# Patient Record
Sex: MALE | Race: WHITE | Employment: OTHER | URBAN - NONMETROPOLITAN AREA
[De-identification: names, ages, dates, MRNs, and addresses within clinical notes are randomized per-mention and may not be internally consistent; named-entity substitution may affect disease eponyms.]

---

## 2017-10-11 DIAGNOSIS — M79.671 FOOT PAIN, BILATERAL: Primary | ICD-10-CM

## 2017-10-11 DIAGNOSIS — M79.672 FOOT PAIN, BILATERAL: Primary | ICD-10-CM

## 2017-10-11 PROCEDURE — L4210 ORTH DEV REPAIR/REPL MINOR P: HCPCS | Performed by: PODIATRIST

## 2022-12-07 ENCOUNTER — OFFICE VISIT (OUTPATIENT)
Dept: PODIATRY | Age: 67
End: 2022-12-07
Payer: MEDICARE

## 2022-12-07 VITALS
SYSTOLIC BLOOD PRESSURE: 130 MMHG | HEIGHT: 70 IN | HEART RATE: 88 BPM | DIASTOLIC BLOOD PRESSURE: 80 MMHG | BODY MASS INDEX: 36.79 KG/M2 | WEIGHT: 257 LBS

## 2022-12-07 DIAGNOSIS — B35.1 DERMATOPHYTOSIS OF NAIL: ICD-10-CM

## 2022-12-07 DIAGNOSIS — M20.42 HAMMER TOES OF BOTH FEET: ICD-10-CM

## 2022-12-07 DIAGNOSIS — E11.42 DM TYPE 2 WITH DIABETIC PERIPHERAL NEUROPATHY (HCC): Primary | ICD-10-CM

## 2022-12-07 DIAGNOSIS — M20.41 HAMMER TOES OF BOTH FEET: ICD-10-CM

## 2022-12-07 PROCEDURE — 1036F TOBACCO NON-USER: CPT | Performed by: PODIATRIST

## 2022-12-07 PROCEDURE — 3017F COLORECTAL CA SCREEN DOC REV: CPT | Performed by: PODIATRIST

## 2022-12-07 PROCEDURE — G8484 FLU IMMUNIZE NO ADMIN: HCPCS | Performed by: PODIATRIST

## 2022-12-07 PROCEDURE — 2022F DILAT RTA XM EVC RTNOPTHY: CPT | Performed by: PODIATRIST

## 2022-12-07 PROCEDURE — 1123F ACP DISCUSS/DSCN MKR DOCD: CPT | Performed by: PODIATRIST

## 2022-12-07 PROCEDURE — G8427 DOCREV CUR MEDS BY ELIG CLIN: HCPCS | Performed by: PODIATRIST

## 2022-12-07 PROCEDURE — G8417 CALC BMI ABV UP PARAM F/U: HCPCS | Performed by: PODIATRIST

## 2022-12-07 PROCEDURE — 11720 DEBRIDE NAIL 1-5: CPT | Performed by: PODIATRIST

## 2022-12-07 PROCEDURE — 3046F HEMOGLOBIN A1C LEVEL >9.0%: CPT | Performed by: PODIATRIST

## 2022-12-07 PROCEDURE — 99203 OFFICE O/P NEW LOW 30 MIN: CPT | Performed by: PODIATRIST

## 2022-12-07 RX ORDER — GLIPIZIDE 10 MG/1
10 TABLET ORAL
COMMUNITY
Start: 2022-06-28

## 2022-12-07 RX ORDER — SILDENAFIL 50 MG/1
25 TABLET, FILM COATED ORAL
COMMUNITY
Start: 2022-05-18

## 2022-12-07 RX ORDER — GABAPENTIN 300 MG/1
300 CAPSULE ORAL 2 TIMES DAILY
COMMUNITY

## 2022-12-07 RX ORDER — ATORVASTATIN CALCIUM 80 MG/1
40 TABLET, FILM COATED ORAL
COMMUNITY
Start: 2022-05-18

## 2022-12-07 NOTE — PROGRESS NOTES
Foot Care Worksheet  PCP: Bret Marion  Last visit: 11/14/2022    Nail description: Thick , Yellow , Crumbly , Marked limitation of ambulation     Pain resulting from thickened and dystrophy of nail plate No    Nails involved  Right   5  (T5-T9)  Left     1, 5  (TA-T4)    Q7 1 Class A Finding - Non traumatic amputation of foot No    Q8 2 Class B Findings - Absent DP pulse No, Absent PT pulse No, Advanced tropic changes (3 required) Yes    Decrease hair growth Yes, Nail changes/thickening Yes, Pigmented changes/discoloration Yes, Skin texture (thin, shiny) No, Skin color (rubor/redness) No    Q9 1 Class B and 2 Class C Findings  Claudication No, Temperature change Yes, Paresthesia No, Burning No, Edema Yes    Subjective:  Jeff Zuniga is a 69 y.o. male who presents to the office today complaining of thick deformed nails. Symptoms began  year(s) ago. Patient relates pain is Present. Pain is rated 1 out of 10 and is described as mild. Treatments prior to today's visit include: none recent. Currently denies F/C/N/V. Allergies   Allergen Reactions    Bee Venom     Naproxen     Lidocaine Rash     ? Past Medical History:   Diagnosis Date    Allergic rhinitis     Colon polyps     history of    Elevated WBC count     history of    Hyperlipidemia     Hypertension     Obesity     Type II or unspecified type diabetes mellitus without mention of complication, not stated as uncontrolled     Unspecified sleep apnea        Prior to Admission medications    Medication Sig Start Date End Date Taking?  Authorizing Provider   atorvastatin (LIPITOR) 80 MG tablet 40 mg 5/18/22  Yes Historical Provider, MD   glipiZIDE (GLUCOTROL) 10 MG tablet 10 mg 6/28/22  Yes Historical Provider, MD   Dulaglutide 0.75 MG/0.5ML SOPN INJECT 0.75MG (0.5ML) UNDER SKIN ONCE EVERY WEEK FOR TYPE 2 DIABETES MELLITUS **REPLACES [facility]** 11/17/22  Yes Historical Provider, MD   vitamin D (CHOLECALCIFEROL) 25 MCG (1000 UT) TABS tablet TAKE TWO TABLETS BY MOUTH EVERY DAY 14  Yes Historical Provider, MD   empagliflozin (JARDIANCE) 25 MG tablet 12.5 mg 22  Yes Historical Provider, MD   sildenafil (VIAGRA) 50 MG tablet 25 mg 22  Yes Historical Provider, MD   gabapentin (NEURONTIN) 300 MG capsule Take 300 mg by mouth 2 times daily. Yes Historical Provider, MD   lisinopril-hydrochlorothiazide (PRINZIDE;ZESTORETIC) 20-12.5 MG per tablet Take 1 tablet by mouth daily. 14  Yes VERNON Woodall   metFORMIN (GLUCOPHAGE) 1000 MG tablet Take 1,000 mg by mouth 2 times daily. Yes Historical Provider, MD   loratadine (CLARITIN) 10 MG tablet Take 10 mg by mouth daily. Yes Historical Provider, MD   aspirin 81 MG tablet Take 81 mg by mouth daily. Yes Historical Provider, MD   Multiple Vitamins-Minerals (MULTIVITAMIN PO) Take  by mouth daily. Yes Historical Provider, MD   Omega-3 Fatty Acids (FISH OIL) 1000 MG CAPS Take 1,000 mg by mouth 2 times daily. Yes Historical Provider, MD       Past Surgical History:   Procedure Laterality Date    CARPAL TUNNEL RELEASE Right 12/3/2010    COLONOSCOPY  2009    polyps    INGUINAL HERNIA REPAIR Right 1977    TOENAIL EXCISION Left 2003    removal nail plate 1st digit       Family History   Problem Relation Age of Onset    Heart Disease Mother     High Blood Pressure Mother     Diabetes Mother     Heart Disease Father     High Blood Pressure Other        Social History     Tobacco Use    Smoking status: Former     Types: Cigarettes     Quit date:      Years since quittin.9    Smokeless tobacco: Not on file   Substance Use Topics    Alcohol use: Not Currently       ROS: All 14 ROS systems reviewed and pertinent positives noted above, all others negative. Lower Extremity Physical Examination:     Vitals:   Vitals:    22 1043   BP: 130/80   Pulse: 88     General: AAO x 3 in NAD.      Vascular: DP and PT pulses palpable 2/4, bilateral.  CFT <3 seconds, bilateral.  Hair growth absent to the level of the digits, bilateral.  Edema present, bilateral.  Varicosities present, bilateral. Erythema absent, bilateral. Distal Rubor absent bilateral.  Temperature decreased bilateral. Hyperpigmentation present bilateral. Atrophic skin yes. Neurological: Sensation Impaired to light touch to level of digits, bilateral.  Protective sensation intact  10/10 sites via 5.07/10g [place] Monofilament, bilateral.  negative Tinel's, bilateral.  negative Valleix sign, bilateral.  Vibratory abnormal  bilateral.  Reflexes Decreased bilateral.  Paresthesias positive. Dysthesias negative. Sharp/dull abnormal  bilateral.   Musculoskeletal: Muscle strength 5/5, bilateral.  Pain absent upon palpation bilateral. Normal medial longitudinal arch, bilateral.  Ankle ROM decreased,bilateral.  1st MPJ ROM within normal limits, bilateral.  Dorsally contracted digits present. No other foot deformities. Integument:  Open lesion absent, bilateral.  Interdigital maceration absent to web spaces bilateral.   Nails left 1, 5 and right 5 thickened, dystrophic and crumbly, discolored with subungual debris. Fissures absent, bilateral. Hyperkeratotic tissue is absent. Previous nail procedures on multiple nails with only spicules remaining. Asessment: Patient is a 69 y.o. male with:    Diagnosis Orders   1. DM type 2 with diabetic peripheral neuropathy ([address]        2. Dermatophytosis of nail        3. Hammer toes of both feet            Plan: Patient examined and evaluated. Current condition and treatment options discussed in detail. Pt given tx options for anti fungal therapy. Pt educated on Rx po lamisil, Rx topical Penlac, OTC home remedies or other OTC topical meds. All nails as mentioned above debrided in length and thickness. Patient advised OTC methods for treatment of the mycotic nails. Patient will follow up in 10 weeks. DM foot ed and exam  Patient is low level medical decision making. Patient is stable chronic condition. No new testing or Rx. Low risk morbidity. Contact office with any questions/problems/concerns.

## 2023-02-15 ENCOUNTER — OFFICE VISIT (OUTPATIENT)
Dept: PODIATRY | Age: 68
End: 2023-02-15
Payer: OTHER GOVERNMENT

## 2023-02-15 VITALS
HEIGHT: 70 IN | BODY MASS INDEX: 36.51 KG/M2 | DIASTOLIC BLOOD PRESSURE: 84 MMHG | HEART RATE: 76 BPM | SYSTOLIC BLOOD PRESSURE: 138 MMHG | WEIGHT: 255 LBS

## 2023-02-15 DIAGNOSIS — E11.42 DM TYPE 2 WITH DIABETIC PERIPHERAL NEUROPATHY (HCC): Primary | ICD-10-CM

## 2023-02-15 DIAGNOSIS — B35.1 DERMATOPHYTOSIS OF NAIL: ICD-10-CM

## 2023-02-15 PROCEDURE — 11720 DEBRIDE NAIL 1-5: CPT | Performed by: PODIATRIST

## 2023-02-15 PROCEDURE — 99999 PR OFFICE/OUTPT VISIT,PROCEDURE ONLY: CPT | Performed by: PODIATRIST

## 2023-02-15 NOTE — PROGRESS NOTES
Foot Care Worksheet  PCP: Michelle Armstrong  Last visit: 11/14/2022    Nail description: Thick , Yellow , Crumbly , Marked limitation of ambulation     Pain resulting from thickened and dystrophy of nail plate No    Nails involved  Right   5  (T5-T9)  Left     1, 5  (TA-T4)    Q7 1 Class A Finding - Non traumatic amputation of foot No    Q8 2 Class B Findings - Absent DP pulse No, Absent PT pulse No, Advanced tropic changes (3 required) Yes    Decrease hair growth Yes, Nail changes/thickening Yes, Pigmented changes/discoloration Yes, Skin texture (thin, shiny) No, Skin color (rubor/redness) No    Q9 1 Class B and 2 Class C Findings  Claudication No, Temperature change Yes, Paresthesia No, Burning No, Edema Yes    Subjective:  Carla Woods is a 69 y.o. male who presents to the office today for DM foot care. Currently denies F/C/N/V. Allergies   Allergen Reactions    Bee Venom     Naproxen     Lidocaine Rash     ? Past Medical History:   Diagnosis Date    Allergic rhinitis     Colon polyps     history of    Elevated WBC count     history of    Hyperlipidemia     Hypertension     Obesity     Type II or unspecified type diabetes mellitus without mention of complication, not stated as uncontrolled     Unspecified sleep apnea        Prior to Admission medications    Medication Sig Start Date End Date Taking?  Authorizing Provider   atorvastatin (LIPITOR) 80 MG tablet 40 mg 5/18/22  Yes Historical Provider, MD   glipiZIDE (GLUCOTROL) 10 MG tablet 10 mg 6/28/22  Yes Historical Provider, MD   Dulaglutide 0.75 MG/0.5ML SOPN INJECT 0.75MG (0.5ML) UNDER SKIN ONCE EVERY WEEK FOR TYPE 2 DIABETES MELLITUS **REPLACES [facility]** 11/17/22  Yes Historical Provider, MD   vitamin D (CHOLECALCIFEROL) 25 MCG (1000 UT) TABS tablet TAKE TWO TABLETS BY MOUTH EVERY DAY 5/29/14  Yes Historical Provider, MD   empagliflozin (JARDIANCE) 25 MG tablet 12.5 mg 5/18/22  Yes Historical Provider, MD   sildenafil (VIAGRA) 50 MG tablet 25 mg 22  Yes Historical Provider, MD   gabapentin (NEURONTIN) 300 MG capsule Take 300 mg by mouth 2 times daily. Yes Historical Provider, MD   lisinopril-hydrochlorothiazide (PRINZIDE;ZESTORETIC) 20-12.5 MG per tablet Take 1 tablet by mouth daily. 14  Yes VERNON Joyner   metFORMIN (GLUCOPHAGE) 1000 MG tablet Take 1,000 mg by mouth 2 times daily. Yes Historical Provider, MD   loratadine (CLARITIN) 10 MG tablet Take 10 mg by mouth daily. Yes Historical Provider, MD   aspirin 81 MG tablet Take 81 mg by mouth daily. Yes Historical Provider, MD   Multiple Vitamins-Minerals (MULTIVITAMIN PO) Take  by mouth daily. Yes Historical Provider, MD   Omega-3 Fatty Acids (FISH OIL) 1000 MG CAPS Take 1,000 mg by mouth 2 times daily. Yes Historical Provider, MD       Past Surgical History:   Procedure Laterality Date    CARPAL TUNNEL RELEASE Right 12/3/2010    COLONOSCOPY  2009    polyps    INGUINAL HERNIA REPAIR Right 1977    TOENAIL EXCISION Left 2003    removal nail plate 1st digit       Family History   Problem Relation Age of Onset    Heart Disease Mother     High Blood Pressure Mother     Diabetes Mother     Heart Disease Father     High Blood Pressure Other        Social History     Tobacco Use    Smoking status: Former     Types: Cigarettes     Quit date:      Years since quittin.1    Smokeless tobacco: Not on file   Substance Use Topics    Alcohol use: Not Currently       ROS: All 14 ROS systems reviewed and pertinent positives noted above, all others negative. Lower Extremity Physical Examination:     Vitals: There were no vitals filed for this visit. General: AAO x 3 in NAD.      Vascular: DP and PT pulses palpable 2/4, bilateral.  CFT <3 seconds, bilateral.  Hair growth absent to the level of the digits, bilateral.  Edema present, bilateral.  Varicosities present, bilateral. Erythema absent, bilateral. Distal Rubor absent bilateral.  Temperature decreased bilateral. Hyperpigmentation present bilateral. Atrophic skin yes. Neurological: Sensation Impaired to light touch to level of digits, bilateral.  Protective sensation intact  10/10 sites via 5.07/10g [place]andon Monofilament, bilateral.  negative Tinel's, bilateral.  negative Valleix sign, bilateral.  Vibratory abnormal  bilateral.  Reflexes Decreased bilateral.  Paresthesias positive. Dysthesias negative. Sharp/dull abnormal  bilateral.   Musculoskeletal: Muscle strength 5/5, bilateral.  Pain absent upon palpation bilateral. Normal medial longitudinal arch, bilateral.  Ankle ROM decreased,bilateral.  1st MPJ ROM within normal limits, bilateral.  Dorsally contracted digits present. No other foot deformities. Integument:  Open lesion absent, bilateral.  Interdigital maceration absent to web spaces bilateral.   Nails left 1, 5 and right 5 thickened, dystrophic and crumbly, discolored with subungual debris. Fissures absent, bilateral. Hyperkeratotic tissue is absent. Previous nail procedures on multiple nails with only spicules remaining. Asessment: Patient is a 69 y.o. male with:    Diagnosis Orders   1. DM type 2 with diabetic peripheral neuropathy ([address]        2. Dermatophytosis of nail              Plan: Patient examined and evaluated. Current condition and treatment options discussed in detail. All nails as mentioned above debrided in length and thickness. Patient advised OTC methods for treatment of the mycotic nails. Patient will follow up in 4 months  Contact office with any questions/problems/concerns.

## 2023-03-05 ENCOUNTER — APPOINTMENT (OUTPATIENT)
Dept: GENERAL RADIOLOGY | Age: 68
End: 2023-03-05
Payer: MEDICARE

## 2023-03-05 ENCOUNTER — APPOINTMENT (OUTPATIENT)
Dept: CT IMAGING | Age: 68
End: 2023-03-05
Payer: MEDICARE

## 2023-03-05 ENCOUNTER — HOSPITAL ENCOUNTER (INPATIENT)
Age: 68
LOS: 6 days | Discharge: HOME | End: 2023-03-11
Attending: SPECIALIST | Admitting: FAMILY MEDICINE
Payer: MEDICARE

## 2023-03-05 ENCOUNTER — ANESTHESIA (OUTPATIENT)
Dept: OPERATING ROOM | Age: 68
End: 2023-03-05
Payer: MEDICARE

## 2023-03-05 ENCOUNTER — ANESTHESIA EVENT (OUTPATIENT)
Dept: OPERATING ROOM | Age: 68
End: 2023-03-05
Payer: MEDICARE

## 2023-03-05 DIAGNOSIS — K56.600 PARTIAL SMALL BOWEL OBSTRUCTION (HCC): Primary | ICD-10-CM

## 2023-03-05 DIAGNOSIS — K56.609 SMALL BOWEL OBSTRUCTION (HCC): ICD-10-CM

## 2023-03-05 PROBLEM — A41.9 SEPSIS WITH ACUTE RENAL FAILURE (HCC): Status: ACTIVE | Noted: 2023-03-05

## 2023-03-05 PROBLEM — N17.9 SEPSIS WITH ACUTE RENAL FAILURE (HCC): Status: ACTIVE | Noted: 2023-03-05

## 2023-03-05 PROBLEM — R65.20 SEPSIS WITH ACUTE RENAL FAILURE (HCC): Status: ACTIVE | Noted: 2023-03-05

## 2023-03-05 LAB
ABSOLUTE EOS #: 0.03 K/UL (ref 0–0.44)
ABSOLUTE EOS #: 0.26 K/UL (ref 0–0.44)
ABSOLUTE IMMATURE GRANULOCYTE: 0.04 K/UL (ref 0–0.3)
ABSOLUTE IMMATURE GRANULOCYTE: 0.11 K/UL (ref 0–0.3)
ABSOLUTE LYMPH #: 0.88 K/UL (ref 1.1–3.7)
ABSOLUTE LYMPH #: 2.19 K/UL (ref 1.1–3.7)
ABSOLUTE MONO #: 0.84 K/UL (ref 0.1–1.2)
ABSOLUTE MONO #: 1.3 K/UL (ref 0.1–1.2)
ALBUMIN SERPL-MCNC: 4.6 G/DL (ref 3.5–5.2)
ALBUMIN/GLOBULIN RATIO: 1.5 (ref 1–2.5)
ALP SERPL-CCNC: 103 U/L (ref 40–129)
ALT SERPL-CCNC: 25 U/L (ref 5–41)
ANION GAP SERPL CALCULATED.3IONS-SCNC: 16 MMOL/L (ref 9–17)
ANION GAP SERPL CALCULATED.3IONS-SCNC: 20 MMOL/L (ref 9–17)
AST SERPL-CCNC: 25 U/L
BASOPHILS # BLD: 0 % (ref 0–2)
BASOPHILS # BLD: 0 % (ref 0–2)
BASOPHILS ABSOLUTE: 0.06 K/UL (ref 0–0.2)
BASOPHILS ABSOLUTE: 0.09 K/UL (ref 0–0.2)
BILIRUB SERPL-MCNC: 0.6 MG/DL (ref 0.3–1.2)
BILIRUBIN URINE: NEGATIVE
BUN SERPL-MCNC: 24 MG/DL (ref 8–23)
BUN SERPL-MCNC: 26 MG/DL (ref 8–23)
BUN/CREAT BLD: 15 (ref 9–20)
BUN/CREAT BLD: 17 (ref 9–20)
CALCIUM SERPL-MCNC: 8.2 MG/DL (ref 8.6–10.4)
CALCIUM SERPL-MCNC: 9.6 MG/DL (ref 8.6–10.4)
CHLORIDE SERPL-SCNC: 102 MMOL/L (ref 98–107)
CHLORIDE SERPL-SCNC: 98 MMOL/L (ref 98–107)
CO2 SERPL-SCNC: 21 MMOL/L (ref 20–31)
CO2 SERPL-SCNC: 22 MMOL/L (ref 20–31)
COLOR: YELLOW
COMMENT UA: ABNORMAL
CREAT SERPL-MCNC: 1.39 MG/DL (ref 0.7–1.2)
CREAT SERPL-MCNC: 1.73 MG/DL (ref 0.7–1.2)
EOSINOPHILS RELATIVE PERCENT: 0 % (ref 1–4)
EOSINOPHILS RELATIVE PERCENT: 1 % (ref 1–4)
GFR SERPL CREATININE-BSD FRML MDRD: 42 ML/MIN/1.73M2
GFR SERPL CREATININE-BSD FRML MDRD: 55 ML/MIN/1.73M2
GLUCOSE BLD-MCNC: 169 MG/DL (ref 75–110)
GLUCOSE SERPL-MCNC: 145 MG/DL (ref 70–99)
GLUCOSE SERPL-MCNC: 243 MG/DL (ref 70–99)
GLUCOSE UR STRIP.AUTO-MCNC: ABNORMAL MG/DL
HCT VFR BLD AUTO: 42.2 % (ref 40.7–50.3)
HCT VFR BLD AUTO: 47.4 % (ref 40.7–50.3)
HGB BLD-MCNC: 13.9 G/DL (ref 13–17)
HGB BLD-MCNC: 15.8 G/DL (ref 13–17)
IMMATURE GRANULOCYTES: 0 %
IMMATURE GRANULOCYTES: 1 %
KETONES UR STRIP.AUTO-MCNC: NEGATIVE MG/DL
LACTATE PLASV-SCNC: 2.9 MMOL/L (ref 0.5–2.2)
LACTATE PLASV-SCNC: 3.8 MMOL/L (ref 0.5–2.2)
LACTATE PLASV-SCNC: 4.1 MMOL/L (ref 0.5–2.2)
LACTATE PLASV-SCNC: 4.3 MMOL/L (ref 0.5–2.2)
LACTIC ACID, SEPSIS: 5.1 MMOL/L (ref 0.5–1.9)
LEUKOCYTE ESTERASE UR QL STRIP.AUTO: NEGATIVE
LIPASE SERPL-CCNC: 41 U/L (ref 13–60)
LYMPHOCYTES # BLD: 6 % (ref 24–43)
LYMPHOCYTES # BLD: 9 % (ref 24–43)
MCH RBC QN AUTO: 27.9 PG (ref 25.2–33.5)
MCH RBC QN AUTO: 28.1 PG (ref 25.2–33.5)
MCHC RBC AUTO-ENTMCNC: 32.9 G/DL (ref 25.2–33.5)
MCHC RBC AUTO-ENTMCNC: 33.3 G/DL (ref 25.2–33.5)
MCV RBC AUTO: 83.6 FL (ref 82.6–102.9)
MCV RBC AUTO: 85.3 FL (ref 82.6–102.9)
MONOCYTES # BLD: 6 % (ref 3–12)
MONOCYTES # BLD: 6 % (ref 3–12)
NITRITE UR QL STRIP.AUTO: NEGATIVE
NRBC AUTOMATED: 0 PER 100 WBC
NRBC AUTOMATED: 0 PER 100 WBC
PDW BLD-RTO: 13.5 % (ref 11.8–14.4)
PDW BLD-RTO: 13.8 % (ref 11.8–14.4)
PLATELET # BLD AUTO: 347 K/UL (ref 138–453)
PLATELET # BLD AUTO: 448 K/UL (ref 138–453)
PMV BLD AUTO: 9.3 FL (ref 8.1–13.5)
PMV BLD AUTO: 9.8 FL (ref 8.1–13.5)
POTASSIUM SERPL-SCNC: 4.2 MMOL/L (ref 3.7–5.3)
POTASSIUM SERPL-SCNC: 4.6 MMOL/L (ref 3.7–5.3)
PROT SERPL-MCNC: 7.7 G/DL (ref 6.4–8.3)
PROT UR STRIP.AUTO-MCNC: 5.5 MG/DL (ref 5–6)
PROT UR STRIP.AUTO-MCNC: NEGATIVE MG/DL
RBC # BLD: 4.95 M/UL (ref 4.21–5.77)
RBC # BLD: 5.67 M/UL (ref 4.21–5.77)
SARS-COV-2 RDRP RESP QL NAA+PROBE: NOT DETECTED
SEG NEUTROPHILS: 83 % (ref 36–65)
SEG NEUTROPHILS: 88 % (ref 36–65)
SEGMENTED NEUTROPHILS ABSOLUTE COUNT: 13.41 K/UL (ref 1.5–8.1)
SEGMENTED NEUTROPHILS ABSOLUTE COUNT: 19.45 K/UL (ref 1.5–8.1)
SODIUM SERPL-SCNC: 139 MMOL/L (ref 135–144)
SODIUM SERPL-SCNC: 140 MMOL/L (ref 135–144)
SPECIFIC GRAVITY UA: 1.01 (ref 1.01–1.02)
SPECIMEN DESCRIPTION: NORMAL
TROPONIN I SERPL DL<=0.01 NG/ML-MCNC: 31 NG/L (ref 0–22)
TROPONIN I SERPL DL<=0.01 NG/ML-MCNC: 32 NG/L (ref 0–22)
TROPONIN I SERPL DL<=0.01 NG/ML-MCNC: 34 NG/L (ref 0–22)
TROPONIN I SERPL DL<=0.01 NG/ML-MCNC: 55 NG/L (ref 0–22)
TURBIDITY: CLEAR
URINE HGB: NEGATIVE
UROBILINOGEN, URINE: NORMAL
WBC # BLD AUTO: 15.3 K/UL (ref 3.5–11.3)
WBC # BLD AUTO: 23.4 K/UL (ref 3.5–11.3)

## 2023-03-05 PROCEDURE — 71045 X-RAY EXAM CHEST 1 VIEW: CPT

## 2023-03-05 PROCEDURE — 2580000003 HC RX 258: Performed by: SPECIALIST

## 2023-03-05 PROCEDURE — 93005 ELECTROCARDIOGRAM TRACING: CPT | Performed by: SPECIALIST

## 2023-03-05 PROCEDURE — 87075 CULTR BACTERIA EXCEPT BLOOD: CPT

## 2023-03-05 PROCEDURE — 81003 URINALYSIS AUTO W/O SCOPE: CPT

## 2023-03-05 PROCEDURE — 2580000003 HC RX 258

## 2023-03-05 PROCEDURE — 2580000003 HC RX 258: Performed by: NURSE ANESTHETIST, CERTIFIED REGISTERED

## 2023-03-05 PROCEDURE — 0DJW4ZZ INSPECTION OF PERITONEUM, PERCUTANEOUS ENDOSCOPIC APPROACH: ICD-10-PCS | Performed by: SURGERY

## 2023-03-05 PROCEDURE — 2500000003 HC RX 250 WO HCPCS: Performed by: NURSE ANESTHETIST, CERTIFIED REGISTERED

## 2023-03-05 PROCEDURE — 6360000002 HC RX W HCPCS

## 2023-03-05 PROCEDURE — 84484 ASSAY OF TROPONIN QUANT: CPT

## 2023-03-05 PROCEDURE — 6360000002 HC RX W HCPCS: Performed by: FAMILY MEDICINE

## 2023-03-05 PROCEDURE — 2580000003 HC RX 258: Performed by: FAMILY MEDICINE

## 2023-03-05 PROCEDURE — 83690 ASSAY OF LIPASE: CPT

## 2023-03-05 PROCEDURE — 87040 BLOOD CULTURE FOR BACTERIA: CPT

## 2023-03-05 PROCEDURE — 85025 COMPLETE CBC W/AUTO DIFF WBC: CPT

## 2023-03-05 PROCEDURE — 87070 CULTURE OTHR SPECIMN AEROBIC: CPT

## 2023-03-05 PROCEDURE — 2709999900 HC NON-CHARGEABLE SUPPLY: Performed by: SURGERY

## 2023-03-05 PROCEDURE — 3700000001 HC ADD 15 MINUTES (ANESTHESIA): Performed by: SURGERY

## 2023-03-05 PROCEDURE — 6360000004 HC RX CONTRAST MEDICATION: Performed by: SPECIALIST

## 2023-03-05 PROCEDURE — 2580000003 HC RX 258: Performed by: SURGERY

## 2023-03-05 PROCEDURE — 99285 EMERGENCY DEPT VISIT HI MDM: CPT

## 2023-03-05 PROCEDURE — 99222 1ST HOSP IP/OBS MODERATE 55: CPT | Performed by: FAMILY MEDICINE

## 2023-03-05 PROCEDURE — 87635 SARS-COV-2 COVID-19 AMP PRB: CPT

## 2023-03-05 PROCEDURE — 3600000004 HC SURGERY LEVEL 4 BASE: Performed by: SURGERY

## 2023-03-05 PROCEDURE — 87205 SMEAR GRAM STAIN: CPT

## 2023-03-05 PROCEDURE — 6360000002 HC RX W HCPCS: Performed by: NURSE ANESTHETIST, CERTIFIED REGISTERED

## 2023-03-05 PROCEDURE — 36415 COLL VENOUS BLD VENIPUNCTURE: CPT

## 2023-03-05 PROCEDURE — 0D9670Z DRAINAGE OF STOMACH WITH DRAINAGE DEVICE, VIA NATURAL OR ARTIFICIAL OPENING: ICD-10-PCS | Performed by: FAMILY MEDICINE

## 2023-03-05 PROCEDURE — 7100000001 HC PACU RECOVERY - ADDTL 15 MIN: Performed by: SURGERY

## 2023-03-05 PROCEDURE — 7100000000 HC PACU RECOVERY - FIRST 15 MIN: Performed by: SURGERY

## 2023-03-05 PROCEDURE — 2709999900 CTA ABDOMEN PELVIS W CONTRAST

## 2023-03-05 PROCEDURE — 80053 COMPREHEN METABOLIC PANEL: CPT

## 2023-03-05 PROCEDURE — 83605 ASSAY OF LACTIC ACID: CPT

## 2023-03-05 PROCEDURE — 83036 HEMOGLOBIN GLYCOSYLATED A1C: CPT

## 2023-03-05 PROCEDURE — 80048 BASIC METABOLIC PNL TOTAL CA: CPT

## 2023-03-05 PROCEDURE — 64488 TAP BLOCK BI INJECTION: CPT | Performed by: NURSE ANESTHETIST, CERTIFIED REGISTERED

## 2023-03-05 PROCEDURE — 82947 ASSAY GLUCOSE BLOOD QUANT: CPT

## 2023-03-05 PROCEDURE — 6360000002 HC RX W HCPCS: Performed by: SPECIALIST

## 2023-03-05 PROCEDURE — 96376 TX/PRO/DX INJ SAME DRUG ADON: CPT

## 2023-03-05 PROCEDURE — 2060000000 HC ICU INTERMEDIATE R&B

## 2023-03-05 PROCEDURE — 3600000014 HC SURGERY LEVEL 4 ADDTL 15MIN: Performed by: SURGERY

## 2023-03-05 PROCEDURE — C9113 INJ PANTOPRAZOLE SODIUM, VIA: HCPCS | Performed by: FAMILY MEDICINE

## 2023-03-05 PROCEDURE — 96374 THER/PROPH/DIAG INJ IV PUSH: CPT

## 2023-03-05 PROCEDURE — 3700000000 HC ANESTHESIA ATTENDED CARE: Performed by: SURGERY

## 2023-03-05 PROCEDURE — 96375 TX/PRO/DX INJ NEW DRUG ADDON: CPT

## 2023-03-05 RX ORDER — ONDANSETRON 2 MG/ML
4 INJECTION INTRAMUSCULAR; INTRAVENOUS ONCE
Status: COMPLETED | OUTPATIENT
Start: 2023-03-05 | End: 2023-03-05

## 2023-03-05 RX ORDER — 0.9 % SODIUM CHLORIDE 0.9 %
1000 INTRAVENOUS SOLUTION INTRAVENOUS ONCE
Status: COMPLETED | OUTPATIENT
Start: 2023-03-05 | End: 2023-03-05

## 2023-03-05 RX ORDER — MORPHINE SULFATE 2 MG/ML
2 INJECTION, SOLUTION INTRAMUSCULAR; INTRAVENOUS EVERY 5 MIN PRN
Status: DISCONTINUED | OUTPATIENT
Start: 2023-03-05 | End: 2023-03-05 | Stop reason: HOSPADM

## 2023-03-05 RX ORDER — FAMOTIDINE 20 MG/1
20 TABLET, FILM COATED ORAL 2 TIMES DAILY
Status: DISCONTINUED | OUTPATIENT
Start: 2023-03-05 | End: 2023-03-05

## 2023-03-05 RX ORDER — MORPHINE SULFATE 4 MG/ML
4 INJECTION, SOLUTION INTRAMUSCULAR; INTRAVENOUS ONCE
Status: COMPLETED | OUTPATIENT
Start: 2023-03-05 | End: 2023-03-05

## 2023-03-05 RX ORDER — SODIUM CHLORIDE, SODIUM LACTATE, POTASSIUM CHLORIDE, CALCIUM CHLORIDE 600; 310; 30; 20 MG/100ML; MG/100ML; MG/100ML; MG/100ML
INJECTION, SOLUTION INTRAVENOUS CONTINUOUS
Status: DISCONTINUED | OUTPATIENT
Start: 2023-03-05 | End: 2023-03-05

## 2023-03-05 RX ORDER — DEXTROSE MONOHYDRATE 100 MG/ML
INJECTION, SOLUTION INTRAVENOUS CONTINUOUS PRN
Status: DISCONTINUED | OUTPATIENT
Start: 2023-03-05 | End: 2023-03-05 | Stop reason: HOSPADM

## 2023-03-05 RX ORDER — POTASSIUM CHLORIDE 7.45 MG/ML
10 INJECTION INTRAVENOUS PRN
Status: DISCONTINUED | OUTPATIENT
Start: 2023-03-05 | End: 2023-03-11 | Stop reason: HOSPADM

## 2023-03-05 RX ORDER — MORPHINE SULFATE 2 MG/ML
2 INJECTION, SOLUTION INTRAMUSCULAR; INTRAVENOUS
Status: DISCONTINUED | OUTPATIENT
Start: 2023-03-05 | End: 2023-03-05

## 2023-03-05 RX ORDER — ONDANSETRON 2 MG/ML
4 INJECTION INTRAMUSCULAR; INTRAVENOUS EVERY 6 HOURS PRN
Status: DISCONTINUED | OUTPATIENT
Start: 2023-03-05 | End: 2023-03-05 | Stop reason: SDUPTHER

## 2023-03-05 RX ORDER — INSULIN LISPRO 100 [IU]/ML
0-4 INJECTION, SOLUTION INTRAVENOUS; SUBCUTANEOUS NIGHTLY
Status: DISCONTINUED | OUTPATIENT
Start: 2023-03-05 | End: 2023-03-05

## 2023-03-05 RX ORDER — 0.9 % SODIUM CHLORIDE 0.9 %
500 INTRAVENOUS SOLUTION INTRAVENOUS ONCE
Status: COMPLETED | OUTPATIENT
Start: 2023-03-05 | End: 2023-03-05

## 2023-03-05 RX ORDER — BUPIVACAINE HYDROCHLORIDE 5 MG/ML
INJECTION, SOLUTION EPIDURAL; INTRACAUDAL
Status: COMPLETED | OUTPATIENT
Start: 2023-03-05 | End: 2023-03-05

## 2023-03-05 RX ORDER — SODIUM CHLORIDE, SODIUM LACTATE, POTASSIUM CHLORIDE, AND CALCIUM CHLORIDE .6; .31; .03; .02 G/100ML; G/100ML; G/100ML; G/100ML
500 INJECTION, SOLUTION INTRAVENOUS ONCE
Status: COMPLETED | OUTPATIENT
Start: 2023-03-05 | End: 2023-03-05

## 2023-03-05 RX ORDER — MORPHINE SULFATE 2 MG/ML
1 INJECTION, SOLUTION INTRAMUSCULAR; INTRAVENOUS EVERY 5 MIN PRN
Status: DISCONTINUED | OUTPATIENT
Start: 2023-03-05 | End: 2023-03-05 | Stop reason: HOSPADM

## 2023-03-05 RX ORDER — SEMAGLUTIDE 1.34 MG/ML
0.5 INJECTION, SOLUTION SUBCUTANEOUS
Status: ON HOLD | COMMUNITY
End: 2023-03-11 | Stop reason: HOSPADM

## 2023-03-05 RX ORDER — MORPHINE SULFATE 2 MG/ML
2 INJECTION, SOLUTION INTRAMUSCULAR; INTRAVENOUS
Status: DISCONTINUED | OUTPATIENT
Start: 2023-03-05 | End: 2023-03-11 | Stop reason: HOSPADM

## 2023-03-05 RX ORDER — DIPHENHYDRAMINE HYDROCHLORIDE 50 MG/ML
INJECTION INTRAMUSCULAR; INTRAVENOUS PRN
Status: DISCONTINUED | OUTPATIENT
Start: 2023-03-05 | End: 2023-03-05 | Stop reason: SDUPTHER

## 2023-03-05 RX ORDER — SODIUM CHLORIDE, SODIUM LACTATE, POTASSIUM CHLORIDE, CALCIUM CHLORIDE 600; 310; 30; 20 MG/100ML; MG/100ML; MG/100ML; MG/100ML
INJECTION, SOLUTION INTRAVENOUS CONTINUOUS PRN
Status: DISCONTINUED | OUTPATIENT
Start: 2023-03-05 | End: 2023-03-05 | Stop reason: SDUPTHER

## 2023-03-05 RX ORDER — MORPHINE SULFATE 4 MG/ML
4 INJECTION, SOLUTION INTRAMUSCULAR; INTRAVENOUS
Status: DISCONTINUED | OUTPATIENT
Start: 2023-03-05 | End: 2023-03-11 | Stop reason: HOSPADM

## 2023-03-05 RX ORDER — DEXAMETHASONE SODIUM PHOSPHATE 4 MG/ML
INJECTION, SOLUTION INTRA-ARTICULAR; INTRALESIONAL; INTRAMUSCULAR; INTRAVENOUS; SOFT TISSUE PRN
Status: DISCONTINUED | OUTPATIENT
Start: 2023-03-05 | End: 2023-03-05 | Stop reason: SDUPTHER

## 2023-03-05 RX ORDER — ENOXAPARIN SODIUM 100 MG/ML
40 INJECTION SUBCUTANEOUS DAILY
Status: DISCONTINUED | OUTPATIENT
Start: 2023-03-05 | End: 2023-03-10

## 2023-03-05 RX ORDER — ONDANSETRON 2 MG/ML
4 INJECTION INTRAMUSCULAR; INTRAVENOUS EVERY 6 HOURS PRN
Status: DISCONTINUED | OUTPATIENT
Start: 2023-03-05 | End: 2023-03-11 | Stop reason: HOSPADM

## 2023-03-05 RX ORDER — PHENYLEPHRINE HYDROCHLORIDE 10 MG/ML
INJECTION INTRAVENOUS PRN
Status: DISCONTINUED | OUTPATIENT
Start: 2023-03-05 | End: 2023-03-05 | Stop reason: SDUPTHER

## 2023-03-05 RX ORDER — MIDAZOLAM HYDROCHLORIDE 1 MG/ML
INJECTION INTRAMUSCULAR; INTRAVENOUS PRN
Status: DISCONTINUED | OUTPATIENT
Start: 2023-03-05 | End: 2023-03-05 | Stop reason: SDUPTHER

## 2023-03-05 RX ORDER — SODIUM CHLORIDE 9 MG/ML
INJECTION, SOLUTION INTRAVENOUS CONTINUOUS
Status: DISCONTINUED | OUTPATIENT
Start: 2023-03-05 | End: 2023-03-05

## 2023-03-05 RX ORDER — METOPROLOL TARTRATE 5 MG/5ML
2.5 INJECTION INTRAVENOUS EVERY 6 HOURS PRN
Status: DISCONTINUED | OUTPATIENT
Start: 2023-03-05 | End: 2023-03-11 | Stop reason: HOSPADM

## 2023-03-05 RX ORDER — ENOXAPARIN SODIUM 100 MG/ML
30 INJECTION SUBCUTANEOUS 2 TIMES DAILY
Status: DISCONTINUED | OUTPATIENT
Start: 2023-03-05 | End: 2023-03-05

## 2023-03-05 RX ORDER — SODIUM CHLORIDE 0.9 % (FLUSH) 0.9 %
3 SYRINGE (ML) INJECTION EVERY 8 HOURS
Status: DISCONTINUED | OUTPATIENT
Start: 2023-03-05 | End: 2023-03-05

## 2023-03-05 RX ORDER — SODIUM CHLORIDE 9 MG/ML
INJECTION, SOLUTION INTRAVENOUS CONTINUOUS PRN
Status: DISCONTINUED | OUTPATIENT
Start: 2023-03-05 | End: 2023-03-05 | Stop reason: SDUPTHER

## 2023-03-05 RX ORDER — INSULIN LISPRO 100 [IU]/ML
0-8 INJECTION, SOLUTION INTRAVENOUS; SUBCUTANEOUS
Status: DISCONTINUED | OUTPATIENT
Start: 2023-03-05 | End: 2023-03-05

## 2023-03-05 RX ORDER — ONDANSETRON 2 MG/ML
INJECTION INTRAMUSCULAR; INTRAVENOUS PRN
Status: DISCONTINUED | OUTPATIENT
Start: 2023-03-05 | End: 2023-03-05 | Stop reason: SDUPTHER

## 2023-03-05 RX ORDER — ROPIVACAINE HYDROCHLORIDE 5 MG/ML
INJECTION, SOLUTION EPIDURAL; INFILTRATION; PERINEURAL
Status: DISCONTINUED | OUTPATIENT
Start: 2023-03-05 | End: 2023-03-05

## 2023-03-05 RX ORDER — ROCURONIUM BROMIDE 10 MG/ML
INJECTION, SOLUTION INTRAVENOUS PRN
Status: DISCONTINUED | OUTPATIENT
Start: 2023-03-05 | End: 2023-03-05 | Stop reason: SDUPTHER

## 2023-03-05 RX ORDER — 0.9 % SODIUM CHLORIDE 0.9 %
1000 INTRAVENOUS SOLUTION INTRAVENOUS
Status: COMPLETED | OUTPATIENT
Start: 2023-03-05 | End: 2023-03-05

## 2023-03-05 RX ORDER — DEXTROSE MONOHYDRATE 100 MG/ML
INJECTION, SOLUTION INTRAVENOUS CONTINUOUS PRN
Status: DISCONTINUED | OUTPATIENT
Start: 2023-03-05 | End: 2023-03-11 | Stop reason: HOSPADM

## 2023-03-05 RX ORDER — ONDANSETRON 4 MG/1
4 TABLET, ORALLY DISINTEGRATING ORAL EVERY 8 HOURS PRN
Status: DISCONTINUED | OUTPATIENT
Start: 2023-03-05 | End: 2023-03-05

## 2023-03-05 RX ORDER — SODIUM CHLORIDE 0.9 % (FLUSH) 0.9 %
5-40 SYRINGE (ML) INJECTION PRN
Status: DISCONTINUED | OUTPATIENT
Start: 2023-03-05 | End: 2023-03-11 | Stop reason: HOSPADM

## 2023-03-05 RX ORDER — INSULIN LISPRO 100 [IU]/ML
0-4 INJECTION, SOLUTION INTRAVENOUS; SUBCUTANEOUS EVERY 6 HOURS
Status: DISCONTINUED | OUTPATIENT
Start: 2023-03-06 | End: 2023-03-09

## 2023-03-05 RX ORDER — PROPOFOL 10 MG/ML
INJECTION, EMULSION INTRAVENOUS PRN
Status: DISCONTINUED | OUTPATIENT
Start: 2023-03-05 | End: 2023-03-05 | Stop reason: SDUPTHER

## 2023-03-05 RX ORDER — SODIUM CHLORIDE 0.9 % (FLUSH) 0.9 %
5-40 SYRINGE (ML) INJECTION EVERY 12 HOURS SCHEDULED
Status: DISCONTINUED | OUTPATIENT
Start: 2023-03-05 | End: 2023-03-11 | Stop reason: HOSPADM

## 2023-03-05 RX ORDER — METOPROLOL TARTRATE 5 MG/5ML
INJECTION INTRAVENOUS PRN
Status: DISCONTINUED | OUTPATIENT
Start: 2023-03-05 | End: 2023-03-05 | Stop reason: SDUPTHER

## 2023-03-05 RX ORDER — LIDOCAINE HYDROCHLORIDE 20 MG/ML
INJECTION, SOLUTION EPIDURAL; INFILTRATION; INTRACAUDAL; PERINEURAL PRN
Status: DISCONTINUED | OUTPATIENT
Start: 2023-03-05 | End: 2023-03-05 | Stop reason: SDUPTHER

## 2023-03-05 RX ORDER — KETOROLAC TROMETHAMINE 30 MG/ML
INJECTION, SOLUTION INTRAMUSCULAR; INTRAVENOUS PRN
Status: DISCONTINUED | OUTPATIENT
Start: 2023-03-05 | End: 2023-03-05 | Stop reason: SDUPTHER

## 2023-03-05 RX ORDER — 0.9 % SODIUM CHLORIDE 0.9 %
1000 INTRAVENOUS SOLUTION INTRAVENOUS
Status: COMPLETED | OUTPATIENT
Start: 2023-03-06 | End: 2023-03-06

## 2023-03-05 RX ORDER — ONDANSETRON 2 MG/ML
4 INJECTION INTRAMUSCULAR; INTRAVENOUS
Status: DISCONTINUED | OUTPATIENT
Start: 2023-03-05 | End: 2023-03-05 | Stop reason: HOSPADM

## 2023-03-05 RX ORDER — SODIUM CHLORIDE 9 MG/ML
INJECTION, SOLUTION INTRAVENOUS PRN
Status: DISCONTINUED | OUTPATIENT
Start: 2023-03-05 | End: 2023-03-11 | Stop reason: HOSPADM

## 2023-03-05 RX ADMIN — ENOXAPARIN SODIUM 30 MG: 100 INJECTION SUBCUTANEOUS at 10:03

## 2023-03-05 RX ADMIN — SODIUM CHLORIDE, POTASSIUM CHLORIDE, SODIUM LACTATE AND CALCIUM CHLORIDE: 600; 310; 30; 20 INJECTION, SOLUTION INTRAVENOUS at 17:05

## 2023-03-05 RX ADMIN — IOPAMIDOL 100 ML: 755 INJECTION, SOLUTION INTRAVENOUS at 05:30

## 2023-03-05 RX ADMIN — MORPHINE SULFATE 2 MG: 2 INJECTION, SOLUTION INTRAMUSCULAR; INTRAVENOUS at 10:03

## 2023-03-05 RX ADMIN — METOPROLOL TARTRATE 2 MG: 5 INJECTION, SOLUTION INTRAVENOUS at 16:47

## 2023-03-05 RX ADMIN — DEXAMETHASONE SODIUM PHOSPHATE 4 MG: 4 INJECTION, SOLUTION INTRAMUSCULAR; INTRAVENOUS at 16:38

## 2023-03-05 RX ADMIN — BUPIVACAINE HYDROCHLORIDE 30 ML: 5 INJECTION, SOLUTION EPIDURAL; INTRACAUDAL; PERINEURAL at 16:03

## 2023-03-05 RX ADMIN — SODIUM CHLORIDE 1000 ML: 9 INJECTION, SOLUTION INTRAVENOUS at 04:17

## 2023-03-05 RX ADMIN — PROPOFOL 180 MG: 10 INJECTION, EMULSION INTRAVENOUS at 15:57

## 2023-03-05 RX ADMIN — SODIUM CHLORIDE, POTASSIUM CHLORIDE, SODIUM LACTATE AND CALCIUM CHLORIDE: 600; 310; 30; 20 INJECTION, SOLUTION INTRAVENOUS at 16:13

## 2023-03-05 RX ADMIN — SODIUM CHLORIDE 1000 ML: 9 INJECTION, SOLUTION INTRAVENOUS at 21:28

## 2023-03-05 RX ADMIN — SODIUM CHLORIDE 1000 ML: 9 INJECTION, SOLUTION INTRAVENOUS at 23:54

## 2023-03-05 RX ADMIN — SODIUM CHLORIDE, POTASSIUM CHLORIDE, SODIUM LACTATE AND CALCIUM CHLORIDE 500 ML: 600; 310; 30; 20 INJECTION, SOLUTION INTRAVENOUS at 13:59

## 2023-03-05 RX ADMIN — SUGAMMADEX 100 MG: 100 INJECTION, SOLUTION INTRAVENOUS at 17:11

## 2023-03-05 RX ADMIN — ONDANSETRON 4 MG: 2 INJECTION INTRAMUSCULAR; INTRAVENOUS at 16:38

## 2023-03-05 RX ADMIN — MORPHINE SULFATE 4 MG: 4 INJECTION, SOLUTION INTRAMUSCULAR; INTRAVENOUS at 06:10

## 2023-03-05 RX ADMIN — MIDAZOLAM HYDROCHLORIDE 2 MG: 1 INJECTION, SOLUTION INTRAMUSCULAR; INTRAVENOUS at 15:47

## 2023-03-05 RX ADMIN — SODIUM CHLORIDE 500 ML: 9 INJECTION, SOLUTION INTRAVENOUS at 22:40

## 2023-03-05 RX ADMIN — METOPROLOL TARTRATE 3 MG: 5 INJECTION, SOLUTION INTRAVENOUS at 16:43

## 2023-03-05 RX ADMIN — SODIUM CHLORIDE: 9 INJECTION, SOLUTION INTRAVENOUS at 17:29

## 2023-03-05 RX ADMIN — PHENYLEPHRINE HYDROCHLORIDE 100 MCG: 10 INJECTION INTRAVENOUS at 16:07

## 2023-03-05 RX ADMIN — ENOXAPARIN SODIUM 40 MG: 100 INJECTION SUBCUTANEOUS at 21:27

## 2023-03-05 RX ADMIN — PHENYLEPHRINE HYDROCHLORIDE 100 MCG: 10 INJECTION INTRAVENOUS at 17:08

## 2023-03-05 RX ADMIN — SODIUM CHLORIDE, POTASSIUM CHLORIDE, SODIUM LACTATE AND CALCIUM CHLORIDE: 600; 310; 30; 20 INJECTION, SOLUTION INTRAVENOUS at 15:49

## 2023-03-05 RX ADMIN — SODIUM CHLORIDE: 9 INJECTION, SOLUTION INTRAVENOUS at 09:24

## 2023-03-05 RX ADMIN — ROCURONIUM BROMIDE 30 MG: 10 INJECTION INTRAVENOUS at 15:57

## 2023-03-05 RX ADMIN — SODIUM CHLORIDE, POTASSIUM CHLORIDE, SODIUM LACTATE AND CALCIUM CHLORIDE: 600; 310; 30; 20 INJECTION, SOLUTION INTRAVENOUS at 18:23

## 2023-03-05 RX ADMIN — SODIUM CHLORIDE, PRESERVATIVE FREE 40 MG: 5 INJECTION INTRAVENOUS at 13:01

## 2023-03-05 RX ADMIN — PHENYLEPHRINE HYDROCHLORIDE 100 MCG: 10 INJECTION INTRAVENOUS at 17:11

## 2023-03-05 RX ADMIN — DIPHENHYDRAMINE HYDROCHLORIDE 25 MG: 50 INJECTION, SOLUTION INTRAMUSCULAR; INTRAVENOUS at 16:38

## 2023-03-05 RX ADMIN — LIDOCAINE HYDROCHLORIDE 100 MG: 20 INJECTION, SOLUTION EPIDURAL; INFILTRATION; INTRACAUDAL; PERINEURAL at 15:57

## 2023-03-05 RX ADMIN — MORPHINE SULFATE 4 MG: 4 INJECTION, SOLUTION INTRAMUSCULAR; INTRAVENOUS at 04:16

## 2023-03-05 RX ADMIN — ONDANSETRON 4 MG: 2 INJECTION INTRAMUSCULAR; INTRAVENOUS at 04:16

## 2023-03-05 RX ADMIN — PHENYLEPHRINE HYDROCHLORIDE 100 MCG: 10 INJECTION INTRAVENOUS at 16:32

## 2023-03-05 RX ADMIN — PHENYLEPHRINE HYDROCHLORIDE 100 MCG: 10 INJECTION INTRAVENOUS at 16:33

## 2023-03-05 RX ADMIN — KETOROLAC TROMETHAMINE 30 MG: 30 INJECTION, SOLUTION INTRAMUSCULAR; INTRAVENOUS at 16:38

## 2023-03-05 RX ADMIN — PHENYLEPHRINE HYDROCHLORIDE 100 MCG: 10 INJECTION INTRAVENOUS at 16:05

## 2023-03-05 RX ADMIN — PIPERACILLIN AND TAZOBACTAM 3375 MG: 3; .375 INJECTION, POWDER, FOR SOLUTION INTRAVENOUS at 13:08

## 2023-03-05 RX ADMIN — ROCURONIUM BROMIDE 20 MG: 10 INJECTION INTRAVENOUS at 16:13

## 2023-03-05 RX ADMIN — SODIUM CHLORIDE 1000 ML: 9 INJECTION, SOLUTION INTRAVENOUS at 19:36

## 2023-03-05 RX ADMIN — PIPERACILLIN AND TAZOBACTAM 3375 MG: 3; .375 INJECTION, POWDER, FOR SOLUTION INTRAVENOUS at 22:59

## 2023-03-05 RX ADMIN — SODIUM CHLORIDE 1000 ML: 9 INJECTION, SOLUTION INTRAVENOUS at 20:13

## 2023-03-05 RX ADMIN — MIDAZOLAM HYDROCHLORIDE 100 MG: 1 INJECTION, SOLUTION INTRAMUSCULAR; INTRAVENOUS at 15:56

## 2023-03-05 RX ADMIN — SUGAMMADEX 100 MG: 100 INJECTION, SOLUTION INTRAVENOUS at 17:02

## 2023-03-05 ASSESSMENT — PAIN DESCRIPTION - FREQUENCY
FREQUENCY: CONTINUOUS
FREQUENCY: CONTINUOUS

## 2023-03-05 ASSESSMENT — PAIN DESCRIPTION - PAIN TYPE: TYPE: ACUTE PAIN

## 2023-03-05 ASSESSMENT — PAIN DESCRIPTION - LOCATION
LOCATION: ABDOMEN
LOCATION: ABDOMEN
LOCATION: ABDOMEN;BACK
LOCATION: ABDOMEN;BACK

## 2023-03-05 ASSESSMENT — ENCOUNTER SYMPTOMS
TROUBLE SWALLOWING: 0
WHEEZING: 0
DIARRHEA: 1
NAUSEA: 1
CHEST TIGHTNESS: 0
VOICE CHANGE: 0
SORE THROAT: 0
CHOKING: 0
ABDOMINAL DISTENTION: 1
VOMITING: 1
SHORTNESS OF BREATH: 0
HEMATOCHEZIA: 0
ABDOMINAL PAIN: 1
COUGH: 0
BACK PAIN: 1

## 2023-03-05 ASSESSMENT — CROHNS DISEASE ACTIVITY INDEX (CDAI): CDAI SCORE: 0

## 2023-03-05 ASSESSMENT — PAIN DESCRIPTION - DESCRIPTORS
DESCRIPTORS: SHARP
DESCRIPTORS: SHARP
DESCRIPTORS: DISCOMFORT

## 2023-03-05 ASSESSMENT — PAIN DESCRIPTION - ORIENTATION: ORIENTATION: MID

## 2023-03-05 ASSESSMENT — PAIN SCALES - GENERAL
PAINLEVEL_OUTOF10: 10
PAINLEVEL_OUTOF10: 8
PAINLEVEL_OUTOF10: 0
PAINLEVEL_OUTOF10: 6
PAINLEVEL_OUTOF10: 7
PAINLEVEL_OUTOF10: 10

## 2023-03-05 ASSESSMENT — PAIN DESCRIPTION - ONSET: ONSET: ON-GOING

## 2023-03-05 ASSESSMENT — PAIN - FUNCTIONAL ASSESSMENT
PAIN_FUNCTIONAL_ASSESSMENT: 0-10
PAIN_FUNCTIONAL_ASSESSMENT: PREVENTS OR INTERFERES WITH ALL ACTIVE AND SOME PASSIVE ACTIVITIES

## 2023-03-05 NOTE — ANESTHESIA PRE PROCEDURE
Department of Anesthesiology  Preprocedure Note       Name:  Mary Avila   Age:  69 y.o.  :  1955                                          MRN:  [id number]         Date:  3/5/2023      Surgeon: Gilles Beyer):  Hai Nino MD    Procedure: Procedure(s):  LAPAROSCOPY EXPLORATORY    Medications prior to admission:   Prior to Admission medications    Medication Sig Start Date End Date Taking? Authorizing Provider   Semaglutide,0.25 or 0.5MG/DOS, (OZEMPIC, 0.25 OR 0.5 MG/DOSE,) 2 MG/1.5ML SOPN Inject 0.5 mg into the skin   Yes Historical Provider, MD   atorvastatin (LIPITOR) 80 MG tablet 40 mg 22   Historical Provider, MD   glipiZIDE (GLUCOTROL) 10 MG tablet 10 mg 22   Historical Provider, MD   Dulaglutide 0.75 MG/0.5ML SOPN INJECT 0.75MG (0.5ML) UNDER SKIN ONCE EVERY WEEK FOR TYPE 2 DIABETES MELLITUS **[facility] 22   Historical Provider, MD   vitamin D (CHOLECALCIFEROL) 25 MCG (1000 UT) TABS tablet TAKE TWO TABLETS BY MOUTH EVERY DAY 14   Historical Provider, MD   empagliflozin (JARDIANCE) 25 MG tablet 12.5 mg 22   Historical Provider, MD   sildenafil (VIAGRA) 50 MG tablet 25 mg 22   Historical Provider, MD   gabapentin (NEURONTIN) 300 MG capsule Take 300 mg by mouth 2 times daily. Historical Provider, MD   lisinopril-hydrochlorothiazide (PRINZIDE;ZESTORETIC) 20-12.5 MG per tablet Take 1 tablet by mouth daily. 14   VERNON Alvarez   metFORMIN (GLUCOPHAGE) 1000 MG tablet Take 1,000 mg by mouth 2 times daily. Historical Provider, MD   loratadine (CLARITIN) 10 MG tablet Take 10 mg by mouth daily. Historical Provider, MD   aspirin 81 MG tablet Take 81 mg by mouth daily. Historical Provider, MD   Multiple Vitamins-Minerals (MULTIVITAMIN PO) Take  by mouth daily. Historical Provider, MD   Omega-3 Fatty Acids (FISH OIL) 1000 MG CAPS Take 1,000 mg by mouth 2 times daily.     Historical Provider, MD       Current medications:    Current Facility-Administered Medications   Medication Dose Route Frequency Provider Last Rate Last Admin    sodium chloride flush 0.9 % injection 3 mL  3 mL IntraVENous Q8H Keegan Fernandez MD        sodium chloride flush 0.9 % injection 5-40 mL  5-40 mL IntraVENous 2 times per day Rexene Bruch, APRN - CNP        sodium chloride flush 0.9 % injection 5-40 mL  5-40 mL IntraVENous PRN Rexene Bruch, APRN - CNP        0.9 % sodium chloride infusion   IntraVENous PRN Rexene Bruch, APRN - CNP        enoxaparin Sodium (LOVENOX) injection 30 mg  30 mg SubCUTAneous BID Rexene Bruch, APRN - CNP   30 mg at 03/05/23 1003    potassium chloride 10 mEq/100 mL IVPB (Peripheral Line)  10 mEq IntraVENous PRN Rexene Bruch, APRN - CNP        ondansetron [facility]) injection 4 mg  4 mg IntraVENous Q6H PRN Rexene Bruch, APRN - CNP        0.9 % sodium chloride infusion   IntraVENous Continuous Rexene Bruch, APRN -  mL/hr at 03/05/23 0924 New Bag at 03/05/23 0924    morphine (PF) injection 2 mg  2 mg IntraVENous Q1H PRN Rexene Bruch, APRN - CNP   2 mg at 03/05/23 1003    piperacillin-tazobactam (ZOSYN) 3,375 mg in sodium chloride 0.9 % 50 mL IVPB (mini-bag)  3,375 mg IntraVENous q8h Raul Alston MD 12.5 mL/hr at 03/05/23 1308 3,375 mg at 03/05/23 1308    metoprolol (LOPRESSOR) injection 2.5 mg  2.5 mg IntraVENous Q6H PRN Raul Alston MD        glucose chewable tablet 16 g  4 tablet Oral PRN Raul Alston MD        dextrose bolus 10% 125 mL  125 mL IntraVENous PRN Raul Alston MD        Or    dextrose bolus 10% 250 mL  250 mL IntraVENous PRN Raul Alston MD        glucagon (rDNA) injection 1 mg  1 mg SubCUTAneous PRN Raul Alston MD        dextrose 10 % infusion   IntraVENous Continuous PRN Yomaira Green MD        insulin lispro (HUMALOG) injection vial 0-8 Units  0-8 Units SubCUTAneous TID  Yomaira Green MD        insulin lispro (HUMALOG) injection vial 0-4 Units  0-4 Units SubCUTAneous Nightly Tiffany Smart MD        pantoprazole (PROTONIX) 40 mg in sodium chloride (PF) 0.9 % 10 mL injection  40 mg IntraVENous Daily Tiffany Smart MD   40 mg at 23 1301       Allergies:     Allergies   Allergen Reactions    Bee Venom     Naproxen        Problem List:    Patient Active Problem List   Diagnosis Code    Partial small bowel obstruction (HCC) K56.600       Past Medical History:        Diagnosis Date    Allergic rhinitis     Colon polyps     history of    Elevated WBC count     history of    Hyperlipidemia     Hypertension     Obesity     Type II or unspecified type diabetes mellitus without mention of complication, not stated as uncontrolled     Unspecified sleep apnea        Past Surgical History:        Procedure Laterality Date    CARPAL TUNNEL RELEASE Right 12/3/2010    COLONOSCOPY  2009    polyps    INGUINAL HERNIA REPAIR Right 1977    TOENAIL EXCISION Left 2003    removal nail plate 1st digit       Social History:    Social History     Tobacco Use    Smoking status: Former     Types: Cigarettes     Quit date:      Years since quittin.1    Smokeless tobacco: Not on file   Substance Use Topics    Alcohol use: Not Currently                                Counseling given: Not Answered      Vital Signs (Current):   Vitals:    23 0700 23 0745 23 0813 23 1245   BP:  (!) 145/66 127/60 122/62   Pulse: 88 94 93 92   Resp: 16 18 20 16   Temp:   37.1 °C (98.7 °F) 36.9 °C (98.5 °F)   TempSrc:   Tympanic Tympanic   SpO2: 96% 98% 97% 95%   Weight:       Height:                                                  BP Readings from Last 3 Encounters:   23 122/62   02/15/23 138/84   22 130/80       NPO Status: Time of last liquid consumption: 023 (to take meds)                        Time of last solid consumption:                         Date of last liquid consumption: 03/05/23                        Date of last solid food consumption: 03/04/23    BMI:   Wt Readings from Last 3 Encounters:   03/05/23 255 lb (115.7 kg)   02/15/23 255 lb (115.7 kg)   12/07/22 257 lb (116.6 kg)     Body mass index is 36.59 kg/m².     CBC:   Lab Results   Component Value Date/Time    WBC 23.4 03/05/2023 04:07 AM    RBC 5.67 03/05/2023 04:07 AM    HGB 15.8 03/05/2023 04:07 AM    HCT 47.4 03/05/2023 04:07 AM    MCV 83.6 03/05/2023 04:07 AM    RDW 13.5 03/05/2023 04:07 AM     03/05/2023 04:07 AM       CMP:   Lab Results   Component Value Date/Time     03/05/2023 04:07 AM    K 4.2 03/05/2023 04:07 AM    CL 98 03/05/2023 04:07 AM    CO2 21 03/05/2023 04:07 AM    BUN 24 03/05/2023 04:07 AM    CREATININE 1.39 03/05/2023 04:07 AM    LABGLOM 55 03/05/2023 04:07 AM    GLUCOSE 243 03/05/2023 04:07 AM    PROT 7.7 03/05/2023 04:07 AM    CALCIUM 9.6 03/05/2023 04:07 AM    BILITOT 0.6 03/05/2023 04:07 AM    ALKPHOS 103 03/05/2023 04:07 AM    AST 25 03/05/2023 04:07 AM    ALT 25 03/05/2023 04:07 AM       POC Tests:   Recent Labs     03/05/23  1235   POCGLU 169*       Coags: No results found for: PROTIME, INR, APTT    HCG (If Applicable): No results found for: PREGTESTUR, PREGSERUM, HCG, HCGQUANT     ABGs: No results found for: PHART, PO2ART, FQA2VJO, PHM4HSH, BEART, I4VDSCZF     Type & Screen (If Applicable):  No results found for: LABABO, LABRH    Drug/Infectious Status (If Applicable):  No results found for: HIV, HEPCAB    COVID-19 Screening (If Applicable):   Lab Results   Component Value Date/Time    COVID19 Not Detected 03/05/2023 06:31 AM           Anesthesia Evaluation  Patient summary reviewed and Nursing notes reviewed  Airway: Mallampati: II  TM distance: >3 FB   Neck ROM: full  Mouth opening: > = 3 FB   Dental:          Pulmonary:normal exam    (+) sleep apnea:                             Cardiovascular:    (+) hypertension:,       ECG reviewed                        Neuro/Psych: GI/Hepatic/Renal:             Endo/Other:    (+) Diabetes, . Abdominal:             Vascular: Other Findings:           Anesthesia Plan      general     ASA 3 - emergent       Induction: intravenous. MIPS: Postoperative opioids intended and Prophylactic antiemetics administered. Anesthetic plan and risks discussed with patient.       Plan discussed with surgical team.                    SAV Fairbanks - ALYSSA   3/5/2023

## 2023-03-05 NOTE — ED PROVIDER NOTES
[address]      Pt Name: Heather Waters  MRN: [id number]  [place] 1955  Date of evaluation: 3/5/2023      CHIEF COMPLAINT       Chief Complaint   Patient presents with    Emesis    Nausea         HISTORY OF PRESENT ILLNESS    Heather Waters is a 69 y.o. male who presents to the emergency department accompanied by his daughter for evaluation of generalized abdominal pain mostly in the periumbilical and epigastric region since 10 PM last night. Patient grades pain as sharp stabbing in nature 8 out of 10 in intensity associate with nausea and vomiting. He initially started having back pain at about 3 PM yesterday. Prior to that, he has had diarrhea about 4 episodes 2 days ago relieved with 3 tablets of Imodium. Patient states he has external hemorrhoids and has noticed blood upon wiping after the bowel movement. He denies any fever or chills. Daughter has noticed some abdominal distention. Patient took extra strength Tylenol as well as Tylenol with codeine without much relief. His previous abdominal surgery includes right inguinal hernia repair. He denies any chest pain, shortness of breath, lightheadedness, dizziness, palpitations or diaphoresis and denies any urinary frequency, urgency, dysuria or hematuria. No history of kidney stones in the past.      REVIEW OF SYSTEMS       Review of Systems   Constitutional:  Negative for chills, diaphoresis and fever. Respiratory:  Negative for cough and shortness of breath. Gastrointestinal:  Positive for abdominal distention, abdominal pain, diarrhea, nausea and vomiting. Genitourinary:  Negative for dysuria and hematuria. Musculoskeletal:  Positive for back pain. Neurological:  Negative for dizziness, light-headedness and headaches. All other systems reviewed and are negative.      PAST MEDICAL HISTORY    has a past medical history of Allergic rhinitis, Colon polyps, Elevated WBC count, Hyperlipidemia, Hypertension, Obesity, Type II or unspecified type diabetes mellitus without mention of complication, not stated as uncontrolled, and Unspecified sleep apnea. SURGICAL HISTORY      has a past surgical history that includes Carpal tunnel release (Right, 12/3/2010); Colonoscopy (1/20/2009); toenail excision (Left, 1/30/2003); and Inguinal hernia repair (Right, 8/19/1977). CURRENT MEDICATIONS       Previous Medications    ASPIRIN 81 MG TABLET    Take 81 mg by mouth daily. ATORVASTATIN (LIPITOR) 80 MG TABLET    40 mg    DULAGLUTIDE 0.75 MG/0.5ML SOPN    INJECT 0.75MG (0.5ML) UNDER SKIN ONCE EVERY WEEK FOR TYPE 2 DIABETES MELLITUS **REPLACES SEMAGLUTIDE**    EMPAGLIFLOZIN (JARDIANCE) 25 MG TABLET    12.5 mg    GABAPENTIN (NEURONTIN) 300 MG CAPSULE    Take 300 mg by mouth 2 times daily. GLIPIZIDE (GLUCOTROL) 10 MG TABLET    10 mg    LISINOPRIL-HYDROCHLOROTHIAZIDE (PRINZIDE;ZESTORETIC) 20-12.5 MG PER TABLET    Take 1 tablet by mouth daily. LORATADINE (CLARITIN) 10 MG TABLET    Take 10 mg by mouth daily. METFORMIN (GLUCOPHAGE) 1000 MG TABLET    Take 1,000 mg by mouth 2 times daily. MULTIPLE VITAMINS-MINERALS (MULTIVITAMIN PO)    Take  by mouth daily. OMEGA-3 FATTY ACIDS (FISH OIL) 1000 MG CAPS    Take 1,000 mg by mouth 2 times daily. SEMAGLUTIDE,0.25 OR 0.5MG/DOS, (OZEMPIC, 0.25 OR 0.5 MG/DOSE,) 2 MG/1.5ML SOPN    Inject 0.5 mg into the skin    SILDENAFIL (VIAGRA) 50 MG TABLET    25 mg    VITAMIN D (CHOLECALCIFEROL) 25 MCG (1000 UT) TABS TABLET    TAKE TWO TABLETS BY MOUTH EVERY DAY       ALLERGIES     is allergic to bee venom, naproxen, and lidocaine. FAMILY HISTORY     has no family status information on file. family history includes Diabetes in his mother; Heart Disease in his father and mother; High Blood Pressure in his mother and another family member. SOCIAL HISTORY      reports that he quit smoking about 31 years ago. His smoking use included cigarettes.  He does not have any smokeless tobacco history on file. He reports that he does not currently use alcohol. He reports that he does not use drugs. SCREENINGS    [place] Coma Scale  Eye Opening: Spontaneous  Best Verbal Response: Oriented  Best Motor Response: Obeys commands  Keith Coma Scale Score: 15      PHYSICAL EXAM     INITIAL VITALS:  height is 5' 10\" (1.778 m) and weight is 255 lb (115.7 kg). His tympanic temperature is 97.4 °F (36.3 °C). His blood pressure is 138/62 and his pulse is 85. His respiration is 16 and oxygen saturation is 94%. Physical Exam  Vitals and nursing note reviewed. Constitutional:       General: He is not in acute distress. Appearance: He is well-developed. HENT:      Head: Normocephalic and atraumatic. Nose: Nose normal.   Eyes:      Extraocular Movements: Extraocular movements intact. Pupils: Pupils are equal, round, and reactive to light. Cardiovascular:      Rate and Rhythm: Normal rate and regular rhythm. Heart sounds: Normal heart sounds. No murmur heard. Pulmonary:      Effort: Pulmonary effort is normal. No respiratory distress. Breath sounds: Normal breath sounds. Abdominal:      General: Bowel sounds are normal. There is distension. There is no abdominal bruit. Palpations: Abdomen is soft. There is no pulsatile mass. Tenderness: There is abdominal tenderness in the epigastric area and periumbilical area. There is guarding. There is no right CVA tenderness, left CVA tenderness or rebound. Negative signs include Aponte's sign and McBurney's sign. Hernia: No hernia is present. Musculoskeletal:      Cervical back: Normal range of motion and neck supple. Skin:     General: Skin is warm and dry. Neurological:      General: No focal deficit present. Mental Status: He is alert and oriented to person, place, and time. Psychiatric:         Behavior: Behavior normal.         Thought Content:  Thought content normal. DIFFERENTIAL DIAGNOSIS/ MDM:     Differential diagnosis considered: Cholecystitis, appendicitis, pancreatitis,  urinary tract infection, renal stone, small bowel obstruction,diverticulitis, gastritis, enteritis, colitis. Chronic Conditions affecting care (DM,HTN,CA, etc): Hypertension, diabetes mellitus, hyperlipidemia, sleep apnea, obesity, colonic polyps, elevated white blood cell count    Social Determinants of Health affecting care (unable to care for self, lives alone, unemployed, homeless,etc): Patient lives at home and is able to take care of himself. History source(s) (patient,spouse,parent,family,friend,EMS,etc): Patient and the daughter    Review of external sources (ECF,Hospital records,EMS report, radiology reports, etc): Hospital records    Tests considered but not ordered: See below    Independent interpretation of tests (eg.  X-ray, CAT scan, Doppler studies, EKG): See below    Discussion of x-ray results with radiology: See below    Consults: See below    Consideration for admission/observation (even if discharged): Considered admission, final decision will be based on test results and patient's status. Prescription considerations: See below    Sepsis considered: No evidence of bacteremia or sepsis at this time. Critical Care note written: Not applicable    DIAGNOSTIC RESULTS     EKG: All EKG's are interpreted by the Emergency Department Physician who either signs or Co-signs this chart in the absence of a cardiologist.    EKG Interpretation    Interpreted by emergency department physician    Rhythm: normal sinus   Rate: normal  Axis: normal  Conduction: RBBB  ST Segments: no acute change  T Waves: no acute change  Q Waves: no acute change    Clinical Impression: no acute change, but this is a nonspecific EKG.         RADIOLOGY:   Interpretation per the Radiologist below, if available at the time of this note:    CTA ABDOMEN PELVIS W CONTRAST    Result Date: 3/5/2023  EXAMINATION: CTA OF THE ABDOMEN AND PELVIS WITH CONTRAST 3/5/2023 5:27 am: TECHNIQUE: CTA of the abdomen and pelvis was performed with the administration of intravenous contrast. Multiplanar reformatted images are provided for review. MIP images are provided for review. Automated exposure control, iterative reconstruction, and/or weight based adjustment of the mA/kV was utilized to reduce the radiation dose to as low as reasonably achievable. COMPARISON: None. HISTORY: ORDERING SYSTEM PROVIDED HISTORY: Diffuse abdominal pain TECHNOLOGIST PROVIDED HISTORY: Diffuse abdominal pain Reason for Exam: Diffuse abdominal pain, r/o mesentaric ischemia CTA ABDOMEN: The abdominal aorta is of normal size. There is no evidence of abdominal aortic aneurysm or dissection. The celiac, superior mesenteric, bilateral renal arteries are within normal limits. 2 right and 2 left renal arteries, normal variants. Lower chest: Clear lung bases. There is no pleural effusions. The heart is of normal size. No pericardial effusion. Organs: The liver, gallbladder, pancreas, spleen and the bilateral adrenal glands are otherwise within normal limits. The bilateral kidneys are within normal limits, without hydronephrosis or obstructive uropathy. GI/Bowel: Multiple dilated small bowel loops containing air-fluid levels throughout the abdomen, suspected transition point in right mid abdomen with sudden transition to decompressed bowel and upstream small bowel fecalization (4:245). There is no pneumoperitoneum. There is no acute appendicitis. Fluid within the esophagus may be due to gastroesophageal reflux. Stomach is distended with fluid, air. .  Ingested radiopaque bodies located within small bowel, pill shaped. Peritoneum/Retroperitoneum: There is no ascites or pneumoperitoneum. There is no evidence of mesenteric or retroperitoneal lymphadenopathy. Bones/Soft Tissues: There is no acute osseous abnormality. There is no acute soft tissue abnormality.  CTA PELVIS: The bilateral iliac and common femoral are within normal limits. Pelvis: The urinary bladder is within normal limits. The pelvic structures are grossly within normal limits. There is no free pelvic fluid. Bones/Soft Tissues: There is no acute osseous abnormality. There is no acute soft tissue abnormality. No evidence of mesenteric ischemia. Low-grade small-bowel obstruction, suspected transition point in the right mid abdomen. Ingested radiopaque bodies located within small bowel, pill shaped. Fluid within the esophagus may be due to gastroesophageal reflux. No evidence of aortic aneurysm, significant arterial stenosis, or traumatic vascular injury. ED BEDSIDE ULTRASOUND:       LABS:  Labs Reviewed   CBC WITH AUTO DIFFERENTIAL - Abnormal; Notable for the following components:       Result Value    WBC 23.4 (*)     Seg Neutrophils 83 (*)     Lymphocytes 9 (*)     Immature Granulocytes 1 (*)     Segs Absolute 19.45 (*)     Absolute Mono # 1.30 (*)     All other components within normal limits   COMPREHENSIVE METABOLIC PANEL - Abnormal; Notable for the following components:    Glucose 243 (*)     BUN 24 (*)     Creatinine 1.39 (*)     Est, Glom Filt Rate 55 (*)     Anion Gap 20 (*)     All other components within normal limits   LACTIC ACID - Abnormal; Notable for the following components:    Lactic Acid 4.1 (*)     All other components within normal limits   TROPONIN - Abnormal; Notable for the following components:    Troponin, High Sensitivity 31 (*)     All other components within normal limits   URINALYSIS WITH REFLEX TO CULTURE - Abnormal; Notable for the following components:    Glucose, Ur 3+ (*)     All other components within normal limits   COVID-19, RAPID   LIPASE   TROPONIN       Patient's past medical history includes elevated white blood cell count. I do not have any previous lab results to compare.   We will attempt to get his baseline lab results from [facility] in [facility]. EMERGENCY DEPARTMENT COURSE:   Vitals:    Vitals:    03/05/23 0400 03/05/23 0427 03/05/23 0455 03/05/23 0548   BP: 117/65   138/62   Pulse: 85 85 90 85   Resp: 16 16 16 16   Temp:       TempSrc:       SpO2: 99% 98% 95% 94%   Weight:       Height:         -------------------------  BP: 138/62, Temp: 97.4 °F (36.3 °C), Heart Rate: 85, Resp: 16    Orders Placed This Encounter   Medications    sodium chloride flush 0.9 % injection 3 mL    0.9 % sodium chloride bolus    morphine injection 4 mg    ondansetron (ZOFRAN) injection 4 mg    iopamidol (ISOVUE-370) 76 % injection 100 mL    morphine injection 4 mg       During the emergency department course, patient was given normal saline 250 mL/h, morphine 4 mg IV push twice and Zofran 4 mg IV push once. His abdominal pain is improved and nausea and vomiting are resolved. Patient will benefit from hospitalization due to early small bowel obstruction. He also has elevated lactic acid level. There is no evidence of mesenteric ischemia. I discussed the case with Dr. Kenny Fermin as well as Pete Lanza CNP covering for hospitalist group and plan is to admit the patient on the medical floor for further evaluation and management. I have reviewed the disposition diagnosis with the patient and or their family/guardian. I have answered their questions and given discharge instructions. They voiced understanding of these instructions and did not have any further questions or complaints. Re-evaluation Notes    Patient is resting comfortably and does not appear to be in any pain or distress at this time. CRITICAL CARE:   None        CONSULTS:      PROCEDURES:  None    FINAL IMPRESSION      1. Partial small bowel obstruction (HCC)          DISPOSITION/PLAN   DISPOSITION Decision To Admit    Condition on Disposition    Stable    PATIENT REFERRED TO:  No follow-up provider specified.     DISCHARGE MEDICATIONS:  New Prescriptions    No medications on file       (Please note that portions of this note were completed with a voice recognition program.  Efforts were made to edit the dictations but occasionally words are mis-transcribed.)    Tracie Wolff MD,, MD, F.A.C.E.P.   Attending Emergency Physician                          Tracie Wolff MD  03/05/23 5946 [Negative] : Heme/Lymph

## 2023-03-05 NOTE — H&P
Hospital Medicine  History and Physical    Patient:  Daisy Kim  MRN: [id number]    CHIEF COMPLAINT:  Abdominal pain with nausea and vomiting    History Obtained From:  Patient and his daughter  PCP: Kemal Menendez    HISTORY OF PRESENT ILLNESS:   The patient is a 69 y.o. male who presents with generalized abdominal pain with bloating since last night  has had nausea and emesis. CT abdomen in ER with low garde SBO TRANSITION POINT IN MID ABDOMEN has h/o chronic back pain [facility] with no acute findings and was given tylenol #3  for pain control has had some loose stools few days ago and then he took imodium and had had no bowel movement since Friday . No prior h/o bowel obstruction has had a right inguinal hernia repair in past no abdominal surgeries. Denies chest pain or SOB. H/o HTN,DMtype 2 and hyperlipidemia    Past Medical History:        Diagnosis Date    Allergic rhinitis     Colon polyps     history of    Elevated WBC count     history of    Hyperlipidemia     Hypertension     Obesity     Type II or unspecified type diabetes mellitus without mention of complication, not stated as uncontrolled     Unspecified sleep apnea        Past Surgical History:        Procedure Laterality Date    CARPAL TUNNEL RELEASE Right 12/3/2010    COLONOSCOPY  1/20/2009    polyps    INGUINAL HERNIA REPAIR Right 8/19/1977    TOENAIL EXCISION Left 1/30/2003    removal nail plate 1st digit       Medications Prior to Admission:  I obtained, documented, reviewed, and updated the patient's current medications. Prior to Admission medications    Medication Sig Start Date End Date Taking?  Authorizing Provider   Semaglutide,0.25 or 0.5MG/DOS, (OZEMPIC, 0.25 OR 0.5 MG/DOSE,) 2 MG/1.5ML SOPN Inject 0.5 mg into the skin   Yes Historical Provider, MD   atorvastatin (LIPITOR) 80 MG tablet 40 mg 5/18/22   Historical Provider, MD   glipiZIDE (GLUCOTROL) 10 MG tablet 10 mg 6/28/22   Historical Provider, MD   Dulaglutide 0.75 MG/0.5ML SOPN INJECT 0.75MG (0.5ML) UNDER SKIN ONCE EVERY WEEK FOR TYPE 2 DIABETES MELLITUS **REPLACES SEMAGLUTIDE** 11/17/22   Historical Provider, MD   vitamin D (CHOLECALCIFEROL) 25 MCG (1000 UT) TABS tablet TAKE TWO TABLETS BY MOUTH EVERY DAY 5/29/14   Historical Provider, MD   empagliflozin (JARDIANCE) 25 MG tablet 12.5 mg 5/18/22   Historical Provider, MD   sildenafil (VIAGRA) 50 MG tablet 25 mg 5/18/22   Historical Provider, MD   gabapentin (NEURONTIN) 300 MG capsule Take 300 mg by mouth 2 times daily. Historical Provider, MD   lisinopril-hydrochlorothiazide (PRINZIDE;ZESTORETIC) 20-12.5 MG per tablet Take 1 tablet by mouth daily. 6/24/14   VERNON Dupont   metFORMIN (GLUCOPHAGE) 1000 MG tablet Take 1,000 mg by mouth 2 times daily. Historical Provider, MD   loratadine (CLARITIN) 10 MG tablet Take 10 mg by mouth daily. Historical Provider, MD   aspirin 81 MG tablet Take 81 mg by mouth daily. Historical Provider, MD   Multiple Vitamins-Minerals (MULTIVITAMIN PO) Take  by mouth daily. Historical Provider, MD   Omega-3 Fatty Acids (FISH OIL) 1000 MG CAPS Take 1,000 mg by mouth 2 times daily. Historical Provider, MD       Allergies:  Bee venom and Naproxen    Social History:   TOBACCO:   reports that he quit smoking about 31 years ago. His smoking use included cigarettes. He does not have any smokeless tobacco history on file. ETOH:   reports that he does not currently use alcohol.   OCCUPATION:      Family History:       Problem Relation Age of Onset    Heart Disease Mother     High Blood Pressure Mother     Diabetes Mother     Heart Disease Father     High Blood Pressure Other        REVIEW OF SYSTEMS:  Constitutional:  negative  HEENT:  negative  Neck:  No lumps or masses, No swollen glands, No recent swelling in thyroid area, and No significant pain in neck  Cardiac:  negative for  chest pain, dyspnea, palpitations  Respiratory:  negative for  dry cough, cough with sputum, dyspnea, and wheezing  Gastrointestinal:  positive for nausea, vomiting, change in bowel habits, abdominal pain, and abdominal distention  :  negative for frequency, dysuria, urinary incontinence, and hematuria  Musculoskeletal:  positive for  chronic back pain  Neuro:  negative for headaches, dizziness, seizures, tremor, and weakness  Psychiatry:No depression or anxiety  Rest of ROS Negative    Physical Exam:    Vitals: /60   Pulse 93   Temp 98.7 °F (37.1 °C) (Tympanic)   Resp 20   Ht 5' 10\" (1.778 m)   Wt 255 lb (115.7 kg)   SpO2 97%   BMI 36.59 kg/m²   General appearance: alert, appears stated age and cooperative  Skin: Skin color, texture, turgor normal. No rashes or lesions  HEENT: Head: Normocephalic, no lesions, without obvious abnormality. Neck: no adenopathy, no carotid bruit, no JVD, supple, symmetrical, trachea midline, and thyroid not enlarged, symmetric, no tenderness/mass/nodules  Lungs: clear to auscultation bilaterally  Heart: regular rate and rhythm, S1, S2 normal, no murmur, click, rub or gallop  Abdomen:  softly distended,bowel sounds absent,has some generalized tenderness,no guarding or rigidity. Extremities: extremities normal, atraumatic, no cyanosis or edema  Neurologic: Mental status: Alert, oriented, thought content appropriate    CBC:   Recent Labs     03/05/23  0407   WBC 23.4*   HGB 15.8        BMP:    Recent Labs     03/05/23  0407      K 4.2   CL 98   CO2 21   BUN 24*   CREATININE 1.39*   GLUCOSE 243*     Hepatic:   Recent Labs     03/05/23  0407   AST 25   ALT 25   BILITOT 0.6   ALKPHOS 103     Troponin: No results for input(s): TROPONINI in the last 72 hours. BNP: No results for input(s): BNP in the last 72 hours. Lipids: No results for input(s): CHOL, HDL in the last 72 hours.     Invalid input(s): LDLCALCU  ABGs: No results found for: PHART, PO2ART, LPL6YRU  INR: No results for input(s): INR in the last 72 hours.  -----------------------------------------------------------------      Assessment and Plan   Small bowel obstruction. DMtype 2 . HTN  Chronic back pain. SARA  Lactic acidosis  Leucocytosis  Elevated troponin    Plan:NPO,IV fluids. NG placement ,surgery consulted. IV Protonix,Insulin coverage for diabetes. IV lopressor for BP control. DVT prevention. serial lactic acid and troponin. IV zosyn since WBC  count is up. Echocardiogram in am.      Estimated length of stay 2-3 days.           Patient Active Problem List   Diagnosis Code    SBO (small bowel obstruction) ([facility] K56.609       DVT prophylaxis:   [] Lovenox   [] SCDs   [] SQ Heparin   [] Encourage ambulation, low risk for DVT, no chemical or mechanical    prophylaxis necessary      [] Already on Anticoagulation        Rosemarie Dubin, MD, MD

## 2023-03-05 NOTE — PROGRESS NOTES
was visiting a relative in the hospital and saw that Mr. Medellin was a patient.  The  has known him for 30 years and just dropped in to see if he could do anything for the patient.  presented Mr. Medellin a 's Card and American Flag for his service in the [facility]       03/05/23 1348   Encounter Summary   Encounter Overview/Reason  Volunteer Encounter   Service Provided For: Patient   Support System Children   Last Encounter  03/05/23   Complexity of Encounter Moderate   Begin Time 1340   End Time  1345   Total Time Calculated 5 min

## 2023-03-05 NOTE — ANESTHESIA POSTPROCEDURE EVALUATION
Department of Anesthesiology  Postprocedure Note    Patient: Rose Mendoza  MRN: [id number]  YOB: 1955  Date of evaluation: 3/5/2023      Procedure Summary     Date: 03/05/23 Room / Location: [address] [place] [address]    Anesthesia Start: 2796 Anesthesia Stop: 1735    Procedure: DIAGNOSTIC LAPAROSCOPY Diagnosis: Small bowel obstruction ([facility] [address]    Surgeons: Marisa Alston MD Responsible Provider: SAV Beasley CRNA    Anesthesia Type: general ASA Status: 3 - Emergent          Anesthesia Type: No value filed.     Yelena Phase I: Yelena Score: 8    Yelena Phase II:        Anesthesia Post Evaluation    Patient location during evaluation: PACU  Patient participation: complete - patient participated  Level of consciousness: awake and alert  Pain score: 0  Airway patency: patent  Nausea & Vomiting: no nausea and no vomiting  Complications: no  Cardiovascular status: blood pressure returned to baseline and hemodynamically stable  Respiratory status: acceptable, spontaneous ventilation, room air and face mask  Hydration status: euvolemic  Multimodal analgesia pain management approach

## 2023-03-05 NOTE — OP NOTE
Operative Note      Patient: Iggy Haywood  YOB: 1955  MRN: [id number]    Date of Procedure: 3/5/2023    Pre-Op Diagnosis: Small Bowel Obstruction, Unknown etiology. Sepsis    Post-Op Diagnosis: Same and (appears to be an ileus rather than sbo)       Procedure(s):  DIAGNOSTIC LAPAROSCOPY    Surgeon(s):  Lavon Elias MD    Assistant:   * No surgical staff found *    Anesthesia: General    Estimated Blood Loss (mL): Minimal    Complications: None    Specimens:   ID Type Source Tests Collected by Time Destination   1 : PERITONEAL FLUID Body Fluid Abdomen CULTURE, BODY FLUID Lavon Elias MD 3/5/2023 1645        Implants:  * No implants in log *      Drains:   NG/OG/NJ/NE Tube Nasogastric Right nostril (Active)   Surrounding Skin Clean, dry & intact 03/05/23 0915   Securement device Adhesive based barton 03/05/23 1002   Status Suction-low intermittent 03/05/23 1002   Placement Verified Gastric Contents; Other (comment); External Catheter Length 03/05/23 1002   NG/OG/NJ/NE External Measurement (cm) 60 cm 03/05/23 1002   Drainage Appearance Tan;Mucous shreds 03/05/23 1002   Output (mL) 1150 ml 03/05/23 1002       Urinary Catheter 03/05/23 Sarabia (Active)       Patient was brought to the operating room and general anesthesia was induced. Tap block was placed by the CRNA for postoperative analgesia. Sarabia catheter placed. His abdomen was then carefully prepped and draped. Peritoneal access was gained with a supraumbilical incision using using an open or balloon port technique. Once this was into the peritoneum the abdomen slight with carbon dioxide. Under direct vision 5 mm ports were placed in the suprapubic position and subxiphoid positions. There was a modest amount of serous fluid in the pelvis. This was aspirated for culture. Really was not enough to get a cell count or anything of that nature. I switched 5 mm scope to the subxiphoid position. Identified the cecum and the appendix.   Both of these appeared normal.  Starting at the terminal ileum I ran the small bowel backwards. While sure the terminal ileum was relatively decompressed compared to the more proximal bowel certainly was not flat and had fluid and air within it. Not an abrupt change but somewhere in the jejunum could deftly start seeing more fluid and air contained within the small bowel small bowel was more distended. This appears to be more consistent with an ileus pattern and a bowel obstruction. Certainly possible he could have something intraluminal but in the absence of a perforation or sign and necrotic bowel I did not think there was any reason to open up at this point. Big part of the reason I did the operation as he seemed to be septic and at this point there is no sign of anything that would cause intra-abdominal sepsis. He had a lot of intraperitoneal fat but I did not see that the omentum was adherent anything. Other than the cecum I really could not see the colon very well because the amount of intra-abdominal fat he had. However again I would expect that the CT scan would have shown evidence of colitis or diverticulitis had that been the case. The gallbladder was fairly fatty but had no adhesions to it no evidence of any acute inflammation. Anterior gastric wall looks fairly normal 2. The small bowel was freely mobile there did not appear to be any areas where was adherent. Process of exploring the small bowel I did see a small area looks like maybe got a small burn when I made the supraumbilical incision with the cautery. I do not think there is any significant intra-abdominal pathology causing his sepsis at this point. I did go ahead and grasped this area where I thought there might be a little burn with laparoscopic instrument.   I desufflated the abdomen and I extended the umbilical incision a little bit and brought that loop of small bowel up and put a couple of 3-0 Vicryl serosal sutures in that area where there might be a small burn. This was not a full-thickness injury on her prior event okay without the sutures. Pneumoperitoneum was released prior to doing this. Once the valve was repaired and all the ports were removed we proceeded with closure. The fascia of the midline incision was closed with a couple of figure-of-eight 0 Vicryl sutures. The skin was closed with subcuticular 4-0 Vicryl sutures. Patient was awakened and transferred recovery room in stable condition. Case classification is clean or 1 assuming that the fluid from the abdomen does not grow any organisms. Sponge needle instrument counts correct at the end of the case.     Electronically signed by Iván Tapia MD on 3/5/2023 at 5:16 PM

## 2023-03-05 NOTE — ED NOTES
Dr. Brendan Patino requesting information from [facility]. [email or web address] due to pt having a hx. of high WBC. Writer called [employer] [email or web address].  Authorization to Calvin Kenny 1937 sheet faxed per request.      Pam Webb RN  03/05/23 9907

## 2023-03-05 NOTE — CONSULTS
Abdominal Pain  This is a new problem. The current episode started yesterday. The onset quality is gradual. The problem occurs constantly. The problem has been rapidly worsening. The pain is located in the epigastric region, RUQ and LUQ. The pain is moderate. The quality of the pain is sharp. The abdominal pain does not radiate. Associated symptoms include anorexia, diarrhea, dysuria, nausea and vomiting. Pertinent negatives include no fever, hematochezia, hematuria or myalgias. Nothing aggravates the pain. The pain is relieved by Nothing. He has tried nothing for the symptoms. Prior diagnostic workup includes CT scan. There is no history of abdominal surgery, colon cancer, Crohn's disease, gallstones, GERD, irritable bowel syndrome, pancreatitis, PUD or ulcerative colitis. Tylenol #3 did not help. Took Imodium Friday for diarrhea to go to work. Yesterday took Dulcolax as thought he might be constipated. Only took 2 or 3 tablets Tylenol #3 for back pain.     Past Medical History:   Diagnosis Date    Allergic rhinitis     Colon polyps     history of    Elevated WBC count     history of    Hyperlipidemia     Hypertension     Obesity     Type II or unspecified type diabetes mellitus without mention of complication, not stated as uncontrolled     Unspecified sleep apnea      Past Surgical History:   Procedure Laterality Date    CARPAL TUNNEL RELEASE Right 12/3/2010    COLONOSCOPY  1/20/2009    polyps    INGUINAL HERNIA REPAIR Right 8/19/1977    TOENAIL EXCISION Left 1/30/2003    removal nail plate 1st digit     Current Facility-Administered Medications   Medication Dose Route Frequency Provider Last Rate Last Admin    sodium chloride flush 0.9 % injection 3 mL  3 mL IntraVENous Q8H Zia Richardson MD        sodium chloride flush 0.9 % injection 5-40 mL  5-40 mL IntraVENous 2 times per day SAV Lyon - CNP        sodium chloride flush 0.9 % injection 5-40 mL  5-40 mL IntraVENous PRN Talisha Crenshaw APRN - CNP        0.9 % sodium chloride infusion   IntraVENous PRN Jesus Alberto Butcher, APRN - CNP        enoxaparin Sodium (LOVENOX) injection 30 mg  30 mg SubCUTAneous BID Jesus Alberto Butcher APRN - CNP   30 mg at 03/05/23 1003    potassium chloride 10 mEq/100 mL IVPB (Peripheral Line)  10 mEq IntraVENous PRN Jesus Alberto Butcher APRN - CNP        ondansetron [facility]) injection 4 mg  4 mg IntraVENous Q6H PRN Jesus Alberto Butcher, APRN - CNP        0.9 % sodium chloride infusion   IntraVENous Continuous Jesus Alberto Butcher APRN -  mL/hr at 03/05/23 0924 New Bag at 03/05/23 0924    morphine (PF) injection 2 mg  2 mg IntraVENous Q1H PRN Jesus Alberto Butcher, APRN - CNP   2 mg at 03/05/23 1003    piperacillin-tazobactam (ZOSYN) 3,375 mg in sodium chloride 0.9 % 50 mL IVPB (mini-bag)  3,375 mg IntraVENous q8h Danielle Dixon MD 12.5 mL/hr at 03/05/23 1308 3,375 mg at 03/05/23 1308    metoprolol (LOPRESSOR) injection 2.5 mg  2.5 mg IntraVENous Q6H PRN Danielle Dixon MD        glucose chewable tablet 16 g  4 tablet Oral PRN Danielle Dixon MD        dextrose bolus 10% 125 mL  125 mL IntraVENous PRN Danielle Dixon MD        Or    dextrose bolus 10% 250 mL  250 mL IntraVENous PRN Danielle Dixon MD        glucagon (rDNA) injection 1 mg  1 mg SubCUTAneous PRN Salmavamary Alonzo MD        dextrose 10 % infusion   IntraVENous Continuous PRN Salmavapralisha Alonzo MD        insulin lispro (HUMALOG) injection vial 0-8 Units  0-8 Units SubCUTAneous TID WC Shivaprasad Morena Alonzo MD        insulin lispro (HUMALOG) injection vial 0-4 Units  0-4 Units SubCUTAneous Nightly Shivaprasad Morena Alonzo MD        pantoprazole (PROTONIX) 40 mg in sodium chloride (PF) 0.9 % 10 mL injection  40 mg IntraVENous Daily Danielle Dixon MD   40 mg at 03/05/23 1301     Allergies   Allergen Reactions    Bee Venom     Naproxen      Social History     Tobacco Use    Smoking status: Former     Types: Cigarettes     Quit date: 1992     Years since quittin.1   Vaping Use    Vaping Use: Never used   Substance Use Topics    Alcohol use: Not Currently    Drug use: Never     His wife   6 months ago. Family History   Problem Relation Age of Onset    Heart Disease Mother     High Blood Pressure Mother     Diabetes Mother     Heart Disease Father     High Blood Pressure Other      Review of Systems   Constitutional:  Negative for fever. HENT:  Negative for dental problem, ear pain, sore throat, trouble swallowing and voice change. Respiratory:  Negative for choking, chest tightness, shortness of breath and wheezing. Gastrointestinal:  Positive for abdominal pain, anorexia, diarrhea, nausea and vomiting. Negative for hematochezia. Genitourinary:  Positive for dysuria. Negative for hematuria. Musculoskeletal:  Negative for myalgias. /62   Pulse 92   Temp 98.5 °F (36.9 °C) (Tympanic)   Resp 16   Ht 5' 10\" (1.778 m)   Wt 255 lb (115.7 kg)   SpO2 95%   BMI 36.59 kg/m²     Physical Exam  Constitutional:       General: He is in acute distress. Appearance: He is obese. He is ill-appearing. He is not diaphoretic. Comments: NG in place   HENT:      Head: Normocephalic and atraumatic. No right periorbital erythema or left periorbital erythema. Nose:      Comments: NG in place     Mouth/Throat:      Lips: Pink. No lesions. Mouth: Mucous membranes are dry. Dentition: Abnormal dentition (edentulous). Does not have dentures. Tongue: No lesions. Tongue does not deviate from midline. Eyes:      General: No scleral icterus. Extraocular Movements: Extraocular movements intact. Conjunctiva/sclera:      Right eye: Right conjunctiva is injected. Left eye: Left conjunctiva is injected. Pupils: Pupils are equal, round, and reactive to light. Cardiovascular:      Rate and Rhythm: Normal rate and regular rhythm.    Pulmonary:      Effort: Pulmonary effort is normal.      Breath sounds: Normal breath sounds and air entry. Abdominal:      General: Abdomen is protuberant. Bowel sounds are decreased. There is distension. There is no abdominal bruit. Palpations: Abdomen is rigid. There is no hepatomegaly, splenomegaly, mass or pulsatile mass. Tenderness: There is generalized abdominal tenderness. There is guarding and rebound. Hernia: No hernia is present. There is no hernia in the umbilical area, ventral area, right femoral area, left femoral area or right inguinal area. Comments: NG drain is brown and cloudy   Skin:     General: Skin is warm and dry. Coloration: Skin is not jaundiced. Neurological:      General: No focal deficit present. Mental Status: He is oriented to person, place, and time and easily aroused. He is lethargic. Psychiatric:         Attention and Perception: He is inattentive. Speech: Speech is delayed. Behavior: Behavior is cooperative. WBC 23.4  Lactic Acid after some hydration is 4.3  Cr, 1,39 BUN 23    CT  images and report reviewed. No evidence of mesenteric ischemia. Low-grade small-bowel obstruction, suspected transition point in the right   mid abdomen. Ingested radiopaque bodies located within small bowel, pill shaped. Fluid within the esophagus may be due to gastroesophageal reflux. No evidence of aortic aneurysm, significant arterial stenosis, or traumatic   vascular injury. IMP/PLAN  1) SBO without obvious source. No hernia. No previous abdominal surgery. - Evidence of sepsis - marked elevated WBC, elevated lactate. - Peritoneal signs on physical exam.      I am recommend proceeding to exploration. This is worrisome for compromised bowel. Source of problems is not clear.     _____    We do not have access to his [place] records. He reports having had a colonoscopy in the past year.

## 2023-03-05 NOTE — ANESTHESIA PROCEDURE NOTES
Peripheral Block    Patient location during procedure: OR  Reason for block: post-op pain management and at surgeon's request  Start time: 3/5/2023 3:56 PM  End time: 3/5/2023 4:04 PM  Staffing  Performed: anesthesiologist   Anesthesiologist: SAV Rosa CRNA  Preanesthetic Checklist  Completed: patient identified, IV checked, site marked, risks and benefits discussed, surgical/procedural consents, equipment checked, pre-op evaluation, timeout performed, anesthesia consent given, oxygen available, monitors applied/VS acknowledged, fire risk safety assessment completed and verbalized and blood product R/B/A discussed and consented  Peripheral Block   Patient position: supine  Prep: ChloraPrep  Provider prep: mask and sterile gloves  Patient monitoring: cardiac monitor, continuous pulse ox, frequent blood pressure checks and IV access  Block type: TAP  Laterality: bilateral  Injection technique: single-shot  Guidance: ultrasound guided  Infiltration strength: 1 %  Dose: 3 mL    Needle   Needle gauge: 21 G  Needle localization: ultrasound guidance  Needle length: 10 cm  Assessment   Injection assessment: negative aspiration for heme, no paresthesia on injection and local visualized surrounding nerve on ultrasound  Paresthesia pain: none  Slow fractionated injection: yes  Hemodynamics: stable  Real-time US image taken/store: yes  Outcomes: uncomplicated and patient tolerated procedure well    Additional Notes  U/S 91372. (1) Under ultrasound guidance, Bilat Tap Blocks placed as noted above. Total of FOUR blocks performed with each containing 15ml of 0.25% Marcaine plain ( 0.5% dilute with equal parts 0.9NS).  Bilat Subcostal and Bilat Mid Axillary for Total of FOUR blocks with each containing 15 ml for Total 60 ml. US images stored, Pt tolerates without incident            Medications Administered  bupivacaine (PF) 0.5 % - Perineural   30 mL - 3/5/2023 4:03:00 PM

## 2023-03-05 NOTE — PROGRESS NOTES
Writer contacted OR staff for procedure today. Writer also contacted pt dtrs Alexandru Kelly to inform them plan of care and upcoming procedure.

## 2023-03-06 ENCOUNTER — APPOINTMENT (OUTPATIENT)
Dept: GENERAL RADIOLOGY | Age: 68
End: 2023-03-06
Payer: MEDICARE

## 2023-03-06 LAB
ABSOLUTE EOS #: <0.03 K/UL (ref 0–0.44)
ABSOLUTE IMMATURE GRANULOCYTE: <0.03 K/UL (ref 0–0.3)
ABSOLUTE LYMPH #: 0.69 K/UL (ref 1.1–3.7)
ABSOLUTE MONO #: 0.69 K/UL (ref 0.1–1.2)
ALBUMIN SERPL-MCNC: 3.1 G/DL (ref 3.5–5.2)
ALBUMIN/GLOBULIN RATIO: 1.3 (ref 1–2.5)
ALP SERPL-CCNC: 66 U/L (ref 40–129)
ALT SERPL-CCNC: 24 U/L (ref 5–41)
ANION GAP SERPL CALCULATED.3IONS-SCNC: 12 MMOL/L (ref 9–17)
AST SERPL-CCNC: 20 U/L
BASOPHILS # BLD: 0 % (ref 0–2)
BASOPHILS ABSOLUTE: <0.03 K/UL (ref 0–0.2)
BILIRUB SERPL-MCNC: 0.6 MG/DL (ref 0.3–1.2)
BUN SERPL-MCNC: 27 MG/DL (ref 8–23)
BUN/CREAT BLD: 20 (ref 9–20)
CALCIUM SERPL-MCNC: 7.2 MG/DL (ref 8.6–10.4)
CHLORIDE SERPL-SCNC: 110 MMOL/L (ref 98–107)
CO2 SERPL-SCNC: 21 MMOL/L (ref 20–31)
CREAT SERPL-MCNC: 1.38 MG/DL (ref 0.7–1.2)
EKG ATRIAL RATE: 86 BPM
EKG P AXIS: 61 DEGREES
EKG P-R INTERVAL: 168 MS
EKG Q-T INTERVAL: 414 MS
EKG QRS DURATION: 152 MS
EKG QTC CALCULATION (BAZETT): 495 MS
EKG R AXIS: 81 DEGREES
EKG T AXIS: 27 DEGREES
EKG VENTRICULAR RATE: 86 BPM
EOSINOPHILS RELATIVE PERCENT: 0 % (ref 1–4)
EST. AVERAGE GLUCOSE BLD GHB EST-MCNC: 160 MG/DL
EST. AVERAGE GLUCOSE BLD GHB EST-MCNC: 163 MG/DL
GFR SERPL CREATININE-BSD FRML MDRD: 55 ML/MIN/1.73M2
GLUCOSE BLD-MCNC: 109 MG/DL (ref 75–110)
GLUCOSE BLD-MCNC: 110 MG/DL (ref 75–110)
GLUCOSE BLD-MCNC: 143 MG/DL (ref 75–110)
GLUCOSE BLD-MCNC: 89 MG/DL (ref 75–110)
GLUCOSE BLD-MCNC: 91 MG/DL (ref 75–110)
GLUCOSE SERPL-MCNC: 148 MG/DL (ref 70–99)
HBA1C MFR BLD: 7.2 % (ref 4–6)
HBA1C MFR BLD: 7.3 % (ref 4–6)
HCT VFR BLD AUTO: 36.5 % (ref 40.7–50.3)
HGB BLD-MCNC: 12.1 G/DL (ref 13–17)
IMMATURE GRANULOCYTES: 0 %
LACTATE PLASV-SCNC: 2 MMOL/L (ref 0.5–2.2)
LYMPHOCYTES # BLD: 6 % (ref 24–43)
MCH RBC QN AUTO: 28.2 PG (ref 25.2–33.5)
MCHC RBC AUTO-ENTMCNC: 33.2 G/DL (ref 25.2–33.5)
MCV RBC AUTO: 85.1 FL (ref 82.6–102.9)
MONOCYTES # BLD: 6 % (ref 3–12)
NRBC AUTOMATED: 0 PER 100 WBC
PDW BLD-RTO: 13.9 % (ref 11.8–14.4)
PLATELET # BLD AUTO: 311 K/UL (ref 138–453)
PMV BLD AUTO: 9.5 FL (ref 8.1–13.5)
POTASSIUM SERPL-SCNC: 4.3 MMOL/L (ref 3.7–5.3)
PROT SERPL-MCNC: 5.5 G/DL (ref 6.4–8.3)
RBC # BLD: 4.29 M/UL (ref 4.21–5.77)
SEG NEUTROPHILS: 88 % (ref 36–65)
SEGMENTED NEUTROPHILS ABSOLUTE COUNT: 9.4 K/UL (ref 1.5–8.1)
SODIUM SERPL-SCNC: 143 MMOL/L (ref 135–144)
TROPONIN I SERPL DL<=0.01 NG/ML-MCNC: 42 NG/L (ref 0–22)
WBC # BLD AUTO: 10.8 K/UL (ref 3.5–11.3)

## 2023-03-06 PROCEDURE — 36415 COLL VENOUS BLD VENIPUNCTURE: CPT

## 2023-03-06 PROCEDURE — 2060000000 HC ICU INTERMEDIATE R&B

## 2023-03-06 PROCEDURE — 6370000000 HC RX 637 (ALT 250 FOR IP): Performed by: SURGERY

## 2023-03-06 PROCEDURE — 6360000002 HC RX W HCPCS

## 2023-03-06 PROCEDURE — 82947 ASSAY GLUCOSE BLOOD QUANT: CPT

## 2023-03-06 PROCEDURE — 80053 COMPREHEN METABOLIC PANEL: CPT

## 2023-03-06 PROCEDURE — 74022 RADEX COMPL AQT ABD SERIES: CPT

## 2023-03-06 PROCEDURE — 85025 COMPLETE CBC W/AUTO DIFF WBC: CPT

## 2023-03-06 PROCEDURE — 83605 ASSAY OF LACTIC ACID: CPT

## 2023-03-06 PROCEDURE — 6360000002 HC RX W HCPCS: Performed by: INTERNAL MEDICINE

## 2023-03-06 PROCEDURE — 2580000003 HC RX 258

## 2023-03-06 PROCEDURE — C9113 INJ PANTOPRAZOLE SODIUM, VIA: HCPCS

## 2023-03-06 PROCEDURE — 99231 SBSQ HOSP IP/OBS SF/LOW 25: CPT | Performed by: INTERNAL MEDICINE

## 2023-03-06 PROCEDURE — 97165 OT EVAL LOW COMPLEX 30 MIN: CPT | Performed by: OCCUPATIONAL THERAPIST

## 2023-03-06 PROCEDURE — 97161 PT EVAL LOW COMPLEX 20 MIN: CPT

## 2023-03-06 PROCEDURE — 84484 ASSAY OF TROPONIN QUANT: CPT

## 2023-03-06 RX ORDER — LIDOCAINE HYDROCHLORIDE 20 MG/ML
15 SOLUTION OROPHARYNGEAL ONCE
Status: COMPLETED | OUTPATIENT
Start: 2023-03-06 | End: 2023-03-06

## 2023-03-06 RX ORDER — SODIUM CHLORIDE 9 MG/ML
INJECTION, SOLUTION INTRAVENOUS CONTINUOUS
Status: DISCONTINUED | OUTPATIENT
Start: 2023-03-06 | End: 2023-03-10

## 2023-03-06 RX ORDER — LIDOCAINE HYDROCHLORIDE 20 MG/ML
15 SOLUTION OROPHARYNGEAL
Status: DISCONTINUED | OUTPATIENT
Start: 2023-03-06 | End: 2023-03-11 | Stop reason: HOSPADM

## 2023-03-06 RX ORDER — LORAZEPAM 2 MG/ML
1 INJECTION INTRAMUSCULAR EVERY 6 HOURS PRN
Status: DISCONTINUED | OUTPATIENT
Start: 2023-03-06 | End: 2023-03-11 | Stop reason: HOSPADM

## 2023-03-06 RX ADMIN — PIPERACILLIN AND TAZOBACTAM 3375 MG: 3; .375 INJECTION, POWDER, FOR SOLUTION INTRAVENOUS at 20:45

## 2023-03-06 RX ADMIN — SODIUM CHLORIDE: 9 INJECTION, SOLUTION INTRAVENOUS at 02:04

## 2023-03-06 RX ADMIN — PIPERACILLIN AND TAZOBACTAM 3375 MG: 3; .375 INJECTION, POWDER, FOR SOLUTION INTRAVENOUS at 13:04

## 2023-03-06 RX ADMIN — SODIUM CHLORIDE, PRESERVATIVE FREE 10 ML: 5 INJECTION INTRAVENOUS at 08:39

## 2023-03-06 RX ADMIN — ENOXAPARIN SODIUM 40 MG: 100 INJECTION SUBCUTANEOUS at 08:39

## 2023-03-06 RX ADMIN — LIDOCAINE HYDROCHLORIDE 15 ML: 20 SOLUTION ORAL at 17:51

## 2023-03-06 RX ADMIN — SODIUM CHLORIDE: 9 INJECTION, SOLUTION INTRAVENOUS at 10:52

## 2023-03-06 RX ADMIN — SODIUM CHLORIDE: 9 INJECTION, SOLUTION INTRAVENOUS at 17:59

## 2023-03-06 RX ADMIN — SODIUM CHLORIDE, PRESERVATIVE FREE 10 ML: 5 INJECTION INTRAVENOUS at 20:47

## 2023-03-06 RX ADMIN — SODIUM CHLORIDE, PRESERVATIVE FREE 40 MG: 5 INJECTION INTRAVENOUS at 08:39

## 2023-03-06 RX ADMIN — LORAZEPAM 1 MG: 2 INJECTION INTRAMUSCULAR; INTRAVENOUS at 17:51

## 2023-03-06 RX ADMIN — PIPERACILLIN AND TAZOBACTAM 3375 MG: 3; .375 INJECTION, POWDER, FOR SOLUTION INTRAVENOUS at 04:55

## 2023-03-06 RX ADMIN — SODIUM CHLORIDE 1000 ML: 9 INJECTION, SOLUTION INTRAVENOUS at 00:57

## 2023-03-06 ASSESSMENT — PAIN - FUNCTIONAL ASSESSMENT: PAIN_FUNCTIONAL_ASSESSMENT: PREVENTS OR INTERFERES SOME ACTIVE ACTIVITIES AND ADLS

## 2023-03-06 ASSESSMENT — PAIN DESCRIPTION - ORIENTATION: ORIENTATION: MID

## 2023-03-06 ASSESSMENT — PAIN SCALES - GENERAL
PAINLEVEL_OUTOF10: 0
PAINLEVEL_OUTOF10: 0
PAINLEVEL_OUTOF10: 1

## 2023-03-06 ASSESSMENT — PAIN DESCRIPTION - ONSET: ONSET: ON-GOING

## 2023-03-06 ASSESSMENT — PAIN DESCRIPTION - DESCRIPTORS: DESCRIPTORS: SORE;DISCOMFORT

## 2023-03-06 ASSESSMENT — PAIN DESCRIPTION - FREQUENCY: FREQUENCY: CONTINUOUS

## 2023-03-06 ASSESSMENT — PAIN DESCRIPTION - LOCATION: LOCATION: ABDOMEN

## 2023-03-06 ASSESSMENT — PAIN DESCRIPTION - PAIN TYPE: TYPE: ACUTE PAIN;SURGICAL PAIN

## 2023-03-06 NOTE — CONSULTS
[facility]  CONSULT NOTE                  Date:   3/6/2023  Patient name: Madalyn Jack  Date of admission:  3/5/2023  3:41 AM  MRN:   [id number]  YOB: 1955    Reason for Admission: Small bowel obstruction    CHIEF COMPLAINT:   Abdominal pain and bloating    History Obtained From:  Patient and chart review     HISTORY OF PRESENT ILLNESS:    61-year-old male admitted with abdominal pain, bloating and emesis. Found to have partial small bowel obstruction. He underwent diagnostic laparoscopy. He currently has NG tube in place. He denies any chest pain, angina or dyspnea. Cardiology consulted for mildly elevated troponins. He also had SARA on admission with creatinine of 1.7 which is improving with IV hydration. He denies any previous history of CAD or CHF. Past Medical History:   has a past medical history of Allergic rhinitis, Colon polyps, Elevated WBC count, Hyperlipidemia, Hypertension, Obesity, Type II or unspecified type diabetes mellitus without mention of complication, not stated as uncontrolled, and Unspecified sleep apnea. Past Surgical History:   has a past surgical history that includes Carpal tunnel release (Right, 12/3/2010); Colonoscopy (1/20/2009); toenail excision (Left, 1/30/2003); Inguinal hernia repair (Right, 8/19/1977); and laparoscopy (N/A, 3/5/2023). Home Medications:    Prior to Admission medications    Medication Sig Start Date End Date Taking?  Authorizing Provider   Semaglutide,0.25 or 0.5MG/DOS, (OZEMPIC, 0.25 OR 0.5 MG/DOSE,) 2 MG/1.5ML SOPN Inject 0.5 mg into the skin   Yes Historical Provider, MD   atorvastatin (LIPITOR) 80 MG tablet 40 mg 5/18/22   Historical Provider, MD   glipiZIDE (GLUCOTROL) 10 MG tablet 10 mg 6/28/22   Historical Provider, MD   Dulaglutide 0.75 MG/0.5ML SOPN INJECT 0.75MG (0.5ML) UNDER SKIN ONCE EVERY WEEK FOR TYPE 2 DIABETES MELLITUS **REPLACES [place]** 11/17/22   Historical Provider, MD   vitamin D (CHOLECALCIFEROL) 25 MCG (1000 UT) TABS tablet TAKE TWO TABLETS BY MOUTH EVERY DAY 5/29/14   Historical Provider, MD   empagliflozin (JARDIANCE) 25 MG tablet 12.5 mg 5/18/22   Historical Provider, MD   sildenafil (VIAGRA) 50 MG tablet 25 mg 5/18/22   Historical Provider, MD   gabapentin (NEURONTIN) 300 MG capsule Take 300 mg by mouth 2 times daily. Historical Provider, MD   lisinopril-hydrochlorothiazide (PRINZIDE;ZESTORETIC) 20-12.5 MG per tablet Take 1 tablet by mouth daily. 6/24/14   VERNON Ho   metFORMIN (GLUCOPHAGE) 1000 MG tablet Take 1,000 mg by mouth 2 times daily. Historical Provider, MD   loratadine (CLARITIN) 10 MG tablet Take 10 mg by mouth daily. Historical Provider, MD   aspirin 81 MG tablet Take 81 mg by mouth daily. Historical Provider, MD   Multiple Vitamins-Minerals (MULTIVITAMIN PO) Take  by mouth daily. Historical Provider, MD   Omega-3 Fatty Acids (FISH OIL) 1000 MG CAPS Take 1,000 mg by mouth 2 times daily. Historical Provider, MD       Allergies:  Bee venom and Naproxen    Social History:   reports that he quit smoking about 31 years ago. His smoking use included cigarettes. He does not have any smokeless tobacco history on file. He reports that he does not currently use alcohol. He reports that he does not use drugs. Family History:    for early CAD    REVIEW OF SYSTEMS:    Constitutional: there has been no unanticipated weight loss. No change in functional capacity. Eyes: No visual changes or diplopia. ENT: No Headaches, hearing loss or vertigo. No mouth sores or sore throat. Cardiovascular: As in HPI  Respiratory: No hx of productive cough, pleuritic chest pain   Gastrointestinal: Positive for abdominal pain, bloating, emesis as described above  Genitourinary: No dysuria, trouble voiding, or hematuria. Musculoskeletal:  No gait disturbance, No weakness or joint complaints. Integumentary: No rash or pruritis.   Neurological: No headache, weakness, numbness or tingling. No change in gait, balance, coordination. Psychiatric: No anxiety, or depression. Endocrine: No temperature intolerance. No excessive thirst, fluid intake, or urination. No tremor. Hematologic/Lymphatic: No abnormal bruising or bleeding, blood clots or swollen lymph nodes. Allergic/Immunologic: No nasal congestion or hives. PHYSICAL EXAM:    Physical Examination:    BP (!) 119/49   Pulse 96   Temp 99 °F (37.2 °C) (Tympanic)   Resp 16   Ht 5' 10\" (1.778 m)   Wt 255 lb (115.7 kg)   SpO2 96%   BMI 36.59 kg/m²    Constitutional and General Appearance: alert, cooperative, in no distress   HEENT: PERRL, no cervical lymphadenopathy. Normal oral mucosa. NG tube in place  Respiratory:  Normal excursion and expansion without use of accessory muscles  Resp Auscultation: Good respiratory effort. No for increased work of breathing. On auscultation: clear to auscultation bilaterally, no wheezing, no rales. Cardiovascular: The apical impulse is not displaced  Heart tones are crisp and normal. regular S1 and S2. Murmurs: None   Jugular venous pulsation Normal  Thre is no carotid bruit   Peripheral pulses are symmetrical and full   Abdomen:  No masses or tenderness  Bowel sounds present  Extremities:   No Cyanosis or Clubbing   Lower extremity edema: No edema    Skin: Warm and dry  Neurological:  Not done     DATA:    Diagnostics:      EKG:   SR, right bundle branch block. Previous cardiac testing:     None    Labs:     CBC:   Recent Labs     03/05/23  1803 03/06/23  0313   WBC 15.3* 10.8   HGB 13.9 12.1*   HCT 42.2 36.5*    311     BMP:   Recent Labs     03/05/23  1803 03/06/23  0313    143   K 4.6 4.3   CO2 22 21   BUN 26* 27*   CREATININE 1.73* 1.38*   LABGLOM 42* 55*   GLUCOSE 145* 148*     BNP: No results for input(s): BNP in the last 72 hours. PT/INR: No results for input(s): PROTIME, INR in the last 72 hours. APTT:No results for input(s):  APTT in the last 72 hours.  CARDIAC ENZYMES:No results for input(s): CKTOTAL, CKMB, CKMBINDEX, TROPONINI in the last 72 hours.   FASTING LIPID PANEL:No results found for: HDL, LDLDIRECT, LDLCALC, TRIG  LIVER PROFILE:  Recent Labs     03/05/23  0407 03/06/23  0313   AST 25 20   ALT 25 24   LABALBU 4.6 3.1*         IMPRESSION:    Small bowel obstruction  Status post diagnostic laparoscopy  Acute kidney injury  Mildly elevated troponins, secondary to SARA, no evidence of ACS  Right bundle branch block  Hypertension  Hyperlipidemia  Diabetes mellitus      RECOMMENDATIONS:  Continue IV hydration  Continue management per general surgery  Echocardiogram to assess LVEF  Resume home dose aspirin, Prinzide on discharge  No further cardiac testing if echocardiogram low risk  Outpatient follow-up in 4-6 weeks     Discussed with patient      Robe Wen MD, [facility]

## 2023-03-06 NOTE — PROGRESS NOTES
Occupational Therapy  Facility/Department: [address] [place] [address]  Occupational Therapy Initial Assessment    Name: Caleb White  : 1955  MRN: [id number]  Date of Service: 3/6/2023    Discharge Recommendations:  Home with assist PRN          Patient Diagnosis(es): The primary encounter diagnosis was Partial small bowel obstruction ([address] A diagnosis of Small bowel obstruction (HCC) was also pertinent to this visit. Past Medical History:  has a past medical history of Allergic rhinitis, Colon polyps, Elevated WBC count, Hyperlipidemia, Hypertension, Obesity, Type II or unspecified type diabetes mellitus without mention of complication, not stated as uncontrolled, and Unspecified sleep apnea. Past Surgical History:  has a past surgical history that includes Carpal tunnel release (Right, 12/3/2010); Colonoscopy (2009); toenail excision (Left, 2003); Inguinal hernia repair (Right, 1977); and laparoscopy (N/A, 3/5/2023).     Treatment Diagnosis: general weakness      Assessment   Performance deficits / Impairments: Decreased endurance;Decreased high-level IADLs  Treatment Diagnosis: general weakness  Prognosis: Good  Decision Making: Low Complexity           Plan   Occupational Therapy Plan  Times Per Week: 1x  Current Treatment Recommendations: Self-Care / ADL, Equipment evaluation, education, & procurement, Patient/Caregiver education & training     Subjective   General  Chart Reviewed: Yes  Patient assessed for rehabilitation services?: Yes  Family / Caregiver Present: No  Referring Practitioner: IGOR Conde  Diagnosis: partial SBO     Social/Functional History  Social/Functional History  Lives With: Son  Type of Home: House  Home Layout: One level  Home Access: Stairs to enter with rails  Entrance Stairs - Number of Steps: 1  Bathroom Shower/Tub: Walk-in shower  Bathroom Toilet: Handicap height  Bathroom Equipment: Shower chair, Grab bars in shower, Grab bars around toilet  Home Equipment: [employer] Help From: Family  ADL Assistance: Independent  Homemaking Assistance: Independent  Homemaking Responsibilities: Yes  Meal Prep Responsibility: Primary  Laundry Responsibility: Primary  Cleaning Responsibility: Primary  Shopping Responsibility: Primary  Ambulation Assistance: Independent  Transfer Assistance: Independent  Active : Yes  Mode of Transportation: SUV  Occupation: Retired       Objective   Heart Rate: 96  Heart Rate Source: Monitor  BP: (!) 117/49  BP Location: Left upper arm  BP Method: Automatic  Patient Position: Sitting  MAP (Calculated): 72  Resp: 18  SpO2: 97 %  O2 Device: Nasal cannula             Safety Devices  Type of Devices: Left in chair;Nurse notified;Gait belt;Call light within reach           ADL  UE Bathing:  Moderate assistance  LE Bathing: Modified independent      Activity Tolerance  Activity Tolerance: Patient tolerated evaluation without incident     Transfers  Sit to stand: Stand by assistance  Stand to sit: Stand by assistance  Vision  Vision: Within Functional Limits  Hearing  Hearing: Within functional limits  Cognition  Overall Cognitive Status: WNL  Orientation  Overall Orientation Status: Within Normal Limits      Goals  Short Term Goals  Time Frame for Short Term Goals: duration of hospital stay  Short Term Goal 1: Patient education adaptive equipment LB dressing for increased ease ADLs       Therapy Time   Individual Concurrent Group Co-treatment   Time In 1305         Time Out 1316         Minutes 11         Timed Code Treatment Minutes: 0 Minutes       ALLA ALTAMIRANO OTR, OT

## 2023-03-06 NOTE — PLAN OF CARE
Problem: Pain  Goal: Verbalizes/displays adequate comfort level or baseline comfort level  3/6/2023 0007 by Anoop Neil RN  Outcome: Progressing  3/5/2023 1159 by Cristian Lord RN  Outcome: Progressing     Problem: Chronic Conditions and Co-morbidities  Goal: Patient's chronic conditions and co-morbidity symptoms are monitored and maintained or improved  Outcome: Progressing  Flowsheets (Taken 3/5/2023 2008)  Care Plan - Patient's Chronic Conditions and Co-Morbidity Symptoms are Monitored and Maintained or Improved:   Monitor and assess patient's chronic conditions and comorbid symptoms for stability, deterioration, or improvement   Update acute care plan with appropriate goals if chronic or comorbid symptoms are exacerbated and prevent overall improvement and discharge     Problem: Neurosensory - Adult  Goal: Achieves maximal functionality and self care  Outcome: Progressing     Problem: Discharge Planning  Goal: Discharge to home or other facility with appropriate resources  3/6/2023 0007 by Anoop Neil RN  Outcome: Progressing  [address] (Taken 3/5/2023 2008)  Discharge to home or other facility with appropriate resources:   Identify barriers to discharge with patient and caregiver   Identify discharge learning needs (meds, wound care, etc)   Refer to discharge planning if patient needs post-hospital services based on physician order or complex needs related to functional status, cognitive ability or social support system  3/5/2023 1159 by Cristian Lord RN  Outcome: Progressing

## 2023-03-06 NOTE — PROGRESS NOTES
Patient had a diagnostic laparoscopy yesterday for questionable small bowel obstruction. It appeared to be an ileus rather than a small bowel obstruction. Patient was left with an NG tube in and conservative therapy today. Patient states he feels better today he has no pain and no nausea. He is complaining significantly of the NG tube causing all kinds of problems with his nose pain in the back of the throat tearing of his eye and he wants to have it out now he said. He has not had a bowel movement. He did pass just a little bit of gas while I was there tonight. PHYSICAL EXAM:    Blood pressure (!) 125/57, pulse 94, temperature 99.6 °F (37.6 °C), temperature source Tympanic, resp. rate 20, height 5' 10\" (1.778 m), weight 255 lb (115.7 kg), SpO2 98 %. Gen:  A and O x 3, NAD, well nourished  Eyes:  Sclera non icterus, PERRL  Head:  Normocephalic, non-tender  Abd: soft , + bs , slightly distended,     Wbc=10.8    Plan: At this point he of the NG tube out. I had a long discussion with him and his daughters. One of the daughters is a nurse. They persuaded him to try and leave it in tonight. We will use some lidocaine viscous for the back of the throat and some Ativan for nerves. We will see if he can keep it in through the night.   We will do a small bowel follow-through through the NG tube tomorrow to make sure he is open and then proceed with may be removing it tomorrow

## 2023-03-06 NOTE — PROGRESS NOTES
Physical Therapy  Facility/Department: [facility]  Physical Therapy Initial Assessment    Name: Kayla Jimenez  : 1955  MRN: [id number]  Date of Service: 3/6/2023    Discharge Recommendations:  Home with assist PRN, Home with Home health PT, Continue to assess pending progress          Patient Diagnosis(es): The primary encounter diagnosis was Partial small bowel obstruction ([address] A diagnosis of Small bowel obstruction (HCC) was also pertinent to this visit. Past Medical History:  has a past medical history of Allergic rhinitis, Colon polyps, Elevated WBC count, Hyperlipidemia, Hypertension, Obesity, Type II or unspecified type diabetes mellitus without mention of complication, not stated as uncontrolled, and Unspecified sleep apnea. Past Surgical History:  has a past surgical history that includes Carpal tunnel release (Right, 12/3/2010); Colonoscopy (2009); toenail excision (Left, 2003); Inguinal hernia repair (Right, 1977); and laparoscopy (N/A, 3/5/2023). Assessment   Body Structures, Functions, Activity Limitations Requiring Skilled Therapeutic Intervention: Decreased functional mobility ; Decreased ADL status; Decreased strength;Decreased endurance;Decreased balance  Therapy Prognosis: Good  Decision Making: Low Complexity  Activity Tolerance  Activity Tolerance: Patient tolerated evaluation without incident     Plan   Physcial Therapy Plan  General Plan: 5-7 times per week  Current Treatment Recommendations: Strengthening, Balance training, Functional mobility training, Transfer training, ADL/Self-care training, Endurance training, Gait training, Neuromuscular re-education, Home exercise program, Safety education & training, Patient/Caregiver education & training, Equipment evaluation, education, & procurement, Therapeutic activities  Safety Devices  Type of Devices: Left in chair, Nurse notified, Gait belt, Call light within reach     Restrictions        Subjective General  Chart Reviewed: Yes  Patient assessed for rehabilitation services?: Yes  Referring Practitioner: Zaina Tyler MD  Diagnosis: Generalized weakness  Follows Commands: Within Functional Limits  Subjective  Subjective: Nursing and patient agreeable to PT eval. Patient in the chair upon arrival.         Social/Functional History  Social/Functional History  Lives With: Son  Type of Home: House  Home Layout: One level  Home Access: Stairs to enter with rails  Entrance Stairs - Number of Steps: 1  Bathroom Shower/Tub: Walk-in shower  Bathroom Toilet: Handicap height  Bathroom Equipment: Shower chair, Grab bars in shower, Grab bars around toilet  Home Equipment: [employer] Help From: Family  ADL Assistance: Independent  Homemaking Assistance: Independent  Homemaking Responsibilities: Yes  Ambulation Assistance: Independent  Transfer Assistance: Independent  Active : Yes  Mode of Transportation: SUV  Vision/Hearing  Vision  Vision: Within Functional Limits  Hearing  Hearing: Within functional limits    Cognition   Orientation  Overall Orientation Status: Within Functional Limits     Objective   Heart Rate: 96  Heart Rate Source: Monitor  BP: (!) 117/49  BP Location: Left upper arm  BP Method: Automatic  Patient Position: Sitting  MAP (Calculated): 72  Resp: 18  SpO2: 97 %  O2 Device: Nasal cannula              AROM RLE (degrees)  RLE AROM: WFL  AROM LLE (degrees)  LLE AROM : WFL  Strength RLE  Comment: Grossly 4 to 4+/5  Strength LLE  Comment: Grossly 4 to 4+/5              Transfers  Sit to Stand: Contact guard assistance  Stand to Sit: Contact guard assistance  Ambulation  Surface: Level tile  Device: Single point cane  Assistance: Contact guard assistance  Gait Deviations: Slow Addis;Decreased step length  Distance: 5'     Balance  Sitting - Static: Good  Sitting - Dynamic: Good  Standing - Static: Fair;+  Standing - Dynamic: Fair;+          Goals  Short Term Goals  Time Frame for Short Term Goals: Length of stay  Short Term Goal 1: Patient will complete bed mobility with ANGEL assistance  Short Term Goal 2: Patient will ambulate 50' with SPC and ANGEL assistance  Short Term Goal 3: Patient will complete transfers with ANGEL assistance         Therapy Time   Individual Concurrent Group Co-treatment   Time In 0105         Time Out 0116         Minutes 11                 Nicole Hazel, PT

## 2023-03-06 NOTE — PROGRESS NOTES
Hospitalist Progress Note    Patient:  Jevon Albright     YOB: 1955    MRN: [id number]   Admit date: 3/5/2023     Acct: [de-identified]     PCP: Vin Purdy    CC--Interval History:     POD 1 diagnostic laparoscopy---3.5.2023--Zavala---SBO, but likely ileus by reports---discussed with General Surgery--SBFT--3.7.2023    Lactic acid --> 2.0--sepsis ruled out     HTN---119/45    RBBB--chronic    See note below     All other ROS negative except noted in HPI    Diet:  Diet NPO Exceptions are: Ice Chips    Medications:  Scheduled Meds:   sodium chloride flush  5-40 mL IntraVENous 2 times per day    piperacillin-tazobactam  3,375 mg IntraVENous q8h    pantoprazole (PROTONIX) 40 mg injection  40 mg IntraVENous Daily    enoxaparin  40 mg SubCUTAneous Daily    insulin lispro  0-4 Units SubCUTAneous Q6H     Continuous Infusions:   sodium chloride 125 mL/hr at 03/06/23 0204    sodium chloride      dextrose       PRN Meds:sodium chloride flush, sodium chloride, potassium chloride, ondansetron, metoprolol, glucose, dextrose bolus **OR** dextrose bolus, glucagon (rDNA), dextrose, morphine **OR** morphine    Objective:  Labs:  CBC with Differential:    Lab Results   Component Value Date/Time    WBC 10.8 03/06/2023 03:13 AM    RBC 4.29 03/06/2023 03:13 AM    HGB 12.1 03/06/2023 03:13 AM    HCT 36.5 03/06/2023 03:13 AM     03/06/2023 03:13 AM    MCV 85.1 03/06/2023 03:13 AM    MCH 28.2 03/06/2023 03:13 AM    MCHC 33.2 03/06/2023 03:13 AM    RDW 13.9 03/06/2023 03:13 AM    LYMPHOPCT 6 03/06/2023 03:13 AM    MONOPCT 6 03/06/2023 03:13 AM    BASOPCT 0 03/06/2023 03:13 AM    MONOSABS 0.69 03/06/2023 03:13 AM    LYMPHSABS 0.69 03/06/2023 03:13 AM    EOSABS <0.03 03/06/2023 03:13 AM    BASOSABS <0.03 03/06/2023 03:13 AM     BMP:    Lab Results   Component Value Date/Time     03/06/2023 03:13 AM    K 4.3 03/06/2023 03:13 AM     03/06/2023 03:13 AM    CO2 21 03/06/2023 03:13 AM    BUN 27 03/06/2023 03:13 AM LABALBU 3.1 03/06/2023 03:13 AM    CREATININE 1.38 03/06/2023 03:13 AM    CALCIUM 7.2 03/06/2023 03:13 AM    LABGLOM 55 03/06/2023 03:13 AM    GLUCOSE 148 03/06/2023 03:13 AM           Physical Exam:  Vitals: BP (!) 119/49   Pulse (!) 103   Temp 99 °F (37.2 °C) (Tympanic)   Resp 16   Ht 5' 10\" (1.778 m)   Wt 255 lb (115.7 kg)   SpO2 96%   BMI 36.59 kg/m²   24 hour intake/output:  Intake/Output Summary (Last 24 hours) at 3/6/2023 0757  Last data filed at 3/6/2023 0503  Gross per 24 hour   Intake 2500 ml   Output 2775 ml   Net -275 ml     Last 3 weights: Wt Readings from Last 3 Encounters:   03/05/23 255 lb (115.7 kg)   02/15/23 255 lb (115.7 kg)   12/07/22 257 lb (116.6 kg)     HEENT:  NGT--clearing, Normocephalic, and Atraumatic  Neck: Supple, No Masses, Tenderness, Nodularity, and No Lymphadenopathy  Chest/Lungs: Clear to Auscultation without Rales, Rhonchi, or Wheezes  Cardiac: Regular Rate and Rhythm  GI/Abdomen: Bowel Sounds absent-- without Guarding or Rebound Tenderness  : Not examined  EXT/Skin:  Edema---mild BLE, No Cyanosis, and No Clubbing  Neuro:  alert--laconic answers to questions and No Localizing Signs/Symptoms      Assessment:    Principal Problem:    Partial small bowel obstruction (HCC)  Active Problems:    Sepsis with acute renal failure (HCC)  Resolved Problems:    * No resolved hospital problems.  *    SHARON WASHINGTON  69  WM  [Ramo Mcgee;  josé antonio Zavala, GS;  DC Cardiology---TCC]  FULL CODE    LOVENOX 30 bid  COVID-19--NEGATIVE,   INFLUENZA--NEGATIVE    NGT    CHI    Anti-infectives:   Zosyn IV    POD _____  diagnostic laparoscopy---3.5.2023---SBO--more likely ileus         SBFT---3.7.2023---pending   SBO        AAS---3.6.2023---persistent mild-low-grade SBO---bibasilar atelectasis--vs--infiltrates        CT abdomen-pelvis---3.5.2023---no mesenteric ischemia--low grade SBO--suspected                               transition point in the right mid-abdomen--fluid in esophagus--GERD EKG---3.5.2023---NSR--86--RBBB  Dehydration--3.5.2023  Elevated lactic acid responsive to fluids---3.5.2023----sepsis ruled out    RBBB  Hypertension          2D ECHO--3.6.2023--pending   Hyperlipidemia  Diabetes Mellitus Type 2---postoperative goal 140-180  CKD---Stage 3b at admission à Stage 3a  ANGELA   Obesity  Hypoalbuminemia   Tobacco abuse---quit--1992   PMH:  allergic rhinitis, colon polyps  PSH:   right CTS---2010, colonoscopy--2009---polyps, RIH--1977, left great toe nail plate [id number]--[id number]    Allergies:  bee venom, naproxen      Plan:    Cont'd IVF    PSBO--SBO--ileus--seen by General Surgery---SBFT---3.7.2023    NGT per GS    2D ECHO---3.7.2023 per Cardiology    See orders     Electronically signed by Elana Dickens MD on 3/6/2023 at 7:57 AM    Hospitalist

## 2023-03-06 NOTE — CARE COORDINATION
DISCHARGE BARRIERS       Reason for Referral:  SW completed a Psychosocial Assessment for evaluation of patient's mental health, social status, and functional capacity within the community. Kayla Jimenez is a 69 y.o. male admitted due to Partial small bowel obstruction ([facility] [address] [facility] SW provided supportive listening while patient discussed past medical history and events leading up to hospitalization. Mental Status:  Alert, oriented, and engaging during assessment. Decision Making:  Makes own decisions. Family/Social/Home Environment: Lives in [place] [id number] and states his handicapped son lives with him. Support: Discussed a good social support network     [facility]    Current DMEs: Justin    PCP: Yung Hunt and ever no issues affording medication.  status:  [place]     ADLs and means of transportation: Independent in ADLs prior to hospitalization and able to transport self. Food insecurity or needed financial assistance: Denies any food insecurity or financial concerns at this time. ACP and Code Status:  Kayla Jimenez is a Full Code status and does not have an Advance Directive. SW discussed an Advance Directive which included the patient's choices for care and treatment in the case of a health event that adversely affects decision-making abilities. SW provided education and resources. Kayla Jimenez has no questions at this time and has agreed to keep me up-to-date should anything change. Collaborative List of SNF/ECF/HH were provided: offered, declined No discharge order at this time. Anticipated Needs/Discharge Plan:  Spoke with patient/family/representative about discharge plan. Patient/Family/Representative verbalizes understanding of the plan of care and denies discharge needs or further services at this time. SW provided business card. SW will continue to monitor needs and assist as appropriate.            Electronically signed by Karen Church Archana Vivar on 3/6/2023 at 12:17 PM

## 2023-03-06 NOTE — CARE COORDINATION
Case Management Assessment  Initial Evaluation    Date/Time of Evaluation: 3/6/2023 11:19 AM  Assessment Completed by: Katelin Pineda RN    If patient is discharged prior to next notation, then this note serves as note for discharge by case management. Patient Name: Carla Woods                   YOB: 1955  Diagnosis: SBO (small bowel obstruction) ([facility] [address] [S70.679]  Partial small bowel obstruction ([facility] [address] [E97.685]                   Date / Time: 3/5/2023  3:41 AM    Patient Admission Status: Inpatient   Readmission Risk (Low < 19, Mod (19-27), High > 27): Readmission Risk Score: 13.7    Current PCP: Michelle Armstrong  PCP verified by CM? Yes    Chart Reviewed: Yes      History Provided by: Patient  Patient Orientation: Alert and Oriented    Patient Cognition: Alert    Hospitalization in the last 30 days (Readmission):  No    If yes, Readmission Assessment in CM Navigator will be completed. Advance Directives:      Code Status: Full Code   Patient's Primary Decision Maker is:        Discharge Planning:    Patient lives with: Children Type of Home: House  Primary Care Giver: Self  Patient Support Systems include: Children   Current Financial resources: Medicare  Current community resources:    Current services prior to admission: Home Bipap            Current DME:              Type of Home Care services:  None    ADLS  Prior functional level: Independent in ADLs/IADLs  Current functional level: Independent in ADLs/IADLs    PT AM-PAC:   /24  OT AM-PAC:   /24    Family can provide assistance at DC: Yes  Would you like Case Management to discuss the discharge plan with any other family members/significant others, and if so, who? No  Plans to Return to Present Housing: Yes  Other Identified Issues/Barriers to RETURNING to current housing: yes  Potential Assistance needed at discharge:  Outpatient PT/OT, Home Care            Potential DME:    Patient expects to discharge to: House  Plan for transportation at discharge:      Financial    Payor: MEDICARE / Plan: MEDICARE PART A AND B / Product Type: *No Product type* /     Does insurance require precert for SNF: No    Potential assistance Purchasing Medications:    Meds-to-Beds request: Not Assessed (no prescription coverage found)      Teresa Ferraro [phone] - [place], [address] [facility] Danyel Almaraz [address]  [facility] [address]  Phone: [phone] Fax: [phone]      Notes:    Factors facilitating achievement of predicted outcomes: Family support, Motivated, and Good insight into deficits    Barriers to discharge: Limited insight into deficits and Decreased endurance    Additional Case Management Notes: Patient lives at home with his son and is independent. Pt denies any discharge needs at present time. The Plan for Transition of Care is related to the following treatment goals of SBO (small bowel obstruction) ([facility] [address] [K56.609]  Partial small bowel obstruction ([address] [N75.728]    IF APPLICABLE: The Patient and/or patient representative Alina Sow and his family were provided with a choice of provider and agrees with the discharge plan. Freedom of choice list with basic dialogue that supports the patient's individualized plan of care/goals and shares the quality data associated with the providers was provided to:     Patient Representative Name:       The Patient and/or Patient Representative Agree with the Discharge Plan?       Rose Allen RN  Case Management Department

## 2023-03-06 NOTE — PLAN OF CARE
Problem: Discharge Planning  Goal: Discharge to home or other facility with appropriate resources  3/6/2023 1134 by Raoul RN  Outcome: Progressing  Flowsheets (Taken 3/6/2023 5519)  Discharge to home or other facility with appropriate resources: Identify barriers to discharge with patient and caregiver  3/6/2023 0007 by Dianna Andrade RN  Outcome: Bib Quiroz (Taken 3/5/2023 2008)  Discharge to home or other facility with appropriate resources:   Identify barriers to discharge with patient and caregiver   Identify discharge learning needs (meds, wound care, etc)   Refer to discharge planning if patient needs post-hospital services based on physician order or complex needs related to functional status, cognitive ability or social support system     Problem: Pain  Goal: Verbalizes/displays adequate comfort level or baseline comfort level  3/6/2023 1134 by Raoul RN  Outcome: Progressing  3/6/2023 0007 by Dianna Andrade RN  Outcome: Progressing     Problem: Chronic Conditions and Co-morbidities  Goal: Patient's chronic conditions and co-morbidity symptoms are monitored and maintained or improved  3/6/2023 1134 by Raoul RN  Outcome: Progressing  Flowsheets (Taken 3/6/2023 0844)  Care Plan - Patient's Chronic Conditions and Co-Morbidity Symptoms are Monitored and Maintained or Improved: Monitor and assess patient's chronic conditions and comorbid symptoms for stability, deterioration, or improvement  3/6/2023 0007 by Dianna Andrade RN  Outcome: Progressing  [address] (Taken 3/5/2023 2008)  Care Plan - Patient's Chronic Conditions and Co-Morbidity Symptoms are Monitored and Maintained or Improved:   Monitor and assess patient's chronic conditions and comorbid symptoms for stability, deterioration, or improvement   Update acute care plan with appropriate goals if chronic or comorbid symptoms are exacerbated and prevent overall improvement and discharge     Problem: Neurosensory - Adult  Goal: Achieves maximal functionality and self care  3/6/2023 1134 by Justice Avila RN  Outcome: Progressing  3/6/2023 0007 by Sole Garcia RN  Outcome: Progressing     Problem: Skin/Tissue Integrity  Goal: Absence of new skin breakdown  Description: 1. Monitor for areas of redness and/or skin breakdown  2. Assess vascular access sites hourly  3. Every 4-6 hours minimum:  Change oxygen saturation probe site  4. Every 4-6 hours:  If on nasal continuous positive airway pressure, respiratory therapy assess nares and determine need for appliance change or resting period.   Outcome: Progressing     Problem: Safety - Adult  Goal: Free from fall injury  Outcome: Progressing

## 2023-03-07 ENCOUNTER — APPOINTMENT (OUTPATIENT)
Dept: GENERAL RADIOLOGY | Age: 68
End: 2023-03-07
Payer: MEDICARE

## 2023-03-07 ENCOUNTER — APPOINTMENT (OUTPATIENT)
Dept: NON INVASIVE DIAGNOSTICS | Age: 68
End: 2023-03-07
Payer: MEDICARE

## 2023-03-07 LAB
ABSOLUTE EOS #: 0.23 K/UL (ref 0–0.44)
ABSOLUTE IMMATURE GRANULOCYTE: 0.03 K/UL (ref 0–0.3)
ABSOLUTE LYMPH #: 1.45 K/UL (ref 1.1–3.7)
ABSOLUTE MONO #: 0.68 K/UL (ref 0.1–1.2)
ANION GAP SERPL CALCULATED.3IONS-SCNC: 12 MMOL/L (ref 9–17)
BASOPHILS # BLD: 1 % (ref 0–2)
BASOPHILS ABSOLUTE: 0.06 K/UL (ref 0–0.2)
BUN SERPL-MCNC: 21 MG/DL (ref 8–23)
BUN/CREAT BLD: 21 (ref 9–20)
CALCIUM SERPL-MCNC: 7.6 MG/DL (ref 8.6–10.4)
CHLORIDE SERPL-SCNC: 108 MMOL/L (ref 98–107)
CO2 SERPL-SCNC: 20 MMOL/L (ref 20–31)
CREAT SERPL-MCNC: 1 MG/DL (ref 0.7–1.2)
EOSINOPHILS RELATIVE PERCENT: 3 % (ref 1–4)
GFR SERPL CREATININE-BSD FRML MDRD: >60 ML/MIN/1.73M2
GLUCOSE BLD-MCNC: 84 MG/DL (ref 75–110)
GLUCOSE BLD-MCNC: 94 MG/DL (ref 75–110)
GLUCOSE BLD-MCNC: 99 MG/DL (ref 75–110)
GLUCOSE SERPL-MCNC: 91 MG/DL (ref 70–99)
HCT VFR BLD AUTO: 37.2 % (ref 40.7–50.3)
HGB BLD-MCNC: 11.8 G/DL (ref 13–17)
IMMATURE GRANULOCYTES: 0 %
LV EF: 60 %
LVEF MODALITY: NORMAL
LYMPHOCYTES # BLD: 17 % (ref 24–43)
MCH RBC QN AUTO: 27.5 PG (ref 25.2–33.5)
MCHC RBC AUTO-ENTMCNC: 31.7 G/DL (ref 25.2–33.5)
MCV RBC AUTO: 86.7 FL (ref 82.6–102.9)
MONOCYTES # BLD: 8 % (ref 3–12)
NRBC AUTOMATED: 0 PER 100 WBC
PDW BLD-RTO: 13.9 % (ref 11.8–14.4)
PLATELET # BLD AUTO: 308 K/UL (ref 138–453)
PMV BLD AUTO: 9.5 FL (ref 8.1–13.5)
POTASSIUM SERPL-SCNC: 3.7 MMOL/L (ref 3.7–5.3)
RBC # BLD: 4.29 M/UL (ref 4.21–5.77)
SEG NEUTROPHILS: 71 % (ref 36–65)
SEGMENTED NEUTROPHILS ABSOLUTE COUNT: 6.15 K/UL (ref 1.5–8.1)
SODIUM SERPL-SCNC: 140 MMOL/L (ref 135–144)
WBC # BLD AUTO: 8.6 K/UL (ref 3.5–11.3)

## 2023-03-07 PROCEDURE — 82947 ASSAY GLUCOSE BLOOD QUANT: CPT

## 2023-03-07 PROCEDURE — C9113 INJ PANTOPRAZOLE SODIUM, VIA: HCPCS

## 2023-03-07 PROCEDURE — 6360000004 HC RX CONTRAST MEDICATION: Performed by: SURGERY

## 2023-03-07 PROCEDURE — 2580000003 HC RX 258

## 2023-03-07 PROCEDURE — 2060000000 HC ICU INTERMEDIATE R&B

## 2023-03-07 PROCEDURE — 80048 BASIC METABOLIC PNL TOTAL CA: CPT

## 2023-03-07 PROCEDURE — 74250 X-RAY XM SM INT 1CNTRST STD: CPT

## 2023-03-07 PROCEDURE — 85025 COMPLETE CBC W/AUTO DIFF WBC: CPT

## 2023-03-07 PROCEDURE — 6360000002 HC RX W HCPCS

## 2023-03-07 PROCEDURE — 36415 COLL VENOUS BLD VENIPUNCTURE: CPT

## 2023-03-07 PROCEDURE — 99231 SBSQ HOSP IP/OBS SF/LOW 25: CPT | Performed by: INTERNAL MEDICINE

## 2023-03-07 PROCEDURE — 99024 POSTOP FOLLOW-UP VISIT: CPT | Performed by: SURGERY

## 2023-03-07 PROCEDURE — 97530 THERAPEUTIC ACTIVITIES: CPT

## 2023-03-07 PROCEDURE — 6370000000 HC RX 637 (ALT 250 FOR IP): Performed by: INTERNAL MEDICINE

## 2023-03-07 PROCEDURE — 93306 TTE W/DOPPLER COMPLETE: CPT

## 2023-03-07 RX ORDER — CALCIUM GLUCONATE 10 MG/ML
1000 INJECTION, SOLUTION INTRAVENOUS ONCE
Status: COMPLETED | OUTPATIENT
Start: 2023-03-07 | End: 2023-03-07

## 2023-03-07 RX ADMIN — PIPERACILLIN AND TAZOBACTAM 3375 MG: 3; .375 INJECTION, POWDER, FOR SOLUTION INTRAVENOUS at 21:28

## 2023-03-07 RX ADMIN — ONDANSETRON 4 MG: 2 INJECTION INTRAMUSCULAR; INTRAVENOUS at 21:16

## 2023-03-07 RX ADMIN — CALCIUM GLUCONATE 1000 MG: 10 INJECTION, SOLUTION INTRAVENOUS at 06:26

## 2023-03-07 RX ADMIN — ENOXAPARIN SODIUM 40 MG: 100 INJECTION SUBCUTANEOUS at 08:44

## 2023-03-07 RX ADMIN — SODIUM CHLORIDE: 9 INJECTION, SOLUTION INTRAVENOUS at 19:41

## 2023-03-07 RX ADMIN — SODIUM CHLORIDE, PRESERVATIVE FREE 40 MG: 5 INJECTION INTRAVENOUS at 08:44

## 2023-03-07 RX ADMIN — SODIUM CHLORIDE, PRESERVATIVE FREE 10 ML: 5 INJECTION INTRAVENOUS at 08:45

## 2023-03-07 RX ADMIN — PIPERACILLIN AND TAZOBACTAM 3375 MG: 3; .375 INJECTION, POWDER, FOR SOLUTION INTRAVENOUS at 05:19

## 2023-03-07 RX ADMIN — MORPHINE SULFATE 4 MG: 4 INJECTION, SOLUTION INTRAMUSCULAR; INTRAVENOUS at 12:18

## 2023-03-07 RX ADMIN — DIATRIZOATE MEGLUMINE AND DIATRIZOATE SODIUM 240 ML: 660; 100 LIQUID ORAL; RECTAL at 14:49

## 2023-03-07 RX ADMIN — LIDOCAINE HYDROCHLORIDE 15 ML: 20 SOLUTION ORAL at 01:42

## 2023-03-07 RX ADMIN — SODIUM CHLORIDE: 9 INJECTION, SOLUTION INTRAVENOUS at 01:48

## 2023-03-07 RX ADMIN — SODIUM CHLORIDE: 9 INJECTION, SOLUTION INTRAVENOUS at 11:20

## 2023-03-07 RX ADMIN — MORPHINE SULFATE 2 MG: 2 INJECTION, SOLUTION INTRAMUSCULAR; INTRAVENOUS at 21:17

## 2023-03-07 RX ADMIN — PIPERACILLIN AND TAZOBACTAM 3375 MG: 3; .375 INJECTION, POWDER, FOR SOLUTION INTRAVENOUS at 12:26

## 2023-03-07 ASSESSMENT — PAIN SCALES - GENERAL
PAINLEVEL_OUTOF10: 7
PAINLEVEL_OUTOF10: 0
PAINLEVEL_OUTOF10: 8
PAINLEVEL_OUTOF10: 4
PAINLEVEL_OUTOF10: 8
PAINLEVEL_OUTOF10: 5

## 2023-03-07 ASSESSMENT — PAIN DESCRIPTION - ORIENTATION: ORIENTATION: LOWER

## 2023-03-07 ASSESSMENT — PAIN DESCRIPTION - LOCATION
LOCATION: ABDOMEN
LOCATION: BACK

## 2023-03-07 ASSESSMENT — PAIN - FUNCTIONAL ASSESSMENT
PAIN_FUNCTIONAL_ASSESSMENT: ACTIVITIES ARE NOT PREVENTED

## 2023-03-07 ASSESSMENT — PAIN DESCRIPTION - PAIN TYPE
TYPE: CHRONIC PAIN
TYPE: CHRONIC PAIN

## 2023-03-07 ASSESSMENT — PAIN DESCRIPTION - DESCRIPTORS
DESCRIPTORS: ACHING;PRESSURE
DESCRIPTORS: ACHING;DISCOMFORT
DESCRIPTORS: ACHING;DISCOMFORT

## 2023-03-07 ASSESSMENT — PAIN DESCRIPTION - FREQUENCY: FREQUENCY: CONTINUOUS

## 2023-03-07 NOTE — PROGRESS NOTES
Physical Therapy    Re-visited patient room in afternoon with patient requesting to hold off on therapy for today. Discussed benefits of skilled session with understanding noted. Will revisit patient room 3/8/23. Charla Jo PTA. Previously Declined (within the last year)

## 2023-03-07 NOTE — PROGRESS NOTES
Occupational Therapy  Facility/Department: [facility]  PROGRESSIVE CARE  Daily Treatment Note  NAME: Kem Laguna  : 1955  MRN: [id number]    Date of Service: 3/7/2023    Discharge Recommendations:  Home with assist PRN  OT Equipment Recommendations  Equipment Needed: Yes  Mobility Devices: ADL Assistive Devices      Patient Diagnosis(es): The primary encounter diagnosis was Partial small bowel obstruction ([address] A diagnosis of Small bowel obstruction (HCC) was also pertinent to this visit. Assessment    Activity Tolerance: Patient tolerated treatment well  Discharge Recommendations: Home with assist PRN  Equipment Needed: Yes  Mobility Devices: ADL Assistive Devices      Plan   Occupational Therapy Plan  Times Per Week: 1x  Current Treatment Recommendations: Self-Care / ADL;Equipment evaluation, education, & procurement;Patient/Caregiver education & training     Restrictions       Subjective   Subjective  Subjective: Patient rec'd seated in recliner upon arrival, agreeable for OT.   Pain: \"Not a whole lot\"  Orientation  Overall Orientation Status: Within Normal Limits  Cognition  Overall Cognitive Status: WNL        Objective    Vitals  Vitals  O2 Device: Nasal cannula  Bed Mobility Training  Bed Mobility Training: No  Transfer Training  Transfer Training: No     ADL  Equipment Provided: Reacher;Sock aid;Long-handled shoe horn;Long-handled sponge;Dressing stick        Safety Devices  Type of Devices: Left in chair;Nurse notified;Call light within reach     Patient Education  Education Given To: Patient  Education Provided: Equipment  Education Provided Comments: Educated patient regarding use of AE for ease in LB dressing tasks; verbal/visual demo of various pieces of AE provided  Education Method: Demonstration;Verbal;Printed Information/Hand-outs  Barriers to Learning: None  Education Outcome: Verbalized understanding    Goals  Short Term Goals  Time Frame for Short Term Goals: duration of hospital stay  Short Term Goal 1: Patient education adaptive equipment LB dressing for increased ease ADLs - MET       Therapy Time   Individual Concurrent Group Co-treatment   Time In 0827         Time Out 4621         Minutes 8         Timed Code Treatment Minutes: [address] Noah Valdes, MARTHA

## 2023-03-07 NOTE — PROGRESS NOTES
POD #2    Felt very nauseated earlier and distended while undergoing SBFT. SBFT does not show dye in colon yet. NG back to suction. BP (!) 150/80   Pulse 81   Temp 97.3 °F (36.3 °C) (Oral)   Resp 18   Ht 5' 10\" (1.778 m)   Wt 255 lb (115.7 kg)   SpO2 96%   BMI 36.59 kg/m²     WBC is down  Creatinine is back to normal    ABd - still distended. Diminished bowel sounds. IMP/PLAN  1) Ileus vs obstruction - he could have an intraluminal obstruction which I may not have picked up with the laparoscopy. If it a bezoar, the gastrografin may help. - Will recheck plain xray tomorrow.

## 2023-03-07 NOTE — PROGRESS NOTES
Hospitalist Progress Note    Patient:  Immanuel Benitez     YOB: 1955    MRN: [id number]   Admit date: 3/5/2023     Acct: [de-identified]     PCP: Rosita Zarco    CC--Interval History:    SBO---SBFT---3.7.2023----consistent SBO---cont'd NGT    [place] out    See 2D ECHO below---preserved EF    See note below    All other ROS negative except noted in HPI    Diet:  Diet NPO Exceptions are: Ice Chips    Medications:  Scheduled Meds:   sodium chloride flush  5-40 mL IntraVENous 2 times per day    piperacillin-tazobactam  3,375 mg IntraVENous q8h    pantoprazole (PROTONIX) 40 mg injection  40 mg IntraVENous Daily    enoxaparin  40 mg SubCUTAneous Daily    insulin lispro  0-4 Units SubCUTAneous Q6H     Continuous Infusions:   sodium chloride 125 mL/hr at 03/07/23 1120    sodium chloride      dextrose       PRN Meds:phenol, lidocaine viscous hcl, LORazepam, sodium chloride flush, sodium chloride, potassium chloride, ondansetron, metoprolol, glucose, dextrose bolus **OR** dextrose bolus, glucagon (rDNA), dextrose, morphine **OR** morphine    Objective:  Labs:  CBC with Differential:    Lab Results   Component Value Date/Time    WBC 8.6 03/07/2023 04:43 AM    RBC 4.29 03/07/2023 04:43 AM    HGB 11.8 03/07/2023 04:43 AM    HCT 37.2 03/07/2023 04:43 AM     03/07/2023 04:43 AM    MCV 86.7 03/07/2023 04:43 AM    MCH 27.5 03/07/2023 04:43 AM    MCHC 31.7 03/07/2023 04:43 AM    RDW 13.9 03/07/2023 04:43 AM    LYMPHOPCT 17 03/07/2023 04:43 AM    MONOPCT 8 03/07/2023 04:43 AM    BASOPCT 1 03/07/2023 04:43 AM    MONOSABS 0.68 03/07/2023 04:43 AM    LYMPHSABS 1.45 03/07/2023 04:43 AM    EOSABS 0.23 03/07/2023 04:43 AM    BASOSABS 0.06 03/07/2023 04:43 AM     BMP:    Lab Results   Component Value Date/Time     03/07/2023 04:43 AM    K 3.7 03/07/2023 04:43 AM     03/07/2023 04:43 AM    CO2 20 03/07/2023 04:43 AM    BUN 21 03/07/2023 04:43 AM    LABALBU 3.1 03/06/2023 03:13 AM    CREATININE 1.00 03/07/2023 04:43 AM CALCIUM 7.6 03/07/2023 04:43 AM    LABGLOM >60 03/07/2023 04:43 AM    GLUCOSE 91 03/07/2023 04:43 AM           Physical Exam:  Vitals: BP (!) 150/80   Pulse 81   Temp 97.3 °F (36.3 °C) (Oral)   Resp 18   Ht 5' 10\" (1.778 m)   Wt 255 lb (115.7 kg)   SpO2 96%   BMI 36.59 kg/m²   24 hour intake/output:  Intake/Output Summary (Last 24 hours) at 3/7/2023 1237  Last data filed at 3/7/2023 1110  Gross per 24 hour   Intake 9629.89 ml   Output 2250 ml   Net 7379.89 ml     Last 3 weights: Wt Readings from Last 3 Encounters:   03/05/23 255 lb (115.7 kg)   02/15/23 255 lb (115.7 kg)   12/07/22 257 lb (116.6 kg)     HEENT:  NGT---, Normocephalic, and Atraumatic  Neck: Supple, No Masses, Tenderness, Nodularity, and No Lymphadenopathy  Chest/Lungs: Clear to Auscultation without Rales, Rhonchi, or Wheezes and Distant Breath Sounds  Cardiac: Regular Rate and Rhythm  GI/Abdomen: Bowel Sounds minimal----mild tenderness without Guarding or Rebound Tenderness  :  clear yellow---chi  EXT/Skin: No Cyanosis, No Clubbing, and Edema Present--minimal  Neuro:  alert---generalized weakness      Assessment:    Principal Problem:    Partial small bowel obstruction (HCC)  Active Problems:    Sepsis with acute renal failure (HCC)  Resolved Problems:    * No resolved hospital problems.  *    SHARON WASHINGTON  67  WM  [Ramo Mcgee;  josé antonio Zavala, GS;  DC Cardiology---[facility]]  FULL CODE    LOVENOX 30 bid  COVID-19--NEGATIVE,   INFLUENZA--NEGATIVE    NGT    [facility]--dc'd---3.7.2023    Anti-infectives:   Zosyn IV    POD _____  diagnostic laparoscopy---3.5.2023---SBO--more likely ileus         SBFT---3.7.2023--dilated loops bowel up to 4.3 cm---at 5 hours contrast mid-small                       bowel---consistent with SBO  SBO        AAS---3.6.2023---persistent mild-low-grade SBO---bibasilar atelectasis--vs--infiltrates        CT abdomen-pelvis---3.5.2023---no mesenteric ischemia--low grade SBO--suspected                               transition point in the right mid-abdomen--fluid in esophagus--GERD         EKG---3.5.2023---NSR--86--RBBB  Dehydration--3.5.2023  Elevated lactic acid responsive to fluids---3.5.2023----sepsis ruled out    RBBB  Hypertension          2D ECHO--3.6.2023--LA mildly dilated--mild LVH--NLVSF--RA dilatation--NRVSF--                               MAC--AV calcification with adequate opening---RVSP ~ 34 mm Hg---                              AR mildly dilated 3.8 cm---IVC not visualized--Grade II moderate DD---                              LVEF ~ 60%  Hyperlipidemia  BLE--edema  Diabetes Mellitus Type 2---postoperative goal 140-180  CKD---Stage 3b at admission à Stage 3a  ANGELA   Obesity  Tobacco abuse---quit--1992   PMH:  allergic rhinitis, colon polyps  PSH:   right CTS---2010, colonoscopy--2009---polyps, RIH--1977, left great toe nail plate [id number]--[id number]    Allergies:  bee venom, naproxen      Plan:     SBO----cont'd NGT     DAVID matute AAS--3.8.2023     Seen by General Surgery    Electronically signed by Ravinder Lucio MD on 3/7/2023 at 12:37 PM    Hospitalist

## 2023-03-07 NOTE — PROGRESS NOTES
Comprehensive Nutrition Assessment    Type and Reason for Visit:  Initial    Nutrition Recommendations/Plan:   Initiate and advance diet as GI feasible. Monitor NPO duration. Malnutrition Assessment:  Malnutrition Status: At risk for malnutrition (Comment) (03/07/23 1327)    Context:  Acute Illness     Findings of the 6 clinical characteristics of malnutrition:  Energy Intake:  Mild decrease in energy intake (Comment) (at least last 2 days)  Weight Loss:  Unable to assess (only stated weights on chart)     Body Fat Loss:  Unable to assess     Muscle Mass Loss:  Unable to assess    Fluid Accumulation:  Mild Extremities   Strength:  Not Performed    Nutrition Assessment:    Inadequate nutrient intakes r/t altered GI status, AEB SBO/ileus and NPO status >2 days. History of reasonably controlled diabetes with A1C 7.2 with glucose  mg/dl in last 24 hours whilst NPO and 0.9% sodium chloride iv hydration. Only 75 ml NGT out noted through last evening with NGT clamped per last flowsheet note. Not avaiable to speak with upon call to room (receiving therapy). Is s/p diagnostic lap with pendoing SBFT. Will monitor NPO duration. Nutrition Related Findings:    + b/s, ngt clamped. + flatus per surgeon note yesterday. Wound Type: None       Current Nutrition Intake & Therapies:    Average Meal Intake: NPO  Average Supplements Intake: NPO  Diet NPO Exceptions are: Ice Chips    Anthropometric Measures:  Height: 5' 10\" (177.8 cm)  Ideal Body Weight (IBW): 166 lbs (75 kg)    Admission Body Weight: 255 lb (115.7 kg)  Current Body Weight: 255 lb (115.7 kg), 153.6 % IBW.  Weight Source: Stated  Current BMI (kg/m2): 36.6  Usual Body Weight: 255 lb (115.7 kg)  % Weight Change (Calculated): 0  Weight Adjustment For: No Adjustment                 BMI Categories: Obese Class 2 (BMI 35.0 -39.9)    Estimated Daily Nutrient Needs:  Energy Requirements Based On: Kcal/kg  Weight Used for Energy Requirements: Current  Energy (kcal/day): 7259-2584 (15-18)  Weight Used for Protein Requirements: Ideal  Protein (g/day): 83-98 (1.1-1.3)  Method Used for Fluid Requirements: 1 ml/kcal  Fluid (ml/day): 2100    Nutrition Diagnosis:   Inadequate protein-energy intake related to altered GI function as evidenced by NPO or clear liquid status due to medical condition    Lab Results   Component Value Date     03/07/2023    K 3.7 03/07/2023     (H) 03/07/2023    CO2 20 03/07/2023    BUN 21 03/07/2023    CREATININE 1.00 03/07/2023    GLUCOSE 91 03/07/2023    CALCIUM 7.6 (L) 03/07/2023    PROT 5.5 (L) 03/06/2023    LABALBU 3.1 (L) 03/06/2023    BILITOT 0.6 03/06/2023    ALKPHOS 66 03/06/2023    AST 20 03/06/2023    ALT 24 03/06/2023    LABGLOM >60 03/07/2023     Hemoglobin A1C   Date Value Ref Range Status   03/05/2023 7.2 (H) 4.0 - 6.0 % Final     No results found for: VITD25    Nutrition Interventions:   Food and/or Nutrient Delivery: Continue NPO, Start Oral Diet  Nutrition Education/Counseling: No recommendation at this time  Coordination of Nutrition Care: Continue to monitor while inpatient  Plan of Care discussed with: no one    Goals:     Goals: Meet at least 75% of estimated needs       Nutrition Monitoring and Evaluation:   Behavioral-Environmental Outcomes: None Identified  Food/Nutrient Intake Outcomes: Food and Nutrient Intake, Diet Advancement/Tolerance  Physical Signs/Symptoms Outcomes: Biochemical Data    Discharge Planning:     Too soon to determine     Summer Solorios, [address], [place]  Contact: [phone]

## 2023-03-08 ENCOUNTER — APPOINTMENT (OUTPATIENT)
Dept: GENERAL RADIOLOGY | Age: 68
End: 2023-03-08
Payer: MEDICARE

## 2023-03-08 LAB
ABSOLUTE EOS #: 0.27 K/UL (ref 0–0.44)
ABSOLUTE IMMATURE GRANULOCYTE: 0.04 K/UL (ref 0–0.3)
ABSOLUTE LYMPH #: 1.4 K/UL (ref 1.1–3.7)
ABSOLUTE MONO #: 0.66 K/UL (ref 0.1–1.2)
ANION GAP SERPL CALCULATED.3IONS-SCNC: 17 MMOL/L (ref 9–17)
BASOPHILS # BLD: 1 % (ref 0–2)
BASOPHILS ABSOLUTE: 0.05 K/UL (ref 0–0.2)
BUN SERPL-MCNC: 23 MG/DL (ref 8–23)
BUN/CREAT BLD: 23 (ref 9–20)
CALCIUM SERPL-MCNC: 8.8 MG/DL (ref 8.6–10.4)
CHLORIDE SERPL-SCNC: 111 MMOL/L (ref 98–107)
CO2 SERPL-SCNC: 18 MMOL/L (ref 20–31)
CREAT SERPL-MCNC: 1.02 MG/DL (ref 0.7–1.2)
EOSINOPHILS RELATIVE PERCENT: 3 % (ref 1–4)
GFR SERPL CREATININE-BSD FRML MDRD: >60 ML/MIN/1.73M2
GLUCOSE BLD-MCNC: 100 MG/DL (ref 75–110)
GLUCOSE BLD-MCNC: 83 MG/DL (ref 75–110)
GLUCOSE BLD-MCNC: 92 MG/DL (ref 75–110)
GLUCOSE SERPL-MCNC: 116 MG/DL (ref 70–99)
HCT VFR BLD AUTO: 38.4 % (ref 40.7–50.3)
HGB BLD-MCNC: 12.5 G/DL (ref 13–17)
IMMATURE GRANULOCYTES: 0 %
LYMPHOCYTES # BLD: 16 % (ref 24–43)
MCH RBC QN AUTO: 28.2 PG (ref 25.2–33.5)
MCHC RBC AUTO-ENTMCNC: 32.6 G/DL (ref 25.2–33.5)
MCV RBC AUTO: 86.7 FL (ref 82.6–102.9)
MONOCYTES # BLD: 7 % (ref 3–12)
NRBC AUTOMATED: 0 PER 100 WBC
PDW BLD-RTO: 13.5 % (ref 11.8–14.4)
PLATELET # BLD AUTO: 352 K/UL (ref 138–453)
PMV BLD AUTO: 9.5 FL (ref 8.1–13.5)
POTASSIUM SERPL-SCNC: 3.9 MMOL/L (ref 3.7–5.3)
RBC # BLD: 4.43 M/UL (ref 4.21–5.77)
SEG NEUTROPHILS: 73 % (ref 36–65)
SEGMENTED NEUTROPHILS ABSOLUTE COUNT: 6.59 K/UL (ref 1.5–8.1)
SODIUM SERPL-SCNC: 146 MMOL/L (ref 135–144)
WBC # BLD AUTO: 9 K/UL (ref 3.5–11.3)

## 2023-03-08 PROCEDURE — 74022 RADEX COMPL AQT ABD SERIES: CPT

## 2023-03-08 PROCEDURE — 6360000002 HC RX W HCPCS

## 2023-03-08 PROCEDURE — 85025 COMPLETE CBC W/AUTO DIFF WBC: CPT

## 2023-03-08 PROCEDURE — 2580000003 HC RX 258

## 2023-03-08 PROCEDURE — 2060000000 HC ICU INTERMEDIATE R&B

## 2023-03-08 PROCEDURE — 99024 POSTOP FOLLOW-UP VISIT: CPT | Performed by: SURGERY

## 2023-03-08 PROCEDURE — 99231 SBSQ HOSP IP/OBS SF/LOW 25: CPT | Performed by: INTERNAL MEDICINE

## 2023-03-08 PROCEDURE — 36415 COLL VENOUS BLD VENIPUNCTURE: CPT

## 2023-03-08 PROCEDURE — 82947 ASSAY GLUCOSE BLOOD QUANT: CPT

## 2023-03-08 PROCEDURE — 80048 BASIC METABOLIC PNL TOTAL CA: CPT

## 2023-03-08 PROCEDURE — C9113 INJ PANTOPRAZOLE SODIUM, VIA: HCPCS

## 2023-03-08 PROCEDURE — 97110 THERAPEUTIC EXERCISES: CPT

## 2023-03-08 RX ADMIN — SODIUM CHLORIDE: 9 INJECTION, SOLUTION INTRAVENOUS at 19:31

## 2023-03-08 RX ADMIN — PIPERACILLIN AND TAZOBACTAM 3375 MG: 3; .375 INJECTION, POWDER, FOR SOLUTION INTRAVENOUS at 13:24

## 2023-03-08 RX ADMIN — SODIUM CHLORIDE: 9 INJECTION, SOLUTION INTRAVENOUS at 03:31

## 2023-03-08 RX ADMIN — PIPERACILLIN AND TAZOBACTAM 3375 MG: 3; .375 INJECTION, POWDER, FOR SOLUTION INTRAVENOUS at 20:45

## 2023-03-08 RX ADMIN — SODIUM CHLORIDE: 9 INJECTION, SOLUTION INTRAVENOUS at 11:56

## 2023-03-08 RX ADMIN — SODIUM CHLORIDE, PRESERVATIVE FREE 10 ML: 5 INJECTION INTRAVENOUS at 09:40

## 2023-03-08 RX ADMIN — ENOXAPARIN SODIUM 40 MG: 100 INJECTION SUBCUTANEOUS at 09:39

## 2023-03-08 RX ADMIN — SODIUM CHLORIDE, PRESERVATIVE FREE 40 MG: 5 INJECTION INTRAVENOUS at 09:39

## 2023-03-08 RX ADMIN — PIPERACILLIN AND TAZOBACTAM 3375 MG: 3; .375 INJECTION, POWDER, FOR SOLUTION INTRAVENOUS at 04:59

## 2023-03-08 ASSESSMENT — PAIN SCALES - GENERAL: PAINLEVEL_OUTOF10: 0

## 2023-03-08 NOTE — PROGRESS NOTES
Hospitalist Progress Note    Patient:  Helen Quiroz     YOB: 1955    MRN: [id number]   Admit date: 3/5/2023     Acct: [de-identified]     PCP: Angélica Brewer    CC--Interval History:    SBO---still with high output NGT----Abdominal X-ray--3.8.2023---progression of enteric contrast in the colon---overall pattern ileus or PSBO---General Surgery to see--may require surgery    Dehydration resolved    HTN--/75    See note below     All other ROS negative except noted in HPI    Diet:  Diet NPO Exceptions are: Ice Chips    Medications:  Scheduled Meds:   sodium chloride flush  5-40 mL IntraVENous 2 times per day    piperacillin-tazobactam  3,375 mg IntraVENous q8h    pantoprazole (PROTONIX) 40 mg injection  40 mg IntraVENous Daily    enoxaparin  40 mg SubCUTAneous Daily    insulin lispro  0-4 Units SubCUTAneous Q6H     Continuous Infusions:   sodium chloride 125 mL/hr at 03/08/23 0510    sodium chloride      dextrose       PRN Meds:phenol, diatrizoate meglumine-sodium, lidocaine viscous hcl, LORazepam, sodium chloride flush, sodium chloride, potassium chloride, ondansetron, metoprolol, glucose, dextrose bolus **OR** dextrose bolus, glucagon (rDNA), dextrose, morphine **OR** morphine    Objective:  Labs:  CBC with Differential:    Lab Results   Component Value Date/Time    WBC 9.0 03/08/2023 05:31 AM    RBC 4.43 03/08/2023 05:31 AM    HGB 12.5 03/08/2023 05:31 AM    HCT 38.4 03/08/2023 05:31 AM     03/08/2023 05:31 AM    MCV 86.7 03/08/2023 05:31 AM    MCH 28.2 03/08/2023 05:31 AM    MCHC 32.6 03/08/2023 05:31 AM    RDW 13.5 03/08/2023 05:31 AM    LYMPHOPCT 16 03/08/2023 05:31 AM    MONOPCT 7 03/08/2023 05:31 AM    BASOPCT 1 03/08/2023 05:31 AM    MONOSABS 0.66 03/08/2023 05:31 AM    LYMPHSABS 1.40 03/08/2023 05:31 AM    EOSABS 0.27 03/08/2023 05:31 AM    BASOSABS 0.05 03/08/2023 05:31 AM     BMP:    Lab Results   Component Value Date/Time     03/08/2023 05:31 AM    K 3.9 03/08/2023 05:31 AM    CL 111 03/08/2023 05:31 AM    CO2 18 03/08/2023 05:31 AM    BUN 23 03/08/2023 05:31 AM    LABALBU 3.1 03/06/2023 03:13 AM    CREATININE 1.02 03/08/2023 05:31 AM    CALCIUM 8.8 03/08/2023 05:31 AM    LABGLOM >60 03/08/2023 05:31 AM    GLUCOSE 116 03/08/2023 05:31 AM           Physical Exam:  Vitals: BP (!) 161/75   Pulse 81   Temp 97.3 °F (36.3 °C) (Tympanic)   Resp 18   Ht 5' 10\" (1.778 m)   Wt 255 lb (115.7 kg)   SpO2 98%   BMI 36.59 kg/m²   24 hour intake/output:  Intake/Output Summary (Last 24 hours) at 3/8/2023 1124  Last data filed at 3/8/2023 1017  Gross per 24 hour   Intake 3151.34 ml   Output 6000 ml   Net -2848.66 ml     Last 3 weights: Wt Readings from Last 3 Encounters:   03/05/23 255 lb (115.7 kg)   02/15/23 255 lb (115.7 kg)   12/07/22 257 lb (116.6 kg)     HEENT:  NGT----pale green semiclear drainage, Normocephalic, and Atraumatic  Neck: Supple, No Masses, Tenderness, Nodularity, and No Lymphadenopathy  Chest/Lungs: Distant Breath Sounds  Cardiac: Regular Rate and Rhythm  GI/Abdomen:  without Guarding or Rebound Tenderness and Bowel Sounds Absent  : Not examined  EXT/Skin: No Cyanosis, No Clubbing, and Edema Present---trivial  Neuro:  alert---generalized weakness      Assessment:    Principal Problem:    Partial small bowel obstruction (HCC)  Active Problems:    Sepsis with acute renal failure (HCC)  Resolved Problems:    * No resolved hospital problems.  *    SHARON WASHINGTON  67  WM  [Ramo Mcgee;  RODOLFO Crawford;  DA[facility]iology---[facility]]  FULL CODE    LOVENOX 30 bid  COVID-19--NEGATIVE,   INFLUENZA--NEGATIVE    NGT    [facility]--dc'd---3.7.2023    Anti-infectives:   Zosyn IV    POD _____  diagnostic laparoscopy---3.5.2023---SBO         SBFT---3.7.2023--dilated loops bowel up to 4.3 cm---at 5 hours contrast mid-small                       bowel---consistent with SBO  SBO        AAS---3.6.2023---persistent mild-low-grade SBO---bibasilar atelectasis--vs--infiltrates        CT abdomen-pelvis---3.5.2023---no mesenteric ischemia--low grade SBO--suspected                               transition point in the right mid-abdomen--fluid in esophagus--GERD         EKG---3.5.2023---NSR--86--RBBB  Dehydration--3.5.2023  Elevated lactic acid responsive to fluids---3.5.2023----sepsis ruled out    RBBB  Hypertension          2D ECHO--3.6.2023--LA mildly dilated--mild LVH--NLVSF--RA dilatation--NRVSF--                               MAC--AV calcification with adequate opening---RVSP ~ 34 mm Hg---                              AR mildly dilated 3.8 cm---IVC not visualized--Grade II moderate DD---                              LVEF ~ 60%  Hyperlipidemia  BLE--edema  Diabetes Mellitus Type 2---postoperative goal 140-180  CKD---Stage 3b at admission à Stage 3a  ANGELA   Obesity  Tobacco abuse---quit--1992   PMH:  allergic rhinitis, colon polyps  PSH:   right CTS---2010, colonoscopy--2009---polyps, RIH--1977, left great toe nail plate [id number]--[id number]    Allergies:  bee venom, naproxen      Plan:      PSBO--cont'd NGT--may require surgery      See orders     Electronically signed by Ghislaine Willis MD on 3/8/2023 at 11:24 AM    Hospitalist

## 2023-03-08 NOTE — PROGRESS NOTES
NG tube clamped this AM per Dr. Amos Hunter order. Pt tolerated ice chips throughout afternoon with no nausea, vomiting, or abd distention. Checked residual at 1800 with no return. Pt continues to deny nausea, increased bloating, or abd pain. Writer spoke with Dr. Bryan Tran at 1800 and updated him on current condition. Dr Bryan Tran orders NG tube be removed at this time. Pt tolerated tube removal.  Tube intact. Bowel sounds remain hypoactive but more audible than previous assessment. Pt continues with ice chips.

## 2023-03-08 NOTE — PROGRESS NOTES
Physical Therapy  Facility/Department: [facility]  PROGRESSIVE CARE  Daily Treatment Note  NAME: Daisy Kim  : 1955  MRN: [id number]    Date of Service: 3/8/2023    Discharge Recommendations:  Home with assist PRN, Home with Home health PT, Continue to assess pending progress        Patient Diagnosis(es): The primary encounter diagnosis was Partial small bowel obstruction ([address] A diagnosis of Small bowel obstruction (HCC) was also pertinent to this visit. Assessment   Activity Tolerance: Patient tolerated treatment well     Plan    Physcial Therapy Plan  General Plan: 5-7 times per week  Current Treatment Recommendations: Strengthening;Balance training;Functional mobility training;Transfer training;ADL/Self-care training; Endurance training;Gait training;Neuromuscular re-education;Home exercise program;Safety education & training;Patient/Caregiver education & training;Equipment evaluation, education, & procurement; Therapeutic activities     Restrictions        Subjective    Subjective  Subjective: Pt in chair upon arrival. pt reports just got back from walking with nursing. Pt agreeable to session.   Pain: not rated  Orientation  Overall Orientation Status: Within Normal Limits     Objective   Vitals  O2 Device: None (Room air)  Bed Mobility Training  Bed Mobility Training: No  Transfer Training  Transfer Training: Yes  Overall Level of Assistance: Supervision  Sit to Stand: Supervision  Stand to Sit: Supervision  Gait Training  Gait Training: Yes  Gait  Overall Level of Assistance: Stand-by assistance  Distance (ft): 5 Feet (limited from lines)  Assistive Device: Cane, straight     PT Exercises  A/AROM Exercises: seated marches x10, hip add x10, LAQ, glute sets x10, seated HR/TR x10  Resistive Exercises: HS curls x10     Safety Devices  Type of Devices: Left in chair;Call light within reach       Goals  Short Term Goals  Time Frame for Short Term Goals: Length of stay  Short Term Goal 1: Patient will complete bed mobility with ANGEL assistance  Short Term Goal 2: Patient will ambulate 48' with SPC and ANGEL assistance  Short Term Goal 3: Patient will complete transfers with ANGEL assistance    Education  Patient Education  Education Given To: Patient    Therapy Time   Individual Concurrent Group Co-treatment   Time In 1037         Time Out 1046         Minutes 9                 Lorin Elder, JESSICA

## 2023-03-08 NOTE — PROGRESS NOTES
Patient states he is not having pain, and had some flatus earlier today. BP (!) 151/73   Pulse 74   Temp 97.6 °F (36.4 °C) (Tympanic)   Resp 20   Ht 5' 10\" (1.778 m)   Wt 255 lb (115.7 kg)   SpO2 99%   BMI 36.59 kg/m²     Xray show gastrografin in colon, but he still has come distended bowel loops. Abd - NG output remains brisk, but his is drinking some water. It appears a little bilious. Soft, with minimal tenderness. Has some active bowel sounds today.     IMP/PLAN  1) Resolving partial SBO vs ileus  - Try clamping NG No

## 2023-03-08 NOTE — FLOWSHEET NOTE
rounding in PCU. Assessment: Patient is a , father, grandfather and soon to be great grandfather. Patient is retired and works part time. Patient is still waiting to find out what is causing his health issues. Patient belongs to the [facility]. Intervention: Engaged in conversation and active listening. Prayed with Patient. Outcome: Patient expressed appreciation for visit and offer of continued prayer. Plan: Chaplains are available on site or on call 24/7 for spiritual and emotional support.     Electronically signed by Clyde Crockett on 3/8/2023 at 2:55 PM

## 2023-03-09 LAB
ABSOLUTE EOS #: 0.46 K/UL (ref 0–0.44)
ABSOLUTE IMMATURE GRANULOCYTE: 0.03 K/UL (ref 0–0.3)
ABSOLUTE LYMPH #: 1.43 K/UL (ref 1.1–3.7)
ABSOLUTE MONO #: 0.53 K/UL (ref 0.1–1.2)
ANION GAP SERPL CALCULATED.3IONS-SCNC: 13 MMOL/L (ref 9–17)
BASOPHILS # BLD: 1 % (ref 0–2)
BASOPHILS ABSOLUTE: 0.06 K/UL (ref 0–0.2)
BUN SERPL-MCNC: 22 MG/DL (ref 8–23)
BUN/CREAT BLD: 26 (ref 9–20)
CALCIUM SERPL-MCNC: 8.4 MG/DL (ref 8.6–10.4)
CHLORIDE SERPL-SCNC: 113 MMOL/L (ref 98–107)
CO2 SERPL-SCNC: 18 MMOL/L (ref 20–31)
CREAT SERPL-MCNC: 0.85 MG/DL (ref 0.7–1.2)
EOSINOPHILS RELATIVE PERCENT: 6 % (ref 1–4)
GFR SERPL CREATININE-BSD FRML MDRD: >60 ML/MIN/1.73M2
GLUCOSE BLD-MCNC: 108 MG/DL (ref 75–110)
GLUCOSE BLD-MCNC: 110 MG/DL (ref 75–110)
GLUCOSE BLD-MCNC: 72 MG/DL (ref 75–110)
GLUCOSE BLD-MCNC: 81 MG/DL (ref 75–110)
GLUCOSE SERPL-MCNC: 81 MG/DL (ref 70–99)
HCT VFR BLD AUTO: 35 % (ref 40.7–50.3)
HGB BLD-MCNC: 11.4 G/DL (ref 13–17)
IMMATURE GRANULOCYTES: 0 %
LYMPHOCYTES # BLD: 19 % (ref 24–43)
MCH RBC QN AUTO: 28 PG (ref 25.2–33.5)
MCHC RBC AUTO-ENTMCNC: 32.6 G/DL (ref 25.2–33.5)
MCV RBC AUTO: 86 FL (ref 82.6–102.9)
MONOCYTES # BLD: 7 % (ref 3–12)
NRBC AUTOMATED: 0 PER 100 WBC
PDW BLD-RTO: 13.2 % (ref 11.8–14.4)
PLATELET # BLD AUTO: 342 K/UL (ref 138–453)
PMV BLD AUTO: 9.4 FL (ref 8.1–13.5)
POTASSIUM SERPL-SCNC: 3.6 MMOL/L (ref 3.7–5.3)
POTASSIUM SERPL-SCNC: 4.1 MMOL/L (ref 3.7–5.3)
RBC # BLD: 4.07 M/UL (ref 4.21–5.77)
SEG NEUTROPHILS: 67 % (ref 36–65)
SEGMENTED NEUTROPHILS ABSOLUTE COUNT: 5.18 K/UL (ref 1.5–8.1)
SODIUM SERPL-SCNC: 144 MMOL/L (ref 135–144)
WBC # BLD AUTO: 7.7 K/UL (ref 3.5–11.3)

## 2023-03-09 PROCEDURE — 36415 COLL VENOUS BLD VENIPUNCTURE: CPT

## 2023-03-09 PROCEDURE — 2580000003 HC RX 258

## 2023-03-09 PROCEDURE — 82947 ASSAY GLUCOSE BLOOD QUANT: CPT

## 2023-03-09 PROCEDURE — APPNB15 APP NON BILLABLE TIME 0-15 MINS

## 2023-03-09 PROCEDURE — 80048 BASIC METABOLIC PNL TOTAL CA: CPT

## 2023-03-09 PROCEDURE — 99231 SBSQ HOSP IP/OBS SF/LOW 25: CPT | Performed by: INTERNAL MEDICINE

## 2023-03-09 PROCEDURE — C9113 INJ PANTOPRAZOLE SODIUM, VIA: HCPCS

## 2023-03-09 PROCEDURE — 2060000000 HC ICU INTERMEDIATE R&B

## 2023-03-09 PROCEDURE — 94760 N-INVAS EAR/PLS OXIMETRY 1: CPT

## 2023-03-09 PROCEDURE — 97116 GAIT TRAINING THERAPY: CPT | Performed by: PHYSICAL THERAPY ASSISTANT

## 2023-03-09 PROCEDURE — 6370000000 HC RX 637 (ALT 250 FOR IP)

## 2023-03-09 PROCEDURE — 6360000002 HC RX W HCPCS

## 2023-03-09 PROCEDURE — 84132 ASSAY OF SERUM POTASSIUM: CPT

## 2023-03-09 PROCEDURE — 85025 COMPLETE CBC W/AUTO DIFF WBC: CPT

## 2023-03-09 RX ORDER — INSULIN LISPRO 100 [IU]/ML
0-4 INJECTION, SOLUTION INTRAVENOUS; SUBCUTANEOUS NIGHTLY
Status: DISCONTINUED | OUTPATIENT
Start: 2023-03-09 | End: 2023-03-11 | Stop reason: HOSPADM

## 2023-03-09 RX ORDER — INSULIN LISPRO 100 [IU]/ML
0-4 INJECTION, SOLUTION INTRAVENOUS; SUBCUTANEOUS
Status: DISCONTINUED | OUTPATIENT
Start: 2023-03-09 | End: 2023-03-11 | Stop reason: HOSPADM

## 2023-03-09 RX ORDER — POTASSIUM CHLORIDE 20 MEQ/1
40 TABLET, EXTENDED RELEASE ORAL ONCE
Status: COMPLETED | OUTPATIENT
Start: 2023-03-09 | End: 2023-03-09

## 2023-03-09 RX ADMIN — PIPERACILLIN AND TAZOBACTAM 3375 MG: 3; .375 INJECTION, POWDER, FOR SOLUTION INTRAVENOUS at 04:58

## 2023-03-09 RX ADMIN — MORPHINE SULFATE 4 MG: 4 INJECTION, SOLUTION INTRAMUSCULAR; INTRAVENOUS at 20:15

## 2023-03-09 RX ADMIN — PIPERACILLIN AND TAZOBACTAM 3375 MG: 3; .375 INJECTION, POWDER, FOR SOLUTION INTRAVENOUS at 14:23

## 2023-03-09 RX ADMIN — SODIUM CHLORIDE: 9 INJECTION, SOLUTION INTRAVENOUS at 19:48

## 2023-03-09 RX ADMIN — POTASSIUM CHLORIDE 40 MEQ: 1500 TABLET, EXTENDED RELEASE ORAL at 06:54

## 2023-03-09 RX ADMIN — SODIUM CHLORIDE, PRESERVATIVE FREE 40 MG: 5 INJECTION INTRAVENOUS at 08:25

## 2023-03-09 RX ADMIN — SODIUM CHLORIDE, PRESERVATIVE FREE 10 ML: 5 INJECTION INTRAVENOUS at 08:29

## 2023-03-09 RX ADMIN — SODIUM CHLORIDE: 9 INJECTION, SOLUTION INTRAVENOUS at 11:49

## 2023-03-09 RX ADMIN — PIPERACILLIN AND TAZOBACTAM 3375 MG: 3; .375 INJECTION, POWDER, FOR SOLUTION INTRAVENOUS at 21:06

## 2023-03-09 RX ADMIN — ENOXAPARIN SODIUM 40 MG: 100 INJECTION SUBCUTANEOUS at 08:24

## 2023-03-09 ASSESSMENT — PAIN DESCRIPTION - ORIENTATION
ORIENTATION: LOWER
ORIENTATION: LOWER

## 2023-03-09 ASSESSMENT — PAIN SCALES - GENERAL
PAINLEVEL_OUTOF10: 7
PAINLEVEL_OUTOF10: 0
PAINLEVEL_OUTOF10: 6

## 2023-03-09 ASSESSMENT — PAIN DESCRIPTION - FREQUENCY: FREQUENCY: CONTINUOUS

## 2023-03-09 ASSESSMENT — PAIN DESCRIPTION - DESCRIPTORS
DESCRIPTORS: SPASM;ACHING
DESCRIPTORS: ACHING

## 2023-03-09 ASSESSMENT — PAIN DESCRIPTION - LOCATION
LOCATION: BACK
LOCATION: BACK

## 2023-03-09 ASSESSMENT — PAIN - FUNCTIONAL ASSESSMENT
PAIN_FUNCTIONAL_ASSESSMENT: PREVENTS OR INTERFERES WITH MANY ACTIVE NOT PASSIVE ACTIVITIES
PAIN_FUNCTIONAL_ASSESSMENT: ACTIVITIES ARE NOT PREVENTED

## 2023-03-09 ASSESSMENT — PAIN DESCRIPTION - PAIN TYPE: TYPE: CHRONIC PAIN

## 2023-03-09 NOTE — PROGRESS NOTES
Physical Therapy  Facility/Department: [facility]  PROGRESSIVE CARE  Daily Treatment Note  NAME: Ayanna Cosme  : 1955  MRN: [id number]    Date of Service: 3/9/2023    Discharge Recommendations:  Home with assist PRN, Home with Home health PT, Continue to assess pending progress        Patient Diagnosis(es): The primary encounter diagnosis was Partial small bowel obstruction ([address] A diagnosis of Small bowel obstruction (HCC) was also pertinent to this visit. Assessment   Activity Tolerance: Patient tolerated treatment well     Plan    Physcial Therapy Plan  General Plan: 5-7 times per week  Current Treatment Recommendations: Strengthening;Balance training;Functional mobility training;Transfer training;ADL/Self-care training; Endurance training;Gait training;Neuromuscular re-education;Home exercise program;Safety education & training;Patient/Caregiver education & training;Equipment evaluation, education, & procurement; Therapeutic activities  PT Plan of Care: Daily     Restrictions        Subjective    Subjective  Subjective: Pt. in chair at initiation of session. Notes feeling ok, notes being cold this morning. Pain: No pain noted at initiation. Orientation  Overall Orientation Status: Within Normal Limits  Cognition  Overall Cognitive Status: WNL     Objective   Vitals     Bed Mobility Training  Bed Mobility Training: No  Transfer Training  Transfer Training: Yes  Overall Level of Assistance: Supervision  Sit to Stand: Supervision  Stand to Sit: Supervision  Gait Training  Gait Training: Yes  Gait  Overall Level of Assistance: Stand-by assistance  Distance (ft): 300 Feet  Assistive Device: Cane, straight  Neuromuscular Education  NDT Treatment: Gait   PT Exercises  Exercise Treatment: Seated HR,TR, Marching  Resistive Exercises: Seated HS curls, LAQ hip abd, add: x10 ea with manual resistance.      Safety Devices  Type of Devices: Left in chair;Call light within reach;Gait belt       Goals  Short Term Goals  Time Frame for Short Term Goals: Length of stay  Short Term Goal 1: Patient will complete bed mobility with ANGEL assistance  Short Term Goal 2: Patient will ambulate 48' with SPC and ANGEL assistance  Short Term Goal 3: Patient will complete transfers with ANGEL assistance    Education  Patient Education  Education Given To: Patient  Barriers to Learning: None  Education Outcome: Verbalized understanding    Therapy Time   Individual Concurrent Group Co-treatment   Time In 0905         Time Out 0922         Minutes [place], [place]

## 2023-03-09 NOTE — PLAN OF CARE
Problem: Discharge Planning  Goal: Discharge to home or other facility with appropriate resources  Outcome: Progressing  Flowsheets (Taken 3/8/2023 2046 by Arcenio Broderick RN)  Discharge to home or other facility with appropriate resources: Identify barriers to discharge with patient and caregiver     Problem: Pain  Goal: Verbalizes/displays adequate comfort level or baseline comfort level  Outcome: Progressing     Problem: Chronic Conditions and Co-morbidities  Goal: Patient's chronic conditions and co-morbidity symptoms are monitored and maintained or improved  Outcome: Progressing  Flowsheets (Taken 3/8/2023 2046 by Arcenio Broderick RN)  Care Plan - Patient's Chronic Conditions and Co-Morbidity Symptoms are Monitored and Maintained or Improved: Monitor and assess patient's chronic conditions and comorbid symptoms for stability, deterioration, or improvement     Problem: Neurosensory - Adult  Goal: Achieves maximal functionality and self care  Outcome: Progressing     Problem: Skin/Tissue Integrity  Goal: Absence of new skin breakdown  Description: 1. Monitor for areas of redness and/or skin breakdown  2. Assess vascular access sites hourly  3. Every 4-6 hours minimum:  Change oxygen saturation probe site  4. Every 4-6 hours:  If on nasal continuous positive airway pressure, respiratory therapy assess nares and determine need for appliance change or resting period.   Outcome: Progressing     Problem: Safety - Adult  Goal: Free from fall injury  Outcome: Progressing     Problem: Nutrition Deficit:  Goal: Optimize nutritional status  Outcome: Progressing     Problem: ABCDS Injury Assessment  Goal: Absence of physical injury  Outcome: Progressing     Problem: Skin/Tissue Integrity - Adult  Goal: Skin integrity remains intact  Outcome: Progressing  Flowsheets (Taken 3/8/2023 2046 by Arcenio Broderick RN)  Skin Integrity Remains Intact: Monitor for areas of redness and/or skin breakdown     Problem: Gastrointestinal - Adult  Goal: Minimal or absence of nausea and vomiting  Outcome: Progressing     Problem: Metabolic/Fluid and Electrolytes - Adult  Goal: Electrolytes maintained within normal limits  Outcome: Progressing  Goal: Hemodynamic stability and optimal renal function maintained  Outcome: Progressing  Goal: Glucose maintained within prescribed range  Outcome: Progressing
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
## 2023-03-09 NOTE — PROGRESS NOTES
Hospitalist Progress Note    Patient:  Lenny Blanco     YOB: 1955    MRN: [id number]   Admit date: 3/5/2023     Acct: [de-identified]     PCP: Hezzie Mortimer    CC--Interval History: POD 4--diagnostic laparoscopy---3.5.2023    PSBO-SBO--ileus?--NGT out--3.8.2023---has tolerated ice chips --> CLD---BS-[+]--gas--[+]--no BM    K = 3.6 ---> potassium replacement    Has been walking in hallway    All other ROS negative except noted in HPI    Diet:  Diet NPO Exceptions are: Ice Chips    Medications:  Scheduled Meds:   sodium chloride flush  5-40 mL IntraVENous 2 times per day    piperacillin-tazobactam  3,375 mg IntraVENous q8h    pantoprazole (PROTONIX) 40 mg injection  40 mg IntraVENous Daily    enoxaparin  40 mg SubCUTAneous Daily    insulin lispro  0-4 Units SubCUTAneous Q6H     Continuous Infusions:   sodium chloride 125 mL/hr at 03/09/23 0659    sodium chloride      dextrose       PRN Meds:phenol, diatrizoate meglumine-sodium, lidocaine viscous hcl, LORazepam, sodium chloride flush, sodium chloride, potassium chloride, ondansetron, metoprolol, glucose, dextrose bolus **OR** dextrose bolus, glucagon (rDNA), dextrose, morphine **OR** morphine    Objective:  Labs:  CBC with Differential:    Lab Results   Component Value Date/Time    WBC 7.7 03/09/2023 05:30 AM    RBC 4.07 03/09/2023 05:30 AM    HGB 11.4 03/09/2023 05:30 AM    HCT 35.0 03/09/2023 05:30 AM     03/09/2023 05:30 AM    MCV 86.0 03/09/2023 05:30 AM    MCH 28.0 03/09/2023 05:30 AM    MCHC 32.6 03/09/2023 05:30 AM    RDW 13.2 03/09/2023 05:30 AM    LYMPHOPCT 19 03/09/2023 05:30 AM    MONOPCT 7 03/09/2023 05:30 AM    BASOPCT 1 03/09/2023 05:30 AM    MONOSABS 0.53 03/09/2023 05:30 AM    LYMPHSABS 1.43 03/09/2023 05:30 AM    EOSABS 0.46 03/09/2023 05:30 AM    BASOSABS 0.06 03/09/2023 05:30 AM     BMP:    Lab Results   Component Value Date/Time     03/09/2023 05:30 AM    K 3.6 03/09/2023 05:30 AM     03/09/2023 05:30 AM    CO2 18 03/09/2023 05:30 AM    BUN 22 03/09/2023 05:30 AM    LABALBU 3.1 03/06/2023 03:13 AM    CREATININE 0.85 03/09/2023 05:30 AM    CALCIUM 8.4 03/09/2023 05:30 AM    LABGLOM >60 03/09/2023 05:30 AM    GLUCOSE 81 03/09/2023 05:30 AM           Physical Exam:  Vitals: /67   Pulse 76   Temp 97.6 °F (36.4 °C) (Tympanic)   Resp 18   Ht 5' 10\" (1.778 m)   Wt 255 lb (115.7 kg)   SpO2 98%   BMI 36.59 kg/m²   24 hour intake/output:  Intake/Output Summary (Last 24 hours) at 3/9/2023 0823  Last data filed at 3/9/2023 0747  Gross per 24 hour   Intake 5599.49 ml   Output 1725 ml   Net 3874.49 ml     Last 3 weights: Wt Readings from Last 3 Encounters:   03/05/23 255 lb (115.7 kg)   02/15/23 255 lb (115.7 kg)   12/07/22 257 lb (116.6 kg)     HEENT: Normocephalic and Atraumatic  Neck: Supple, No Masses, Tenderness, Nodularity, and No Lymphadenopathy  Chest/Lungs: Clear to Auscultation without Rales, Rhonchi, or Wheezes  Cardiac: Regular Rate and Rhythm  GI/Abdomen: Bowel Sounds Present  without Guarding or Rebound Tenderness  : Not examined  EXT/Skin: No Cyanosis, No Clubbing, and Edema Present---minimal  Neuro:  alert--generalieed weakness      Assessment:    Principal Problem:    Partial small bowel obstruction (HCC)  Active Problems:    Sepsis with acute renal failure (HCC)  Resolved Problems:    * No resolved hospital problems.  *    SHARON WASHINGTON  67  WM  [Ramo Mcgee;  RODOLFO Crawford;  [facility]]  FULL CODE    LOVENOX 30 bid  COVID-19--NEGATIVE,   INFLUENZA--NEGATIVE    NGT---out--3.8.2023    [facility]--dc'd---3.7.2023    Anti-infectives:   Zosyn IV    POD _____  diagnostic laparoscopy---3.5.2023---SBO         X-ray--abdomen---3.9.2023---progression of enteric contrast in the colon--ileus--vs--PSBO         SBFT---3.7.2023--dilated loops bowel up to 4.3 cm---at 5 hours contrast mid-small                       bowel---consistent with SBO  SBO        AAS---3.6.2023---persistent mild-low-grade SBO---bibasilar atelectasis--vs--infiltrates        CT abdomen-pelvis---3.5.2023---no mesenteric ischemia--low grade SBO--suspected                               transition point in the right mid-abdomen--fluid in esophagus--GERD         EKG---3.5.2023---NSR--86--RBBB  Dehydration--3.5.2023  Elevated lactic acid responsive to fluids---3.5.2023----sepsis ruled out    RBBB  Hypertension          2D ECHO--3.6.2023--LA mildly dilated--mild LVH--NLVSF--RA dilatation--NRVSF--                               MAC--AV calcification with adequate opening---RVSP ~ 34 mm Hg---                              AR mildly dilated 3.8 cm---IVC not visualized--Grade II moderate DD---                              LVEF ~ 60%  Hyperlipidemia  BLE--edema  Diabetes Mellitus Type 2---postoperative goal 140-180  CKD---Stage 3b at admission à Stage 3a  ANGELA   Obesity  Tobacco abuse---quit--1992   PMH:  allergic rhinitis, colon polyps  PSH:   right CTS---2010, colonoscopy--2009---polyps, RIH--1977, left great toe nail plate [id number]--[id number]    Allergies:  bee venom, naproxen      Plan:     Diet --> CLD     Consider dc Zosyn     See orders    Electronically signed by Jon Cooper MD on 3/9/2023 at 8:23 AM    Hospitalist

## 2023-03-10 LAB
ABSOLUTE EOS #: 0.41 K/UL (ref 0–0.44)
ABSOLUTE IMMATURE GRANULOCYTE: 0.08 K/UL (ref 0–0.3)
ABSOLUTE LYMPH #: 1.56 K/UL (ref 1.1–3.7)
ABSOLUTE MONO #: 0.63 K/UL (ref 0.1–1.2)
ANION GAP SERPL CALCULATED.3IONS-SCNC: 11 MMOL/L (ref 9–17)
BASOPHILS # BLD: 1 % (ref 0–2)
BASOPHILS ABSOLUTE: 0.07 K/UL (ref 0–0.2)
BUN SERPL-MCNC: 15 MG/DL (ref 8–23)
BUN/CREAT BLD: 16 (ref 9–20)
CALCIUM SERPL-MCNC: 8.6 MG/DL (ref 8.6–10.4)
CHLORIDE SERPL-SCNC: 109 MMOL/L (ref 98–107)
CO2 SERPL-SCNC: 19 MMOL/L (ref 20–31)
CREAT SERPL-MCNC: 0.93 MG/DL (ref 0.7–1.2)
EOSINOPHILS RELATIVE PERCENT: 5 % (ref 1–4)
GFR SERPL CREATININE-BSD FRML MDRD: >60 ML/MIN/1.73M2
GLUCOSE BLD-MCNC: 102 MG/DL (ref 75–110)
GLUCOSE BLD-MCNC: 121 MG/DL (ref 75–110)
GLUCOSE BLD-MCNC: 89 MG/DL (ref 75–110)
GLUCOSE SERPL-MCNC: 113 MG/DL (ref 70–99)
HCT VFR BLD AUTO: 37.2 % (ref 40.7–50.3)
HGB BLD-MCNC: 12.5 G/DL (ref 13–17)
IMMATURE GRANULOCYTES: 1 %
LYMPHOCYTES # BLD: 19 % (ref 24–43)
MCH RBC QN AUTO: 28.1 PG (ref 25.2–33.5)
MCHC RBC AUTO-ENTMCNC: 33.6 G/DL (ref 25.2–33.5)
MCV RBC AUTO: 83.6 FL (ref 82.6–102.9)
MONOCYTES # BLD: 8 % (ref 3–12)
NRBC AUTOMATED: 0 PER 100 WBC
PDW BLD-RTO: 13.2 % (ref 11.8–14.4)
PLATELET # BLD AUTO: 369 K/UL (ref 138–453)
PMV BLD AUTO: 8.8 FL (ref 8.1–13.5)
POTASSIUM SERPL-SCNC: 3.8 MMOL/L (ref 3.7–5.3)
RBC # BLD: 4.45 M/UL (ref 4.21–5.77)
SEG NEUTROPHILS: 66 % (ref 36–65)
SEGMENTED NEUTROPHILS ABSOLUTE COUNT: 5.32 K/UL (ref 1.5–8.1)
SODIUM SERPL-SCNC: 139 MMOL/L (ref 135–144)
WBC # BLD AUTO: 8.1 K/UL (ref 3.5–11.3)

## 2023-03-10 PROCEDURE — 36415 COLL VENOUS BLD VENIPUNCTURE: CPT

## 2023-03-10 PROCEDURE — 82947 ASSAY GLUCOSE BLOOD QUANT: CPT

## 2023-03-10 PROCEDURE — 80048 BASIC METABOLIC PNL TOTAL CA: CPT

## 2023-03-10 PROCEDURE — 97116 GAIT TRAINING THERAPY: CPT

## 2023-03-10 PROCEDURE — C9113 INJ PANTOPRAZOLE SODIUM, VIA: HCPCS

## 2023-03-10 PROCEDURE — 2580000003 HC RX 258

## 2023-03-10 PROCEDURE — 85025 COMPLETE CBC W/AUTO DIFF WBC: CPT

## 2023-03-10 PROCEDURE — 99232 SBSQ HOSP IP/OBS MODERATE 35: CPT | Performed by: INTERNAL MEDICINE

## 2023-03-10 PROCEDURE — 2060000000 HC ICU INTERMEDIATE R&B

## 2023-03-10 PROCEDURE — 6360000002 HC RX W HCPCS

## 2023-03-10 PROCEDURE — 6360000002 HC RX W HCPCS: Performed by: INTERNAL MEDICINE

## 2023-03-10 RX ORDER — ENOXAPARIN SODIUM 100 MG/ML
30 INJECTION SUBCUTANEOUS 2 TIMES DAILY
Status: DISCONTINUED | OUTPATIENT
Start: 2023-03-10 | End: 2023-03-11 | Stop reason: HOSPADM

## 2023-03-10 RX ADMIN — ENOXAPARIN SODIUM 30 MG: 100 INJECTION SUBCUTANEOUS at 21:18

## 2023-03-10 RX ADMIN — SODIUM CHLORIDE: 9 INJECTION, SOLUTION INTRAVENOUS at 18:54

## 2023-03-10 RX ADMIN — SODIUM CHLORIDE: 9 INJECTION, SOLUTION INTRAVENOUS at 10:46

## 2023-03-10 RX ADMIN — SODIUM CHLORIDE, PRESERVATIVE FREE 40 MG: 5 INJECTION INTRAVENOUS at 08:34

## 2023-03-10 RX ADMIN — SODIUM CHLORIDE: 9 INJECTION, SOLUTION INTRAVENOUS at 02:59

## 2023-03-10 RX ADMIN — PIPERACILLIN AND TAZOBACTAM 3375 MG: 3; .375 INJECTION, POWDER, FOR SOLUTION INTRAVENOUS at 05:29

## 2023-03-10 RX ADMIN — ENOXAPARIN SODIUM 40 MG: 100 INJECTION SUBCUTANEOUS at 08:34

## 2023-03-10 ASSESSMENT — PAIN SCALES - GENERAL
PAINLEVEL_OUTOF10: 0

## 2023-03-10 NOTE — PROGRESS NOTES
Physical Therapy  Facility/Department: [facility]  PROGRESSIVE CARE  Daily Treatment Note  NAME: Taryn Villa  : 1955  MRN: [id number]    Date of Service: 3/10/2023    Discharge Recommendations:  Home with assist PRN, Home with Home health PT, Continue to assess pending progress        Patient Diagnosis(es): The primary encounter diagnosis was Partial small bowel obstruction ([address] A diagnosis of Small bowel obstruction (HCC) was also pertinent to this visit. Assessment   Activity Tolerance: Patient tolerated treatment well     Plan    Physcial Therapy Plan  Current Treatment Recommendations: Strengthening;Balance training;Functional mobility training;Transfer training;ADL/Self-care training; Endurance training;Gait training;Neuromuscular re-education;Home exercise program;Safety education & training;Patient/Caregiver education & training;Equipment evaluation, education, & procurement; Therapeutic activities     Restrictions        Subjective    Subjective  Subjective: Patient in bedside chair upon arrival. Agreeable to session.   Pain: none  Orientation  Overall Orientation Status: Within Normal Limits     Objective   Vitals  O2 Device: None (Room air)  Bed Mobility Training  Bed Mobility Training: No (In bedside chair upon arrival, returned to bed side chair.)  Transfer Training  Transfer Training: Yes  Overall Level of Assistance: Modified independent  Sit to Stand: Modified independent  Stand to Sit: Modified independent  Gait Training  Gait Training: Yes  Gait  Overall Level of Assistance: Modified independent  Distance (ft): 300 Feet  Assistive Device: Cane, straight           Safety Devices  Type of Devices: Left in chair;Call light within reach       Goals  Short Term Goals  Time Frame for Short Term Goals: Length of stay  Short Term Goal 1: Patient will complete bed mobility with ANGEL assistance  Short Term Goal 2: Patient will ambulate 48' with [facility] and ANGEL assistance  Short Term Goal 3: Patient will complete transfers with ANGEL assistance    Education  Patient Education  Education Given To: Patient  Education Provided: Home Exercise Program;Role of Therapy  Education Provided Comments: Educated patient on things to do at home. Encouraged continuation of ambulating at home.   Barriers to Learning: None  Education Outcome: Verbalized understanding    Therapy Time   Individual Concurrent Group Co-treatment   Time In 0106         Time Out 0117         Min[address] , [place]

## 2023-03-10 NOTE — PROGRESS NOTES
POD #5    Not much pain. + BS. Tolerating food. BP (!) 143/77   Pulse 69   Temp 97.6 °F (36.4 °C) (Tympanic)   Resp 16   Ht 5' 10\" (1.778 m)   Wt 271 lb 11.2 oz (123.2 kg)   SpO2 95%   BMI 38.98 kg/m²     No distress. IMP/PLAN  1) Appears to be recovering nicely  - Follow late next week in office with nurse to remove staples. - Follow up with me in 2 - 3 weeks if desired.

## 2023-03-10 NOTE — PLAN OF CARE
Problem: Discharge Planning  Goal: Discharge to home or other facility with appropriate resources  3/10/2023 1016 by Alvarado Le RN  Outcome: Progressing  3/9/2023 2108 by Ramses Carver RN  Outcome: Progressing     Problem: Pain  Goal: Verbalizes/displays adequate comfort level or baseline comfort level  3/10/2023 1016 by Alvarado Le RN  Outcome: Progressing  3/9/2023 2108 by Ramses Carver RN  Outcome: Progressing     Problem: Chronic Conditions and Co-morbidities  Goal: Patient's chronic conditions and co-morbidity symptoms are monitored and maintained or improved  3/10/2023 1016 by Alvarado Le RN  Outcome: Progressing  3/9/2023 2108 by Ramses Carver RN  Outcome: Progressing     Problem: Neurosensory - Adult  Goal: Achieves maximal functionality and self care  3/10/2023 1016 by Alvarado Le RN  Outcome: Progressing  3/9/2023 2108 by Ramses Carver RN  Outcome: Progressing     Problem: Skin/Tissue Integrity  Goal: Absence of new skin breakdown  Description: 1. Monitor for areas of redness and/or skin breakdown  2. Assess vascular access sites hourly  3. Every 4-6 hours minimum:  Change oxygen saturation probe site  4. Every 4-6 hours:  If on nasal continuous positive airway pressure, respiratory therapy assess nares and determine need for appliance change or resting period.   3/10/2023 1016 by Alvarado Le RN  Outcome: Progressing  3/9/2023 2108 by Ramses Carver RN  Outcome: Progressing     Problem: Safety - Adult  Goal: Free from fall injury  3/10/2023 1016 by Alvarado Le RN  Outcome: Progressing  3/9/2023 2108 by Ramses Carver RN  Outcome: Progressing     Problem: Nutrition Deficit:  Goal: Optimize nutritional status  3/10/2023 1016 by Alvarado Le RN  Outcome: Progressing  3/9/2023 2108 by Ramses Carver RN  Outcome: Progressing     Problem: ABCDS Injury Assessment  Goal: Absence of physical injury  3/10/2023 1016 by Alvarado Le RN  Outcome: Progressing  3/9/2023 2108 by Luis F Pearson Josue Benavides RN  Outcome: Progressing     Problem: Skin/Tissue Integrity - Adult  Goal: Skin integrity remains intact  3/10/2023 1016 by Maxine Shields RN  Outcome: Progressing  3/9/2023 2108 by Iliana Rosales RN  Outcome: Progressing  Flowsheets (Taken 3/9/2023 2006 by Santosh Sparks RN)  Skin Integrity Remains Intact: Monitor for areas of redness and/or skin breakdown     Problem: Gastrointestinal - Adult  Goal: Minimal or absence of nausea and vomiting  3/10/2023 1016 by Maxine Shields RN  Outcome: Progressing  3/9/2023 2108 by Iliana Rosales RN  Outcome: Progressing     Problem: Metabolic/Fluid and Electrolytes - Adult  Goal: Electrolytes maintained within normal limits  3/10/2023 1016 by Maxine Shields RN  Outcome: Progressing  3/9/2023 2108 by Iliana Rosales RN  Outcome: Progressing  Goal: Hemodynamic stability and optimal renal function maintained  3/10/2023 1016 by Maxine Shields RN  Outcome: Progressing  3/9/2023 2108 by Iliana Rosales RN  Outcome: Progressing  Goal: Glucose maintained within prescribed range  3/10/2023 1016 by Maxine Shields RN  Outcome: Progressing  3/9/2023 2108 by Iliana Rosales RN  Outcome: Progressing

## 2023-03-10 NOTE — PROGRESS NOTES
Physical Therapy    Visited patient room 3/10/23 in a.m. with patient requesting to not perform any therapy until after his breakfast was complete. Time allotted for morning sessions is limited, discussed with patient returning as therapists are able for trial of therapy. Patient agreed.      Díaz Cons, PTA

## 2023-03-10 NOTE — PLAN OF CARE
Problem: Discharge Planning  Goal: Discharge to home or other facility with appropriate resources  3/9/2023 2004 by Amilcar Cruz RN  Outcome: Progressing  3/9/2023 0947 by Petrona Jacobo RN  Outcome: Progressing  Flowsheets (Taken 3/8/2023 2046 by Patricio Joseph RN)  Discharge to home or other facility with appropriate resources: Identify barriers to discharge with patient and caregiver     Problem: Pain  Goal: Verbalizes/displays adequate comfort level or baseline comfort level  3/9/2023 2004 by Amilcar Cruz RN  Outcome: Progressing  3/9/2023 0947 by Petrona Jacobo RN  Outcome: Progressing     Problem: Chronic Conditions and Co-morbidities  Goal: Patient's chronic conditions and co-morbidity symptoms are monitored and maintained or improved  3/9/2023 2004 by Amilcar Cruz RN  Outcome: Progressing  3/9/2023 0947 by Petrona Jacobo RN  Outcome: Progressing  Flowsheets (Taken 3/8/2023 2046 by Patricio Joseph RN)  Care Plan - Patient's Chronic Conditions and Co-Morbidity Symptoms are Monitored and Maintained or Improved: Monitor and assess patient's chronic conditions and comorbid symptoms for stability, deterioration, or improvement     Problem: Neurosensory - Adult  Goal: Achieves maximal functionality and self care  3/9/2023 2004 by Amilcar Cruz RN  Outcome: Progressing  3/9/2023 0947 by Petrona Jacobo RN  Outcome: Progressing     Problem: Skin/Tissue Integrity  Goal: Absence of new skin breakdown  Description: 1. Monitor for areas of redness and/or skin breakdown  2. Assess vascular access sites hourly  3. Every 4-6 hours minimum:  Change oxygen saturation probe site  4. Every 4-6 hours:  If on nasal continuous positive airway pressure, respiratory therapy assess nares and determine need for appliance change or resting period.   3/9/2023 2004 by Amilcar Cruz RN  Outcome: Progressing  3/9/2023 0947 by Petrona Jacobo RN  Outcome: Progressing     Problem: Safety - Adult  Goal: Free from fall injury  3/9/2023 2004 by Shadia Arizmendi RN  Outcome: Progressing  3/9/2023 0947 by Ginger Murry RN  Outcome: Progressing     Problem: Nutrition Deficit:  Goal: Optimize nutritional status  3/9/2023 2004 by Shadia Arizmendi RN  Outcome: Progressing  3/9/2023 0947 by Ginger Murry RN  Outcome: Progressing     Problem: ABCDS Injury Assessment  Goal: Absence of physical injury  3/9/2023 2004 by Shadia Arizmendi RN  Outcome: Progressing  3/9/2023 0947 by Ginger Murry RN  Outcome: Progressing     Problem: Skin/Tissue Integrity - Adult  Goal: Skin integrity remains intact  3/9/2023 2004 by Shadia Arizmendi RN  Outcome: Progressing  3/9/2023 0947 by Ginger Murry RN  Outcome: Progressing  Flowsheets (Taken 3/8/2023 2046 by Juana Burgos RN)  Skin Integrity Remains Intact: Monitor for areas of redness and/or skin breakdown     Problem: Gastrointestinal - Adult  Goal: Minimal or absence of nausea and vomiting  3/9/2023 2004 by Shadia Arizmendi RN  Outcome: Progressing  3/9/2023 0947 by Ginger Murry RN  Outcome: Progressing     Problem: Metabolic/Fluid and Electrolytes - Adult  Goal: Electrolytes maintained within normal limits  3/9/2023 2004 by Shadia Arizmendi RN  Outcome: Progressing  3/9/2023 0947 by Ginger Murry RN  Outcome: Progressing  Goal: Hemodynamic stability and optimal renal function maintained  3/9/2023 2004 by Shadia Arizmendi RN  Outcome: Progressing  3/9/2023 0947 by Ginger Murry RN  Outcome: Progressing  Goal: Glucose maintained within prescribed range  3/9/2023 2004 by Shadia Arizmendi RN  Outcome: Progressing  3/9/2023 0947 by Ginger Murry RN  Outcome: Progressing

## 2023-03-10 NOTE — PLAN OF CARE
Problem: Discharge Planning  Goal: Discharge to home or other facility with appropriate resources  3/9/2023 2108 by Ramses Carver RN  Outcome: Progressing  3/9/2023 2004 by Boogie Castro RN  Outcome: Progressing  3/9/2023 0947 by Alvarado Le RN  Outcome: Progressing  Flowsheets (Taken 3/8/2023 2046 by Vane Corbin, RN)  Discharge to home or other facility with appropriate resources: Identify barriers to discharge with patient and caregiver     Problem: Pain  Goal: Verbalizes/displays adequate comfort level or baseline comfort level  3/9/2023 2108 by Ramses Carver RN  Outcome: Progressing  3/9/2023 2004 by Boogie Castro RN  Outcome: Progressing  3/9/2023 0947 by Alvarado Le RN  Outcome: Progressing     Problem: Chronic Conditions and Co-morbidities  Goal: Patient's chronic conditions and co-morbidity symptoms are monitored and maintained or improved  3/9/2023 2108 by Ramses Carver RN  Outcome: Progressing  3/9/2023 2004 by Boogie Castro RN  Outcome: Progressing  3/9/2023 0947 by Alvarado Le RN  Outcome: Progressing  Flowsheets (Taken 3/8/2023 2046 by Vane Corbin, RN)  Care Plan - Patient's Chronic Conditions and Co-Morbidity Symptoms are Monitored and Maintained or Improved: Monitor and assess patient's chronic conditions and comorbid symptoms for stability, deterioration, or improvement     Problem: Neurosensory - Adult  Goal: Achieves maximal functionality and self care  3/9/2023 2108 by Ramses Carver RN  Outcome: Progressing  3/9/2023 2004 by Boogie Castro RN  Outcome: Progressing  3/9/2023 0947 by Alvarado Le RN  Outcome: Progressing     Problem: Skin/Tissue Integrity  Goal: Absence of new skin breakdown  Description: 1. Monitor for areas of redness and/or skin breakdown  2. Assess vascular access sites hourly  3. Every 4-6 hours minimum:  Change oxygen saturation probe site  4.   Every 4-6 hours:  If on nasal continuous positive airway pressure, respiratory therapy assess nares and determine need for appliance change or resting period.   3/9/2023 2108 by Johnna Cruz RN  Outcome: Progressing  3/9/2023 2004 by Jannet Hartman RN  Outcome: Progressing  3/9/2023 0947 by Chad Foy RN  Outcome: Progressing     Problem: Safety - Adult  Goal: Free from fall injury  3/9/2023 2108 by Johnna Cruz RN  Outcome: Progressing  3/9/2023 2004 by Jannet Hartman RN  Outcome: Progressing  3/9/2023 0947 by Chad Foy RN  Outcome: Progressing     Problem: Nutrition Deficit:  Goal: Optimize nutritional status  3/9/2023 2108 by Johnna Cruz RN  Outcome: Progressing  3/9/2023 2004 by Jannet Hartman RN  Outcome: Progressing  3/9/2023 0947 by Chad Foy RN  Outcome: Progressing     Problem: ABCDS Injury Assessment  Goal: Absence of physical injury  3/9/2023 2108 by Johnna Cruz RN  Outcome: Progressing  3/9/2023 2004 by Jannet Hartman RN  Outcome: Progressing  3/9/2023 0947 by Chad Foy RN  Outcome: Progressing     Problem: Skin/Tissue Integrity - Adult  Goal: Skin integrity remains intact  3/9/2023 2108 by Johnna Cruz RN  Outcome: Progressing  Flowsheets (Taken 3/9/2023 2006 by Jannet Hartman RN)  Skin Integrity Remains Intact: Monitor for areas of redness and/or skin breakdown  3/9/2023 2004 by Jannet Hartman RN  Outcome: Progressing  3/9/2023 0947 by Chad Foy RN  Outcome: Progressing  Flowsheets (Taken 3/8/2023 2046 by Oumar Mensah RN)  Skin Integrity Remains Intact: Monitor for areas of redness and/or skin breakdown     Problem: Gastrointestinal - Adult  Goal: Minimal or absence of nausea and vomiting  3/9/2023 2108 by Johnna Cruz RN  Outcome: Progressing  3/9/2023 2004 by Jannet Hartman RN  Outcome: Progressing  3/9/2023 0947 by Chad Foy RN  Outcome: Progressing     Problem: Metabolic/Fluid and Electrolytes - Adult  Goal: Electrolytes maintained within normal limits  3/9/2023 2108 by Johnna Cruz RN  Outcome: Progressing  3/9/2023 2004 by Jannet Nares, RN  Outcome: Progressing  3/9/2023 0947 by Pardeep BORREGO Rachell Bautista RN  Outcome: Progressing  Goal: Hemodynamic stability and optimal renal function maintained  3/9/2023 2108 by Melita Calzada RN  Outcome: Progressing  3/9/2023 2004 by Jorgito Miller RN  Outcome: Progressing  3/9/2023 0947 by Sid Child RN  Outcome: Progressing  Goal: Glucose maintained within prescribed range  3/9/2023 2108 by Melita Calzada RN  Outcome: Progressing  3/9/2023 2004 by Jorgito Miller RN  Outcome: Progressing  3/9/2023 0947 by Sid Child RN  Outcome: Progressing

## 2023-03-11 VITALS
WEIGHT: 269.4 LBS | TEMPERATURE: 98 F | DIASTOLIC BLOOD PRESSURE: 70 MMHG | BODY MASS INDEX: 38.57 KG/M2 | HEIGHT: 70 IN | RESPIRATION RATE: 19 BRPM | OXYGEN SATURATION: 95 % | HEART RATE: 77 BPM | SYSTOLIC BLOOD PRESSURE: 150 MMHG

## 2023-03-11 LAB
ABSOLUTE EOS #: 0.34 K/UL (ref 0–0.44)
ABSOLUTE IMMATURE GRANULOCYTE: 0.15 K/UL (ref 0–0.3)
ABSOLUTE LYMPH #: 1.58 K/UL (ref 1.1–3.7)
ABSOLUTE MONO #: 0.59 K/UL (ref 0.1–1.2)
ANION GAP SERPL CALCULATED.3IONS-SCNC: 12 MMOL/L (ref 9–17)
BASOPHILS # BLD: 1 % (ref 0–2)
BASOPHILS ABSOLUTE: 0.06 K/UL (ref 0–0.2)
BUN SERPL-MCNC: 9 MG/DL (ref 8–23)
BUN/CREAT BLD: 10 (ref 9–20)
CALCIUM SERPL-MCNC: 8.2 MG/DL (ref 8.6–10.4)
CHLORIDE SERPL-SCNC: 111 MMOL/L (ref 98–107)
CO2 SERPL-SCNC: 19 MMOL/L (ref 20–31)
CREAT SERPL-MCNC: 0.88 MG/DL (ref 0.7–1.2)
EOSINOPHILS RELATIVE PERCENT: 4 % (ref 1–4)
GFR SERPL CREATININE-BSD FRML MDRD: >60 ML/MIN/1.73M2
GLUCOSE BLD-MCNC: 102 MG/DL (ref 75–110)
GLUCOSE BLD-MCNC: 116 MG/DL (ref 75–110)
GLUCOSE SERPL-MCNC: 107 MG/DL (ref 70–99)
HCT VFR BLD AUTO: 32.2 % (ref 40.7–50.3)
HGB BLD-MCNC: 10.9 G/DL (ref 13–17)
IMMATURE GRANULOCYTES: 2 %
LYMPHOCYTES # BLD: 20 % (ref 24–43)
MCH RBC QN AUTO: 28.2 PG (ref 25.2–33.5)
MCHC RBC AUTO-ENTMCNC: 33.9 G/DL (ref 25.2–33.5)
MCV RBC AUTO: 83.4 FL (ref 82.6–102.9)
MICROORGANISM SPEC CULT: NORMAL
MICROORGANISM SPEC CULT: NORMAL
MONOCYTES # BLD: 8 % (ref 3–12)
NRBC AUTOMATED: 0 PER 100 WBC
PDW BLD-RTO: 13 % (ref 11.8–14.4)
PLATELET # BLD AUTO: 345 K/UL (ref 138–453)
PMV BLD AUTO: 9.5 FL (ref 8.1–13.5)
POTASSIUM SERPL-SCNC: 3.5 MMOL/L (ref 3.7–5.3)
RBC # BLD: 3.86 M/UL (ref 4.21–5.77)
SEG NEUTROPHILS: 65 % (ref 36–65)
SEGMENTED NEUTROPHILS ABSOLUTE COUNT: 5.13 K/UL (ref 1.5–8.1)
SODIUM SERPL-SCNC: 142 MMOL/L (ref 135–144)
SPECIMEN DESCRIPTION: NORMAL
SPECIMEN DESCRIPTION: NORMAL
WBC # BLD AUTO: 7.9 K/UL (ref 3.5–11.3)

## 2023-03-11 PROCEDURE — C9113 INJ PANTOPRAZOLE SODIUM, VIA: HCPCS

## 2023-03-11 PROCEDURE — 6360000002 HC RX W HCPCS: Performed by: INTERNAL MEDICINE

## 2023-03-11 PROCEDURE — 80048 BASIC METABOLIC PNL TOTAL CA: CPT

## 2023-03-11 PROCEDURE — 82947 ASSAY GLUCOSE BLOOD QUANT: CPT

## 2023-03-11 PROCEDURE — 6370000000 HC RX 637 (ALT 250 FOR IP)

## 2023-03-11 PROCEDURE — 2580000003 HC RX 258

## 2023-03-11 PROCEDURE — 99239 HOSP IP/OBS DSCHRG MGMT >30: CPT | Performed by: FAMILY MEDICINE

## 2023-03-11 PROCEDURE — 36415 COLL VENOUS BLD VENIPUNCTURE: CPT

## 2023-03-11 PROCEDURE — 85025 COMPLETE CBC W/AUTO DIFF WBC: CPT

## 2023-03-11 PROCEDURE — 83036 HEMOGLOBIN GLYCOSYLATED A1C: CPT

## 2023-03-11 PROCEDURE — 99024 POSTOP FOLLOW-UP VISIT: CPT | Performed by: SURGERY

## 2023-03-11 PROCEDURE — 6360000002 HC RX W HCPCS

## 2023-03-11 RX ORDER — POTASSIUM CHLORIDE 20 MEQ/1
40 TABLET, EXTENDED RELEASE ORAL ONCE
Status: COMPLETED | OUTPATIENT
Start: 2023-03-11 | End: 2023-03-11

## 2023-03-11 RX ADMIN — SODIUM CHLORIDE, PRESERVATIVE FREE 40 MG: 5 INJECTION INTRAVENOUS at 09:19

## 2023-03-11 RX ADMIN — SODIUM CHLORIDE, PRESERVATIVE FREE 10 ML: 5 INJECTION INTRAVENOUS at 09:20

## 2023-03-11 RX ADMIN — ENOXAPARIN SODIUM 30 MG: 100 INJECTION SUBCUTANEOUS at 09:19

## 2023-03-11 RX ADMIN — POTASSIUM CHLORIDE 40 MEQ: 1500 TABLET, EXTENDED RELEASE ORAL at 06:52

## 2023-03-11 NOTE — PLAN OF CARE
Problem: Discharge Planning  Goal: Discharge to home or other facility with appropriate resources  3/10/2023 1951 by Mike Taylor RN  Outcome: Progressing  3/10/2023 1016 by Jeffry Fowler RN  Outcome: Progressing     Problem: Pain  Goal: Verbalizes/displays adequate comfort level or baseline comfort level  3/10/2023 1951 by Mike Taylro RN  Outcome: Progressing  3/10/2023 1016 by Jeffry Fowler RN  Outcome: Progressing     Problem: Chronic Conditions and Co-morbidities  Goal: Patient's chronic conditions and co-morbidity symptoms are monitored and maintained or improved  3/10/2023 1951 by Mike Taylor RN  Outcome: Progressing  3/10/2023 1016 by Jeffry Fowler RN  Outcome: Progressing     Problem: Neurosensory - Adult  Goal: Achieves maximal functionality and self care  3/10/2023 1951 by Mike Taylor RN  Outcome: Progressing  3/10/2023 1016 by Jeffry Fowler RN  Outcome: Progressing     Problem: Skin/Tissue Integrity  Goal: Absence of new skin breakdown  Description: 1. Monitor for areas of redness and/or skin breakdown  2. Assess vascular access sites hourly  3. Every 4-6 hours minimum:  Change oxygen saturation probe site  4. Every 4-6 hours:  If on nasal continuous positive airway pressure, respiratory therapy assess nares and determine need for appliance change or resting period.   3/10/2023 1951 by Mike Taylor RN  Outcome: Progressing  3/10/2023 1016 by Jeffry Fowler RN  Outcome: Progressing     Problem: Safety - Adult  Goal: Free from fall injury  3/10/2023 1951 by Mike Taylor RN  Outcome: Progressing  3/10/2023 1016 by Jeffry Fowler RN  Outcome: Progressing     Problem: Nutrition Deficit:  Goal: Optimize nutritional status  3/10/2023 1951 by Mike Taylor RN  Outcome: Progressing  3/10/2023 1016 by Jeffry Fowler RN  Outcome: Progressing     Problem: ABCDS Injury Assessment  Goal: Absence of physical injury  3/10/2023 1951 by Mike Taylor RN  Outcome: Progressing  3/10/2023 1016 by Escobar Wheat Valerie Cerna RN  Outcome: Progressing     Problem: Skin/Tissue Integrity - Adult  Goal: Skin integrity remains intact  3/10/2023 1951 by Annalee Nageotte, RN  Outcome: Progressing  3/10/2023 1016 by Debbie Martinez RN  Outcome: Progressing     Problem: Gastrointestinal - Adult  Goal: Minimal or absence of nausea and vomiting  3/10/2023 1951 by Annalee Nageotte, RN  Outcome: Progressing  3/10/2023 1016 by Debbie Martinez RN  Outcome: Progressing     Problem: Metabolic/Fluid and Electrolytes - Adult  Goal: Electrolytes maintained within normal limits  3/10/2023 1951 by Annalee Nageotte, RN  Outcome: Progressing  3/10/2023 1016 by Debbie Martinez RN  Outcome: Progressing  Goal: Hemodynamic stability and optimal renal function maintained  3/10/2023 1951 by Annalee Nageotte, RN  Outcome: Progressing  3/10/2023 1016 by Debbie Martinez RN  Outcome: Progressing  Goal: Glucose maintained within prescribed range  3/10/2023 1951 by Annalee Nageotte, RN  Outcome: Progressing  3/10/2023 1016 by Debbie Martinez RN  Outcome: Progressing

## 2023-03-11 NOTE — PROGRESS NOTES
General Surgery Progress Note    Chief Complaint   Patient presents with    Emesis    Nausea       POD #:  6      History:  The patient is 69 y.o. male. Pt doing well from SBO surgery 6 days ago. Had a prolonged ileus, but it is resolved now. Anil reg diet. No nausea or emesis. Bowels working no pain. I reviewed the patient's Medications and Allergies. Physical Exam:  Vitals:    03/11/23 0913   BP: (!) 150/70   Pulse: 77   Resp: 19   Temp: 98 °F (36.7 °C)   SpO2: 95%   Abdomen:  soft + BS non tender        Impression/Plan:  Patient Active Problem List   Diagnosis    Partial small bowel obstruction (HCC)    Sepsis with acute renal failure (HCC)        1. SBO resolved 6 days post op. Doing well.  OK to dc home from surgery stand point    - follow up with Dr Isiah Thayer this week in office for staples/sutures out  - reg diet  - may remove any dressings and may shower  - no lifting more then 15 lbs, until seen in postop visit      Electronically by Kenji Parrish MD  on 3/11/2023 at 9:31 AM

## 2023-03-11 NOTE — DISCHARGE INSTR - DIET
Good nutrition is important when healing from an illness, injury, or surgery. Follow any nutrition recommendations given to you during your hospital stay. If you were given an oral nutrition supplement while in the hospital, continue to take this supplement at home. You can take it with meals, in-between meals, and/or before bedtime. These supplements can be purchased at most local grocery stores, pharmacies, and chain [employer]-stores. If you have any questions about your diet or nutrition, call the hospital and ask for the dietitian.       Diet as tolerated

## 2023-03-11 NOTE — DISCHARGE INSTRUCTIONS
Please call [facility] office on Monday to schedule a follow-up appointment with your PCP.

## 2023-03-11 NOTE — DISCHARGE SUMMARY
Hospitalist Discharge Summary    Madalyn Jack  :  1955  MRN:  [id number]    Admit date:  3/5/2023  Discharge date:  3/11/23    Admitting Physician:  Siobhan Juarez MD    Discharge Diagnoses:   Patient Active Problem List   Diagnosis    Partial small bowel obstruction ([address]    Sepsis with acute renal failure [facility])        Admission Condition:  fair      Discharged Condition:  good    Hospital Course/Treatments   admitted with sbo, pt was seen by general surgery and underwent surgery, ileus resolved, pt tolerating reg diet, pt will be d/c home with following meds    Discharge Medications:         Medication List        CONTINUE taking these medications      aspirin 81 MG tablet     atorvastatin 80 MG tablet  Commonly known as: LIPITOR     Dulaglutide 0.75 MG/0.5ML Sopn     empagliflozin 25 MG tablet  Commonly known as: JARDIANCE     fish oil 1000 MG capsule     gabapentin 300 MG capsule  Commonly known as: NEURONTIN     glipiZIDE 10 MG tablet  Commonly known as: GLUCOTROL     lisinopril-hydroCHLOROthiazide 20-12.5 MG per tablet  Commonly known as: PRINZIDE;ZESTORETIC  Take 1 tablet by mouth daily.      loratadine 10 MG tablet  Commonly known as: CLARITIN     metFORMIN 1000 MG tablet  Commonly known as: GLUCOPHAGE     MULTIVITAMIN PO     sildenafil 50 MG tablet  Commonly known as: VIAGRA     vitamin D 25 MCG (1000 UT) Tabs tablet  Commonly known as: CHOLECALCIFEROL            STOP taking these medications      Ozempic (0.25 or 0.5 MG/DOSE) 2 MG/1.5ML Sopn  Generic drug: Semaglutide(0.25 or 0.5MG/DOS)              Consults:  IP CONSULT TO CARDIOLOGY  IP CONSULT TO GENERAL SURGERY    Significant Diagnostic Studies:  XR ACUTE ABD SERIES CHEST 1 VW    Result Date: 3/6/2023  EXAMINATION: TWO XRAY VIEWS OF THE ABDOMEN AND SINGLE  XRAY VIEW OF THE CHEST 3/6/2023 6:13 am COMPARISON: CT from 2023 HISTORY: ORDERING SYSTEM PROVIDED HISTORY: SBO TECHNOLOGIST PROVIDED HISTORY: SBO Reason for Exam: Small bowel obstruction FINDINGS: The small bowel is dilated up to 4 cm. There is paucity of gas in the colon. Stool noted in the right colon. Pelvic catheter is identified. No free intraperitoneal air noted. Radiodensities identified in the right lower abdomen likely ingested pills in the right colon, these demonstrate interval progression from small bowel into the right colon. Feeding tube terminates in the proximal stomach. Bibasilar atelectasis versus infiltrates. No pneumothorax. Normal cardiac size. Significant osteopenic changes and degenerative changes noted in the bony structures. Findings suggest persistent mild/low-grade small-bowel obstruction similar to prior study. Bibasilar atelectasis versus infiltrates. XR CHEST PORTABLE    Result Date: 3/5/2023  EXAMINATION: ONE XRAY VIEW OF THE CHEST 3/5/2023 9:16 am COMPARISON: Abdominal CT today. HISTORY: ORDERING SYSTEM PROVIDED HISTORY: NG tube placement TECHNOLOGIST PROVIDED HISTORY: NG tube placement Reason for Exam: Recent NG placement FINDINGS: The enteric tube terminates at the level of the body of the stomach. Shallow inflation. Minimal linear opacities in the lung bases are noted. The right costophrenic angle is obscured. No pneumothorax or focal consolidation. Cardiac silhouette size at the upper limits of normal.     Enteric tube terminates at the level of the body of the stomach. CTA ABDOMEN PELVIS W CONTRAST    Result Date: 3/5/2023  EXAMINATION: CTA OF THE ABDOMEN AND PELVIS WITH CONTRAST 3/5/2023 5:27 am: TECHNIQUE: CTA of the abdomen and pelvis was performed with the administration of intravenous contrast. Multiplanar reformatted images are provided for review. MIP images are provided for review. Automated exposure control, iterative reconstruction, and/or weight based adjustment of the mA/kV was utilized to reduce the radiation dose to as low as reasonably achievable. COMPARISON: None.  HISTORY: ORDERING SYSTEM PROVIDED HISTORY: Diffuse abdominal pain TECHNOLOGIST PROVIDED HISTORY: Diffuse abdominal pain Reason for Exam: Diffuse abdominal pain, r/o mesentaric ischemia CTA ABDOMEN: The abdominal aorta is of normal size. There is no evidence of abdominal aortic aneurysm or dissection. The celiac, superior mesenteric, bilateral renal arteries are within normal limits. 2 right and 2 left renal arteries, normal variants. Lower chest: Clear lung bases. There is no pleural effusions. The heart is of normal size. No pericardial effusion. Organs: The liver, gallbladder, pancreas, spleen and the bilateral adrenal glands are otherwise within normal limits. The bilateral kidneys are within normal limits, without hydronephrosis or obstructive uropathy. GI/Bowel: Multiple dilated small bowel loops containing air-fluid levels throughout the abdomen, suspected transition point in right mid abdomen with sudden transition to decompressed bowel and upstream small bowel fecalization (4:245). There is no pneumoperitoneum. There is no acute appendicitis. Fluid within the esophagus may be due to gastroesophageal reflux. Stomach is distended with fluid, air. .  Ingested radiopaque bodies located within small bowel, pill shaped. Peritoneum/Retroperitoneum: There is no ascites or pneumoperitoneum. There is no evidence of mesenteric or retroperitoneal lymphadenopathy. Bones/Soft Tissues: There is no acute osseous abnormality. There is no acute soft tissue abnormality. CTA PELVIS: The bilateral iliac and common femoral are within normal limits. Pelvis: The urinary bladder is within normal limits. The pelvic structures are grossly within normal limits. There is no free pelvic fluid. Bones/Soft Tissues: There is no acute osseous abnormality. There is no acute soft tissue abnormality. No evidence of mesenteric ischemia. Low-grade small-bowel obstruction, suspected transition point in the right mid abdomen.  Ingested radiopaque bodies located within small bowel, pill shaped. Fluid within the esophagus may be due to gastroesophageal reflux. No evidence of aortic aneurysm, significant arterial stenosis, or traumatic vascular injury. Disposition:   home    Discharge Instructions: Activity: activity as tolerated  Diet:  regular diet    Follow up with Danish Ernandez in 1 weeks.  And follow up with dr Huma Madsen in 1 week    Signed:  Sylvania Moritz, MD  3/11/2023, 12:40 PM    Time spent in discharge of this pt is more than 30 minutes in examination,evaluvation,  counseling and review of medication and discharge plan

## 2023-03-11 NOTE — PROGRESS NOTES
Discharge teaching and instructions for diagnosis of CHF completed with patient using teachback method. AVS reviewed with the patient. There are no new medications prescribed at this time. Care at home was discussed including dressing changes and the blisters on his foot. The patient will follow up and get sutures removed. Referral was sent to Dr. Diamond Hagan office to call the patient to schedule next week. Unit phone number highlighted on AVS if questions arise. Patient requested to have a note from Dr. Tavia Lou and his most recent labs. The patient does have an appt with the [facility] clinic on Monday.  He would like to discuss the results with his PCP

## 2023-03-12 ENCOUNTER — APPOINTMENT (OUTPATIENT)
Dept: CT IMAGING | Age: 68
End: 2023-03-12
Payer: MEDICARE

## 2023-03-12 ENCOUNTER — APPOINTMENT (OUTPATIENT)
Dept: GENERAL RADIOLOGY | Age: 68
End: 2023-03-12
Payer: MEDICARE

## 2023-03-12 ENCOUNTER — HOSPITAL ENCOUNTER (INPATIENT)
Age: 68
LOS: 6 days | Discharge: HOME | End: 2023-03-18
Attending: EMERGENCY MEDICINE | Admitting: FAMILY MEDICINE
Payer: MEDICARE

## 2023-03-12 DIAGNOSIS — K56.609 SBO (SMALL BOWEL OBSTRUCTION) (HCC): Primary | ICD-10-CM

## 2023-03-12 PROBLEM — M25.542 PAIN, JOINT, HAND, LEFT: Status: ACTIVE | Noted: 2021-07-28

## 2023-03-12 PROBLEM — R65.20 SEPSIS WITH ACUTE RENAL FAILURE (HCC): Status: RESOLVED | Noted: 2023-03-05 | Resolved: 2023-03-12

## 2023-03-12 PROBLEM — A41.9 SEPSIS WITH ACUTE RENAL FAILURE (HCC): Status: RESOLVED | Noted: 2023-03-05 | Resolved: 2023-03-12

## 2023-03-12 PROBLEM — K56.600 PARTIAL SMALL BOWEL OBSTRUCTION (HCC): Status: RESOLVED | Noted: 2023-03-05 | Resolved: 2023-03-12

## 2023-03-12 PROBLEM — R29.898 WEAKNESS OF LEFT HAND: Status: ACTIVE | Noted: 2021-07-28

## 2023-03-12 PROBLEM — R29.898 WEAKNESS OF LEFT HAND: Status: RESOLVED | Noted: 2021-07-28 | Resolved: 2023-03-12

## 2023-03-12 PROBLEM — N17.9 SEPSIS WITH ACUTE RENAL FAILURE (HCC): Status: RESOLVED | Noted: 2023-03-05 | Resolved: 2023-03-12

## 2023-03-12 PROBLEM — M25.542 PAIN, JOINT, HAND, LEFT: Status: RESOLVED | Noted: 2021-07-28 | Resolved: 2023-03-12

## 2023-03-12 LAB
ABSOLUTE EOS #: 0.15 K/UL (ref 0–0.44)
ABSOLUTE IMMATURE GRANULOCYTE: 0.18 K/UL (ref 0–0.3)
ABSOLUTE LYMPH #: 1.31 K/UL (ref 1.1–3.7)
ABSOLUTE MONO #: 0.7 K/UL (ref 0.1–1.2)
ALBUMIN SERPL-MCNC: 3.7 G/DL (ref 3.5–5.2)
ALBUMIN/GLOBULIN RATIO: 1.2 (ref 1–2.5)
ALP SERPL-CCNC: 71 U/L (ref 40–129)
ALT SERPL-CCNC: 18 U/L (ref 5–41)
ANION GAP SERPL CALCULATED.3IONS-SCNC: 12 MMOL/L (ref 9–17)
AST SERPL-CCNC: 17 U/L
BASOPHILS # BLD: 0 % (ref 0–2)
BASOPHILS ABSOLUTE: 0.04 K/UL (ref 0–0.2)
BILIRUB DIRECT SERPL-MCNC: 0.1 MG/DL
BILIRUB INDIRECT SERPL-MCNC: 0.3 MG/DL (ref 0–1)
BILIRUB SERPL-MCNC: 0.4 MG/DL (ref 0.3–1.2)
BNP SERPL-MCNC: 365 PG/ML
BUN SERPL-MCNC: 8 MG/DL (ref 8–23)
BUN/CREAT BLD: 8 (ref 9–20)
CALCIUM SERPL-MCNC: 9 MG/DL (ref 8.6–10.4)
CHLORIDE SERPL-SCNC: 105 MMOL/L (ref 98–107)
CO2 SERPL-SCNC: 24 MMOL/L (ref 20–31)
CREAT SERPL-MCNC: 0.95 MG/DL (ref 0.7–1.2)
EOSINOPHILS RELATIVE PERCENT: 2 % (ref 1–4)
GFR SERPL CREATININE-BSD FRML MDRD: >60 ML/MIN/1.73M2
GLOBULIN SER-MCNC: 3.1 G/DL (ref 1.5–3.8)
GLUCOSE BLD-MCNC: 110 MG/DL (ref 75–110)
GLUCOSE SERPL-MCNC: 131 MG/DL (ref 70–99)
HCT VFR BLD AUTO: 35.4 % (ref 40.7–50.3)
HGB BLD-MCNC: 12 G/DL (ref 13–17)
IMMATURE GRANULOCYTES: 2 %
LYMPHOCYTES # BLD: 13 % (ref 24–43)
MCH RBC QN AUTO: 28.3 PG (ref 25.2–33.5)
MCHC RBC AUTO-ENTMCNC: 33.9 G/DL (ref 25.2–33.5)
MCV RBC AUTO: 83.5 FL (ref 82.6–102.9)
MICROORGANISM SPEC CULT: ABNORMAL
MICROORGANISM/AGENT SPEC: ABNORMAL
MONOCYTES # BLD: 7 % (ref 3–12)
NRBC AUTOMATED: 0 PER 100 WBC
PDW BLD-RTO: 13.3 % (ref 11.8–14.4)
PLATELET # BLD AUTO: 393 K/UL (ref 138–453)
PMV BLD AUTO: 9.1 FL (ref 8.1–13.5)
POTASSIUM SERPL-SCNC: 3.6 MMOL/L (ref 3.7–5.3)
PROT SERPL-MCNC: 6.8 G/DL (ref 6.4–8.3)
RBC # BLD: 4.24 M/UL (ref 4.21–5.77)
SARS-COV-2 RDRP RESP QL NAA+PROBE: NOT DETECTED
SEG NEUTROPHILS: 76 % (ref 36–65)
SEGMENTED NEUTROPHILS ABSOLUTE COUNT: 7.73 K/UL (ref 1.5–8.1)
SODIUM SERPL-SCNC: 141 MMOL/L (ref 135–144)
SPECIMEN DESCRIPTION: ABNORMAL
SPECIMEN DESCRIPTION: NORMAL
WBC # BLD AUTO: 10.1 K/UL (ref 3.5–11.3)

## 2023-03-12 PROCEDURE — 6360000002 HC RX W HCPCS: Performed by: FAMILY MEDICINE

## 2023-03-12 PROCEDURE — 96374 THER/PROPH/DIAG INJ IV PUSH: CPT

## 2023-03-12 PROCEDURE — 85025 COMPLETE CBC W/AUTO DIFF WBC: CPT

## 2023-03-12 PROCEDURE — 99285 EMERGENCY DEPT VISIT HI MDM: CPT

## 2023-03-12 PROCEDURE — 6360000002 HC RX W HCPCS

## 2023-03-12 PROCEDURE — 83880 ASSAY OF NATRIURETIC PEPTIDE: CPT

## 2023-03-12 PROCEDURE — 2580000003 HC RX 258: Performed by: FAMILY MEDICINE

## 2023-03-12 PROCEDURE — 82947 ASSAY GLUCOSE BLOOD QUANT: CPT

## 2023-03-12 PROCEDURE — 74176 CT ABD & PELVIS W/O CONTRAST: CPT

## 2023-03-12 PROCEDURE — 6370000000 HC RX 637 (ALT 250 FOR IP): Performed by: EMERGENCY MEDICINE

## 2023-03-12 PROCEDURE — 2500000003 HC RX 250 WO HCPCS

## 2023-03-12 PROCEDURE — 2060000000 HC ICU INTERMEDIATE R&B

## 2023-03-12 PROCEDURE — 71045 X-RAY EXAM CHEST 1 VIEW: CPT

## 2023-03-12 PROCEDURE — 80076 HEPATIC FUNCTION PANEL: CPT

## 2023-03-12 PROCEDURE — 6360000002 HC RX W HCPCS: Performed by: EMERGENCY MEDICINE

## 2023-03-12 PROCEDURE — 80048 BASIC METABOLIC PNL TOTAL CA: CPT

## 2023-03-12 PROCEDURE — 99222 1ST HOSP IP/OBS MODERATE 55: CPT

## 2023-03-12 PROCEDURE — 87635 SARS-COV-2 COVID-19 AMP PRB: CPT

## 2023-03-12 RX ORDER — ENALAPRILAT 2.5 MG/2ML
1.25 INJECTION INTRAVENOUS EVERY 6 HOURS SCHEDULED
Status: DISCONTINUED | OUTPATIENT
Start: 2023-03-12 | End: 2023-03-18 | Stop reason: HOSPADM

## 2023-03-12 RX ORDER — ONDANSETRON 2 MG/ML
4 INJECTION INTRAMUSCULAR; INTRAVENOUS EVERY 6 HOURS PRN
Status: DISCONTINUED | OUTPATIENT
Start: 2023-03-12 | End: 2023-03-18 | Stop reason: HOSPADM

## 2023-03-12 RX ORDER — LIDOCAINE HYDROCHLORIDE 20 MG/ML
JELLY TOPICAL PRN
Status: DISCONTINUED | OUTPATIENT
Start: 2023-03-12 | End: 2023-03-12

## 2023-03-12 RX ORDER — ACETAMINOPHEN 325 MG/1
650 TABLET ORAL EVERY 6 HOURS PRN
Status: DISCONTINUED | OUTPATIENT
Start: 2023-03-12 | End: 2023-03-18 | Stop reason: HOSPADM

## 2023-03-12 RX ORDER — ENOXAPARIN SODIUM 100 MG/ML
30 INJECTION SUBCUTANEOUS 2 TIMES DAILY
Status: DISCONTINUED | OUTPATIENT
Start: 2023-03-12 | End: 2023-03-18 | Stop reason: HOSPADM

## 2023-03-12 RX ORDER — POTASSIUM CHLORIDE 7.45 MG/ML
10 INJECTION INTRAVENOUS
Status: COMPLETED | OUTPATIENT
Start: 2023-03-12 | End: 2023-03-13

## 2023-03-12 RX ORDER — ONDANSETRON 2 MG/ML
4 INJECTION INTRAMUSCULAR; INTRAVENOUS ONCE
Status: COMPLETED | OUTPATIENT
Start: 2023-03-12 | End: 2023-03-12

## 2023-03-12 RX ORDER — INSULIN LISPRO 100 [IU]/ML
0-4 INJECTION, SOLUTION INTRAVENOUS; SUBCUTANEOUS EVERY 4 HOURS
Status: DISCONTINUED | OUTPATIENT
Start: 2023-03-12 | End: 2023-03-14

## 2023-03-12 RX ORDER — ONDANSETRON 4 MG/1
4 TABLET, ORALLY DISINTEGRATING ORAL EVERY 8 HOURS PRN
Status: DISCONTINUED | OUTPATIENT
Start: 2023-03-12 | End: 2023-03-18 | Stop reason: HOSPADM

## 2023-03-12 RX ORDER — FUROSEMIDE 10 MG/ML
20 INJECTION INTRAMUSCULAR; INTRAVENOUS DAILY
Status: DISCONTINUED | OUTPATIENT
Start: 2023-03-12 | End: 2023-03-18 | Stop reason: HOSPADM

## 2023-03-12 RX ORDER — POLYETHYLENE GLYCOL 3350 17 G/17G
17 POWDER, FOR SOLUTION ORAL DAILY PRN
Status: DISCONTINUED | OUTPATIENT
Start: 2023-03-12 | End: 2023-03-18 | Stop reason: HOSPADM

## 2023-03-12 RX ORDER — INSULIN LISPRO 100 [IU]/ML
0-4 INJECTION, SOLUTION INTRAVENOUS; SUBCUTANEOUS
Status: DISCONTINUED | OUTPATIENT
Start: 2023-03-12 | End: 2023-03-12

## 2023-03-12 RX ORDER — ACETAMINOPHEN 650 MG/1
650 SUPPOSITORY RECTAL EVERY 6 HOURS PRN
Status: DISCONTINUED | OUTPATIENT
Start: 2023-03-12 | End: 2023-03-18 | Stop reason: HOSPADM

## 2023-03-12 RX ORDER — LIDOCAINE HYDROCHLORIDE 20 MG/ML
15 SOLUTION OROPHARYNGEAL
Status: DISCONTINUED | OUTPATIENT
Start: 2023-03-12 | End: 2023-03-18 | Stop reason: HOSPADM

## 2023-03-12 RX ORDER — SODIUM CHLORIDE 9 MG/ML
INJECTION, SOLUTION INTRAVENOUS CONTINUOUS
Status: DISCONTINUED | OUTPATIENT
Start: 2023-03-12 | End: 2023-03-18 | Stop reason: HOSPADM

## 2023-03-12 RX ORDER — INSULIN LISPRO 100 [IU]/ML
0-4 INJECTION, SOLUTION INTRAVENOUS; SUBCUTANEOUS NIGHTLY
Status: DISCONTINUED | OUTPATIENT
Start: 2023-03-12 | End: 2023-03-12

## 2023-03-12 RX ORDER — DEXTROSE MONOHYDRATE 100 MG/ML
INJECTION, SOLUTION INTRAVENOUS CONTINUOUS PRN
Status: DISCONTINUED | OUTPATIENT
Start: 2023-03-12 | End: 2023-03-18 | Stop reason: HOSPADM

## 2023-03-12 RX ADMIN — ENOXAPARIN SODIUM 30 MG: 100 INJECTION SUBCUTANEOUS at 20:59

## 2023-03-12 RX ADMIN — ONDANSETRON 4 MG: 2 INJECTION INTRAMUSCULAR; INTRAVENOUS at 16:08

## 2023-03-12 RX ADMIN — FUROSEMIDE 20 MG: 10 INJECTION, SOLUTION INTRAMUSCULAR; INTRAVENOUS at 21:01

## 2023-03-12 RX ADMIN — POTASSIUM CHLORIDE 10 MEQ: 7.46 INJECTION, SOLUTION INTRAVENOUS at 23:25

## 2023-03-12 RX ADMIN — LIDOCAINE HYDROCHLORIDE: 20 JELLY TOPICAL at 17:11

## 2023-03-12 RX ADMIN — SILVER SULFADIAZINE: 10 CREAM TOPICAL at 20:59

## 2023-03-12 RX ADMIN — POTASSIUM CHLORIDE 10 MEQ: 7.46 INJECTION, SOLUTION INTRAVENOUS at 22:20

## 2023-03-12 RX ADMIN — ENALAPRILAT 1.25 MG: 1.25 INJECTION INTRAVENOUS at 21:02

## 2023-03-12 RX ADMIN — LIDOCAINE HYDROCHLORIDE 15 ML: 20 SOLUTION ORAL at 17:44

## 2023-03-12 RX ADMIN — POTASSIUM CHLORIDE 10 MEQ: 7.46 INJECTION, SOLUTION INTRAVENOUS at 21:23

## 2023-03-12 RX ADMIN — SODIUM CHLORIDE: 9 INJECTION, SOLUTION INTRAVENOUS at 18:14

## 2023-03-12 ASSESSMENT — PAIN SCALES - GENERAL
PAINLEVEL_OUTOF10: 0
PAINLEVEL_OUTOF10: 7

## 2023-03-12 ASSESSMENT — PAIN DESCRIPTION - DESCRIPTORS: DESCRIPTORS: ACHING

## 2023-03-12 ASSESSMENT — ENCOUNTER SYMPTOMS
VOMITING: 1
DIARRHEA: 1
NAUSEA: 1

## 2023-03-12 ASSESSMENT — PAIN DESCRIPTION - LOCATION: LOCATION: BACK

## 2023-03-12 ASSESSMENT — PAIN - FUNCTIONAL ASSESSMENT: PAIN_FUNCTIONAL_ASSESSMENT: 0-10

## 2023-03-12 ASSESSMENT — PAIN DESCRIPTION - PAIN TYPE: TYPE: CHRONIC PAIN

## 2023-03-12 NOTE — H&P
HOSPITALIST ADMISSION H&P      REASON FOR ADMISSION:  Small bowel obstruction  ESTIMATED LENGTH OF STAY:> 2 midnights, 3-4 days    ATTENDING/ADMITTING PHYSICIAN: Mj Bennett MD  PCP: Kristie Loyola    HISTORY OF PRESENT ILLNESS:      The patient is a 69 y.o. male patient of Kristie Loyola who presents from ER with c/o wound leaking. Patient was discharged 3/11/23 s/p ileus/PSBO. Patient reports he went to the bathroom and had a large amount of watery drainage squirt out of an incision site at his umbilicus, so he came to ER for evaluation. In ER patient had emesis and reported he has had 5 liquid stools at home prior to coming to ER. In ER: Nigel, NG placed. CT A&P w/o: Impression   Progressive small-bowel obstruction when compared to 03/05/2023       Mild anasarca   CXR:  FINDINGS:IMPRESSION:   There is an NG tube however the distal portion is not well visualized due to   under penetration. No acute infiltrates or pleural effusions are seen       RECOMMENDATION:   Follow-up single-view abdomen for further evaluation of NG tube location         Wounds and LDAs present prior to admission: See media: Abdominal lap sites, bilateral feet dorsal aspect-blisters. See below for additional PMH. Patient eumz-pmvxlhwcib-pmnrxegm-available records reviewed, including, but not limited to ER records, imaging results, lab results, office records, personal records, and [facility] -- no signs of abuse or diversion. 1 prescription, 1 prescriber, 1 pharmacy.      Past Medical History:   Diagnosis Date    Allergic rhinitis     Colon polyps     history of    Elevated WBC count     history of    Hyperlipidemia     Hypertension     Obesity     Pain, joint, hand, left 7/28/2021    Partial small bowel obstruction ([address] 3/5/2023    Sepsis with acute renal failure ([address] 3/5/2023    Type II or unspecified type diabetes mellitus without mention of complication, not stated as uncontrolled     Unspecified sleep apnea     Weakness of left hand 2021           Past Surgical History:   Procedure Laterality Date    CARPAL TUNNEL RELEASE Right 12/3/2010    COLONOSCOPY  2009    polyps    INGUINAL HERNIA REPAIR Right 1977    LAPAROSCOPY N/A 3/5/2023    DIAGNOSTIC LAPAROSCOPY performed by Vee Meek MD at [address] Left 2003    removal nail plate 1st digit       Medications Prior to Admission:    Medications Prior to Admission: atorvastatin (LIPITOR) 80 MG tablet, 40 mg  glipiZIDE (GLUCOTROL) 10 MG tablet, 10 mg  Dulaglutide 0.75 MG/0.5ML SOPN, INJECT 0.75MG (0.5ML) UNDER SKIN ONCE EVERY WEEK FOR TYPE 2 DIABETES MELLITUS **REPLACES SEMAGLUTIDE**  vitamin D (CHOLECALCIFEROL) 25 MCG (1000 UT) TABS tablet, TAKE TWO TABLETS BY MOUTH EVERY DAY  empagliflozin (JARDIANCE) 25 MG tablet, 12.5 mg  sildenafil (VIAGRA) 50 MG tablet, 25 mg  gabapentin (NEURONTIN) 300 MG capsule, Take 300 mg by mouth 2 times daily. lisinopril-hydrochlorothiazide (PRINZIDE;ZESTORETIC) 20-12.5 MG per tablet, Take 1 tablet by mouth daily. metFORMIN (GLUCOPHAGE) 1000 MG tablet, Take 1,000 mg by mouth 2 times daily. loratadine (CLARITIN) 10 MG tablet, Take 10 mg by mouth daily. aspirin 81 MG tablet, Take 81 mg by mouth daily. Multiple Vitamins-Minerals (MULTIVITAMIN PO), Take  by mouth daily. Omega-3 Fatty Acids (FISH OIL) 1000 MG CAPS, Take 1,000 mg by mouth 2 times daily.     Allergies:    Bee venom and Naproxen    Social History:   Tobacco:   Social History     Tobacco Use   Smoking Status Former    Types: Cigarettes    Quit date:     Years since quittin.2   Smokeless Tobacco Not on file       Alcohol:   Social History     Substance and Sexual Activity   Alcohol Use Not Currently       Drug:   Social History     Substance and Sexual Activity   Drug Use Never       Family History:   family history includes Diabetes in his mother; Heart Disease in his father and mother; High Blood Pressure in his mother and another family member. REVIEW OF SYSTEMS:  See HPI and problem list; otherwise no other new complaints with respect to eyes, ENT, neck, pulmonary, coronary, chest, GI, , endocrine, musculoskeletal, immune system/connective tissue disease, hematologic, neurologic, psychiatric, skin, lymphatics, or malignancies. Code status: patient/family wishes for Full Code at this time. PHYSICAL EXAM:  Vitals:  BP (!) 161/82   Pulse 79   Temp 98.9 °F (37.2 °C) (Tympanic)   Resp 18   Ht 5' 10\" (1.778 m)   Wt 269 lb (122 kg)   SpO2 97%   BMI 38.60 kg/m²     General: awake, alert, cooperative, well nourished, drowsy, and well groomed  HEENT:  NG tube Rt nare, edentulous , PERRLA, EMOI, External nose normal, Normocephalic, and Atraumatic  Neck: Supple, Carotid Pulses Present, No Bruits, No Masses, Tenderness, Nodularity, and No Lymphadenopathy  Chest/Lungs: Clear to Auscultation without Rales, Rhonchi, or Wheezes and Respirations even and unlabored  Cardiac: Regular Rate and Rhythm and Pedal Pulses Palpable Bilaterally  GI/Abdomen: Bowel sounds hypoactive throughout, Soft, Non-tender, without Guarding or Rebound Tenderness, No Masses, and No Tenderness, NG Rt nare  : Not examined  Extremities/Musculoskeletal:  3+ edema BLE feet to ankles.  and All four extremities with 5/5 strength  Skin:  See media-bilateral dorsal feet, Skin warm and dry, and Incisionabdominal lap sites   Neuro: Alert and Oriented, to Person, to Time, to Place, to Situation, No Localizing Signs/Symptoms, follows commands with all 4 extremities, and Strength equal bilaterally  Psychiatric: Normal mood and affect      LABS:    CBC with Differential:    Lab Results   Component Value Date/Time    WBC 10.1 03/12/2023 04:04 PM    RBC 4.24 03/12/2023 04:04 PM    HGB 12.0 03/12/2023 04:04 PM    HCT 35.4 03/12/2023 04:04 PM     03/12/2023 04:04 PM    MCV 83.5 03/12/2023 04:04 PM    MCH 28.3 03/12/2023 04:04 PM    MCHC 33.9 03/12/2023 04:04 PM    RDW 13.3 03/12/2023 04:04 PM    LYMPHOPCT 13 03/12/2023 04:04 PM    MONOPCT 7 03/12/2023 04:04 PM    BASOPCT 0 03/12/2023 04:04 PM    MONOSABS 0.70 03/12/2023 04:04 PM    LYMPHSABS 1.31 03/12/2023 04:04 PM    EOSABS 0.15 03/12/2023 04:04 PM    BASOSABS 0.04 03/12/2023 04:04 PM     BMP:    Lab Results   Component Value Date/Time     03/12/2023 04:04 PM    K 3.6 03/12/2023 04:04 PM     03/12/2023 04:04 PM    CO2 24 03/12/2023 04:04 PM    BUN 8 03/12/2023 04:04 PM    LABALBU 3.7 03/12/2023 04:04 PM    CREATININE 0.95 03/12/2023 04:04 PM    CALCIUM 9.0 03/12/2023 04:04 PM    LABGLOM >60 03/12/2023 04:04 PM    GLUCOSE 131 03/12/2023 04:04 PM     Hepatic Function Panel:    Lab Results   Component Value Date/Time    ALKPHOS 71 03/12/2023 04:04 PM    ALT 18 03/12/2023 04:04 PM    AST 17 03/12/2023 04:04 PM    PROT 6.8 03/12/2023 04:04 PM    BILITOT 0.4 03/12/2023 04:04 PM    BILIDIR 0.1 03/12/2023 04:04 PM    IBILI 0.3 03/12/2023 04:04 PM    LABALBU 3.7 03/12/2023 04:04 PM     HgBA1c:    Lab Results   Component Value Date/Time    LABA1C 7.2 03/05/2023 06:03 PM     Covid (-)      ASSESSMENT:      Patient Active Problem List   Diagnosis    SBO (small bowel obstruction) ([facility]     Patient nhfc-qllbcotnuo-xxdvyrec-available records reviewed, including, but not limited to, ER reports---labs--imaging--office records--personal notes below.     Molly Garcia  67  WM  [Ramo Mcgee;  josé antonio Zavala GS;  [facility]]  FULL CODE    LOVENOX 30 bid  COVID-19--NEGATIVE,   INFLUENZA--NEGATIVE    NGT---out--3.8.2023    [facility]--dc'd---3.7.2023    Anti-infectives:       SBO----3.12.2023---with watery drainage from the wound site        CT abdomen-pelvis---3.12.2023---progressive SBO        CXR---3.12.2023------NGT--no acute infiltrates pr pleural effusions       POD _____  diagnostic laparoscopy---3.5.2023---SBO         X-ray--abdomen---3.9.2023---progression of enteric contrast in the colon--ileus--vs--PSBO SBFT---3.7.2023--dilated loops bowel up to 4.3 cm---at 5 hours contrast mid-small                       bowel---consistent with SBO  SBO        AAS---3.6.2023---persistent mild-low-grade SBO---bibasilar atelectasis--vs--infiltrates        CT abdomen-pelvis---3.5.2023---no mesenteric ischemia--low grade SBO--suspected                               transition point in the right mid-abdomen--fluid in esophagus--GERD         EKG---3.5.2023---NSR--86--RBBB  Dehydration--3.5.2023  Elevated lactic acid responsive to fluids---3.5.2023----sepsis ruled out    RBBB  Hypertension          2D ECHO--3.6.2023--LA mildly dilated--mild LVH--NLVSF--RA dilatation--NRVSF--                               MAC--AV calcification with adequate opening---RVSP ~ 34 mm Hg---                              AR mildly dilated 3.8 cm---IVC not visualized--Grade II moderate DD---                              LVEF ~ 60%  Hyperlipidemia  BLE--edema  Diabetes Mellitus Type 2---postoperative goal 140-180  CKD---Stage 3b at admission à Stage 3a  ANGELA   Obesity  Tobacco abuse---quit--1992   PMH:  allergic rhinitis, colon polyps  PSH:   right CTS---2010, colonoscopy--2009---polyps, RIH--1977, left great toe nail plate [id number]--[id number]    Allergies:  bee venom, naproxen    Attending Supervising [de-identified] Attestation Statement  I performed a history and physical examination on the patient and discussed the management with the nurse practitioner. I reviewed and agree with the findings and plan as documented in her note . Electronically signed by Dorota Ventura MD on 3/13/23 at 11:52 AM EDT     PLAN:    1. SBO: NG to LIWS, NPO, MIVF, Antiemetics, Pain control, General surgery consult (discussed with Dr. Babs Bumpers will see in am). Stool for C-diff and gastrointestinal panel. 2. DMI: Sliding scale insulin, Hypoglycemic protocol POCT. 3. Pedal blisters: Wound care-Silvadene and Dry dressing, Podiatry consult, Diuretics.     I&O, Daily weight, VS per unit protocol, Monitor labs.     Home medications reviewed  See orders     Note that over 55 minutes was spent in evaluation of the patient, review of the chart and pertinent records, discussion with family/staff, etc    Ethyl SAV Cortez NP    9:03 PM  3/12/2023

## 2023-03-12 NOTE — PLAN OF CARE
Problem: Discharge Planning  Goal: Discharge to home or other facility with appropriate resources  Outcome: Progressing  Flowsheets (Taken 3/12/2023 1834)  Discharge to home or other facility with appropriate resources: Identify barriers to discharge with patient and caregiver     Problem: Safety - Adult  Goal: Free from fall injury  Outcome: Progressing     Problem: ABCDS Injury Assessment  Goal: Absence of physical injury  Outcome: Progressing  Flowsheets (Taken 3/12/2023 1830)  Absence of Physical Injury: Implement safety measures based on patient assessment     Problem: Skin/Tissue Integrity  Goal: Absence of new skin breakdown  Description: 1. Monitor for areas of redness and/or skin breakdown  2. Assess vascular access sites hourly  3. Every 4-6 hours minimum:  Change oxygen saturation probe site  4. Every 4-6 hours:  If on nasal continuous positive airway pressure, respiratory therapy assess nares and determine need for appliance change or resting period.   Outcome: Progressing     Problem: Pain  Goal: Verbalizes/displays adequate comfort level or baseline comfort level  Outcome: Progressing

## 2023-03-12 NOTE — ED PROVIDER NOTES
[facility] ED  EMERGENCY DEPARTMENT ENCOUNTER      Pt Name: Meera Matos  MRN: [id number]  [place] 1955  Date of evaluation: 3/12/2023  Provider: Que Burgos MD    CHIEF COMPLAINT     Chief Complaint   Patient presents with    Post-op Problem         HISTORY OF PRESENT ILLNESS   (Location/Symptom, Timing/Onset, Context/Setting,Quality, Duration, Modifying Factors, Severity)  Note limiting factors. Meera Matos is a 69 y.o. male who presents to the emergency department today with a chief complaint of watery drainage from his surgical incision site just above the umbilicus. Patient states the draining was copious but it seems to have stopped now. His daughter states it was clear in appearance. Patient has had poor appetite today and has vomited once in the ED. He states he has had 5 loose bowel movements today. His daughter states that the noticed increased swelling of the legs and feet with some blistering of the feet 2 days ago. He has a history of diabetes and hypertension. The history is provided by the patient, medical records and a relative. Nursing Notes werereviewed. REVIEW OF SYSTEMS    (2-9 systems for level 4, 10 or more for level 5)     Review of Systems   Constitutional:  Negative for fever. Cardiovascular:  Positive for leg swelling. Negative for chest pain. Gastrointestinal:  Positive for diarrhea, nausea and vomiting. All other systems reviewed and are negative. Except as noted above the remainder of the review of systems was reviewed and negative.        PAST MEDICAL HISTORY     Past Medical History:   Diagnosis Date    Allergic rhinitis     Colon polyps     history of    Elevated WBC count     history of    Hyperlipidemia     Hypertension     Obesity     Type II or unspecified type diabetes mellitus without mention of complication, not stated as uncontrolled     Unspecified sleep apnea          SURGICALHISTORY       Past Surgical History:   Procedure Laterality Date    CARPAL TUNNEL RELEASE Right 12/3/2010    COLONOSCOPY  2009    polyps    INGUINAL HERNIA REPAIR Right 1977    LAPAROSCOPY N/A 3/5/2023    DIAGNOSTIC LAPAROSCOPY performed by Baron Gatito MD at [address] Left 2003    removal nail plate 1st digit         CURRENT MEDICATIONS       Previous Medications    ASPIRIN 81 MG TABLET    Take 81 mg by mouth daily. ATORVASTATIN (LIPITOR) 80 MG TABLET    40 mg    DULAGLUTIDE 0.75 MG/0.5ML SOPN    INJECT 0.75MG (0.5ML) UNDER SKIN ONCE EVERY WEEK FOR TYPE 2 DIABETES MELLITUS **REPLACES SEMAGLUTIDE**    EMPAGLIFLOZIN (JARDIANCE) 25 MG TABLET    12.5 mg    GABAPENTIN (NEURONTIN) 300 MG CAPSULE    Take 300 mg by mouth 2 times daily. GLIPIZIDE (GLUCOTROL) 10 MG TABLET    10 mg    LISINOPRIL-HYDROCHLOROTHIAZIDE (PRINZIDE;ZESTORETIC) 20-12.5 MG PER TABLET    Take 1 tablet by mouth daily. LORATADINE (CLARITIN) 10 MG TABLET    Take 10 mg by mouth daily. METFORMIN (GLUCOPHAGE) 1000 MG TABLET    Take 1,000 mg by mouth 2 times daily. MULTIPLE VITAMINS-MINERALS (MULTIVITAMIN PO)    Take  by mouth daily. OMEGA-3 FATTY ACIDS (FISH OIL) 1000 MG CAPS    Take 1,000 mg by mouth 2 times daily. SILDENAFIL (VIAGRA) 50 MG TABLET    25 mg    VITAMIN D (CHOLECALCIFEROL) 25 MCG (1000 UT) TABS TABLET    TAKE TWO TABLETS BY MOUTH EVERY DAY       ALLERGIES     Bee venom and Naproxen    FAMILY HISTORY       Family History   Problem Relation Age of Onset    Heart Disease Mother     High Blood Pressure Mother     Diabetes Mother     Heart Disease Father     High Blood Pressure Other           SOCIAL HISTORY       Social History     Socioeconomic History    Marital status:       Spouse name: None    Number of children: None    Years of education: None    Highest education level: None   Tobacco Use    Smoking status: Former     Types: Cigarettes     Quit date:      Years since quittin.2   Vaping Use    Vaping Use: Never used Substance and Sexual Activity    Alcohol use: Not Currently    Drug use: Never       SCREENINGS    Keith Coma Scale  Eye Opening: Spontaneous  Best Verbal Response: Oriented  Best Motor Response: Obeys commands  Keith Coma Scale Score: 15        PHYSICAL EXAM    (up to 7 for level 4, 8 or more for level 5)     ED Triage Vitals [03/12/23 1542]   BP Temp Temp Source Heart Rate Resp SpO2 Height Weight   (!) 150/81 97.9 °F (36.6 °C) Tympanic 76 24 99 % 5' 10\" (1.778 m) 269 lb (122 kg)       Physical Exam  Vitals reviewed. Constitutional:       Comments: High BMI of 38   HENT:      Head:      Comments: HEENT exam is normal to inspection. Cardiovascular:      Rate and Rhythm: Normal rate and regular rhythm. Heart sounds: Normal heart sounds. Pulmonary:      Effort: Pulmonary effort is normal. No respiratory distress. Breath sounds: Normal breath sounds. Abdominal:      General: Abdomen is protuberant. Bowel sounds are decreased. There is distension. Palpations: Abdomen is soft. There is no fluid wave, hepatomegaly or splenomegaly. Tenderness: There is no abdominal tenderness. Comments: Unable to express any fluid from the incision site above the umbilicus. Musculoskeletal:         General: No tenderness. Cervical back: Neck supple. Right lower leg: Edema present. Left lower leg: Edema present. Skin:     General: Skin is warm and dry. Coloration: Skin is not pale. Neurological:      General: No focal deficit present. Mental Status: He is oriented to person, place, and time. Mental status is at baseline.    Psychiatric:         Mood and Affect: Mood normal.         Behavior: Behavior normal.       DIAGNOSTIC RESULTS     EKG: All EKG's are interpreted by the Emergency Department Physician who either signs orCo-signs this chart in the absence of a cardiologist.    RADIOLOGY:     Interpretation per the Radiologist below, ifavailable at the time of this note:    XR CHEST PORTABLE   Final Result      CT ABDOMEN PELVIS WO CONTRAST Additional Contrast? None   Final Result   Progressive small-bowel obstruction when compared to 03/05/2023      Mild anasarca      RECOMMENDATIONS:   Surgical consultation               ED BEDSIDE ULTRASOUND:   Performed by ED Physician - none    LABS:  Labs Reviewed   CBC WITH AUTO DIFFERENTIAL - Abnormal; Notable for the following components:       Result Value    Hemoglobin 12.0 (*)     Hematocrit 35.4 (*)     MCHC 33.9 (*)     Seg Neutrophils 76 (*)     Lymphocytes 13 (*)     Immature Granulocytes 2 (*)     All other components within normal limits   BASIC METABOLIC PANEL - Abnormal; Notable for the following components:    Glucose 131 (*)     Bun/Cre Ratio 8 (*)     Potassium 3.6 (*)     All other components within normal limits   GASTROINTESTINAL PANEL, MOLECULAR   C. DIFFICILE TOXIN MOLECULAR   COVID-19, RAPID   HEPATIC FUNCTION PANEL   BRAIN NATRIURETIC PEPTIDE   BLOOD OCCULT STOOL SCREEN #1       All other labs were within normal range ornot returned as of this dictation. EMERGENCY DEPARTMENT COURSE and DIFFERENTIAL DIAGNOSIS/MDM:   Vitals:    Vitals:    03/12/23 1542 03/12/23 1730   BP: (!) 150/81 (!) 161/78   Pulse: 76 78   Resp: 24 16   Temp: 97.9 °F (36.6 °C)    TempSrc: Tympanic    SpO2: 99% 98%   Weight: 269 lb (122 kg)    Height: 5' 10\" (1.778 m)        ED Course as of 03/12/23 1754   Sun Mar 12, 2023   1714 I have reviewed the patient's op note from his recent surgery and the surgeon reports some degree of intra-abdominal ascites that he encountered. Patient's CT scan of the abdomen also reports anasarca in addition to possible ileus versus small bowel obstruction. [SH]      ED Course User Index  [SH] Gabino Brasher MD       CT scan of the abdomen pelvis is consistent with small bowel obstruction. Nasogastric tube has been inserted for gastric decompression.       CONSULTS:  [facility] SURGERY    PROCEDURES:  Unlessotherwise noted below, none     Procedures    FINAL IMPRESSION      1. [facility]          DISPOSITION/PLAN   DISPOSITION Admitted 03/12/2023 05:37:35 PM      PATIENT REFERRED TO:  No follow-up provider specified. DISCHARGE MEDICATIONS:         Problem List:  Patient Active Problem List   Diagnosis Code    Partial small bowel obstruction ([facility] K56.600    Sepsis with acute renal failure (HCC) A41.9, R65.20, N17.9    SBO (small bowel obstruction) ([facility] K56.609           Summation      Patient Course: Admitted    ED Medicationsadministered this visit:    Medications   lidocaine (XYLOCAINE) 2 % uro-jet ( Topical Given 3/12/23 1711)   phenol 1.4 % mouth spray 1 spray (has no administration in time range)   insulin lispro (HUMALOG) injection vial 0-4 Units (has no administration in time range)   insulin lispro (HUMALOG) injection vial 0-4 Units (has no administration in time range)   lidocaine viscous hcl (XYLOCAINE) 2 % solution 15 mL (15 mLs Mouth/Throat Given 3/12/23 1744)   ondansetron (ZOFRAN) injection 4 mg (4 mg IntraVENous Given 3/12/23 1608)       New Prescriptions from this visit:    New Prescriptions    No medications on file       Follow-up:  No follow-up provider specified. Final Impression:   1.  SBO (small bowel obstruction) ([facility]               (Please note that portions of this note were completed with a voice recognitionprogram.  Efforts were made to edit the dictations but occasionally words are mis-transcribed.)    Adilene Vu MD (electronically signed)  Attending Emergency Physician           Adilene Vu MD  03/12/23 [phone]

## 2023-03-13 ENCOUNTER — TELEPHONE (OUTPATIENT)
Dept: PODIATRY | Age: 68
End: 2023-03-13

## 2023-03-13 LAB
ABSOLUTE EOS #: 0.19 K/UL (ref 0–0.44)
ABSOLUTE IMMATURE GRANULOCYTE: 0.17 K/UL (ref 0–0.3)
ABSOLUTE LYMPH #: 1.69 K/UL (ref 1.1–3.7)
ABSOLUTE MONO #: 0.81 K/UL (ref 0.1–1.2)
ANION GAP SERPL CALCULATED.3IONS-SCNC: 12 MMOL/L (ref 9–17)
BASOPHILS # BLD: 1 % (ref 0–2)
BASOPHILS ABSOLUTE: 0.05 K/UL (ref 0–0.2)
BUN SERPL-MCNC: 7 MG/DL (ref 8–23)
BUN/CREAT BLD: 8 (ref 9–20)
CALCIUM SERPL-MCNC: 8.5 MG/DL (ref 8.6–10.4)
CHLORIDE SERPL-SCNC: 106 MMOL/L (ref 98–107)
CO2 SERPL-SCNC: 22 MMOL/L (ref 20–31)
CREAT SERPL-MCNC: 0.92 MG/DL (ref 0.7–1.2)
EOSINOPHILS RELATIVE PERCENT: 2 % (ref 1–4)
GFR SERPL CREATININE-BSD FRML MDRD: >60 ML/MIN/1.73M2
GLUCOSE BLD-MCNC: 105 MG/DL (ref 75–110)
GLUCOSE BLD-MCNC: 105 MG/DL (ref 75–110)
GLUCOSE BLD-MCNC: 115 MG/DL (ref 75–110)
GLUCOSE BLD-MCNC: 117 MG/DL (ref 75–110)
GLUCOSE BLD-MCNC: 81 MG/DL (ref 75–110)
GLUCOSE BLD-MCNC: 83 MG/DL (ref 75–110)
GLUCOSE BLD-MCNC: 90 MG/DL (ref 75–110)
GLUCOSE SERPL-MCNC: 127 MG/DL (ref 70–99)
HCT VFR BLD AUTO: 35.2 % (ref 40.7–50.3)
HGB BLD-MCNC: 11.9 G/DL (ref 13–17)
IMMATURE GRANULOCYTES: 2 %
LYMPHOCYTES # BLD: 18 % (ref 24–43)
MCH RBC QN AUTO: 28.3 PG (ref 25.2–33.5)
MCHC RBC AUTO-ENTMCNC: 33.8 G/DL (ref 25.2–33.5)
MCV RBC AUTO: 83.6 FL (ref 82.6–102.9)
MONOCYTES # BLD: 9 % (ref 3–12)
NRBC AUTOMATED: 0 PER 100 WBC
PDW BLD-RTO: 13.6 % (ref 11.8–14.4)
PLATELET # BLD AUTO: 411 K/UL (ref 138–453)
PMV BLD AUTO: 9.2 FL (ref 8.1–13.5)
POTASSIUM SERPL-SCNC: 3.8 MMOL/L (ref 3.7–5.3)
RBC # BLD: 4.21 M/UL (ref 4.21–5.77)
SEG NEUTROPHILS: 68 % (ref 36–65)
SEGMENTED NEUTROPHILS ABSOLUTE COUNT: 6.59 K/UL (ref 1.5–8.1)
SODIUM SERPL-SCNC: 140 MMOL/L (ref 135–144)
WBC # BLD AUTO: 9.5 K/UL (ref 3.5–11.3)

## 2023-03-13 PROCEDURE — 6370000000 HC RX 637 (ALT 250 FOR IP): Performed by: FAMILY MEDICINE

## 2023-03-13 PROCEDURE — 2500000003 HC RX 250 WO HCPCS

## 2023-03-13 PROCEDURE — 6360000002 HC RX W HCPCS: Performed by: FAMILY MEDICINE

## 2023-03-13 PROCEDURE — 82947 ASSAY GLUCOSE BLOOD QUANT: CPT

## 2023-03-13 PROCEDURE — 85025 COMPLETE CBC W/AUTO DIFF WBC: CPT

## 2023-03-13 PROCEDURE — 2060000000 HC ICU INTERMEDIATE R&B

## 2023-03-13 PROCEDURE — 2580000003 HC RX 258: Performed by: FAMILY MEDICINE

## 2023-03-13 PROCEDURE — 6360000002 HC RX W HCPCS

## 2023-03-13 PROCEDURE — 94760 N-INVAS EAR/PLS OXIMETRY 1: CPT

## 2023-03-13 PROCEDURE — 99222 1ST HOSP IP/OBS MODERATE 55: CPT | Performed by: INTERNAL MEDICINE

## 2023-03-13 PROCEDURE — 80048 BASIC METABOLIC PNL TOTAL CA: CPT

## 2023-03-13 PROCEDURE — 6370000000 HC RX 637 (ALT 250 FOR IP): Performed by: INTERNAL MEDICINE

## 2023-03-13 PROCEDURE — 99221 1ST HOSP IP/OBS SF/LOW 40: CPT | Performed by: SURGERY

## 2023-03-13 PROCEDURE — 36415 COLL VENOUS BLD VENIPUNCTURE: CPT

## 2023-03-13 RX ORDER — OXYCODONE HYDROCHLORIDE AND ACETAMINOPHEN 5; 325 MG/1; MG/1
1 TABLET ORAL EVERY 4 HOURS PRN
Status: DISCONTINUED | OUTPATIENT
Start: 2023-03-13 | End: 2023-03-18 | Stop reason: HOSPADM

## 2023-03-13 RX ORDER — OXYCODONE HYDROCHLORIDE AND ACETAMINOPHEN 5; 325 MG/1; MG/1
2 TABLET ORAL EVERY 4 HOURS PRN
Status: DISCONTINUED | OUTPATIENT
Start: 2023-03-13 | End: 2023-03-18 | Stop reason: HOSPADM

## 2023-03-13 RX ADMIN — ENOXAPARIN SODIUM 30 MG: 100 INJECTION SUBCUTANEOUS at 08:27

## 2023-03-13 RX ADMIN — ENOXAPARIN SODIUM 30 MG: 100 INJECTION SUBCUTANEOUS at 21:14

## 2023-03-13 RX ADMIN — SODIUM CHLORIDE: 9 INJECTION, SOLUTION INTRAVENOUS at 07:27

## 2023-03-13 RX ADMIN — POTASSIUM CHLORIDE 10 MEQ: 7.46 INJECTION, SOLUTION INTRAVENOUS at 00:32

## 2023-03-13 RX ADMIN — ENALAPRILAT 1.25 MG: 1.25 INJECTION INTRAVENOUS at 18:54

## 2023-03-13 RX ADMIN — FUROSEMIDE 20 MG: 10 INJECTION, SOLUTION INTRAMUSCULAR; INTRAVENOUS at 08:27

## 2023-03-13 RX ADMIN — ENALAPRILAT 1.25 MG: 1.25 INJECTION INTRAVENOUS at 01:17

## 2023-03-13 RX ADMIN — ENALAPRILAT 1.25 MG: 1.25 INJECTION INTRAVENOUS at 06:10

## 2023-03-13 RX ADMIN — SILVER SULFADIAZINE: 10 CREAM TOPICAL at 08:36

## 2023-03-13 RX ADMIN — ACETAMINOPHEN 650 MG: 325 TABLET ORAL at 08:27

## 2023-03-13 RX ADMIN — ENALAPRILAT 1.25 MG: 1.25 INJECTION INTRAVENOUS at 23:55

## 2023-03-13 RX ADMIN — SODIUM CHLORIDE: 9 INJECTION, SOLUTION INTRAVENOUS at 18:50

## 2023-03-13 RX ADMIN — ENALAPRILAT 1.25 MG: 1.25 INJECTION INTRAVENOUS at 12:08

## 2023-03-13 RX ADMIN — OXYCODONE AND ACETAMINOPHEN 2 TABLET: 5; 325 TABLET ORAL at 15:09

## 2023-03-13 ASSESSMENT — PAIN SCALES - GENERAL
PAINLEVEL_OUTOF10: 0
PAINLEVEL_OUTOF10: 3
PAINLEVEL_OUTOF10: 0
PAINLEVEL_OUTOF10: 2
PAINLEVEL_OUTOF10: 7
PAINLEVEL_OUTOF10: 0
PAINLEVEL_OUTOF10: 6
PAINLEVEL_OUTOF10: 4

## 2023-03-13 ASSESSMENT — PAIN DESCRIPTION - ORIENTATION
ORIENTATION: MID
ORIENTATION: MID

## 2023-03-13 ASSESSMENT — PAIN DESCRIPTION - DESCRIPTORS
DESCRIPTORS: ACHING
DESCRIPTORS: ACHING

## 2023-03-13 ASSESSMENT — PAIN DESCRIPTION - LOCATION
LOCATION: BUTTOCKS
LOCATION: BUTTOCKS

## 2023-03-13 NOTE — PROGRESS NOTES
Occupational Therapy      Patient declined OT services, stating they are at their baseline level of function for basic self care and functional mobility. Patient discharged per their request. Patient was advised to inform staff if there is a decline in function or if they have any questions, therapy can return at that time. Patient verbalized understanding.

## 2023-03-13 NOTE — CARE COORDINATION
Case Management Assessment  Initial Evaluation    Date/Time of Evaluation: 3/13/2023 11:40 AM  Assessment Completed by: Gigi Heimlich, RN    If patient is discharged prior to next notation, then this note serves as note for discharge by case management. Patient Name: Amy Cabral                   YOB: 1955  Diagnosis: SBO (small bowel obstruction) [facility]) [H23.278]                   Date / Time: 3/12/2023  3:36 PM    Patient Admission Status: Inpatient   Readmission Risk (Low < 19, Mod (19-27), High > 27): Readmission Risk Score: 14.6    Current PCP: David Solis  PCP verified by CM? Yes    Chart Reviewed: Yes      History Provided by: Patient  Patient Orientation: Alert and Oriented    Patient Cognition: Alert    Hospitalization in the last 30 days (Readmission):  Yes    If yes, Readmission Assessment in CM Navigator will be completed. Advance Directives:      Code Status: Full Code   Patient's Primary Decision Maker is:        Discharge Planning:    Patient lives with: Children Type of Home: House  Primary Care Giver: Self  Patient Support Systems include: Children   Current Financial resources: Medicare  Current community resources:    Current services prior to admission: None            Current DME:              Type of Home Care services:  None    ADLS  Prior functional level: Independent in ADLs/IADLs  Current functional level: Independent in ADLs/IADLs    PT AM-PAC:   /24  OT AM-PAC:   /24    Family can provide assistance at DC: Yes  Would you like Case Management to discuss the discharge plan with any other family members/significant others, and if so, who?  No  Plans to Return to Present Housing: Yes  Other Identified Issues/Barriers to RETURNING to current housing: yes  Potential Assistance needed at discharge: N/A            Potential DME:    Patient expects to discharge to: [address] [place] [address] for transportation at discharge:      Financial    Payor: Trey Martinezelor / Plan: MEDICARE PART A AND B / Product Type: *No Product type* /     Does insurance require precert for SNF: No    Potential assistance Purchasing Medications:    Meds-to-Beds request:        [facility] [phone] - [place], [address] [facility] [address]  Phone: [phone] Fax: [phone]      Notes:    Factors facilitating achievement of predicted outcomes: Family support, Motivated, Cooperative, and Pleasant    Barriers to discharge: Pain and Decreased endurance    Additional Case Management Notes: Patient lives at home with his daughter and is independent. Pt undecided if he would like home health upon discharge. List of agencies provided, will address closer to discharge day. The Plan for Transition of Care is related to the following treatment goals of SBO (small bowel obstruction) ([facility] [D97.134]    IF APPLICABLE: The Patient and/or patient representative Rivka Abebe and his family were provided with a choice of provider and agrees with the discharge plan. Freedom of choice list with basic dialogue that supports the patient's individualized plan of care/goals and shares the quality data associated with the providers was provided to:     Patient Representative Name:       The Patient and/or Patient Representative Agree with the Discharge Plan?       Christy Levine RN  Case Management Department

## 2023-03-13 NOTE — CONSULTS
Meera Matos is a 69 y.o. male      CC:    sbo    HISTORY OF PRESENT ILLNESS:    Pt is 68 yo M who was just dced on 3/11 after 6 days in hospital for SBO and Laparoscopic exploration. He came back on 3/12 for drainage from his laparoscopic umbilical incision. Some bloating but no abd pain. Also noted blisters on both his feet on dorsal side at base of toes. In ER , they did a CT and it showed SBO , mild anasarca. An NGT was placed. And he was admitted. At present time , he is with daughter and states he is comfortable , no nausea. Still draining clear straw colored fluid from the umbilical incision- saturating an abd pad and both feet. No SOB No CP    No flatus no BM last 24 hours    Has been on gulaglutide last 3 months. Last dose yesterday. Review of Systems:    General:  Fever: Negative  Weight Change:Negative  Night Sweats: Negative    Eye:  Blurry Vision:Negative  Double Vision: Negative    Ent:  Headaches: Negative  Sore throat: Negative    Allergy/Immunology:  Hives: Negative  Latex allergy: Negative    Hematology/Lymphatic:  Bleeding Problems: Negative  Blood Clots: Negative  Swollen Lymph Nodes: Negative    Lungs:  Cough: Negative  SOB: Negative  Wheezing:Negative    Cardiovascular:  Chest Pain: Negative  Palpitations:Negative    GI:   Decreased Appetite: Negative  Heartburn: Negative  Dysphagia: Negative  Nausea/Vomiting: Negative  Abdominal Pain: Negative  Change in Bowels:Negative  Constipation: Negative  Diarrhea: Negative  Rectal Bleeding: Negative    :   Dysuria: Negative  Increase Urinary Frequency/Urgency: Negative    Neuro:  Seizures: Negative  Confusion: Negative        PAST MEDICAL HISTORY:      Family History   Problem Relation Age of Onset    Heart Disease Mother     High Blood Pressure Mother     Diabetes Mother     Heart Disease Father     High Blood Pressure Other      Social History     Socioeconomic History    Marital status:       Spouse name: Not on file Number of children: Not on file    Years of education: Not on file    Highest education level: Not on file   Occupational History    Not on file   Tobacco Use    Smoking status: Former     Types: Cigarettes     Quit date: 12     Years since quittin.2    Smokeless tobacco: Not on file   Vaping Use    Vaping Use: Never used   Substance and Sexual Activity    Alcohol use: Not Currently    Drug use: Never    Sexual activity: Not on file   Other Topics Concern    Not on file   Social History Narrative    Not on file     Social Determinants of Health     Financial Resource Strain: Not on file   Food Insecurity: Not on file   Transportation Needs: Not on file   Physical Activity: Not on file   Stress: Not on file   Social Connections: Not on file   Intimate Partner Violence: Not on file   Housing Stability: Not on file     Past Surgical History:   Procedure Laterality Date    CARPAL TUNNEL RELEASE Right 12/3/2010    COLONOSCOPY  2009    polyps    INGUINAL HERNIA REPAIR Right 1977    LAPAROSCOPY N/A 3/5/2023    DIAGNOSTIC LAPAROSCOPY performed by Aria Weldon MD at [address] Left 2003    removal nail plate 1st digit     Past Medical History:   Diagnosis Date    Allergic rhinitis     Colon polyps     history of    Elevated WBC count     history of    Hyperlipidemia     Hypertension     Obesity     Pain, joint, hand, left 2021    Partial small bowel obstruction ([facility] 3/5/2023    Sepsis with acute renal failure ([facility] 3/5/2023    Type II or unspecified type diabetes mellitus without mention of complication, not stated as uncontrolled     Unspecified sleep apnea     Weakness of left hand 2021     No current facility-administered medications on file prior to encounter.      Current Outpatient Medications on File Prior to Encounter   Medication Sig Dispense Refill    atorvastatin (LIPITOR) 80 MG tablet 40 mg      glipiZIDE (GLUCOTROL) 10 MG tablet 10 mg      Dulaglutide 0.75 MG/0.5ML SOPN INJECT 0.75MG (0.5ML) UNDER SKIN ONCE EVERY WEEK FOR TYPE 2 DIABETES MELLITUS **REPLACES SEMAGLUTIDE**      vitamin D (CHOLECALCIFEROL) 25 MCG (1000 UT) TABS tablet TAKE TWO TABLETS BY MOUTH EVERY DAY      empagliflozin (JARDIANCE) 25 MG tablet 12.5 mg      sildenafil (VIAGRA) 50 MG tablet 25 mg      gabapentin (NEURONTIN) 300 MG capsule Take 300 mg by mouth 2 times daily. lisinopril-hydrochlorothiazide (PRINZIDE;ZESTORETIC) 20-12.5 MG per tablet Take 1 tablet by mouth daily. 30 tablet 1    metFORMIN (GLUCOPHAGE) 1000 MG tablet Take 1,000 mg by mouth 2 times daily. loratadine (CLARITIN) 10 MG tablet Take 10 mg by mouth daily. aspirin 81 MG tablet Take 81 mg by mouth daily. Multiple Vitamins-Minerals (MULTIVITAMIN PO) Take  by mouth daily. Omega-3 Fatty Acids (FISH OIL) 1000 MG CAPS Take 1,000 mg by mouth 2 times daily. Allergies as of 03/12/2023 - Fully Reviewed 03/12/2023   Allergen Reaction Noted    Bee venom  03/24/2014    Naproxen  03/24/2014           PHYSICAL EXAM:    Blood pressure (!) 148/63, pulse 69, temperature 97.6 °F (36.4 °C), temperature source Tympanic, resp. rate 18, height 5' 10\" (1.778 m), weight 262 lb 14.4 oz (119.3 kg), SpO2 96 %. Gen:  A and O x 3, NAD, well nourished  Eyes:  Sclera non icterus, PERRL  Head:  Normocephalic, non-tender  Neck:  Supple, no adenopathy, thyroid non tender and no masses,no carotid bruits  Lungs:  CTA, symmetrical  Chest:  RRR, no murmurs  Abd:  Soft, NT, slightly distended, straw/clear fluid draining from umbilical incision.  Decreased BS,   Ext:  pitting edema bilaterally up to knee with blisters dorsal feet consistent with edema, no sign of cellulitis  Psych: reveals appropriate mood, memory and judgment,  Neuro:  Reveals no gross motor or sensory deficits,   Msk:  5/5 strength all 4 extremities, no joint tenderness        ASSESS MENT:Plan    PSBO vs prolonged ileus     - could be due to intra luminal obstruction such as stricture, inflammation or tumor.  But also might be due to ileus, possibly related to the use of dulaglutide.     -  would continue NGT decompression, would hold dulaglutide. Continue bowel rest   -  may consider SBFT again   -  may eventually need exploratory laparotomy with hands on evaluation of SB to rule out an intra luminal lesion or obstruction.    2.  General anasarca/ascites/lower ext edema/foot blisters   -  would recommend general diuresis   - will use gentle ace wraps for press to compress lower ext    3.  Nutrition.   - has not eaten much sense march 5,    - consider PPN in am   - will check protien levels

## 2023-03-13 NOTE — FLOWSHEET NOTE
rounding in PCU. Assessment: Patient was accompanied by his daughter Yemi Corado. Patient was having a tough time  because he was back in the hospital after being home only one day and because his wife had passed away 10 months ago. Patient was waiting to meet with Dr. Joana Mendoza to discuss his condition and although he was in pain (both his back and bowels), he was able to maintain his joking spirit. Intervention: Engaged in conversation and active listening. Prayed with Patient. Outcome: Patient expressed appreciation for visit and offer of continued prayer. Plan: Chaplains are available on site or on call 24/7 for spiritual and emotional support.     Electronically signed by Jenni Tapia on 3/13/2023 at 3:23 PM

## 2023-03-14 LAB
ABSOLUTE EOS #: 0.28 K/UL (ref 0–0.44)
ABSOLUTE IMMATURE GRANULOCYTE: 0.11 K/UL (ref 0–0.3)
ABSOLUTE LYMPH #: 1.75 K/UL (ref 1.1–3.7)
ABSOLUTE MONO #: 0.71 K/UL (ref 0.1–1.2)
ALBUMIN SERPL-MCNC: 3.1 G/DL (ref 3.5–5.2)
ALBUMIN/GLOBULIN RATIO: 1.2 (ref 1–2.5)
ALP SERPL-CCNC: 67 U/L (ref 40–129)
ALT SERPL-CCNC: 18 U/L (ref 5–41)
ANION GAP SERPL CALCULATED.3IONS-SCNC: 13 MMOL/L (ref 9–17)
AST SERPL-CCNC: 15 U/L
BASOPHILS # BLD: 1 % (ref 0–2)
BASOPHILS ABSOLUTE: 0.05 K/UL (ref 0–0.2)
BILIRUB SERPL-MCNC: 0.4 MG/DL (ref 0.3–1.2)
BUN SERPL-MCNC: 10 MG/DL (ref 8–23)
BUN/CREAT BLD: 11 (ref 9–20)
CALCIUM SERPL-MCNC: 8.4 MG/DL (ref 8.6–10.4)
CHLORIDE SERPL-SCNC: 105 MMOL/L (ref 98–107)
CO2 SERPL-SCNC: 23 MMOL/L (ref 20–31)
CREAT SERPL-MCNC: 0.89 MG/DL (ref 0.7–1.2)
EOSINOPHILS RELATIVE PERCENT: 3 % (ref 1–4)
EST. AVERAGE GLUCOSE BLD GHB EST-MCNC: 154 MG/DL
GFR SERPL CREATININE-BSD FRML MDRD: >60 ML/MIN/1.73M2
GLUCOSE BLD-MCNC: 79 MG/DL (ref 75–110)
GLUCOSE BLD-MCNC: 84 MG/DL (ref 75–110)
GLUCOSE BLD-MCNC: 91 MG/DL (ref 75–110)
GLUCOSE BLD-MCNC: 93 MG/DL (ref 75–110)
GLUCOSE SERPL-MCNC: 95 MG/DL (ref 70–99)
HBA1C MFR BLD: 7 % (ref 4–6)
HCT VFR BLD AUTO: 37.1 % (ref 40.7–50.3)
HGB BLD-MCNC: 12.2 G/DL (ref 13–17)
IMMATURE GRANULOCYTES: 1 %
LYMPHOCYTES # BLD: 20 % (ref 24–43)
MAGNESIUM SERPL-MCNC: 1.7 MG/DL (ref 1.6–2.6)
MCH RBC QN AUTO: 27.7 PG (ref 25.2–33.5)
MCHC RBC AUTO-ENTMCNC: 32.9 G/DL (ref 25.2–33.5)
MCV RBC AUTO: 84.1 FL (ref 82.6–102.9)
MONOCYTES # BLD: 8 % (ref 3–12)
NRBC AUTOMATED: 0 PER 100 WBC
PDW BLD-RTO: 13.5 % (ref 11.8–14.4)
PLATELET # BLD AUTO: 412 K/UL (ref 138–453)
PMV BLD AUTO: 9.3 FL (ref 8.1–13.5)
POTASSIUM SERPL-SCNC: 3.4 MMOL/L (ref 3.7–5.3)
POTASSIUM SERPL-SCNC: 3.7 MMOL/L (ref 3.7–5.3)
PREALB SERPL-MCNC: 16.5 MG/DL (ref 20–40)
PROT SERPL-MCNC: 5.6 G/DL (ref 6.4–8.3)
RBC # BLD: 4.41 M/UL (ref 4.21–5.77)
SEG NEUTROPHILS: 67 % (ref 36–65)
SEGMENTED NEUTROPHILS ABSOLUTE COUNT: 5.75 K/UL (ref 1.5–8.1)
SODIUM SERPL-SCNC: 141 MMOL/L (ref 135–144)
WBC # BLD AUTO: 8.7 K/UL (ref 3.5–11.3)

## 2023-03-14 PROCEDURE — 2060000000 HC ICU INTERMEDIATE R&B

## 2023-03-14 PROCEDURE — 85025 COMPLETE CBC W/AUTO DIFF WBC: CPT

## 2023-03-14 PROCEDURE — 6360000002 HC RX W HCPCS: Performed by: FAMILY MEDICINE

## 2023-03-14 PROCEDURE — 2500000003 HC RX 250 WO HCPCS

## 2023-03-14 PROCEDURE — 82947 ASSAY GLUCOSE BLOOD QUANT: CPT

## 2023-03-14 PROCEDURE — 99231 SBSQ HOSP IP/OBS SF/LOW 25: CPT | Performed by: INTERNAL MEDICINE

## 2023-03-14 PROCEDURE — 6360000002 HC RX W HCPCS: Performed by: INTERNAL MEDICINE

## 2023-03-14 PROCEDURE — 80053 COMPREHEN METABOLIC PANEL: CPT

## 2023-03-14 PROCEDURE — 84132 ASSAY OF SERUM POTASSIUM: CPT

## 2023-03-14 PROCEDURE — 84134 ASSAY OF PREALBUMIN: CPT

## 2023-03-14 PROCEDURE — 99232 SBSQ HOSP IP/OBS MODERATE 35: CPT | Performed by: SURGERY

## 2023-03-14 PROCEDURE — 6360000002 HC RX W HCPCS

## 2023-03-14 PROCEDURE — 36415 COLL VENOUS BLD VENIPUNCTURE: CPT

## 2023-03-14 PROCEDURE — 83735 ASSAY OF MAGNESIUM: CPT

## 2023-03-14 PROCEDURE — 2580000003 HC RX 258: Performed by: NURSE PRACTITIONER

## 2023-03-14 PROCEDURE — 6360000002 HC RX W HCPCS: Performed by: NURSE PRACTITIONER

## 2023-03-14 RX ORDER — INSULIN LISPRO 100 [IU]/ML
0-4 INJECTION, SOLUTION INTRAVENOUS; SUBCUTANEOUS EVERY 6 HOURS SCHEDULED
Status: DISCONTINUED | OUTPATIENT
Start: 2023-03-14 | End: 2023-03-17

## 2023-03-14 RX ORDER — POTASSIUM CHLORIDE 7.45 MG/ML
10 INJECTION INTRAVENOUS
Status: COMPLETED | OUTPATIENT
Start: 2023-03-14 | End: 2023-03-15

## 2023-03-14 RX ADMIN — ENALAPRILAT 1.25 MG: 1.25 INJECTION INTRAVENOUS at 12:36

## 2023-03-14 RX ADMIN — POTASSIUM CHLORIDE 10 MEQ: 10 INJECTION, SOLUTION INTRAVENOUS at 10:24

## 2023-03-14 RX ADMIN — SODIUM CHLORIDE: 9 INJECTION, SOLUTION INTRAVENOUS at 12:49

## 2023-03-14 RX ADMIN — SILVER SULFADIAZINE: 10 CREAM TOPICAL at 00:15

## 2023-03-14 RX ADMIN — POTASSIUM CHLORIDE 10 MEQ: 10 INJECTION, SOLUTION INTRAVENOUS at 08:45

## 2023-03-14 RX ADMIN — POTASSIUM CHLORIDE 10 MEQ: 10 INJECTION, SOLUTION INTRAVENOUS at 12:23

## 2023-03-14 RX ADMIN — FUROSEMIDE 20 MG: 10 INJECTION, SOLUTION INTRAMUSCULAR; INTRAVENOUS at 08:43

## 2023-03-14 RX ADMIN — POTASSIUM CHLORIDE 10 MEQ: 10 INJECTION, SOLUTION INTRAVENOUS at 13:58

## 2023-03-14 RX ADMIN — ENOXAPARIN SODIUM 30 MG: 100 INJECTION SUBCUTANEOUS at 08:44

## 2023-03-14 RX ADMIN — ENALAPRILAT 1.25 MG: 1.25 INJECTION INTRAVENOUS at 06:06

## 2023-03-14 RX ADMIN — SILVER SULFADIAZINE: 10 CREAM TOPICAL at 08:44

## 2023-03-14 RX ADMIN — HYDROMORPHONE HYDROCHLORIDE 0.5 MG: 1 INJECTION, SOLUTION INTRAMUSCULAR; INTRAVENOUS; SUBCUTANEOUS at 11:25

## 2023-03-14 RX ADMIN — ENALAPRILAT 1.25 MG: 1.25 INJECTION INTRAVENOUS at 18:28

## 2023-03-14 RX ADMIN — ENOXAPARIN SODIUM 30 MG: 100 INJECTION SUBCUTANEOUS at 21:56

## 2023-03-14 ASSESSMENT — PAIN DESCRIPTION - FREQUENCY: FREQUENCY: INTERMITTENT

## 2023-03-14 ASSESSMENT — PAIN DESCRIPTION - ORIENTATION: ORIENTATION: MID

## 2023-03-14 ASSESSMENT — PAIN SCALES - GENERAL
PAINLEVEL_OUTOF10: 3
PAINLEVEL_OUTOF10: 7

## 2023-03-14 ASSESSMENT — PAIN DESCRIPTION - PAIN TYPE: TYPE: ACUTE PAIN

## 2023-03-14 ASSESSMENT — PAIN DESCRIPTION - DESCRIPTORS: DESCRIPTORS: ACHING

## 2023-03-14 ASSESSMENT — PAIN DESCRIPTION - LOCATION: LOCATION: BUTTOCKS

## 2023-03-14 NOTE — CARE COORDINATION
DISCHARGE BARRIERS       Reason for Referral:  SW completed a Psychosocial Assessment for evaluation of patient's mental health, social status, and functional capacity within the community. Yon Purdy is a 69 y.o. male admitted due to SBO (small bowel obstruction) ([facility] SW provided supportive listening while patient discussed past medical history and events leading up to hospitalization. Mental Status:  Alert, oriented, and engaging during assessment. Decision Making:  Makes own decisions. Family/Social/Home Environment: Lives in [address] [place] [address] Drive: Discussed a good social support network     Current Services: [facility]. Current DMEs: [facility]    PCP: Shane Rajan no issues affording medication.  status:  Marine       ADLs and means of transportation: Independent in ADLs prior to hospitalization and able to transport self. Food insecurity or needed financial assistance: Denies any food insecurity or financial concerns at this time. ACP and Code Status:  Yon Purdy is a Full Code status and does not have an Advance Directive. SW discussed an Advance Directive which included the patient's choices for care and treatment in the case of a health event that adversely affects decision-making abilities. SW provided education and resources. Yon Purdy has no questions at this time and has agreed to keep me up-to-date should anything change. Collaborative List of SNF/ECF/HH were provided: offered, declined No discharge order at this time. Anticipated Needs/Discharge Plan:  Spoke with patient/family/representative about discharge plan. Patient/Family/Representative verbalizes understanding of the plan of care and denies discharge needs or further services at this time. SW provided business card. SW will continue to monitor needs and assist as appropriate.              Electronically signed by KHUSHBOO Llamas on 3/14/2023 at 1:49 PM

## 2023-03-14 NOTE — PROGRESS NOTES
Pt still not feeling \"right \"  He says hard to describe. No significant abd pain  No nausea or emesis with ngt  No flatus or bm  BP (!) 158/70   Pulse 76   Temp 98.3 °F (36.8 °C) (Tympanic)   Resp 18   Ht 5' 10\" (1.778 m)   Wt 262 lb 14.4 oz (119.3 kg)   SpO2 96%   BMI 37.72 kg/m²   Abd- soft, mild distention, + bs mild tenderness, no R or G  Still some serous drainage from umbilical inc. But most of inc closed  Still seeping from feet, but less and blisters less  Decreased edema, bilaterally LE    Wbc- 8.7  Hb=12.2    Imp:  Not sure if prolonged ileus vs psbo again. Had discussion with pt and family about exploration vs observation   At this time not acute surgical abd  Will do SBFT again in am  Then based on those results may take to OR and may do open so   We can run the bowel and make sure no internal obstruction.       Total time with pt and family  39 min

## 2023-03-14 NOTE — PROGRESS NOTES
Physician Progress Note      Ryan Hewitt  CSN #:                  [id number]  :                       1955  ADMIT DATE:       3/12/2023 3:36 PM  [address] [place] DATE:  RESPONDING  PROVIDER #:        Hui Fox MD          QUERY TEXT:    Pt admitted with small bowel obstruction. Per H&P 3+ edema BLE feet to ankles,  patient is being treated with Lasix 20   mg IV daily  (pt is NPO). 3/7/23 echo showed grade II diastolic dysfunction    If possible, please document in progress notes and discharge summary if you   are evaluating and/or treating any of the following: The medical record reflects the following:  Risk Factors: HTN, hyperlipidemia, obesity, DM,  small bowel obstruction  Clinical Indicators: Per H&P  lungs CTA,  3+ edema BLE feet to ankles;    admission:  CXR: no acute infiltrates or pleural effusions,   Pro-;      3/7/23 echo: EF 60%, grade II diastolic dysfunction  Treatment: Lasix  20 mg IV daily    Laura Cortez, MSN, RN, CCDS, [address] [place] [address]   [phone]  . Options provided:  -- Chronic Diastolic CHF/HFpEF  -- Lower extremity edema without heart failure  -- Other - I will add my own diagnosis  -- Disagree - Not applicable / Not valid  -- Disagree - Clinically unable to determine / Unknown  -- Refer to Clinical Documentation Reviewer    PROVIDER RESPONSE TEXT:    This patient has chronic diastolic CHF/HFpEF.     Query created by: Anand Gutiérrez on 3/14/2023 11:43 AM      Electronically signed by:  Hui Fox MD 3/14/2023 2:33 PM stretcher

## 2023-03-14 NOTE — PROGRESS NOTES
Hospitalist Progress Note    Patient:  Ford Whiting     YOB: 1955    MRN: [id number]   Admit date: 3/12/2023     Acct: [de-identified]     PCP: Sanam Bradley    CC--Interval History: SBO---still with high output from NGT    BLE--wound care---dressings CDI    POD 10--diagnostic laparoscopy    If not taking PO by 3.15.2023---suggest parenteral feeding    See note below     All other ROS negative except noted in HPI    Diet:  Diet NPO    Medications:  Scheduled Meds:   insulin lispro  0-4 Units SubCUTAneous 4 times per day    enoxaparin  30 mg SubCUTAneous BID    enalaprilat  1.25 mg IntraVENous 4 times per day    furosemide  20 mg IntraVENous Daily    silver sulfADIAZINE   Topical BID     Continuous Infusions:   sodium chloride 50 mL/hr at 03/14/23 1249    dextrose       PRN Meds:HYDROmorphone, oxyCODONE-acetaminophen, oxyCODONE-acetaminophen, ondansetron **OR** ondansetron, polyethylene glycol, acetaminophen **OR** acetaminophen, lidocaine viscous hcl, glucose, dextrose bolus **OR** dextrose bolus, glucagon (rDNA), dextrose, phenol    Objective:  Labs:  CBC with Differential:    Lab Results   Component Value Date/Time    WBC 8.7 03/14/2023 05:21 AM    RBC 4.41 03/14/2023 05:21 AM    HGB 12.2 03/14/2023 05:21 AM    HCT 37.1 03/14/2023 05:21 AM     03/14/2023 05:21 AM    MCV 84.1 03/14/2023 05:21 AM    MCH 27.7 03/14/2023 05:21 AM    MCHC 32.9 03/14/2023 05:21 AM    RDW 13.5 03/14/2023 05:21 AM    LYMPHOPCT 20 03/14/2023 05:21 AM    MONOPCT 8 03/14/2023 05:21 AM    BASOPCT 1 03/14/2023 05:21 AM    MONOSABS 0.71 03/14/2023 05:21 AM    LYMPHSABS 1.75 03/14/2023 05:21 AM    EOSABS 0.28 03/14/2023 05:21 AM    BASOSABS 0.05 03/14/2023 05:21 AM     BMP:    Lab Results   Component Value Date/Time     03/14/2023 05:21 AM    K 3.4 03/14/2023 05:21 AM     03/14/2023 05:21 AM    CO2 23 03/14/2023 05:21 AM    BUN 10 03/14/2023 05:21 AM    LABALBU 3.1 03/14/2023 05:21 AM    CREATININE 0.89 03/14/2023 05:21 AM    CALCIUM 8.4 03/14/2023 05:21 AM    LABGLOM >60 03/14/2023 05:21 AM    GLUCOSE 95 03/14/2023 05:21 AM           Physical Exam:  Vitals: BP (!) 158/70   Pulse 76   Temp 98.3 °F (36.8 °C) (Tympanic)   Resp 18   Ht 5' 10\" (1.778 m)   Wt 262 lb 14.4 oz (119.3 kg)   SpO2 96%   BMI 37.72 kg/m²   24 hour intake/output:  Intake/Output Summary (Last 24 hours) at 3/14/2023 1732  Last data filed at 3/14/2023 1125  Gross per 24 hour   Intake 904.97 ml   Output 1950 ml   Net -1045.03 ml     Last 3 weights: Wt Readings from Last 3 Encounters:   03/13/23 262 lb 14.4 oz (119.3 kg)   03/11/23 269 lb 6.4 oz (122.2 kg)   02/15/23 255 lb (115.7 kg)     HEENT:  NGT---, Normocephalic, and Atraumatic  Neck: Supple, No Masses, Tenderness, Nodularity, and No Lymphadenopathy  Chest/Lungs: Clear to Auscultation without Rales, Rhonchi, or Wheezes  Cardiac: Regular Rate and Rhythm  GI/Abdomen: Bowel Sounds Absent and Tenderness--minimal  : Not examined  EXT/Skin: No Cyanosis, No Clubbing, and Edema Present---mild---BLE dressing CDI---toes capillary refill < 2 seconds  Neuro: Alert and Oriented episodic periods in which he becomes withdrawn and No Localizing Signs/Symptoms      Assessment:    Principal Problem:    SBO (small bowel obstruction) (HCC)  Resolved Problems:    * No resolved hospital problems.  SHARON Wall  69  WM  [Ramo Mcgee;  Anthony [place];  [facility]---[facility]; RODOLFO Medrano]  FULL CODE    LOVENOX 30 bid  COVID-19--NEGATIVE,   INFLUENZA--NEGATIVE    NGT    Anti-infectives:       SBO----3.12.2023---with watery drainage from the wound site        CT abdomen-pelvis---3.12.2023---progressive SBO        CXR---3.12.2023------NGT--no acute infiltrates pr pleural effusions     Anasarca---3.12.2023  Blisters BLE---feet---3.12.2023      POD _____  diagnostic laparoscopy---3.5.2023---SBO         X-ray--abdomen---3.9.2023---progression of enteric contrast in the colon--ileus--vs--PSBO SBFT---3.7.2023--dilated loops bowel up to 4.3 cm---at 5 hours contrast mid-small                       bowel---consistent with SBO  SBO        AAS---3.6.2023---persistent mild-low-grade SBO---bibasilar atelectasis--vs--infiltrates        CT abdomen-pelvis---3.5.2023---no mesenteric ischemia--low grade SBO--suspected                               transition point in the right mid-abdomen--fluid in esophagus--GERD         EKG---3.5.2023---NSR--86--RBBB  Dehydration--3.5.2023  Elevated lactic acid responsive to fluids---3.5.2023----sepsis ruled out    RBBB  Hypertension          2D ECHO--3.6.2023--LA mildly dilated--mild LVH--NLVSF--RA dilatation--NRVSF--                               MAC--AV calcification with adequate opening---RVSP ~ 34 mm Hg---                              AR mildly dilated 3.8 cm---IVC not visualized--Grade II moderate DD---                              LVEF ~ 60%  Hyperlipidemia  BLE--edema  Diabetes Mellitus Type 2---postoperative goal 140-180  CKD---Stage 3b at admission à Stage 3a  ANGELA   Obesity  Tobacco abuse---quit--1992   PMH:  allergic rhinitis, colon polyps  PSH:   right CTS---2010, colonoscopy--2009---polyps, RIH--1977, left great toe nail plate [id number]    Allergies:  bee venom, naproxen      Plan:    SBO----cont'd NGT    Have discussed with GS    Possible SBFT---3.15.2023    Suspect that patient will ultimately require exploration    See orders     Electronically signed by Makenna Thompson MD on 3/14/2023 at 5:32 PM    Hospitalist

## 2023-03-15 ENCOUNTER — APPOINTMENT (OUTPATIENT)
Dept: GENERAL RADIOLOGY | Age: 68
End: 2023-03-15
Payer: MEDICARE

## 2023-03-15 ENCOUNTER — ANESTHESIA (OUTPATIENT)
Dept: INPATIENT UNIT | Age: 68
End: 2023-03-15
Payer: MEDICARE

## 2023-03-15 ENCOUNTER — ANESTHESIA EVENT (OUTPATIENT)
Dept: INPATIENT UNIT | Age: 68
End: 2023-03-15
Payer: MEDICARE

## 2023-03-15 LAB
ABSOLUTE EOS #: 0.28 K/UL (ref 0–0.44)
ABSOLUTE IMMATURE GRANULOCYTE: 0.08 K/UL (ref 0–0.3)
ABSOLUTE LYMPH #: 1.93 K/UL (ref 1.1–3.7)
ABSOLUTE MONO #: 0.74 K/UL (ref 0.1–1.2)
ANION GAP SERPL CALCULATED.3IONS-SCNC: 14 MMOL/L (ref 9–17)
BASOPHILS # BLD: 0 % (ref 0–2)
BASOPHILS ABSOLUTE: 0.04 K/UL (ref 0–0.2)
BUN SERPL-MCNC: 12 MG/DL (ref 8–23)
BUN/CREAT BLD: 13 (ref 9–20)
CALCIUM SERPL-MCNC: 8.6 MG/DL (ref 8.6–10.4)
CHLORIDE SERPL-SCNC: 104 MMOL/L (ref 98–107)
CO2 SERPL-SCNC: 23 MMOL/L (ref 20–31)
CREAT SERPL-MCNC: 0.89 MG/DL (ref 0.7–1.2)
EOSINOPHILS RELATIVE PERCENT: 3 % (ref 1–4)
GFR SERPL CREATININE-BSD FRML MDRD: >60 ML/MIN/1.73M2
GLUCOSE BLD-MCNC: 78 MG/DL (ref 75–110)
GLUCOSE BLD-MCNC: 90 MG/DL (ref 75–110)
GLUCOSE BLD-MCNC: 92 MG/DL (ref 75–110)
GLUCOSE BLD-MCNC: 97 MG/DL (ref 75–110)
GLUCOSE SERPL-MCNC: 84 MG/DL (ref 70–99)
HCT VFR BLD AUTO: 35 % (ref 40.7–50.3)
HCT VFR BLD AUTO: 36.9 % (ref 40.7–50.3)
HGB BLD-MCNC: 11.7 G/DL (ref 13–17)
HGB BLD-MCNC: 12.1 G/DL (ref 13–17)
IMMATURE GRANULOCYTES: 1 %
LYMPHOCYTES # BLD: 21 % (ref 24–43)
MCH RBC QN AUTO: 27.9 PG (ref 25.2–33.5)
MCHC RBC AUTO-ENTMCNC: 33.4 G/DL (ref 25.2–33.5)
MCV RBC AUTO: 83.5 FL (ref 82.6–102.9)
MONOCYTES # BLD: 8 % (ref 3–12)
NRBC AUTOMATED: 0 PER 100 WBC
PDW BLD-RTO: 13.5 % (ref 11.8–14.4)
PLATELET # BLD AUTO: 423 K/UL (ref 138–453)
PMV BLD AUTO: 9.4 FL (ref 8.1–13.5)
POTASSIUM SERPL-SCNC: 3.4 MMOL/L (ref 3.7–5.3)
POTASSIUM SERPL-SCNC: 3.7 MMOL/L (ref 3.7–5.3)
RBC # BLD: 4.19 M/UL (ref 4.21–5.77)
SEG NEUTROPHILS: 67 % (ref 36–65)
SEGMENTED NEUTROPHILS ABSOLUTE COUNT: 6.12 K/UL (ref 1.5–8.1)
SODIUM SERPL-SCNC: 141 MMOL/L (ref 135–144)
WBC # BLD AUTO: 9.2 K/UL (ref 3.5–11.3)

## 2023-03-15 PROCEDURE — 99232 SBSQ HOSP IP/OBS MODERATE 35: CPT | Performed by: SURGERY

## 2023-03-15 PROCEDURE — 85014 HEMATOCRIT: CPT

## 2023-03-15 PROCEDURE — 2500000003 HC RX 250 WO HCPCS: Performed by: INTERNAL MEDICINE

## 2023-03-15 PROCEDURE — 80048 BASIC METABOLIC PNL TOTAL CA: CPT

## 2023-03-15 PROCEDURE — 6360000002 HC RX W HCPCS: Performed by: FAMILY MEDICINE

## 2023-03-15 PROCEDURE — 6360000002 HC RX W HCPCS: Performed by: INTERNAL MEDICINE

## 2023-03-15 PROCEDURE — 36415 COLL VENOUS BLD VENIPUNCTURE: CPT

## 2023-03-15 PROCEDURE — 85025 COMPLETE CBC W/AUTO DIFF WBC: CPT

## 2023-03-15 PROCEDURE — 6360000002 HC RX W HCPCS

## 2023-03-15 PROCEDURE — 6360000002 HC RX W HCPCS: Performed by: NURSE PRACTITIONER

## 2023-03-15 PROCEDURE — 2500000003 HC RX 250 WO HCPCS

## 2023-03-15 PROCEDURE — 6360000004 HC RX CONTRAST MEDICATION: Performed by: INTERNAL MEDICINE

## 2023-03-15 PROCEDURE — 82947 ASSAY GLUCOSE BLOOD QUANT: CPT

## 2023-03-15 PROCEDURE — 74250 X-RAY XM SM INT 1CNTRST STD: CPT

## 2023-03-15 PROCEDURE — 36569 INSJ PICC 5 YR+ W/O IMAGING: CPT | Performed by: NURSE ANESTHETIST, CERTIFIED REGISTERED

## 2023-03-15 PROCEDURE — 2580000003 HC RX 258: Performed by: NURSE PRACTITIONER

## 2023-03-15 PROCEDURE — 84132 ASSAY OF SERUM POTASSIUM: CPT

## 2023-03-15 PROCEDURE — APPNB15 APP NON BILLABLE TIME 0-15 MINS: Performed by: NURSE PRACTITIONER

## 2023-03-15 PROCEDURE — 02HV33Z INSERTION OF INFUSION DEVICE INTO SUPERIOR VENA CAVA, PERCUTANEOUS APPROACH: ICD-10-PCS | Performed by: NURSE ANESTHETIST, CERTIFIED REGISTERED

## 2023-03-15 PROCEDURE — 2580000003 HC RX 258: Performed by: INTERNAL MEDICINE

## 2023-03-15 PROCEDURE — 2060000000 HC ICU INTERMEDIATE R&B

## 2023-03-15 PROCEDURE — 99231 SBSQ HOSP IP/OBS SF/LOW 25: CPT | Performed by: INTERNAL MEDICINE

## 2023-03-15 PROCEDURE — 85018 HEMOGLOBIN: CPT

## 2023-03-15 PROCEDURE — 71045 X-RAY EXAM CHEST 1 VIEW: CPT

## 2023-03-15 RX ORDER — POTASSIUM CHLORIDE 7.45 MG/ML
10 INJECTION INTRAVENOUS
Status: DISCONTINUED | OUTPATIENT
Start: 2023-03-15 | End: 2023-03-15

## 2023-03-15 RX ORDER — SODIUM CHLORIDE AND POTASSIUM CHLORIDE 150; 900 MG/100ML; MG/100ML
INJECTION, SOLUTION INTRAVENOUS
Status: DISPENSED
Start: 2023-03-15 | End: 2023-03-16

## 2023-03-15 RX ORDER — SODIUM CHLORIDE 0.9 % (FLUSH) 0.9 %
5-40 SYRINGE (ML) INJECTION EVERY 12 HOURS SCHEDULED
Status: DISCONTINUED | OUTPATIENT
Start: 2023-03-15 | End: 2023-03-18 | Stop reason: HOSPADM

## 2023-03-15 RX ORDER — SODIUM CHLORIDE 9 MG/ML
25 INJECTION, SOLUTION INTRAVENOUS PRN
Status: DISCONTINUED | OUTPATIENT
Start: 2023-03-15 | End: 2023-03-18 | Stop reason: HOSPADM

## 2023-03-15 RX ORDER — SODIUM CHLORIDE 0.9 % (FLUSH) 0.9 %
5-40 SYRINGE (ML) INJECTION PRN
Status: DISCONTINUED | OUTPATIENT
Start: 2023-03-15 | End: 2023-03-18 | Stop reason: HOSPADM

## 2023-03-15 RX ORDER — LIDOCAINE HYDROCHLORIDE 10 MG/ML
5 INJECTION, SOLUTION INFILTRATION; PERINEURAL ONCE
Status: DISCONTINUED | OUTPATIENT
Start: 2023-03-15 | End: 2023-03-18 | Stop reason: HOSPADM

## 2023-03-15 RX ORDER — POTASSIUM CHLORIDE 7.45 MG/ML
INJECTION INTRAVENOUS
Status: COMPLETED
Start: 2023-03-15 | End: 2023-03-15

## 2023-03-15 RX ORDER — POTASSIUM CHLORIDE 7.45 MG/ML
10 INJECTION INTRAVENOUS
Status: ACTIVE | OUTPATIENT
Start: 2023-03-15 | End: 2023-03-15

## 2023-03-15 RX ADMIN — POTASSIUM CHLORIDE 10 MEQ: 10 INJECTION, SOLUTION INTRAVENOUS at 14:22

## 2023-03-15 RX ADMIN — ENOXAPARIN SODIUM 30 MG: 100 INJECTION SUBCUTANEOUS at 12:25

## 2023-03-15 RX ADMIN — SODIUM CHLORIDE, PRESERVATIVE FREE 10 ML: 5 INJECTION INTRAVENOUS at 22:02

## 2023-03-15 RX ADMIN — SILVER SULFADIAZINE: 10 CREAM TOPICAL at 12:23

## 2023-03-15 RX ADMIN — ENALAPRILAT 1.25 MG: 1.25 INJECTION INTRAVENOUS at 12:23

## 2023-03-15 RX ADMIN — POTASSIUM CHLORIDE 10 MEQ: 10 INJECTION, SOLUTION INTRAVENOUS at 07:31

## 2023-03-15 RX ADMIN — FUROSEMIDE 20 MG: 10 INJECTION, SOLUTION INTRAMUSCULAR; INTRAVENOUS at 12:23

## 2023-03-15 RX ADMIN — SODIUM CHLORIDE: 9 INJECTION, SOLUTION INTRAVENOUS at 07:33

## 2023-03-15 RX ADMIN — ENALAPRILAT 1.25 MG: 1.25 INJECTION INTRAVENOUS at 05:39

## 2023-03-15 RX ADMIN — ENALAPRILAT 1.25 MG: 1.25 INJECTION INTRAVENOUS at 18:36

## 2023-03-15 RX ADMIN — DIATRIZOATE MEGLUMINE AND DIATRIZOATE SODIUM 240 ML: 660; 100 LIQUID ORAL; RECTAL at 09:51

## 2023-03-15 RX ADMIN — ENALAPRILAT 1.25 MG: 1.25 INJECTION INTRAVENOUS at 00:29

## 2023-03-15 RX ADMIN — I.V. FAT EMULSION 250 ML: 20 EMULSION INTRAVENOUS at 21:48

## 2023-03-15 RX ADMIN — ASCORBIC ACID, VITAMIN A PALMITATE, CHOLECALCIFEROL, THIAMINE HYDROCHLORIDE, RIBOFLAVIN-5 PHOSPHATE SODIUM, PYRIDOXINE HYDROCHLORIDE, NIACINAMIDE, DEXPANTHENOL, ALPHA-TOCOPHEROL ACETATE, VITAMIN K1, FOLIC ACID, BIOTIN, CYANOCOBALAMIN: 200; 3300; 200; 6; 3.6; 6; 40; 15; 10; 150; 600; 60; 5 INJECTION, SOLUTION INTRAVENOUS at 20:13

## 2023-03-15 RX ADMIN — POTASSIUM CHLORIDE 10 MEQ: 10 INJECTION, SOLUTION INTRAVENOUS at 11:29

## 2023-03-15 RX ADMIN — POTASSIUM CHLORIDE 10 MEQ: 7.46 INJECTION, SOLUTION INTRAVENOUS at 17:09

## 2023-03-15 RX ADMIN — POTASSIUM CHLORIDE 10 MEQ: 7.46 INJECTION, SOLUTION INTRAVENOUS at 14:22

## 2023-03-15 RX ADMIN — POTASSIUM CHLORIDE 10 MEQ: 7.46 INJECTION, SOLUTION INTRAVENOUS at 14:26

## 2023-03-15 RX ADMIN — ENOXAPARIN SODIUM 30 MG: 100 INJECTION SUBCUTANEOUS at 21:47

## 2023-03-15 NOTE — PROGRESS NOTES
SPIRITUAL CARE DEPARTMENT - [facility]  PROGRESS NOTE    Shift date: 10/15/23  Shift day: Wednesday     Room # 0211/0211-01   Name: Min Bhakta                  Referral: Routine Visit    Admit Date & Time: 3/12/2023  3:36 PM    Assessment:  Min Bhakta is a 69 y.o. male in the hospital because of SBO. Upon entering the room writer observes nurse with patient. Unable to access. Plan:  Chaplains will remain available to offer spiritual and emotional support as needed.        03/15/23 1619   Encounter Summary   Encounter Overview/Reason  Attempted Encounter   Service Provided For: Patient   Referral/Consult From: [facility] Unknown   Last Encounter  03/15/23   Complexity of Encounter Low   Begin Time 1615   End Time  1616   Total Time Calculated 1 min   Spiritual/Emotional needs   Type Spiritual Support   Assessment/Intervention/Outcome   Assessment Unable to assess   Intervention Other (Comment)   Outcome Other (comment)       Electronically signed by Marycarmen Cheatham on 3/15/2023 at 4:23 PM

## 2023-03-15 NOTE — PLAN OF CARE
Problem: Discharge Planning  Goal: Discharge to home or other facility with appropriate resources  Outcome: Progressing  Flowsheets (Taken 3/15/2023 0758)  Discharge to home or other facility with appropriate resources:   Identify barriers to discharge with patient and caregiver   Arrange for needed discharge resources and transportation as appropriate   Identify discharge learning needs (meds, wound care, etc)     Problem: Safety - Adult  Goal: Free from fall injury  Outcome: Progressing     Problem: ABCDS Injury Assessment  Goal: Absence of physical injury  Outcome: Progressing     Problem: Skin/Tissue Integrity  Goal: Absence of new skin breakdown  Description: 1. Monitor for areas of redness and/or skin breakdown  2. Assess vascular access sites hourly  3. Every 4-6 hours minimum:  Change oxygen saturation probe site  4. Every 4-6 hours:  If on nasal continuous positive airway pressure, respiratory therapy assess nares and determine need for appliance change or resting period.   Outcome: Progressing     Problem: Pain  Goal: Verbalizes/displays adequate comfort level or baseline comfort level  Outcome: Progressing     Problem: Chronic Conditions and Co-morbidities  Goal: Patient's chronic conditions and co-morbidity symptoms are monitored and maintained or improved  Outcome: Progressing  Flowsheets (Taken 3/15/2023 0758)  Care Plan - Patient's Chronic Conditions and Co-Morbidity Symptoms are Monitored and Maintained or Improved: Monitor and assess patient's chronic conditions and comorbid symptoms for stability, deterioration, or improvement

## 2023-03-15 NOTE — PROGRESS NOTES
93 Deshaun Feliciano took call regarding PICC placement, [facility]  Dr. Shirley Goel notified of PICC needing adjusted. Dr. Shirley Goel will discuss with Ayan Jacobo CNP to adjust the line.

## 2023-03-16 ENCOUNTER — APPOINTMENT (OUTPATIENT)
Dept: GENERAL RADIOLOGY | Age: 68
End: 2023-03-16
Payer: MEDICARE

## 2023-03-16 LAB
ABSOLUTE EOS #: 0.27 K/UL (ref 0–0.44)
ABSOLUTE IMMATURE GRANULOCYTE: 0.08 K/UL (ref 0–0.3)
ABSOLUTE LYMPH #: 1.73 K/UL (ref 1.1–3.7)
ABSOLUTE MONO #: 0.84 K/UL (ref 0.1–1.2)
ANION GAP SERPL CALCULATED.3IONS-SCNC: 14 MMOL/L (ref 9–17)
BASOPHILS # BLD: 0 % (ref 0–2)
BASOPHILS ABSOLUTE: 0.05 K/UL (ref 0–0.2)
BUN SERPL-MCNC: 14 MG/DL (ref 8–23)
BUN/CREAT BLD: 15 (ref 9–20)
CALCIUM SERPL-MCNC: 8.8 MG/DL (ref 8.6–10.4)
CHLORIDE SERPL-SCNC: 101 MMOL/L (ref 98–107)
CO2 SERPL-SCNC: 24 MMOL/L (ref 20–31)
CREAT SERPL-MCNC: 0.91 MG/DL (ref 0.7–1.2)
EOSINOPHILS RELATIVE PERCENT: 2 % (ref 1–4)
GFR SERPL CREATININE-BSD FRML MDRD: >60 ML/MIN/1.73M2
GLUCOSE BLD-MCNC: 166 MG/DL (ref 75–110)
GLUCOSE BLD-MCNC: 177 MG/DL (ref 75–110)
GLUCOSE BLD-MCNC: 215 MG/DL (ref 75–110)
GLUCOSE SERPL-MCNC: 196 MG/DL (ref 70–99)
HCT VFR BLD AUTO: 36.4 % (ref 40.7–50.3)
HGB BLD-MCNC: 12.1 G/DL (ref 13–17)
IMMATURE GRANULOCYTES: 1 %
LYMPHOCYTES # BLD: 15 % (ref 24–43)
MCH RBC QN AUTO: 27.8 PG (ref 25.2–33.5)
MCHC RBC AUTO-ENTMCNC: 33.2 G/DL (ref 25.2–33.5)
MCV RBC AUTO: 83.7 FL (ref 82.6–102.9)
MONOCYTES # BLD: 7 % (ref 3–12)
NRBC AUTOMATED: 0 PER 100 WBC
PDW BLD-RTO: 13.5 % (ref 11.8–14.4)
PHOSPHATE SERPL-MCNC: 2.8 MG/DL (ref 2.5–4.5)
PLATELET # BLD AUTO: 449 K/UL (ref 138–453)
PMV BLD AUTO: 9.6 FL (ref 8.1–13.5)
POTASSIUM SERPL-SCNC: 3.2 MMOL/L (ref 3.7–5.3)
POTASSIUM SERPL-SCNC: 3.6 MMOL/L (ref 3.7–5.3)
RBC # BLD: 4.35 M/UL (ref 4.21–5.77)
SEG NEUTROPHILS: 75 % (ref 36–65)
SEGMENTED NEUTROPHILS ABSOLUTE COUNT: 8.47 K/UL (ref 1.5–8.1)
SODIUM SERPL-SCNC: 139 MMOL/L (ref 135–144)
WBC # BLD AUTO: 11.4 K/UL (ref 3.5–11.3)

## 2023-03-16 PROCEDURE — 85025 COMPLETE CBC W/AUTO DIFF WBC: CPT

## 2023-03-16 PROCEDURE — 2500000003 HC RX 250 WO HCPCS: Performed by: INTERNAL MEDICINE

## 2023-03-16 PROCEDURE — 6360000002 HC RX W HCPCS: Performed by: NURSE PRACTITIONER

## 2023-03-16 PROCEDURE — 2060000000 HC ICU INTERMEDIATE R&B

## 2023-03-16 PROCEDURE — 84100 ASSAY OF PHOSPHORUS: CPT

## 2023-03-16 PROCEDURE — 71045 X-RAY EXAM CHEST 1 VIEW: CPT

## 2023-03-16 PROCEDURE — 2500000003 HC RX 250 WO HCPCS

## 2023-03-16 PROCEDURE — 6360000002 HC RX W HCPCS: Performed by: FAMILY MEDICINE

## 2023-03-16 PROCEDURE — 36415 COLL VENOUS BLD VENIPUNCTURE: CPT

## 2023-03-16 PROCEDURE — 80048 BASIC METABOLIC PNL TOTAL CA: CPT

## 2023-03-16 PROCEDURE — 82947 ASSAY GLUCOSE BLOOD QUANT: CPT

## 2023-03-16 PROCEDURE — 84132 ASSAY OF SERUM POTASSIUM: CPT

## 2023-03-16 PROCEDURE — 6370000000 HC RX 637 (ALT 250 FOR IP): Performed by: INTERNAL MEDICINE

## 2023-03-16 PROCEDURE — 99231 SBSQ HOSP IP/OBS SF/LOW 25: CPT | Performed by: INTERNAL MEDICINE

## 2023-03-16 PROCEDURE — 6360000002 HC RX W HCPCS

## 2023-03-16 PROCEDURE — 99024 POSTOP FOLLOW-UP VISIT: CPT | Performed by: SURGERY

## 2023-03-16 RX ORDER — POTASSIUM CHLORIDE 7.45 MG/ML
INJECTION INTRAVENOUS
Status: DISPENSED
Start: 2023-03-16 | End: 2023-03-17

## 2023-03-16 RX ORDER — POTASSIUM CHLORIDE 7.45 MG/ML
10 INJECTION INTRAVENOUS
Status: COMPLETED | OUTPATIENT
Start: 2023-03-16 | End: 2023-03-16

## 2023-03-16 RX ORDER — POTASSIUM CHLORIDE 20 MEQ/1
60 TABLET, EXTENDED RELEASE ORAL ONCE
Status: COMPLETED | OUTPATIENT
Start: 2023-03-16 | End: 2023-03-16

## 2023-03-16 RX ADMIN — ENOXAPARIN SODIUM 30 MG: 100 INJECTION SUBCUTANEOUS at 08:15

## 2023-03-16 RX ADMIN — FUROSEMIDE 20 MG: 10 INJECTION, SOLUTION INTRAMUSCULAR; INTRAVENOUS at 08:10

## 2023-03-16 RX ADMIN — I.V. FAT EMULSION 250 ML: 20 EMULSION INTRAVENOUS at 17:39

## 2023-03-16 RX ADMIN — ENALAPRILAT 1.25 MG: 1.25 INJECTION INTRAVENOUS at 06:38

## 2023-03-16 RX ADMIN — POTASSIUM CHLORIDE 10 MEQ: 10 INJECTION, SOLUTION INTRAVENOUS at 12:16

## 2023-03-16 RX ADMIN — LEUCINE, PHENYLALANINE, LYSINE, METHIONINE, ISOLEUCINE, VALINE, HISTIDINE, THREONINE, TRYPTOPHAN, ALANINE, GLYCINE, ARGININE, PROLINE, SERINE, TYROSINE, SODIUM ACETATE, DIBASIC POTASSIUM PHOSPHATE, MAGNESIUM CHLORIDE, SODIUM CHLORIDE, CALCIUM CHLORIDE, DEXTROSE
365; 280; 290; 200; 300; 290; 240; 210; 90; 1035; 515; 575; 340; 250; 20; 340; 261; 51; 59; 33; 20 INJECTION INTRAVENOUS at 17:37

## 2023-03-16 RX ADMIN — ENALAPRILAT 1.25 MG: 1.25 INJECTION INTRAVENOUS at 17:42

## 2023-03-16 RX ADMIN — POTASSIUM CHLORIDE 10 MEQ: 10 INJECTION, SOLUTION INTRAVENOUS at 09:56

## 2023-03-16 RX ADMIN — POTASSIUM CHLORIDE 10 MEQ: 10 INJECTION, SOLUTION INTRAVENOUS at 08:18

## 2023-03-16 RX ADMIN — SILVER SULFADIAZINE: 10 CREAM TOPICAL at 18:14

## 2023-03-16 RX ADMIN — POTASSIUM CHLORIDE 60 MEQ: 1500 TABLET, EXTENDED RELEASE ORAL at 17:41

## 2023-03-16 RX ADMIN — SILVER SULFADIAZINE: 10 CREAM TOPICAL at 02:11

## 2023-03-16 RX ADMIN — SILVER SULFADIAZINE: 10 CREAM TOPICAL at 20:25

## 2023-03-16 RX ADMIN — ENALAPRILAT 1.25 MG: 1.25 INJECTION INTRAVENOUS at 00:08

## 2023-03-16 RX ADMIN — ENALAPRILAT 1.25 MG: 1.25 INJECTION INTRAVENOUS at 12:20

## 2023-03-16 RX ADMIN — ENOXAPARIN SODIUM 30 MG: 100 INJECTION SUBCUTANEOUS at 20:09

## 2023-03-16 RX ADMIN — INSULIN LISPRO 1 UNITS: 100 INJECTION, SOLUTION INTRAVENOUS; SUBCUTANEOUS at 12:29

## 2023-03-16 ASSESSMENT — PAIN SCALES - GENERAL: PAINLEVEL_OUTOF10: 0

## 2023-03-16 NOTE — PROGRESS NOTES
Hospitalist Progress Note    Patient:  Jeff Zuniga     YOB: 1955    MRN: [id number]   Admit date: 3/12/2023     Acct: [de-identified]     PCP: Bret Marion    CC--Interval History: PSBO--ileus----SBFT----slow transit to colon ~ 2 hours    PICC line---and TPN    General Surgery favors time and cont'd NGT    See note below    All other ROS negative except noted in HPI    Diet:  PN-Adult Premix 5/20 - Standard Electrolytes - Central Line  Diet NPO Exceptions are: Ice Chips    Medications:  Scheduled Meds:   lidocaine 1 % injection  5 mL IntraDERmal Once    sodium chloride flush  5-40 mL IntraVENous 2 times per day    fat emulsion  250 mL IntraVENous Daily    0.9% NaCl with KCl 20 mEq        insulin lispro  0-4 Units SubCUTAneous 4 times per day    enoxaparin  30 mg SubCUTAneous BID    enalaprilat  1.25 mg IntraVENous 4 times per day    furosemide  20 mg IntraVENous Daily    silver sulfADIAZINE   Topical BID     Continuous Infusions:   sodium chloride      PN-Adult Premix 5/20 - Standard Electrolytes - Central Line 75 mL/hr at 03/15/23 2013    sodium chloride 50 mL/hr at 03/15/23 0733    dextrose       PRN Meds:sodium chloride flush, sodium chloride, diatrizoate meglumine-sodium, HYDROmorphone, oxyCODONE-acetaminophen, oxyCODONE-acetaminophen, ondansetron **OR** ondansetron, polyethylene glycol, acetaminophen **OR** acetaminophen, lidocaine viscous hcl, glucose, dextrose bolus **OR** dextrose bolus, glucagon (rDNA), dextrose, phenol    Objective:  Labs:  CBC with Differential:    Lab Results   Component Value Date/Time    WBC 9.2 03/15/2023 05:32 AM    RBC 4.19 03/15/2023 05:32 AM    HGB 11.7 03/15/2023 05:32 AM    HCT 35.0 03/15/2023 05:32 AM     03/15/2023 05:32 AM    MCV 83.5 03/15/2023 05:32 AM    MCH 27.9 03/15/2023 05:32 AM    MCHC 33.4 03/15/2023 05:32 AM    RDW 13.5 03/15/2023 05:32 AM    LYMPHOPCT 21 03/15/2023 05:32 AM    MONOPCT 8 03/15/2023 05:32 AM    BASOPCT 0 03/15/2023 05:32 AM MONOSABS 0.74 03/15/2023 05:32 AM    LYMPHSABS 1.93 03/15/2023 05:32 AM    EOSABS 0.28 03/15/2023 05:32 AM    BASOSABS 0.04 03/15/2023 05:32 AM     BMP:    Lab Results   Component Value Date/Time     03/15/2023 05:32 AM    K 3.7 03/15/2023 01:40 PM     03/15/2023 05:32 AM    CO2 23 03/15/2023 05:32 AM    BUN 12 03/15/2023 05:32 AM    LABALBU 3.1 03/14/2023 05:21 AM    CREATININE 0.89 03/15/2023 05:32 AM    CALCIUM 8.6 03/15/2023 05:32 AM    LABGLOM >60 03/15/2023 05:32 AM    GLUCOSE 84 03/15/2023 05:32 AM           Physical Exam:  Vitals: BP (!) 152/73   Pulse 85   Temp 98.9 °F (37.2 °C) (Tympanic)   Resp 18   Ht 5' 10\" (1.778 m)   Wt 262 lb 14.4 oz (119.3 kg)   SpO2 98%   BMI 37.72 kg/m²   24 hour intake/output:  Intake/Output Summary (Last 24 hours) at 3/15/2023 2041  Last data filed at 3/15/2023 1832  Gross per 24 hour   Intake 567.5 ml   Output 1800 ml   Net -1232.5 ml     Last 3 weights: Wt Readings from Last 3 Encounters:   03/13/23 262 lb 14.4 oz (119.3 kg)   03/11/23 269 lb 6.4 oz (122.2 kg)   02/15/23 255 lb (115.7 kg)     HEENT: Normocephalic and Atraumatic  Neck: Supple, No Masses, Tenderness, Nodularity, and No Lymphadenopathy  Chest/Lungs: Distant Breath Sounds  Cardiac: Regular Rate and Rhythm  GI/Abdomen: Bowel Sounds minimaland Soft, Non-tender, without Guarding or Rebound Tenderness  : Not examined  EXT/Skin: No Edema, No Cyanosis, and No Clubbing  Neuro: Alert and Oriented and No Localizing Signs/Symptoms      Assessment:    Principal Problem:    SBO (small bowel obstruction) (HCC)  Resolved Problems:    * No resolved hospital problems.  SHARON Palma  67  WM  [Ramo Mcgee;  Anthony [place];  [facility]---[facility]; Amber Khoury ]  FULL CODE    LOVENOX 30 bid  COVID-19--NEGATIVE,   INFLUENZA--NEGATIVE    NGT  PICC--3.15.2023   TPN--3.15.2023    Anti-infectives:   --------    SBO----3.12.2023---with watery drainage from the wound site        SBFT---3.15.2023---mildly prominent small bowel loops proximally although contrast                                  appears to reach the colon in c. 2 hours--ileus--vs--PSBO        CT abdomen-pelvis---3.12.2023---progressive SBO        CXR---3.12.2023------NGT--no acute infiltrates pr pleural effusions     Anasarca---3.12.2023  Blisters BLE---feet---3.12.2023      POD _____  diagnostic laparoscopy---3.5.2023---SBO         X-ray--abdomen---3.9.2023---progression of enteric contrast in the colon--ileus--vs--PSBO         SBFT---3.7.2023--dilated loops bowel up to 4.3 cm---at 5 hours contrast mid-small                       bowel---consistent with SBO  SBO        AAS---3.6.2023---persistent mild-low-grade SBO---bibasilar atelectasis--vs--infiltrates        CT abdomen-pelvis---3.5.2023---no mesenteric ischemia--low grade SBO--suspected                               transition point in the right mid-abdomen--fluid in esophagus--GERD         EKG---3.5.2023---NSR--86--RBBB  Dehydration--3.5.2023  Elevated lactic acid responsive to fluids---3.5.2023----sepsis ruled out    RBBB  Hypertension          2D ECHO--3.6.2023--LA mildly dilated--mild LVH--NLVSF--RA dilatation--NRVSF--                               MAC--AV calcification with adequate opening---RVSP ~ 34 mm Hg---                              AR mildly dilated 3.8 cm---IVC not visualized--Grade II moderate DD---                              LVEF ~ 60%  Hyperlipidemia  BLE--edema  Diabetes Mellitus Type 2---postoperative goal 140-180  CKD---Stage 3b at admission à Stage 3a  ANGELA   Obesity  Tobacco abuse---quit--1992   PMH:  allergic rhinitis, colon polyps  PSH:   right CTS---2010, colonoscopy--2009---polyps, RIH--1977, left great toe nail plate [id number]    Allergies:  bee venom, naproxen        Plan:     PICC and TPN     See orders    Electronically signed by Zeferino Bonilla MD on 3/15/2023 at 8:41 PM    Hospitalist

## 2023-03-16 NOTE — PROGRESS NOTES
Pt passed several more liq BM. No nausea  No abd pain  /68   Pulse 83   Temp 98.3 °F (36.8 °C) (Tympanic)   Resp 18   Ht 5' 10\" (1.778 m)   Wt 262 lb 14.4 oz (119.3 kg)   SpO2 95%   BMI 37.72 kg/m²   Abd- soft + bs NT ND  Inc fairly dry  Lower legs minimal drainage  Wbc=11.4    Imp:  Pt appears better. No sign of any obstruction at this time. Will dc ngt and try clear liquids. If tolerates then tomorrow will advance   If do not tolerate then plan is to explore, probably open so we can run the bowel this time. Tomorrow Dr Susy Rider is on Call and would be doing the case. I have discussed the case with him and he will be on standby and will cover the pt after tomorrow. The other issue , is that the OR is closing to all cases from Friday afternoon to Monday morning for mandatory maintance. There is a chance that pt may need to be transferred to have the surgery if needed , if the OR are closed.

## 2023-03-16 NOTE — PROGRESS NOTES
SBFT no obstruction  Feeling better  Several bm  BP (!) 152/73   Pulse 85   Temp 98.9 °F (37.2 °C) (Tympanic)   Resp 18   Ht 5' 10\" (1.778 m)   Wt 262 lb 14.4 oz (119.3 kg)   SpO2 98%   BMI 37.72 kg/m²   Abd soft +BS minimal tenderness  Wbc=9.2  Pre alb=16.5    Pic line plalced    Plan  Start tpn  May have ice chips  Await bowel funtion to return

## 2023-03-16 NOTE — PLAN OF CARE
Problem: Discharge Planning  Goal: Discharge to home or other facility with appropriate resources  Outcome: Progressing  Flowsheets (Taken 3/16/2023 0815)  Discharge to home or other facility with appropriate resources:   Identify barriers to discharge with patient and caregiver   Arrange for needed discharge resources and transportation as appropriate   Identify discharge learning needs (meds, wound care, etc)   Refer to discharge planning if patient needs post-hospital services based on physician order or complex needs related to functional status, cognitive ability or social support system     Problem: Safety - Adult  Goal: Free from fall injury  Outcome: Progressing  Flowsheets (Taken 3/16/2023 0815)  Free From Fall Injury: Instruct family/caregiver on patient safety     Problem: ABCDS Injury Assessment  Goal: Absence of physical injury  Outcome: Progressing  Flowsheets (Taken 3/16/2023 0815)  Absence of Physical Injury: Implement safety measures based on patient assessment   - Patient free from falls and injuries. Problem: Skin/Tissue Integrity  Goal: Absence of new skin breakdown  Description: 1. Monitor for areas of redness and/or skin breakdown  2. Assess vascular access sites hourly  3. Every 4-6 hours minimum:  Change oxygen saturation probe site  4. Every 4-6 hours:  If on nasal continuous positive airway pressure, respiratory therapy assess nares and determine need for appliance change or resting period. Outcome: Progressing   - Dressing changes as ordered.      Problem: Pain  Goal: Verbalizes/displays adequate comfort level or baseline comfort level  Outcome: Progressing     Problem: Chronic Conditions and Co-morbidities  Goal: Patient's chronic conditions and co-morbidity symptoms are monitored and maintained or improved  Outcome: Progressing  Flowsheets (Taken 3/16/2023 0815)  Care Plan - Patient's Chronic Conditions and Co-Morbidity Symptoms are Monitored and Maintained or Improved: Monitor and assess patient's chronic conditions and comorbid symptoms for stability, deterioration, or improvement     Problem: Nutrition Deficit:  Goal: Optimize nutritional status  3/16/2023 1858 by Padmini Spears RN  Outcome: Progressing   - NG tube removed and Patient diet changed to clears.

## 2023-03-16 NOTE — PROGRESS NOTES
rounding on PCU. Assessment: Patient is back again. He seems very depressed and tired over his current medical condition. The  offered prayer that he requested and told him to press his call button if he wanted the  to come back. Intervention: Engaged in conversation and prayer. Patient expressed gratitude for visit and offer of continued prayer. Plan: Chaplains are available 24/7 to help with spiritual and emotional concerns. 03/16/23 7314   Encounter Summary   Encounter Overview/Reason  Volunteer Encounter   Service Provided For: Patient   Referral/Consult From: [address] [place] [address] Children;Family members; Mosque/darcy community;Friends/neighbors   Last Encounter  03/16/23   Complexity of Encounter Low   Begin Time 0815   End Time  0833   Total Time Calculated 18 min   Spiritual/Emotional needs   Type Spiritual Support   Assessment/Intervention/Outcome   Assessment Stress overload   Intervention Active listening;Prayer (assurance of)/[place];Sustaining Presence/Ministry of presence   Outcome Expressed Gratitude

## 2023-03-16 NOTE — PROGRESS NOTES
PICC line pulled back 4 cm per Dr. Roderick Catalan request. Now in 42 cm with 8 cm showing externally. Stat lock and occlusive dressing in place. Okay to use line. Morning chest xray ordered.

## 2023-03-17 LAB
ABSOLUTE EOS #: 0.31 K/UL (ref 0–0.44)
ABSOLUTE IMMATURE GRANULOCYTE: 0.07 K/UL (ref 0–0.3)
ABSOLUTE LYMPH #: 1.67 K/UL (ref 1.1–3.7)
ABSOLUTE MONO #: 0.76 K/UL (ref 0.1–1.2)
ANION GAP SERPL CALCULATED.3IONS-SCNC: 11 MMOL/L (ref 9–17)
BASOPHILS # BLD: 0 % (ref 0–2)
BASOPHILS ABSOLUTE: 0.04 K/UL (ref 0–0.2)
BUN SERPL-MCNC: 13 MG/DL (ref 8–23)
BUN/CREAT BLD: 15 (ref 9–20)
CALCIUM SERPL-MCNC: 8.8 MG/DL (ref 8.6–10.4)
CHLORIDE SERPL-SCNC: 105 MMOL/L (ref 98–107)
CO2 SERPL-SCNC: 24 MMOL/L (ref 20–31)
CREAT SERPL-MCNC: 0.84 MG/DL (ref 0.7–1.2)
EOSINOPHILS RELATIVE PERCENT: 3 % (ref 1–4)
GFR SERPL CREATININE-BSD FRML MDRD: >60 ML/MIN/1.73M2
GLUCOSE BLD-MCNC: 135 MG/DL (ref 75–110)
GLUCOSE BLD-MCNC: 137 MG/DL (ref 75–110)
GLUCOSE BLD-MCNC: 163 MG/DL (ref 75–110)
GLUCOSE BLD-MCNC: 165 MG/DL (ref 75–110)
GLUCOSE BLD-MCNC: 173 MG/DL (ref 75–110)
GLUCOSE SERPL-MCNC: 205 MG/DL (ref 70–99)
HCT VFR BLD AUTO: 34.9 % (ref 40.7–50.3)
HGB BLD-MCNC: 11.6 G/DL (ref 13–17)
IMMATURE GRANULOCYTES: 1 %
LYMPHOCYTES # BLD: 17 % (ref 24–43)
MCH RBC QN AUTO: 27.7 PG (ref 25.2–33.5)
MCHC RBC AUTO-ENTMCNC: 33.2 G/DL (ref 25.2–33.5)
MCV RBC AUTO: 83.3 FL (ref 82.6–102.9)
MONOCYTES # BLD: 8 % (ref 3–12)
NRBC AUTOMATED: 0 PER 100 WBC
PDW BLD-RTO: 13.4 % (ref 11.8–14.4)
PHOSPHATE SERPL-MCNC: 3 MG/DL (ref 2.5–4.5)
PLATELET # BLD AUTO: 395 K/UL (ref 138–453)
PMV BLD AUTO: 9.1 FL (ref 8.1–13.5)
POTASSIUM SERPL-SCNC: 3.7 MMOL/L (ref 3.7–5.3)
RBC # BLD: 4.19 M/UL (ref 4.21–5.77)
SEG NEUTROPHILS: 71 % (ref 36–65)
SEGMENTED NEUTROPHILS ABSOLUTE COUNT: 7.23 K/UL (ref 1.5–8.1)
SODIUM SERPL-SCNC: 140 MMOL/L (ref 135–144)
WBC # BLD AUTO: 10.1 K/UL (ref 3.5–11.3)

## 2023-03-17 PROCEDURE — 99232 SBSQ HOSP IP/OBS MODERATE 35: CPT | Performed by: SURGERY

## 2023-03-17 PROCEDURE — 36415 COLL VENOUS BLD VENIPUNCTURE: CPT

## 2023-03-17 PROCEDURE — 6360000002 HC RX W HCPCS

## 2023-03-17 PROCEDURE — 99231 SBSQ HOSP IP/OBS SF/LOW 25: CPT | Performed by: INTERNAL MEDICINE

## 2023-03-17 PROCEDURE — 84100 ASSAY OF PHOSPHORUS: CPT

## 2023-03-17 PROCEDURE — 6360000002 HC RX W HCPCS: Performed by: FAMILY MEDICINE

## 2023-03-17 PROCEDURE — 6370000000 HC RX 637 (ALT 250 FOR IP): Performed by: INTERNAL MEDICINE

## 2023-03-17 PROCEDURE — 80048 BASIC METABOLIC PNL TOTAL CA: CPT

## 2023-03-17 PROCEDURE — 2060000000 HC ICU INTERMEDIATE R&B

## 2023-03-17 PROCEDURE — 6370000000 HC RX 637 (ALT 250 FOR IP): Performed by: NURSE PRACTITIONER

## 2023-03-17 PROCEDURE — 2580000003 HC RX 258: Performed by: NURSE PRACTITIONER

## 2023-03-17 PROCEDURE — 2500000003 HC RX 250 WO HCPCS

## 2023-03-17 PROCEDURE — 85025 COMPLETE CBC W/AUTO DIFF WBC: CPT

## 2023-03-17 PROCEDURE — 2500000003 HC RX 250 WO HCPCS: Performed by: INTERNAL MEDICINE

## 2023-03-17 PROCEDURE — 82947 ASSAY GLUCOSE BLOOD QUANT: CPT

## 2023-03-17 RX ORDER — INSULIN GLARGINE 100 [IU]/ML
7 INJECTION, SOLUTION SUBCUTANEOUS DAILY
Status: DISCONTINUED | OUTPATIENT
Start: 2023-03-17 | End: 2023-03-18 | Stop reason: HOSPADM

## 2023-03-17 RX ORDER — SIMETHICONE 80 MG
80 TABLET,CHEWABLE ORAL EVERY 4 HOURS PRN
Status: DISCONTINUED | OUTPATIENT
Start: 2023-03-17 | End: 2023-03-18 | Stop reason: HOSPADM

## 2023-03-17 RX ORDER — POTASSIUM CHLORIDE 20 MEQ/1
60 TABLET, EXTENDED RELEASE ORAL ONCE
Status: COMPLETED | OUTPATIENT
Start: 2023-03-17 | End: 2023-03-17

## 2023-03-17 RX ORDER — INSULIN LISPRO 100 [IU]/ML
0-4 INJECTION, SOLUTION INTRAVENOUS; SUBCUTANEOUS
Status: DISCONTINUED | OUTPATIENT
Start: 2023-03-17 | End: 2023-03-18 | Stop reason: HOSPADM

## 2023-03-17 RX ADMIN — INSULIN LISPRO 1 UNITS: 100 INJECTION, SOLUTION INTRAVENOUS; SUBCUTANEOUS at 06:33

## 2023-03-17 RX ADMIN — SODIUM CHLORIDE: 9 INJECTION, SOLUTION INTRAVENOUS at 01:33

## 2023-03-17 RX ADMIN — INSULIN GLARGINE 7 UNITS: 100 INJECTION, SOLUTION SUBCUTANEOUS at 12:23

## 2023-03-17 RX ADMIN — ENALAPRILAT 1.25 MG: 1.25 INJECTION INTRAVENOUS at 18:33

## 2023-03-17 RX ADMIN — SIMETHICONE 80 MG: 80 TABLET, CHEWABLE ORAL at 20:25

## 2023-03-17 RX ADMIN — ENALAPRILAT 1.25 MG: 1.25 INJECTION INTRAVENOUS at 00:30

## 2023-03-17 RX ADMIN — SILVER SULFADIAZINE: 10 CREAM TOPICAL at 20:26

## 2023-03-17 RX ADMIN — I.V. FAT EMULSION 250 ML: 20 EMULSION INTRAVENOUS at 18:43

## 2023-03-17 RX ADMIN — ENALAPRILAT 1.25 MG: 1.25 INJECTION INTRAVENOUS at 05:35

## 2023-03-17 RX ADMIN — ENOXAPARIN SODIUM 30 MG: 100 INJECTION SUBCUTANEOUS at 10:19

## 2023-03-17 RX ADMIN — SILVER SULFADIAZINE: 10 CREAM TOPICAL at 10:19

## 2023-03-17 RX ADMIN — ENALAPRILAT 1.25 MG: 1.25 INJECTION INTRAVENOUS at 23:51

## 2023-03-17 RX ADMIN — ENOXAPARIN SODIUM 30 MG: 100 INJECTION SUBCUTANEOUS at 20:26

## 2023-03-17 RX ADMIN — ENALAPRILAT 1.25 MG: 1.25 INJECTION INTRAVENOUS at 12:22

## 2023-03-17 RX ADMIN — ASCORBIC ACID, VITAMIN A PALMITATE, CHOLECALCIFEROL, THIAMINE HYDROCHLORIDE, RIBOFLAVIN-5 PHOSPHATE SODIUM, PYRIDOXINE HYDROCHLORIDE, NIACINAMIDE, DEXPANTHENOL, ALPHA-TOCOPHEROL ACETATE, VITAMIN K1, FOLIC ACID, BIOTIN, CYANOCOBALAMIN: 200; 3300; 200; 6; 3.6; 6; 40; 15; 10; 150; 600; 60; 5 INJECTION, SOLUTION INTRAVENOUS at 18:39

## 2023-03-17 RX ADMIN — POTASSIUM CHLORIDE 60 MEQ: 1500 TABLET, EXTENDED RELEASE ORAL at 10:19

## 2023-03-17 RX ADMIN — SODIUM CHLORIDE: 9 INJECTION, SOLUTION INTRAVENOUS at 21:34

## 2023-03-17 RX ADMIN — FUROSEMIDE 20 MG: 10 INJECTION, SOLUTION INTRAMUSCULAR; INTRAVENOUS at 10:19

## 2023-03-17 ASSESSMENT — PAIN SCALES - GENERAL
PAINLEVEL_OUTOF10: 6
PAINLEVEL_OUTOF10: 0

## 2023-03-17 ASSESSMENT — PAIN DESCRIPTION - DESCRIPTORS
DESCRIPTORS: ACHING
DESCRIPTORS: ACHING

## 2023-03-17 ASSESSMENT — PAIN DESCRIPTION - ORIENTATION: ORIENTATION: MID

## 2023-03-17 ASSESSMENT — PAIN DESCRIPTION - LOCATION
LOCATION: BACK
LOCATION: BACK

## 2023-03-17 ASSESSMENT — PAIN - FUNCTIONAL ASSESSMENT: PAIN_FUNCTIONAL_ASSESSMENT: ACTIVITIES ARE NOT PREVENTED

## 2023-03-17 ASSESSMENT — PAIN DESCRIPTION - PAIN TYPE
TYPE: CHRONIC PAIN
TYPE: CHRONIC PAIN

## 2023-03-17 ASSESSMENT — PAIN DESCRIPTION - ONSET: ONSET: ON-GOING

## 2023-03-17 NOTE — PROGRESS NOTES
Hospitalist Progress Note    Patient:  Lynnette Casiano     YOB: 1955    MRN: [id number]   Admit date: 3/12/2023     Acct: [de-identified]     PCP: Joel HILLMAN--Interval History: PSBO--improved--NGT out---diet advanced to FLD--seen by General Surgery    K = 3.7 ---> potassium replacement    Elevated blood glucose --->   Lantus 7 and log correction      On TPN until can eat adequate PO    See note below     All other ROS negative except noted in HPI    Diet:  PN-Adult Premix 5/20 - Standard Electrolytes - Central Line  ADULT DIET;  Full Liquid    Medications:  Scheduled Meds:   lidocaine 1 % injection  5 mL IntraDERmal Once    sodium chloride flush  5-40 mL IntraVENous 2 times per day    fat emulsion  250 mL IntraVENous Daily    insulin lispro  0-4 Units SubCUTAneous 4 times per day    enoxaparin  30 mg SubCUTAneous BID    enalaprilat  1.25 mg IntraVENous 4 times per day    furosemide  20 mg IntraVENous Daily    silver sulfADIAZINE   Topical BID     Continuous Infusions:   PN-Adult Premix 5/20 - Standard Electrolytes - Central Line 75 mL/hr at 03/17/23 0544    sodium chloride      sodium chloride 50 mL/hr at 03/17/23 0544    dextrose       PRN Meds:sodium chloride flush, sodium chloride, diatrizoate meglumine-sodium, HYDROmorphone, oxyCODONE-acetaminophen, oxyCODONE-acetaminophen, ondansetron **OR** ondansetron, polyethylene glycol, acetaminophen **OR** acetaminophen, lidocaine viscous hcl, glucose, dextrose bolus **OR** dextrose bolus, glucagon (rDNA), dextrose, phenol    Objective:  Labs:  CBC with Differential:    Lab Results   Component Value Date/Time    WBC 10.1 03/17/2023 05:45 AM    RBC 4.19 03/17/2023 05:45 AM    HGB 11.6 03/17/2023 05:45 AM    HCT 34.9 03/17/2023 05:45 AM     03/17/2023 05:45 AM    MCV 83.3 03/17/2023 05:45 AM    MCH 27.7 03/17/2023 05:45 AM    MCHC 33.2 03/17/2023 05:45 AM    RDW 13.4 03/17/2023 05:45 AM    LYMPHOPCT 17 03/17/2023 05:45 AM    MONOPCT 8 03/17/2023 05:45 AM    BASOPCT 0 03/17/2023 05:45 AM    MONOSABS 0.76 03/17/2023 05:45 AM    LYMPHSABS 1.67 03/17/2023 05:45 AM    EOSABS 0.31 03/17/2023 05:45 AM    BASOSABS 0.04 03/17/2023 05:45 AM     BMP:    Lab Results   Component Value Date/Time     03/17/2023 05:45 AM    K 3.7 03/17/2023 05:45 AM     03/17/2023 05:45 AM    CO2 24 03/17/2023 05:45 AM    BUN 13 03/17/2023 05:45 AM    LABALBU 3.1 03/14/2023 05:21 AM    CREATININE 0.84 03/17/2023 05:45 AM    CALCIUM 8.8 03/17/2023 05:45 AM    LABGLOM >60 03/17/2023 05:45 AM    GLUCOSE 205 03/17/2023 05:45 AM           Physical Exam:  Vitals: BP (!) 152/79   Pulse 81   Temp 98.9 °F (37.2 °C) (Tympanic)   Resp 18   Ht 5' 10\" (1.778 m)   Wt 253 lb 1.6 oz (114.8 kg)   SpO2 94%   BMI 36.32 kg/m²   24 hour intake/output:  Intake/Output Summary (Last 24 hours) at 3/17/2023 0741  Last data filed at 3/17/2023 0544  Gross per 24 hour   Intake 3846.81 ml   Output --   Net 3846.81 ml     Last 3 weights: Wt Readings from Last 3 Encounters:   03/17/23 253 lb 1.6 oz (114.8 kg)   03/11/23 269 lb 6.4 oz (122.2 kg)   02/15/23 255 lb (115.7 kg)     HEENT: Normocephalic and Atraumatic  Neck: Supple, No Masses, Tenderness, Nodularity, and No Lymphadenopathy  Chest/Lungs: Clear to Auscultation without Rales, Rhonchi, or Wheezes  Cardiac: Regular Rate and Rhythm  GI/Abdomen: Bowel Sounds Present and Soft, Non-tender, without Guarding or Rebound Tenderness  : Not examined  EXT/Skin: No Edema, No Cyanosis, and No Clubbing  Neuro:  generalized weakness and Alert and Oriented      Assessment:    Principal Problem:    SBO (small bowel obstruction) (HCC)  Resolved Problems:    * No resolved hospital problems.  SHARON Duran  67  WM  [Ramo Mcgee;  [place], [place];  ND Cardiology---[facility]; Curtis Felipe, ]  FULL CODE    LOVENOX 30 bid  COVID-19--NEGATIVE,   INFLUENZA--NEGATIVE    NGT--dc'd 3.16.2023  PICC--3.15.2023   TPN--3.15.2023    Anti-infectives:   --------    SBO----3.12.2023---with watery drainage from the wound site        SBFT---3.15.2023---mildly prominent small bowel loops proximally although contrast                                  appears to reach the colon in c. 2 hours--ileus--vs--PSBO        CT abdomen-pelvis---3.12.2023---progressive SBO        CXR---3.12.2023------NGT--no acute infiltrates pr pleural effusions     Anasarca---3.12.2023  Blisters BLE---feet---3.12.2023      POD _____  diagnostic laparoscopy---3.5.2023---SBO         X-ray--abdomen---3.9.2023---progression of enteric contrast in the colon--ileus--vs--PSBO         SBFT---3.7.2023--dilated loops bowel up to 4.3 cm---at 5 hours contrast mid-small                       bowel---consistent with SBO  SBO        AAS---3.6.2023---persistent mild-low-grade SBO---bibasilar atelectasis--vs--infiltrates        CT abdomen-pelvis---3.5.2023---no mesenteric ischemia--low grade SBO--suspected                               transition point in the right mid-abdomen--fluid in esophagus--GERD         EKG---3.5.2023---NSR--86--RBBB  Dehydration--3.5.2023  Elevated lactic acid responsive to fluids---3.5.2023----sepsis ruled out    RBBB  Hypertension          2D ECHO--3.6.2023--LA mildly dilated--mild LVH--NLVSF--RA dilatation--NRVSF--                               MAC--AV calcification with adequate opening---RVSP ~ 34 mm Hg---                              AR mildly dilated 3.8 cm---IVC not visualized--Grade II moderate DD---                              LVEF ~ 60%  Hyperlipidemia  BLE--edema  Diabetes Mellitus Type 2---postoperative goal 140-180  CKD---Stage 3b at admission à Stage 3a  ANGELA   Obesity  Tobacco abuse---quit--1992   PMH:  allergic rhinitis, colon polyps  PSH:   right CTS---2010, colonoscopy--2009---polyps, RIH--1977, left great toe nail plate [id number]    Allergies:  bee venom, naproxen        Plan:     Diet advanced     TPN cont'd     Ambulate      DM2---added Lantus 7     Will need staples out before discharge     See orders    Electronically signed by Edith Rothman MD on 3/17/2023 at 7:41 AM    Hospitalist

## 2023-03-17 NOTE — PROGRESS NOTES
PSBOHospitalist Progress Note    Patient:  Luann Olvera     YOB: 1955    MRN: [id number]   Admit date: 3/12/2023     Acct: [de-identified]     PCP: Broyn Levy    CC--Interval History:     PSBO---seen by General Surgery---NGT out----CLD    K = 3.2 ---> potassium replacement    TPN cont'd until patient demonstrates ability to take adequate KCAL PO    BG above goal ---> Lantus---log    See note below    All other ROS negative except noted in HPI    Diet:  ADULT DIET;  Clear Liquid  PN-Adult Premix 5/20 - Standard Electrolytes - Central Line    Medications:  Scheduled Meds:   potassium chloride        lidocaine 1 % injection  5 mL IntraDERmal Once    sodium chloride flush  5-40 mL IntraVENous 2 times per day    fat emulsion  250 mL IntraVENous Daily    insulin lispro  0-4 Units SubCUTAneous 4 times per day    enoxaparin  30 mg SubCUTAneous BID    enalaprilat  1.25 mg IntraVENous 4 times per day    furosemide  20 mg IntraVENous Daily    silver sulfADIAZINE   Topical BID     Continuous Infusions:   PN-Adult Premix 5/20 - Standard Electrolytes - Central Line 75 mL/hr at 03/16/23 1737    sodium chloride      sodium chloride 50 mL/hr at 03/15/23 0733    dextrose       PRN Meds:sodium chloride flush, sodium chloride, diatrizoate meglumine-sodium, HYDROmorphone, oxyCODONE-acetaminophen, oxyCODONE-acetaminophen, ondansetron **OR** ondansetron, polyethylene glycol, acetaminophen **OR** acetaminophen, lidocaine viscous hcl, glucose, dextrose bolus **OR** dextrose bolus, glucagon (rDNA), dextrose, phenol    Objective:  Labs:  CBC with Differential:    Lab Results   Component Value Date/Time    WBC 11.4 03/16/2023 05:17 AM    RBC 4.35 03/16/2023 05:17 AM    HGB 12.1 03/16/2023 05:17 AM    HCT 36.4 03/16/2023 05:17 AM     03/16/2023 05:17 AM    MCV 83.7 03/16/2023 05:17 AM    MCH 27.8 03/16/2023 05:17 AM    MCHC 33.2 03/16/2023 05:17 AM    RDW 13.5 03/16/2023 05:17 AM    LYMPHOPCT 15 03/16/2023 05:17 AM    MONOPCT 7 03/16/2023 05:17 AM    BASOPCT 0 03/16/2023 05:17 AM    MONOSABS 0.84 03/16/2023 05:17 AM    LYMPHSABS 1.73 03/16/2023 05:17 AM    EOSABS 0.27 03/16/2023 05:17 AM    BASOSABS 0.05 03/16/2023 05:17 AM     BMP:    Lab Results   Component Value Date/Time     03/16/2023 05:17 AM    K 3.6 03/16/2023 03:12 PM     03/16/2023 05:17 AM    CO2 24 03/16/2023 05:17 AM    BUN 14 03/16/2023 05:17 AM    LABALBU 3.1 03/14/2023 05:21 AM    CREATININE 0.91 03/16/2023 05:17 AM    CALCIUM 8.8 03/16/2023 05:17 AM    LABGLOM >60 03/16/2023 05:17 AM    GLUCOSE 196 03/16/2023 05:17 AM           Physical Exam:  Vitals: /68   Pulse 83   Temp 98.4 °F (36.9 °C) (Tympanic)   Resp 18   Ht 5' 10\" (1.778 m)   Wt 262 lb 14.4 oz (119.3 kg)   SpO2 95%   BMI 37.72 kg/m²   24 hour intake/output:  Intake/Output Summary (Last 24 hours) at 3/16/2023 2056  Last data filed at 3/16/2023 0646  Gross per 24 hour   Intake --   Output 300 ml   Net -300 ml     Last 3 weights: Wt Readings from Last 3 Encounters:   03/13/23 262 lb 14.4 oz (119.3 kg)   03/11/23 269 lb 6.4 oz (122.2 kg)   02/15/23 255 lb (115.7 kg)     HEENT: Normocephalic and Atraumatic  Neck: Supple, No Masses, Tenderness, Nodularity, and No Lymphadenopathy  Chest/Lungs: Clear to Auscultation without Rales, Rhonchi, or Wheezes and Distant Breath Sounds  Cardiac: Regular Rate and Rhythm  GI/Abdomen: Bowel Sounds Present  minimal and Soft, Non-tender, without Guarding or Rebound Tenderness  : Not examined  EXT/Skin:  dressing in place----toes warm capillary refill < 2 seconds, No Cyanosis, and No Clubbing  Neuro: Alert and Oriented and No Localizing Signs/Symptoms      Assessment:    Principal Problem:    SBO (small bowel obstruction) (HCC)  Resolved Problems:    * No resolved hospital problems.  SHARON Le  67  WM  [Ramo Mcgee;  [place], [place];  DC Cardiology---[facility]; Eric Garzon ]  FULL CODE    LOVENOX 30 bid  COVID-19--NEGATIVE,   INFLUENZA--NEGATIVE NGT--dc'd 3.16.2023  PICC--3.15.2023   TPN--3.15.2023    Anti-infectives:   --------    SBO----3.12.2023---with watery drainage from the wound site        SBFT---3.15.2023---mildly prominent small bowel loops proximally although contrast                                  appears to reach the colon in c. 2 hours--ileus--vs--PSBO        CT abdomen-pelvis---3.12.2023---progressive SBO        CXR---3.12.2023------NGT--no acute infiltrates pr pleural effusions     Anasarca---3.12.2023  Blisters BLE---feet---3.12.2023      POD _____  diagnostic laparoscopy---3.5.2023---SBO         X-ray--abdomen---3.9.2023---progression of enteric contrast in the colon--ileus--vs--PSBO         SBFT---3.7.2023--dilated loops bowel up to 4.3 cm---at 5 hours contrast mid-small                       bowel---consistent with SBO  SBO        AAS---3.6.2023---persistent mild-low-grade SBO---bibasilar atelectasis--vs--infiltrates        CT abdomen-pelvis---3.5.2023---no mesenteric ischemia--low grade SBO--suspected                               transition point in the right mid-abdomen--fluid in esophagus--GERD         EKG---3.5.2023---NSR--86--RBBB  Dehydration--3.5.2023  Elevated lactic acid responsive to fluids---3.5.2023----sepsis ruled out    RBBB  Hypertension          2D ECHO--3.6.2023--LA mildly dilated--mild LVH--NLVSF--RA dilatation--NRVSF--                               MAC--AV calcification with adequate opening---RVSP ~ 34 mm Hg---                              AR mildly dilated 3.8 cm---IVC not visualized--Grade II moderate DD---                              LVEF ~ 60%  Hyperlipidemia  BLE--edema  Diabetes Mellitus Type 2---postoperative goal 140-180  CKD---Stage 3b at admission à Stage 3a  ANGELA   Obesity  Tobacco abuse---quit--1992   PMH:  allergic rhinitis, colon polyps  PSH:   right CTS---2010, colonoscopy--2009---polyps, RIH--1977, left great toe nail plate [id number]    Allergies:  bee venom, naproxen      Plan:    PSBO---NGT out---CLD    Potassium replacement    DM2--Lantus--log    TPN    See orders    Electronically signed by Pradip Vincent MD on 3/16/2023 at 8:56 PM    Hospitalist

## 2023-03-17 NOTE — PROGRESS NOTES
Surgery Progress Note            PATIENT NAME: Husam Keita     TODAY'S DATE: 3/17/2023, 12:18 PM    CHIEF COMPLAINT:  SBO, S/p diagnostic laparoscopy    SUBJECTIVE:    Pt seen and examined. No acute events. Tolerating clear diet yesterday and advanced to full liquids today with no issues so far. Pain controlled. Has had flatus and some small loose stool this morning. Ambulating. No new issues. OBJECTIVE:   VITALS:  BP (!) 152/79   Pulse 81   Temp 98.9 °F (37.2 °C) (Tympanic)   Resp 18   Ht 5' 10\" (1.778 m)   Wt 253 lb 1.6 oz (114.8 kg)   SpO2 94%   BMI 36.32 kg/m²      INTAKE/OUTPUT:      Intake/Output Summary (Last 24 hours) at 3/17/2023 1218  Last data filed at 3/17/2023 1027  Gross per 24 hour   Intake 4146.81 ml   Output --   Net 4146.81 ml                 CONSTITUTIONAL:  awake and alert. No acute distress  CARDIOVASCULAR:  regular rate and rhythm and No Murmur  LUNGS:  CTA Bilaterally  ABDOMEN:   soft, obese, minimal incisional tenderness but otherwise non tender, bowel sounds present. Small amount of drainage noted on bandage at umbilicus but incisions otherwise clean and intact.   EXTREMITIES:  negative    Data:  CBC:   Lab Results   Component Value Date/Time    WBC 10.1 03/17/2023 05:45 AM    RBC 4.19 03/17/2023 05:45 AM    HGB 11.6 03/17/2023 05:45 AM    HCT 34.9 03/17/2023 05:45 AM    MCV 83.3 03/17/2023 05:45 AM    MCH 27.7 03/17/2023 05:45 AM    MCHC 33.2 03/17/2023 05:45 AM    RDW 13.4 03/17/2023 05:45 AM     03/17/2023 05:45 AM    MPV 9.1 03/17/2023 05:45 AM     BMP:    Lab Results   Component Value Date/Time     03/17/2023 05:45 AM    K 3.7 03/17/2023 05:45 AM     03/17/2023 05:45 AM    CO2 24 03/17/2023 05:45 AM    BUN 13 03/17/2023 05:45 AM    LABALBU 3.1 03/14/2023 05:21 AM    CREATININE 0.84 03/17/2023 05:45 AM    CALCIUM 8.8 03/17/2023 05:45 AM    LABGLOM >60 03/17/2023 05:45 AM    GLUCOSE 205 03/17/2023 05:45 AM       Radiology Review:        ASSESSMENT Patient Active Problem List   Diagnosis    SBO (small bowel obstruction) ([facility] [address]     S/p diagnostic lap for SBO with no significant findings    Plan  On full liquid diet now. If tolerates will advance to full diet at breakfast tomorrow and if no issues likely home tomorrow afternoon. Pain control PRN  Dressing changes as needed.   Any staples can be removed prior to discharge  Medical mgmt per primary      Electronically signed by Fiordaliza Trimble DO  on 3/17/2023 at 12:18 PM

## 2023-03-17 NOTE — PLAN OF CARE
Problem: Discharge Planning  Goal: Discharge to home or other facility with appropriate resources  Outcome: Progressing  Flowsheets (Taken 3/17/2023 1027)  Discharge to home or other facility with appropriate resources: Identify barriers to discharge with patient and caregiver     Problem: Safety - Adult  Goal: Free from fall injury  Outcome: Progressing     Problem: ABCDS Injury Assessment  Goal: Absence of physical injury  Outcome: Progressing     Problem: Skin/Tissue Integrity  Goal: Absence of new skin breakdown  Description: 1. Monitor for areas of redness and/or skin breakdown  2. Assess vascular access sites hourly  3. Every 4-6 hours minimum:  Change oxygen saturation probe site  4. Every 4-6 hours:  If on nasal continuous positive airway pressure, respiratory therapy assess nares and determine need for appliance change or resting period.   Outcome: Progressing     Problem: Pain  Goal: Verbalizes/displays adequate comfort level or baseline comfort level  Outcome: Progressing     Problem: Chronic Conditions and Co-morbidities  Goal: Patient's chronic conditions and co-morbidity symptoms are monitored and maintained or improved  Outcome: Progressing  Flowsheets (Taken 3/17/2023 1027)  Care Plan - Patient's Chronic Conditions and Co-Morbidity Symptoms are Monitored and Maintained or Improved: Monitor and assess patient's chronic conditions and comorbid symptoms for stability, deterioration, or improvement     Problem: Nutrition Deficit:  Goal: Optimize nutritional status  Outcome: Progressing

## 2023-03-18 VITALS
SYSTOLIC BLOOD PRESSURE: 135 MMHG | OXYGEN SATURATION: 96 % | WEIGHT: 255.8 LBS | TEMPERATURE: 98.4 F | HEART RATE: 97 BPM | RESPIRATION RATE: 20 BRPM | HEIGHT: 70 IN | DIASTOLIC BLOOD PRESSURE: 76 MMHG | BODY MASS INDEX: 36.62 KG/M2

## 2023-03-18 LAB
ABSOLUTE EOS #: 0.33 K/UL (ref 0–0.44)
ABSOLUTE IMMATURE GRANULOCYTE: 0.07 K/UL (ref 0–0.3)
ABSOLUTE LYMPH #: 1.68 K/UL (ref 1.1–3.7)
ABSOLUTE MONO #: 0.63 K/UL (ref 0.1–1.2)
ANION GAP SERPL CALCULATED.3IONS-SCNC: 12 MMOL/L (ref 9–17)
BASOPHILS # BLD: 1 % (ref 0–2)
BASOPHILS ABSOLUTE: 0.06 K/UL (ref 0–0.2)
BUN SERPL-MCNC: 12 MG/DL (ref 8–23)
BUN/CREAT BLD: 14 (ref 9–20)
CALCIUM SERPL-MCNC: 8.8 MG/DL (ref 8.6–10.4)
CHLORIDE SERPL-SCNC: 105 MMOL/L (ref 98–107)
CO2 SERPL-SCNC: 24 MMOL/L (ref 20–31)
CREAT SERPL-MCNC: 0.85 MG/DL (ref 0.7–1.2)
EOSINOPHILS RELATIVE PERCENT: 4 % (ref 1–4)
GFR SERPL CREATININE-BSD FRML MDRD: >60 ML/MIN/1.73M2
GLUCOSE BLD-MCNC: 187 MG/DL (ref 75–110)
GLUCOSE SERPL-MCNC: 183 MG/DL (ref 70–99)
HCT VFR BLD AUTO: 36.2 % (ref 40.7–50.3)
HGB BLD-MCNC: 11.8 G/DL (ref 13–17)
IMMATURE GRANULOCYTES: 1 %
LYMPHOCYTES # BLD: 18 % (ref 24–43)
MCH RBC QN AUTO: 27.8 PG (ref 25.2–33.5)
MCHC RBC AUTO-ENTMCNC: 32.6 G/DL (ref 25.2–33.5)
MCV RBC AUTO: 85.2 FL (ref 82.6–102.9)
MONOCYTES # BLD: 7 % (ref 3–12)
NRBC AUTOMATED: 0 PER 100 WBC
PDW BLD-RTO: 13.4 % (ref 11.8–14.4)
PLATELET # BLD AUTO: 407 K/UL (ref 138–453)
PMV BLD AUTO: 9.6 FL (ref 8.1–13.5)
POTASSIUM SERPL-SCNC: 3.9 MMOL/L (ref 3.7–5.3)
RBC # BLD: 4.25 M/UL (ref 4.21–5.77)
SEG NEUTROPHILS: 69 % (ref 36–65)
SEGMENTED NEUTROPHILS ABSOLUTE COUNT: 6.72 K/UL (ref 1.5–8.1)
SODIUM SERPL-SCNC: 141 MMOL/L (ref 135–144)
WBC # BLD AUTO: 9.5 K/UL (ref 3.5–11.3)

## 2023-03-18 PROCEDURE — 82947 ASSAY GLUCOSE BLOOD QUANT: CPT

## 2023-03-18 PROCEDURE — 80048 BASIC METABOLIC PNL TOTAL CA: CPT

## 2023-03-18 PROCEDURE — 2580000003 HC RX 258: Performed by: INTERNAL MEDICINE

## 2023-03-18 PROCEDURE — 2500000003 HC RX 250 WO HCPCS

## 2023-03-18 PROCEDURE — 6360000002 HC RX W HCPCS: Performed by: FAMILY MEDICINE

## 2023-03-18 PROCEDURE — 36415 COLL VENOUS BLD VENIPUNCTURE: CPT

## 2023-03-18 PROCEDURE — 6360000002 HC RX W HCPCS

## 2023-03-18 PROCEDURE — 6370000000 HC RX 637 (ALT 250 FOR IP): Performed by: INTERNAL MEDICINE

## 2023-03-18 PROCEDURE — 99239 HOSP IP/OBS DSCHRG MGMT >30: CPT | Performed by: FAMILY MEDICINE

## 2023-03-18 PROCEDURE — 85025 COMPLETE CBC W/AUTO DIFF WBC: CPT

## 2023-03-18 RX ADMIN — SODIUM CHLORIDE, PRESERVATIVE FREE 10 ML: 5 INJECTION INTRAVENOUS at 12:36

## 2023-03-18 RX ADMIN — ENOXAPARIN SODIUM 30 MG: 100 INJECTION SUBCUTANEOUS at 08:43

## 2023-03-18 RX ADMIN — ENALAPRILAT 1.25 MG: 1.25 INJECTION INTRAVENOUS at 12:36

## 2023-03-18 RX ADMIN — SODIUM CHLORIDE, PRESERVATIVE FREE 10 ML: 5 INJECTION INTRAVENOUS at 08:44

## 2023-03-18 RX ADMIN — FUROSEMIDE 20 MG: 10 INJECTION, SOLUTION INTRAMUSCULAR; INTRAVENOUS at 08:42

## 2023-03-18 RX ADMIN — ENALAPRILAT 1.25 MG: 1.25 INJECTION INTRAVENOUS at 06:34

## 2023-03-18 RX ADMIN — INSULIN GLARGINE 7 UNITS: 100 INJECTION, SOLUTION SUBCUTANEOUS at 08:43

## 2023-03-18 RX ADMIN — SILVER SULFADIAZINE: 10 CREAM TOPICAL at 09:45

## 2023-03-18 ASSESSMENT — PAIN SCALES - GENERAL
PAINLEVEL_OUTOF10: 0
PAINLEVEL_OUTOF10: 0

## 2023-03-18 NOTE — DISCHARGE INSTRUCTIONS
Patient Discharge Instructions  Discharge Date:  03/19/23       Discharged To: Home    Home with Home Health Care: No    RESUME ACTIVITY:      BATHING: Ok to shower starting the day after surgery. No tub baths or submerging in water until after follow up in office. DRIVING: No driving for 1week  or while taking narcotic pain medications    RETURN TO WORK: Teri Spencer to return as tolerated 1 week following surgery with the following restrictions:  No lifting more than 10 pounds  The above restrictions are in effect for 6 week(s)    WALKING:  Yes    STAIRS:  Yes    LIFTING: Less than 10 pounds for 6weeks     DIET: common adult    SPECIAL INSTRUCTIONS:     Call the office at [phone] if you have a fever > 100 F, or if your incision becomes red, tender, or drains more than a small amount of clear fluid. Call for follow up appointment with Dr. Ann Richter in:  1week     May use ibuprofen, if able, for additional pain control. Use up to 400mg every 6 hours as needed. Ok to use ice packs to incisions for comfort. Use 15 minutes on, 30 minutes off and repeat as desired. Use over the counter stool softeners such as miralax or colace as needed for constipation.

## 2023-03-18 NOTE — DISCHARGE SUMMARY
Hospitalist Discharge Summary    Jacy Hooks  :  1955  MRN:  [id number]    Admit date:  3/12/2023  Discharge date:  3/18/23    Admitting Physician:  Domo Joyce MD    Discharge Diagnoses:   Patient Active Problem List   Diagnosis    SBO (small bowel obstruction) [facility])        Admission Condition:  fair      Discharged Condition:  good    Hospital Course/Treatments   admitted with h/o of partial small bowel obstruction, pt was seen by general surgery, pt was taken for yadira lap,no abnormalities was noted, pt did well, pt was on tpn, pt tolerating  reg diet, pt will be d/c if pt tolerates reg diet  with lunch and his tpn will be d/c    Discharge Medications:         Medication List        START taking these medications      silver sulfADIAZINE 1 % cream  Commonly known as: SILVADENE  Apply topically daily. to foot blister            CONTINUE taking these medications      aspirin 81 MG tablet     atorvastatin 80 MG tablet  Commonly known as: LIPITOR     Dulaglutide 0.75 MG/0.5ML Sopn     empagliflozin 25 MG tablet  Commonly known as: JARDIANCE     fish oil 1000 MG capsule     gabapentin 300 MG capsule  Commonly known as: NEURONTIN     glipiZIDE 10 MG tablet  Commonly known as: GLUCOTROL     lisinopril-hydroCHLOROthiazide 20-12.5 MG per tablet  Commonly known as: PRINZIDE;ZESTORETIC  Take 1 tablet by mouth daily.      loratadine 10 MG tablet  Commonly known as: CLARITIN     metFORMIN 1000 MG tablet  Commonly known as: GLUCOPHAGE     MULTIVITAMIN PO     sildenafil 50 MG tablet  Commonly known as: VIAGRA     vitamin D 25 MCG (1000 UT) Tabs tablet  Commonly known as: CHOLECALCIFEROL               Where to Get Your Medications        You can get these medications from any pharmacy    Bring a paper prescription for each of these medications  silver sulfADIAZINE 1 % cream         Consults:  IP CONSULT TO PODIATRY  IP CONSULT TO GENERAL SURGERY  IP CONSULT TO GENERAL SURGERY    Significant Diagnostic Studies:  CT ABDOMEN PELVIS WO CONTRAST Additional Contrast? None    Result Date: 3/12/2023  EXAMINATION: CT OF THE ABDOMEN AND PELVIS WITHOUT CONTRAST 3/12/2023 1:27 pm TECHNIQUE: CT of the abdomen and pelvis was performed without the administration of intravenous contrast. Multiplanar reformatted images are provided for review. Automated exposure control, iterative reconstruction, and/or weight based adjustment of the mA/kV was utilized to reduce the radiation dose to as low as reasonably achievable. COMPARISON: 03/05/2023 and correlated with small-bowel series of 03/07/2023 HISTORY: ORDERING SYSTEM PROVIDED HISTORY: recent surgery, vomiting TECHNOLOGIST PROVIDED HISTORY: recent surgery, vomiting Decision Support Exception - unselect if not a suspected or confirmed emergency medical condition->Emergency Medical Condition (MA) Reason for Exam: Recent bowel obstruction surgery; vomiting, abdominal pain FINDINGS: Lower chest: Stable bibasilar atelectasis. There is no pleural effusions. The heart is of normal size. No pericardial effusion. Organs: The liver, gallbladder, pancreas, spleen and the bilateral adrenal glands are otherwise within normal limits. The bilateral kidneys are within normal limits, without hydronephrosis or obstructive uropathy. GI/Bowel: Progressive multiple dilated small bowel loops containing air-fluid levels throughout the abdomen, suspected transition point in right mid abdomen. Suggestion of developing pneumatosis intestinalis. There is no pneumoperitoneum. .  Stomach remains distended with fluid, air. Tanda Bun Peritoneum/Retroperitoneum: There is no ascites or pneumoperitoneum. There is no evidence of mesenteric or retroperitoneal lymphadenopathy. Bones/Soft Tissues: There is no acute osseous abnormality. Fat containing umbilical hernia. Mildly diffuse anasarca.      Progressive small-bowel obstruction when compared to 03/05/2023 Mild anasarca RECOMMENDATIONS: Surgical consultation     XR CHEST PORTABLE    Result Date: 3/12/2023  EXAMINATION: ONE XRAY VIEW OF THE CHEST 3/12/2023 2:26 pm COMPARISON: None. HISTORY: ORDERING SYSTEM PROVIDED HISTORY: NG tube placement TECHNOLOGIST PROVIDED HISTORY: NG tube placement Reason for Exam: Ng tube placement FINDINGS:IMPRESSION: There is an NG tube however the distal portion is not well visualized due to under penetration. No acute infiltrates or pleural effusions are seen RECOMMENDATION: Follow-up single-view abdomen for further evaluation of NG tube location       Disposition:   home    Discharge Instructions: Activity: activity as tolerated  Diet:  regular diet    Follow up with Laina Back in 1 weeks.     Signed:  Karen Thomas MD  3/18/2023, 12:52 PM    Time spent in discharge of this pt is more than 30 minutes in examination,evaluvation,  counseling and review of medication and discharge plan

## 2023-03-18 NOTE — PROGRESS NOTES
Surgery Progress Note            PATIENT NAME: Wilmer Koo     TODAY'S DATE: 3/18/2023, 8:59 AM    CHIEF COMPLAINT:  S/p diagnostic lap for SBO    SUBJECTIVE:    Pt seen and examined. No acute events overnight. Pt tolerating diet and advanced to reg diet this morning, no problems. Had small BM yesterday. Pain controlled with medication. Still on PPN. No new issues. OBJECTIVE:   VITALS:  BP (!) 158/76   Pulse 90   Temp 97.8 °F (36.6 °C) (Tympanic)   Resp 18   Ht 5' 10\" (1.778 m)   Wt 255 lb 12.8 oz (116 kg)   SpO2 96%   BMI 36.70 kg/m²      INTAKE/OUTPUT:      Intake/Output Summary (Last 24 hours) at 3/18/2023 0859  Last data filed at 3/18/2023 0839  Gross per 24 hour   Intake 4757.87 ml   Output --   Net 4757.87 ml                 CONSTITUTIONAL:  awake and alert. No acute distress  CARDIOVASCULAR:  regular rate and rhythm   LUNGS:  clear to auscultation  ABDOMEN:   soft, obese, appropriately tender, bowel sounds present. Incisions clean and intact. Steri strips in place at 2 and dry dressing at umbilical incision.   EXTREMITIES:  negative    Data:  CBC:   Lab Results   Component Value Date/Time    WBC 9.5 03/18/2023 05:10 AM    RBC 4.25 03/18/2023 05:10 AM    HGB 11.8 03/18/2023 05:10 AM    HCT 36.2 03/18/2023 05:10 AM    MCV 85.2 03/18/2023 05:10 AM    MCH 27.8 03/18/2023 05:10 AM    MCHC 32.6 03/18/2023 05:10 AM    RDW 13.4 03/18/2023 05:10 AM     03/18/2023 05:10 AM    MPV 9.6 03/18/2023 05:10 AM     BMP:    Lab Results   Component Value Date/Time     03/18/2023 05:10 AM    K 3.9 03/18/2023 05:10 AM     03/18/2023 05:10 AM    CO2 24 03/18/2023 05:10 AM    BUN 12 03/18/2023 05:10 AM    LABALBU 3.1 03/14/2023 05:21 AM    CREATININE 0.85 03/18/2023 05:10 AM    CALCIUM 8.8 03/18/2023 05:10 AM    LABGLOM >60 03/18/2023 05:10 AM    GLUCOSE 183 03/18/2023 05:10 AM       Radiology Review:        ASSESSMENT   Patient Active Problem List   Diagnosis    SBO (small bowel obstruction) [facility])           Plan  Pt doing well with adequate pain control, having bowel function and tolerating diet. [id number] Yina Newman for discharge from surgical perspective  Pt to follow up in office with Dr. Alpesh Gallegos this week.     Electronically signed by Lina Mendez DO  on 3/18/2023 at 8:59 AM

## 2023-03-18 NOTE — PLAN OF CARE
Problem: Discharge Planning  Goal: Discharge to home or other facility with appropriate resources  3/18/2023 1411 by Evelyn Cheng RN  Outcome: Completed  3/18/2023 0848 by Evelyn Cheng RN  Outcome: Progressing  Flowsheets (Taken 3/17/2023 1955 by Gabbi Adams, RN)  Discharge to home or other facility with appropriate resources: Identify barriers to discharge with patient and caregiver     Problem: Safety - Adult  Goal: Free from fall injury  3/18/2023 1411 by Evelyn Cheng RN  Outcome: Completed  3/18/2023 0848 by Evelyn Cheng RN  Outcome: Progressing     Problem: ABCDS Injury Assessment  Goal: Absence of physical injury  3/18/2023 1411 by Evelyn Cheng RN  Outcome: Completed  3/18/2023 0848 by Evelyn Cheng RN  Outcome: Progressing     Problem: Skin/Tissue Integrity  Goal: Absence of new skin breakdown  Description: 1. Monitor for areas of redness and/or skin breakdown  2. Assess vascular access sites hourly  3. Every 4-6 hours minimum:  Change oxygen saturation probe site  4. Every 4-6 hours:  If on nasal continuous positive airway pressure, respiratory therapy assess nares and determine need for appliance change or resting period.   3/18/2023 1411 by Evelyn Cheng RN  Outcome: Completed  3/18/2023 0848 by Evelyn Cheng RN  Outcome: Progressing     Problem: Pain  Goal: Verbalizes/displays adequate comfort level or baseline comfort level  3/18/2023 1411 by Evelyn Cheng RN  Outcome: Completed  3/18/2023 0848 by Evelyn Cheng RN  Outcome: Progressing     Problem: Chronic Conditions and Co-morbidities  Goal: Patient's chronic conditions and co-morbidity symptoms are monitored and maintained or improved  3/18/2023 1411 by Evelyn Cheng RN  Outcome: Completed  3/18/2023 0848 by Evelyn Cheng RN  Outcome: Progressing  Flowsheets (Taken 3/17/2023 1955 by Gabbi Adams, RN)  Care Plan - Patient's Chronic Conditions and Co-Morbidity Symptoms are Monitored and Maintained or Improved: Monitor and assess patient's chronic conditions and comorbid symptoms for stability, deterioration, or improvement     Problem: Nutrition Deficit:  Goal: Optimize nutritional status  3/18/2023 1411 by Lawanda Scott RN  Outcome: Completed  3/18/2023 0848 by Lawanda Scott RN  Outcome: Progressing     Problem: Discharge Planning  Goal: Discharge to home or other facility with appropriate resources  3/18/2023 1411 by Lawanda Scott RN  Outcome: Completed  3/18/2023 0848 by Lawanda Scott RN  Outcome: Progressing  Flowsheets (Taken 3/17/2023 1955 by Aileen Kwon RN)  Discharge to home or other facility with appropriate resources: Identify barriers to discharge with patient and caregiver     Problem: Safety - Adult  Goal: Free from fall injury  3/18/2023 1411 by Lawanda Scott RN  Outcome: Completed  3/18/2023 0848 by Lawanda Scott RN  Outcome: Progressing     Problem: ABCDS Injury Assessment  Goal: Absence of physical injury  3/18/2023 1411 by Lawanda Scott RN  Outcome: Completed  3/18/2023 0848 by Lawanda Scott RN  Outcome: Progressing     Problem: Skin/Tissue Integrity  Goal: Absence of new skin breakdown  Description: 1. Monitor for areas of redness and/or skin breakdown  2. Assess vascular access sites hourly  3. Every 4-6 hours minimum:  Change oxygen saturation probe site  4. Every 4-6 hours:  If on nasal continuous positive airway pressure, respiratory therapy assess nares and determine need for appliance change or resting period.   3/18/2023 1411 by Lawanda Scott RN  Outcome: Completed  3/18/2023 0848 by Lawanda Scott RN  Outcome: Progressing     Problem: Pain  Goal: Verbalizes/displays adequate comfort level or baseline comfort level  3/18/2023 1411 by Lawanda Scott RN  Outcome: Completed  3/18/2023 0848 by Lawanda Scott RN  Outcome: Progressing     Problem: Chronic Conditions and Co-morbidities  Goal: Patient's chronic conditions and co-morbidity symptoms are monitored and maintained or improved  3/18/2023 1411 by Hoda Delgado RN  Outcome: Completed  3/18/2023 0848 by Hoda Delgado RN  Outcome: Progressing  Flowsheets (Taken 3/17/2023 1955 by Pam Burciaga RN)  Care Plan - Patient's Chronic Conditions and Co-Morbidity Symptoms are Monitored and Maintained or Improved: Monitor and assess patient's chronic conditions and comorbid symptoms for stability, deterioration, or improvement     Problem: Nutrition Deficit:  Goal: Optimize nutritional status  3/18/2023 1411 by Hoda Delgado RN  Outcome: Completed  3/18/2023 0848 by Hoda Delgado RN  Outcome: Progressing

## 2023-03-18 NOTE — PLAN OF CARE
Problem: Discharge Planning  Goal: Discharge to home or other facility with appropriate resources  Outcome: Progressing  Flowsheets (Taken 3/17/2023 1955 by Felisha Reed RN)  Discharge to home or other facility with appropriate resources: Identify barriers to discharge with patient and caregiver     Problem: Safety - Adult  Goal: Free from fall injury  Outcome: Progressing     Problem: ABCDS Injury Assessment  Goal: Absence of physical injury  Outcome: Progressing     Problem: Skin/Tissue Integrity  Goal: Absence of new skin breakdown  Description: 1. Monitor for areas of redness and/or skin breakdown  2. Assess vascular access sites hourly  3. Every 4-6 hours minimum:  Change oxygen saturation probe site  4. Every 4-6 hours:  If on nasal continuous positive airway pressure, respiratory therapy assess nares and determine need for appliance change or resting period.   Outcome: Progressing     Problem: Pain  Goal: Verbalizes/displays adequate comfort level or baseline comfort level  Outcome: Progressing     Problem: Chronic Conditions and Co-morbidities  Goal: Patient's chronic conditions and co-morbidity symptoms are monitored and maintained or improved  Outcome: Progressing  Flowsheets (Taken 3/17/2023 1955 by Felisha Reed RN)  Care Plan - Patient's Chronic Conditions and Co-Morbidity Symptoms are Monitored and Maintained or Improved: Monitor and assess patient's chronic conditions and comorbid symptoms for stability, deterioration, or improvement     Problem: Nutrition Deficit:  Goal: Optimize nutritional status  Outcome: Progressing

## 2023-03-19 ENCOUNTER — FOLLOWUP TELEPHONE ENCOUNTER (OUTPATIENT)
Dept: INPATIENT UNIT | Age: 68
End: 2023-03-19

## 2023-03-21 ENCOUNTER — OFFICE VISIT (OUTPATIENT)
Dept: SURGERY | Age: 68
End: 2023-03-21
Payer: MEDICARE

## 2023-03-21 VITALS
HEART RATE: 92 BPM | BODY MASS INDEX: 35.48 KG/M2 | OXYGEN SATURATION: 97 % | HEIGHT: 70 IN | DIASTOLIC BLOOD PRESSURE: 64 MMHG | SYSTOLIC BLOOD PRESSURE: 118 MMHG | WEIGHT: 247.8 LBS | TEMPERATURE: 98.8 F

## 2023-03-21 DIAGNOSIS — K56.609 SBO (SMALL BOWEL OBSTRUCTION) (HCC): Primary | ICD-10-CM

## 2023-03-21 PROCEDURE — 99214 OFFICE O/P EST MOD 30 MIN: CPT | Performed by: SURGERY

## 2023-03-21 PROCEDURE — 99024 POSTOP FOLLOW-UP VISIT: CPT | Performed by: SURGERY

## 2023-03-21 ASSESSMENT — ENCOUNTER SYMPTOMS
ABDOMINAL PAIN: 0
RECTAL PAIN: 0
SHORTNESS OF BREATH: 0
CHOKING: 0
DIARRHEA: 1
ABDOMINAL DISTENTION: 0
COUGH: 1
NAUSEA: 0

## 2023-03-21 NOTE — PROGRESS NOTES
Patient here post hospitalization. 3/5/2023 - Diagnostic laparoscopy    Discharged 3/18/2023. Initially went home 3/11/2023 readmitted 3/12/2023 with edema, drainage form incisions, and SBO    No problems since he has been home, other than having diarrhea. Having up to 3 liquid brown bowel movementsl      Review of Systems   Constitutional:  Negative for appetite change, diaphoresis, fatigue and fever. Respiratory:  Positive for cough. Negative for choking and shortness of breath. Cardiovascular:  Negative for chest pain and palpitations. Gastrointestinal:  Positive for diarrhea. Negative for abdominal distention, abdominal pain, nausea and rectal pain. Genitourinary:  Negative for difficulty urinating and dysuria. /64 (Site: Left Upper Arm, Position: Sitting, Cuff Size: Large Adult)   Pulse 92   Temp 98.8 °F (37.1 °C) (Tympanic)   Ht 5' 10\" (1.778 m)   Wt 247 lb 12.8 oz (112.4 kg)   SpO2 97%   BMI 35.56 kg/m²     Physical Exam  Constitutional:       General: He is in acute distress (mild, breathing heavy). Appearance: He is obese. He is not ill-appearing. Cardiovascular:      Rate and Rhythm: Normal rate and regular rhythm. Pulses:           Dorsalis pedis pulses are 1+ on the right side and 1+ on the left side. Posterior tibial pulses are 1+ on the right side and 1+ on the left side. Pulmonary:      Effort: Respiratory distress (He seems to be a litte short of breath) present. Breath sounds: Normal breath sounds. Abdominal:      General: Abdomen is flat. A surgical scar is present. Bowel sounds are normal.      Palpations: Abdomen is soft. There is no fluid wave, hepatomegaly, splenomegaly, mass or pulsatile mass. Tenderness: There is no abdominal tenderness. Hernia: No hernia is present. There is no hernia in the umbilical area or ventral area. Comments: Wounds healing well   Skin:     General: Skin is warm and dry.

## 2023-03-21 NOTE — LETTER
March 21, 2023       Mary Aivla YOB: 1955   [address] [address] Date of Visit:  3/21/2023       To Whom It May Concern: It is my medical opinion that Mary Avila {Work release (duty restriction):81486}. If you have any questions or concerns, please don't hesitate to call.     Sincerely,        Kacey Smiley MD

## 2023-03-24 ENCOUNTER — HOSPITAL ENCOUNTER (OUTPATIENT)
Dept: CT IMAGING | Age: 68
End: 2023-03-24
Payer: MEDICARE

## 2023-03-24 DIAGNOSIS — K56.609 SBO (SMALL BOWEL OBSTRUCTION) (HCC): ICD-10-CM

## 2023-03-24 PROCEDURE — 74177 CT ABD & PELVIS W/CONTRAST: CPT

## 2023-03-24 PROCEDURE — 2500000003 HC RX 250 WO HCPCS: Performed by: SURGERY

## 2023-03-24 PROCEDURE — 6360000004 HC RX CONTRAST MEDICATION: Performed by: SURGERY

## 2023-03-24 RX ADMIN — BARIUM SULFATE 1350 ML: 1 SUSPENSION ORAL at 10:28

## 2023-03-24 RX ADMIN — IOPAMIDOL 100 ML: 755 INJECTION, SOLUTION INTRAVENOUS at 10:28

## 2023-05-15 NOTE — PROGRESS NOTES
Comprehensive Nutrition Assessment    Type and Reason for Visit:  Initial    Nutrition Recommendations/Plan:   Obtain Mg++, PO4 and triglycerides please  Advance PN if continued lack of GI function to 85 ml/hr of Clinimix (maintain current lipids)     Malnutrition Assessment:  Malnutrition Status: At risk for malnutrition (Comment) (03/16/23 0557)    Context:  Acute Illness     Findings of the 6 clinical characteristics of malnutrition:  Energy Intake:  Mild decrease in energy intake (Comment)  Weight Loss:  No significant weight loss     Body Fat Loss:  Unable to assess     Muscle Mass Loss:  Unable to assess    Fluid Accumulation:  Moderate to Severe Extremities   Strength:  Not Performed    Nutrition Assessment:    Predicted suboptimal nutrient intakes r/t altered GI status, AEB repeat ileus vs SBO now on PN support. Current PN providing ~18 marcellus/kg (former weight of 255#) and 1.19 g/kg protein for his IBW of 166# (75.5 kg). Tolerating current PN per available labs, however would obtain PO4, triglyceride and repeat Mg++. Mild hyperglycemia on a ssc protocol on low coverage (none used). Recommendations would include increasing PN to 85 ml/hr as a goal for Clinimix 5/20% after Mg++, PO4 and triglycerides obtained (and IF wnl). Will monitor GI status and progress. Nutrition Related Findings:    +2 BLE edema, hypoactive b/s, no stool.  Wound Type: Open Wounds (ruptured foot blister)       Current Nutrition Intake & Therapies:    Average Meal Intake: NPO  Average Supplements Intake: NPO  Current Parenteral Nutrition Orders:  Type and Formula: Premix Central (5/20%)   Lipids: 250ml (daily)  Duration: Continuous  Rate/Volume: 75 ml/hr 5/20 Clinimix and 250 ml IL 20% = 2050 ml daily  Current PN Order Provides: 2084 calories, 90 g AA with 24% cals as fat, 120:1 NPC:N  Goal PN Orders Provides: 85 ml/hr Clinimix 5/20% continuous wtih 250 ml IL 20% x 12 hours = 2295 calories, 102 g AA, 21.7% fat, 116:1 LMM with pt's daughter (Violet) re: We can have labs with Dr Turner or at [place].     NPC:1    Anthropometric Measures:  Height: 5' 10\" (177.8 cm)  Ideal Body Weight (IBW): 166 lbs (75 kg)    Admission Body Weight: 270 lb 4.8 oz (122.6 kg)  Current Body Weight: 262 lb 14.4 oz (119.3 kg), 158.4 % IBW.  Weight Source: Bed Scale  Current BMI (kg/m2): 37.7  Usual Body Weight: 255 lb (115.7 kg)  % Weight Change (Calculated): 3.1  Weight Adjustment For: No Adjustment                 BMI Categories: Obese Class 2 (BMI 35.0 -39.9)    Estimated Daily Nutrient Needs:  Energy Requirements Based On: Kcal/kg  Weight Used for Energy Requirements: Current  Energy (kcal/day): 9823-5468 (15-20)  Weight Used for Protein Requirements: Ideal  Protein (g/day):   Method Used for Fluid Requirements: 1 ml/kcal  Fluid (ml/day): 2300    Nutrition Diagnosis:   Predicted inadequate energy intake related to altered GI function as evidenced by NPO or clear liquid status due to medical condition    Recent Labs     03/14/23  0521 03/14/23  1718 03/15/23  0532 03/15/23  1340     --  141  --    K 3.4* 3.7 3.4* 3.7     --  104  --    CO2 23  --  23  --    BUN 10  --  12  --    CREATININE 0.89  --  0.89  --    GLUCOSE 95  --  84  --    ALT 18  --   --   --    ALKPHOS 67  --   --   --       Lab Results   Component Value Date/Time    LABALBU 3.1 03/14/2023 05:21 AM    No results found for: PHOS   Lab Results   Component Value Date/Time    MG 1.7 03/14/2023 05:21 AM      Lab Results   Component Value Date/Time    PREALBUMIN 16.5 03/14/2023 05:21 AM       Nutrition Interventions:   Food and/or Nutrient Delivery: Modify Parenteral Nutrition, Continue Current Parenteral Nutrition  Nutrition Education/Counseling: No recommendation at this time  Coordination of Nutrition Care: Continue to monitor while inpatient  Plan of Care discussed with: no one    Goals:     Goals: Meet at least 75% of estimated needs       Nutrition Monitoring and Evaluation:   Behavioral-Environmental Outcomes: None Identified  Food/Nutrient Intake Outcomes: Diet Advancement/Tolerance, Parenteral Nutrition Intake/Tolerance  Physical Signs/Symptoms Outcomes: Biochemical Data, Fluid Status or Edema, Weight, GI Status    Discharge Planning:     Too soon to determine     Sydney Rees, [address], [place]  Contact: [phone]

## 2023-06-26 ENCOUNTER — OFFICE VISIT (OUTPATIENT)
Dept: PODIATRY | Age: 68
End: 2023-06-26
Payer: MEDICARE

## 2023-06-26 VITALS — RESPIRATION RATE: 20 BRPM | BODY MASS INDEX: 36.76 KG/M2 | WEIGHT: 256.2 LBS

## 2023-06-26 DIAGNOSIS — B35.1 DERMATOPHYTOSIS OF NAIL: ICD-10-CM

## 2023-06-26 DIAGNOSIS — E11.42 DM TYPE 2 WITH DIABETIC PERIPHERAL NEUROPATHY (HCC): ICD-10-CM

## 2023-06-26 DIAGNOSIS — L97.521 ULCER OF LEFT FOOT, LIMITED TO BREAKDOWN OF SKIN (HCC): Primary | ICD-10-CM

## 2023-06-26 PROCEDURE — 1036F TOBACCO NON-USER: CPT | Performed by: PODIATRIST

## 2023-06-26 PROCEDURE — L4387 NON-PNEUM WALK BOOT PRE OTS: HCPCS | Performed by: PODIATRIST

## 2023-06-26 PROCEDURE — 99203 OFFICE O/P NEW LOW 30 MIN: CPT | Performed by: PODIATRIST

## 2023-06-26 PROCEDURE — 99214 OFFICE O/P EST MOD 30 MIN: CPT | Performed by: PODIATRIST

## 2023-06-26 PROCEDURE — 3051F HG A1C>EQUAL 7.0%<8.0%: CPT | Performed by: PODIATRIST

## 2023-06-26 PROCEDURE — G8417 CALC BMI ABV UP PARAM F/U: HCPCS | Performed by: PODIATRIST

## 2023-06-26 PROCEDURE — 2022F DILAT RTA XM EVC RTNOPTHY: CPT | Performed by: PODIATRIST

## 2023-06-26 PROCEDURE — G8427 DOCREV CUR MEDS BY ELIG CLIN: HCPCS | Performed by: PODIATRIST

## 2023-06-26 PROCEDURE — 1123F ACP DISCUSS/DSCN MKR DOCD: CPT | Performed by: PODIATRIST

## 2023-06-26 PROCEDURE — 97597 DBRDMT OPN WND 1ST 20 CM/<: CPT | Performed by: PODIATRIST

## 2023-06-26 PROCEDURE — 3017F COLORECTAL CA SCREEN DOC REV: CPT | Performed by: PODIATRIST

## 2023-06-26 PROCEDURE — 11720 DEBRIDE NAIL 1-5: CPT | Performed by: PODIATRIST

## 2023-06-26 RX ORDER — TAMSULOSIN HYDROCHLORIDE 0.4 MG/1
0.4 CAPSULE ORAL
COMMUNITY
Start: 2023-03-27

## 2023-06-26 RX ORDER — INSULIN GLARGINE-YFGN 100 [IU]/ML
INJECTION, SOLUTION SUBCUTANEOUS
COMMUNITY
Start: 2023-06-14

## 2023-06-26 RX ORDER — AMOXICILLIN AND CLAVULANATE POTASSIUM 875; 125 MG/1; MG/1
TABLET, FILM COATED ORAL
COMMUNITY
Start: 2023-06-20

## 2023-06-26 RX ORDER — LIDOCAINE 50 MG/G
PATCH TOPICAL
COMMUNITY
Start: 2022-12-13

## 2023-06-27 ENCOUNTER — TELEPHONE (OUTPATIENT)
Dept: PODIATRY | Age: 68
End: 2023-06-27

## 2023-07-05 ENCOUNTER — TELEPHONE (OUTPATIENT)
Dept: WOUND CARE | Age: 68
End: 2023-07-05

## 2023-07-06 ENCOUNTER — OFFICE VISIT (OUTPATIENT)
Dept: PRIMARY CARE CLINIC | Age: 68
End: 2023-07-06
Payer: MEDICARE

## 2023-07-06 VITALS
OXYGEN SATURATION: 98 % | TEMPERATURE: 98.3 F | WEIGHT: 257 LBS | DIASTOLIC BLOOD PRESSURE: 72 MMHG | BODY MASS INDEX: 35.98 KG/M2 | SYSTOLIC BLOOD PRESSURE: 136 MMHG | RESPIRATION RATE: 20 BRPM | HEART RATE: 91 BPM | HEIGHT: 71 IN

## 2023-07-06 DIAGNOSIS — T14.8XXA WOUND INFECTION: Primary | ICD-10-CM

## 2023-07-06 DIAGNOSIS — L08.9 WOUND INFECTION: Primary | ICD-10-CM

## 2023-07-06 PROCEDURE — G8417 CALC BMI ABV UP PARAM F/U: HCPCS

## 2023-07-06 PROCEDURE — 1036F TOBACCO NON-USER: CPT

## 2023-07-06 PROCEDURE — 99203 OFFICE O/P NEW LOW 30 MIN: CPT

## 2023-07-06 PROCEDURE — 99212 OFFICE O/P EST SF 10 MIN: CPT

## 2023-07-06 PROCEDURE — 1123F ACP DISCUSS/DSCN MKR DOCD: CPT

## 2023-07-06 PROCEDURE — 3017F COLORECTAL CA SCREEN DOC REV: CPT

## 2023-07-06 PROCEDURE — G8427 DOCREV CUR MEDS BY ELIG CLIN: HCPCS

## 2023-07-06 RX ORDER — AMOXICILLIN AND CLAVULANATE POTASSIUM 875; 125 MG/1; MG/1
1 TABLET, FILM COATED ORAL 2 TIMES DAILY
Qty: 20 TABLET | Refills: 0 | Status: SHIPPED | OUTPATIENT
Start: 2023-07-06 | End: 2023-07-16

## 2023-07-06 RX ORDER — SULFAMETHOXAZOLE AND TRIMETHOPRIM 800; 160 MG/1; MG/1
1 TABLET ORAL 2 TIMES DAILY
Qty: 14 TABLET | Refills: 0 | Status: SHIPPED | OUTPATIENT
Start: 2023-07-06 | End: 2023-07-13

## 2023-07-06 ASSESSMENT — ENCOUNTER SYMPTOMS
VOMITING: 0
COLOR CHANGE: 1
NAUSEA: 0
DIARRHEA: 0

## 2023-07-06 NOTE — PROGRESS NOTES
[address] [place] [address] In department of [address]  Phone: [phone]  Fax: [phone]      Emma Palmer is a 69 y.o. male who presents to the [facility] today for his medical conditions/complaints as noted below. Emma Palmer is c/o of Skin Problem (Left foot, had debridement of a foot ulcer by Dr. Kely Faust, area is increasing in size - skin surrounding ulcer peeling off - finished Augmentin 875/[facility], increase in drainage as well, has neuropathy - feels burning sensation)          HPI:     Skin Problem  This is a recurrent problem. The current episode started in the past 7 days (drainage increased x2 days ago). The problem has been gradually worsening since onset. Location: plantar aspect of left foot. The rash is characterized by redness and draining. Pertinent negatives include no diarrhea, fatigue, fever or vomiting. Treatments tried: recently finished antibiotic treatment. There is no history of allergies or varicella.      Past Medical History:   Diagnosis Date    Allergic rhinitis     Colon polyps     history of    Elevated WBC count     history of    Hyperlipidemia     Hypertension     Obesity     Pain, joint, hand, left 7/28/2021    Partial small bowel obstruction ([address]) 3/5/2023    Sepsis with acute renal failure ([address]) 3/5/2023    Type II or unspecified type diabetes mellitus without mention of complication, not stated as uncontrolled     Unspecified sleep apnea     Weakness of left hand 7/28/2021        Allergies   Allergen Reactions    Bee Venom Anaphylaxis    Naproxen      Other reaction(s): Unknown Reaction    Semaglutide      Other reaction(s): Constipation    Empagliflozin Rash       Wt Readings from Last 3 Encounters:   07/06/23 257 lb (116.6 kg)   06/26/23 256 lb 3.2 oz (116.2 kg)   03/21/23 247 lb 12.8 oz (112.4 kg)     BP Readings from Last 3 Encounters:   07/06/23 136/72   03/21/23 118/64   03/18/23

## 2023-07-06 NOTE — PATIENT INSTRUCTIONS
Keep foot elevated while at home  Take boot off at night while sleeping. May use urinal at bedside to help with nighttime urination. Bactrim x 7 days  Augmentin x 10 days  Follow up with Dr. Jen Santos and wound care.

## 2023-07-10 ENCOUNTER — HOSPITAL ENCOUNTER (OUTPATIENT)
Dept: WOUND CARE | Age: 68
Discharge: HOME | End: 2023-07-11
Payer: MEDICARE

## 2023-07-10 VITALS
SYSTOLIC BLOOD PRESSURE: 122 MMHG | DIASTOLIC BLOOD PRESSURE: 80 MMHG | WEIGHT: 258 LBS | TEMPERATURE: 97.2 F | RESPIRATION RATE: 20 BRPM | HEIGHT: 71 IN | HEART RATE: 80 BPM | BODY MASS INDEX: 36.12 KG/M2

## 2023-07-10 DIAGNOSIS — E11.42 DM TYPE 2 WITH DIABETIC PERIPHERAL NEUROPATHY (HCC): ICD-10-CM

## 2023-07-10 DIAGNOSIS — L97.522 ULCER OF LEFT FOOT WITH FAT LAYER EXPOSED (HCC): ICD-10-CM

## 2023-07-10 PROCEDURE — 11042 DBRDMT SUBQ TIS 1ST 20SQCM/<: CPT | Performed by: PODIATRIST

## 2023-07-10 PROCEDURE — 11042 DBRDMT SUBQ TIS 1ST 20SQCM/<: CPT

## 2023-07-10 RX ORDER — GINSENG 100 MG
CAPSULE ORAL ONCE
OUTPATIENT
Start: 2023-07-10 | End: 2023-07-10

## 2023-07-10 RX ORDER — LIDOCAINE HYDROCHLORIDE 40 MG/ML
SOLUTION TOPICAL ONCE
OUTPATIENT
Start: 2023-07-10 | End: 2023-07-10

## 2023-07-10 RX ORDER — CLOBETASOL PROPIONATE 0.5 MG/G
OINTMENT TOPICAL ONCE
OUTPATIENT
Start: 2023-07-10 | End: 2023-07-10

## 2023-07-10 RX ORDER — LIDOCAINE 40 MG/G
CREAM TOPICAL ONCE
OUTPATIENT
Start: 2023-07-10 | End: 2023-07-10

## 2023-07-10 RX ORDER — IBUPROFEN 200 MG
TABLET ORAL ONCE
OUTPATIENT
Start: 2023-07-10 | End: 2023-07-10

## 2023-07-10 RX ORDER — SODIUM CHLOR/HYPOCHLOROUS ACID 0.033 %
SOLUTION, IRRIGATION IRRIGATION ONCE
OUTPATIENT
Start: 2023-07-10 | End: 2023-07-10

## 2023-07-10 RX ORDER — BACITRACIN ZINC AND POLYMYXIN B SULFATE 500; 1000 [USP'U]/G; [USP'U]/G
OINTMENT TOPICAL ONCE
OUTPATIENT
Start: 2023-07-10 | End: 2023-07-10

## 2023-07-10 RX ORDER — GENTAMICIN SULFATE 1 MG/G
OINTMENT TOPICAL ONCE
OUTPATIENT
Start: 2023-07-10 | End: 2023-07-10

## 2023-07-10 RX ORDER — LIDOCAINE 50 MG/G
OINTMENT TOPICAL ONCE
OUTPATIENT
Start: 2023-07-10 | End: 2023-07-10

## 2023-07-10 RX ORDER — BETAMETHASONE DIPROPIONATE 0.05 %
OINTMENT (GRAM) TOPICAL ONCE
OUTPATIENT
Start: 2023-07-10 | End: 2023-07-10

## 2023-07-10 RX ORDER — LIDOCAINE HYDROCHLORIDE 20 MG/ML
JELLY TOPICAL ONCE
OUTPATIENT
Start: 2023-07-10 | End: 2023-07-10

## 2023-07-10 NOTE — PROGRESS NOTES
Subjective:  Katey Carroll is a 69 y.o. male who presents to the office today for DM ulcer L foot. Recently put back on po abx. Dressing changed as advised. Currently denies F/C/N/V. Allergies   Allergen Reactions    Bee Venom Anaphylaxis    Naproxen      Other reaction(s): Unknown Reaction    Semaglutide      Other reaction(s): Constipation    Empagliflozin Rash       Past Medical History:   Diagnosis Date    Allergic rhinitis     Colon polyps     history of    Elevated WBC count     history of    Hyperlipidemia     Hypertension     Obesity     Pain, joint, hand, left 7/28/2021    Partial small bowel obstruction ([address]) 3/5/2023    Sepsis with acute renal failure ([address]) 3/5/2023    Type II or unspecified type diabetes mellitus without mention of complication, not stated as uncontrolled     Unspecified sleep apnea     Weakness of left hand 7/28/2021       Prior to Admission medications    Medication Sig Start Date End Date Taking? Authorizing Provider   sulfamethoxazole-trimethoprim (BACTRIM DS;SEPTRA DS) 800-160 MG per tablet Take 1 tablet by mouth 2 times daily for 7 days 7/6/23 7/13/23  Gregory Hoff APRN - CNP   amoxicillin-clavulanate (AUGMENTIN) 875-125 MG per tablet Take 1 tablet by mouth 2 times daily for 10 days 7/6/23 7/16/23  SAV Chin - CNP   Insulin Glargine-yfgn 100 UNIT/ML SOPN INJECT 40 UNITS UNDER SKIN AT BEDTIME FOR TYPE 2 DIABETES MELLITUS 6/14/23   Historical Provider, MD   lidocaine (LIDODERM) 5 % APPLY 1 PATCH TO SKIN EVERY DAY FOR UP TO 12 HOURS (ON FOR 12 HOURS AND OFF FOR 12 HOURS) WITHIN A 24-HOUR PERIOD 12/13/22   Historical Provider, MD   tamsulosin (FLOMAX) 0.4 MG capsule 1 capsule 3/27/23   Historical Provider, MD   mupirocin (BACTROBAN) 2 % ointment APPLY SMALL AMOUNT TOPICALLY EVERY 48 HOURS FOR SKIN INFECTION 6/20/23   Historical Provider, MD   silver sulfADIAZINE (SILVADENE) 1 % cream Apply topically daily. to foot blister 3/18/23   Cosme Tejada MD

## 2023-07-10 NOTE — DISCHARGE INSTRUCTIONS
Apply silver alginate to left foot wound. Covering with a dry dressing. Change daily. Continue to offload foot as much as possible. Follow up with Dr. Kely Faust in 1 week as scheduled. SIGNS OF INFECTION  - Redness, swelling, skin hot  - Wound bed turns black or stringy yellow  - Foul odor  - Increased drainage or pus  - Increased pain  - Fever greater than 100F    CALL YOUR DOCTOR OR SEEK MEDICAL ATTENTION IF SIGNS OF INFECTION.   DO NOT WAIT UNTIL YOUR NEXT APPOINTMENT    Call the Wound Care Nurse with any other questions or concerns- [phone]

## 2023-07-17 ENCOUNTER — HOSPITAL ENCOUNTER (OUTPATIENT)
Dept: WOUND CARE | Age: 68
Discharge: HOME | End: 2023-07-18
Payer: MEDICARE

## 2023-07-17 VITALS
DIASTOLIC BLOOD PRESSURE: 80 MMHG | HEIGHT: 71 IN | WEIGHT: 261 LBS | RESPIRATION RATE: 20 BRPM | TEMPERATURE: 97.1 F | SYSTOLIC BLOOD PRESSURE: 130 MMHG | BODY MASS INDEX: 36.54 KG/M2 | HEART RATE: 82 BPM

## 2023-07-17 DIAGNOSIS — E11.42 DM TYPE 2 WITH DIABETIC PERIPHERAL NEUROPATHY (HCC): ICD-10-CM

## 2023-07-17 DIAGNOSIS — L97.522 ULCER OF LEFT FOOT WITH FAT LAYER EXPOSED (HCC): Primary | ICD-10-CM

## 2023-07-17 PROCEDURE — 11042 DBRDMT SUBQ TIS 1ST 20SQCM/<: CPT

## 2023-07-17 PROCEDURE — 99213 OFFICE O/P EST LOW 20 MIN: CPT | Performed by: PODIATRIST

## 2023-07-17 PROCEDURE — 99213 OFFICE O/P EST LOW 20 MIN: CPT

## 2023-07-17 PROCEDURE — 11042 DBRDMT SUBQ TIS 1ST 20SQCM/<: CPT | Performed by: PODIATRIST

## 2023-07-17 NOTE — PLAN OF CARE
Problem: Chronic Conditions and Co-morbidities  Goal: Patient's chronic conditions and co-morbidity symptoms are monitored and maintained or improved  Outcome: Progressing     Problem: Cognitive:  Goal: Knowledge of wound care  Description: Knowledge of wound care  Outcome: Progressing  Goal: Understands risk factors for wounds  Description: Understands risk factors for wounds  Outcome: Progressing     Problem: Wound:  Goal: Will show signs of wound healing; wound closure and no evidence of infection  Description: Will show signs of wound healing; wound closure and no evidence of infection  Outcome: Progressing     Problem: Pressure Ulcer:  Goal: Signs of wound healing will improve  Description: Signs of wound healing will improve  Outcome: Progressing  Goal: Absence of new pressure ulcer  Description: Absence of new pressure ulcer  Outcome: Progressing  Goal: Will show no infection signs and symptoms  Description: Will show no infection signs and symptoms  Outcome: Progressing     Problem: Compression therapy:  Goal: Will be free from complications associated with compression therapy  Description: Will be free from complications associated with compression therapy  Outcome: Progressing     Problem: Weight control:  Goal: Ability to maintain an optimal weight for height and age will be supported  Description: Ability to maintain an optimal weight for height and age will be supported  Outcome: Progressing     Problem: Falls - Risk of:  Goal: Will remain free from falls  Description: Will remain free from falls  Outcome: Progressing     Problem: Blood Glucose:  Goal: Ability to maintain appropriate glucose levels will improve  Description: Ability to maintain appropriate glucose levels will improve  Outcome: Progressing

## 2023-07-17 NOTE — PROGRESS NOTES
of days: 127       Wound 07/10/23 Left;Plantar (Active)   Dressing Status Old drainage noted 07/17/23 1004   Wound Cleansed Cleansed with saline 07/17/23 1004   Dressing/Treatment Alginate with Ag 07/17/23 1004   Offloading for Diabetic Foot Ulcers Offloading boot 07/17/23 1004   Dressing Change Due 07/18/23 07/17/23 1004   Wound Length (cm) 3.3 cm 07/17/23 1004   Wound Width (cm) 2.5 cm 07/17/23 1004   Wound Depth (cm) 0.1 cm 07/17/23 1004   Wound Surface Area (cm^2) 8.25 cm^2 07/17/23 1004   Change in Wound Size % (l*w) 1.43 07/17/23 1004   Wound Volume (cm^3) 0.825 cm^3 07/17/23 1004   Wound Healing % 1 07/17/23 1004   Wound Assessment Fibrinous 07/17/23 1004   Drainage Amount Moderate 07/17/23 1004   Drainage Description Serosanguinous 07/17/23 1004   Odor Mild 07/17/23 1004   Valery-wound Assessment Hyperkeratosis (callous) 07/17/23 1004   Margins Defined edges 07/17/23 1004   Wound Thickness Description not for Pressure Injury Full thickness 07/17/23 1004   Number of days: 7         Asessment: Patient is a 69 y.o. male with:   Hospital Problems             Last Modified POA    DM type 2 with diabetic peripheral neuropathy ([address]) 7/17/2023 Yes    Ulcer of left foot with fat layer exposed ([address]) 7/17/2023 Yes         Plan: Patient examined and evaluated. Current condition and treatment options discussed in detail. Sharp excisional debridement took place of the ulceration, sub-cutaneous in nature,  curette and tissue nippers were used for debridement. All non viable and necrotic tissue was removed to promote healing. Bleeding was present. See wound assessment note for post debridement measurements. Dressing : silver algiante qd  Continue offloading boot. Orders Placed This Encounter   Procedures    VL LOWER EXTREMITY ARTERIAL SEGMENTAL PRESSURES W PPG     Standing Status:   Future     Standing Expiration Date:   7/17/2024     Is low level medical decision making.   Patient is acute uncomplicated condition

## 2023-07-17 NOTE — DISCHARGE INSTRUCTIONS
Continue Silver alginate to left foot wound. Continue wearing offloading boot. Vascular studies scheduled for tomorrow 7/18 @ 11am    Follow up with Dr. Igor Burrows in 1 week as scheduled. SIGNS OF INFECTION  - Redness, swelling, skin hot  - Wound bed turns black or stringy yellow  - Foul odor  - Increased drainage or pus  - Increased pain  - Fever greater than 100F    CALL YOUR DOCTOR OR SEEK MEDICAL ATTENTION IF SIGNS OF INFECTION.   DO NOT WAIT UNTIL YOUR NEXT APPOINTMENT    Call the Wound Care Nurse with any other questions or concerns- [phone]

## 2023-07-18 ENCOUNTER — HOSPITAL ENCOUNTER (OUTPATIENT)
Dept: INTERVENTIONAL RADIOLOGY/VASCULAR | Age: 68
Discharge: HOME | End: 2023-07-20
Attending: PODIATRIST
Payer: MEDICARE

## 2023-07-18 DIAGNOSIS — L97.522 ULCER OF LEFT FOOT WITH FAT LAYER EXPOSED (HCC): ICD-10-CM

## 2023-07-18 DIAGNOSIS — E11.42 DM TYPE 2 WITH DIABETIC PERIPHERAL NEUROPATHY (HCC): ICD-10-CM

## 2023-07-18 PROCEDURE — 93923 UPR/LXTR ART STDY 3+ LVLS: CPT

## 2023-07-24 ENCOUNTER — HOSPITAL ENCOUNTER (OUTPATIENT)
Dept: WOUND CARE | Age: 68
Discharge: HOME | End: 2023-07-25
Payer: MEDICARE

## 2023-07-24 VITALS
HEIGHT: 71 IN | HEART RATE: 66 BPM | WEIGHT: 262 LBS | DIASTOLIC BLOOD PRESSURE: 74 MMHG | TEMPERATURE: 97.6 F | SYSTOLIC BLOOD PRESSURE: 128 MMHG | RESPIRATION RATE: 22 BRPM | BODY MASS INDEX: 36.68 KG/M2

## 2023-07-24 DIAGNOSIS — L97.522 ULCER OF LEFT FOOT WITH FAT LAYER EXPOSED (HCC): Primary | ICD-10-CM

## 2023-07-24 DIAGNOSIS — I73.9 PVD (PERIPHERAL VASCULAR DISEASE) (HCC): ICD-10-CM

## 2023-07-24 PROCEDURE — 11042 DBRDMT SUBQ TIS 1ST 20SQCM/<: CPT | Performed by: PODIATRIST

## 2023-07-24 PROCEDURE — 99213 OFFICE O/P EST LOW 20 MIN: CPT

## 2023-07-24 PROCEDURE — 99213 OFFICE O/P EST LOW 20 MIN: CPT | Performed by: PODIATRIST

## 2023-07-24 PROCEDURE — 11042 DBRDMT SUBQ TIS 1ST 20SQCM/<: CPT

## 2023-07-24 RX ORDER — LIDOCAINE HYDROCHLORIDE 40 MG/ML
SOLUTION TOPICAL ONCE
OUTPATIENT
Start: 2023-07-24 | End: 2023-07-24

## 2023-07-24 RX ORDER — GENTAMICIN SULFATE 1 MG/G
OINTMENT TOPICAL ONCE
Status: DISCONTINUED | OUTPATIENT
Start: 2023-07-24 | End: 2023-07-24

## 2023-07-24 RX ORDER — LIDOCAINE 40 MG/G
CREAM TOPICAL ONCE
OUTPATIENT
Start: 2023-07-24 | End: 2023-07-24

## 2023-07-24 RX ORDER — GENTAMICIN SULFATE 1 MG/G
OINTMENT TOPICAL ONCE
OUTPATIENT
Start: 2023-07-24 | End: 2023-07-24

## 2023-07-24 RX ORDER — GINSENG 100 MG
CAPSULE ORAL ONCE
Status: CANCELLED | OUTPATIENT
Start: 2023-07-24 | End: 2023-07-24

## 2023-07-24 RX ORDER — BACITRACIN ZINC AND POLYMYXIN B SULFATE 500; 1000 [USP'U]/G; [USP'U]/G
OINTMENT TOPICAL ONCE
OUTPATIENT
Start: 2023-07-24 | End: 2023-07-24

## 2023-07-24 RX ORDER — LIDOCAINE 40 MG/G
CREAM TOPICAL ONCE
Status: CANCELLED | OUTPATIENT
Start: 2023-07-24 | End: 2023-07-24

## 2023-07-24 RX ORDER — LIDOCAINE HYDROCHLORIDE 20 MG/ML
JELLY TOPICAL ONCE
Status: CANCELLED | OUTPATIENT
Start: 2023-07-24 | End: 2023-07-24

## 2023-07-24 RX ORDER — GENTAMICIN SULFATE 1 MG/G
OINTMENT TOPICAL ONCE
Status: CANCELLED | OUTPATIENT
Start: 2023-07-24 | End: 2023-07-24

## 2023-07-24 RX ORDER — SODIUM CHLOR/HYPOCHLOROUS ACID 0.033 %
SOLUTION, IRRIGATION IRRIGATION ONCE
Status: CANCELLED | OUTPATIENT
Start: 2023-07-24 | End: 2023-07-24

## 2023-07-24 RX ORDER — SODIUM CHLOR/HYPOCHLOROUS ACID 0.033 %
SOLUTION, IRRIGATION IRRIGATION ONCE
Status: DISCONTINUED | OUTPATIENT
Start: 2023-07-24 | End: 2023-07-24

## 2023-07-24 RX ORDER — BETAMETHASONE DIPROPIONATE 0.05 %
OINTMENT (GRAM) TOPICAL ONCE
Status: CANCELLED | OUTPATIENT
Start: 2023-07-24 | End: 2023-07-24

## 2023-07-24 RX ORDER — LIDOCAINE 50 MG/G
OINTMENT TOPICAL ONCE
Status: CANCELLED | OUTPATIENT
Start: 2023-07-24 | End: 2023-07-24

## 2023-07-24 RX ORDER — CLOBETASOL PROPIONATE 0.5 MG/G
OINTMENT TOPICAL ONCE
Status: DISCONTINUED | OUTPATIENT
Start: 2023-07-24 | End: 2023-07-24

## 2023-07-24 RX ORDER — LIDOCAINE HYDROCHLORIDE 20 MG/ML
JELLY TOPICAL ONCE
OUTPATIENT
Start: 2023-07-24 | End: 2023-07-24

## 2023-07-24 RX ORDER — IBUPROFEN 200 MG
TABLET ORAL ONCE
Status: DISCONTINUED | OUTPATIENT
Start: 2023-07-24 | End: 2023-07-24

## 2023-07-24 RX ORDER — GINSENG 100 MG
CAPSULE ORAL ONCE
OUTPATIENT
Start: 2023-07-24 | End: 2023-07-24

## 2023-07-24 RX ORDER — IBUPROFEN 200 MG
TABLET ORAL ONCE
OUTPATIENT
Start: 2023-07-24 | End: 2023-07-24

## 2023-07-24 RX ORDER — IBUPROFEN 200 MG
TABLET ORAL ONCE
Status: CANCELLED | OUTPATIENT
Start: 2023-07-24 | End: 2023-07-24

## 2023-07-24 RX ORDER — BACITRACIN ZINC AND POLYMYXIN B SULFATE 500; 1000 [USP'U]/G; [USP'U]/G
OINTMENT TOPICAL ONCE
Status: CANCELLED | OUTPATIENT
Start: 2023-07-24 | End: 2023-07-24

## 2023-07-24 RX ORDER — LIDOCAINE 50 MG/G
OINTMENT TOPICAL ONCE
OUTPATIENT
Start: 2023-07-24 | End: 2023-07-24

## 2023-07-24 RX ORDER — LIDOCAINE 50 MG/G
OINTMENT TOPICAL ONCE
Status: DISCONTINUED | OUTPATIENT
Start: 2023-07-24 | End: 2023-07-24

## 2023-07-24 RX ORDER — LIDOCAINE HYDROCHLORIDE 40 MG/ML
SOLUTION TOPICAL ONCE
Status: DISCONTINUED | OUTPATIENT
Start: 2023-07-24 | End: 2023-07-24

## 2023-07-24 RX ORDER — CLOBETASOL PROPIONATE 0.5 MG/G
OINTMENT TOPICAL ONCE
OUTPATIENT
Start: 2023-07-24 | End: 2023-07-24

## 2023-07-24 RX ORDER — LIDOCAINE HYDROCHLORIDE 40 MG/ML
SOLUTION TOPICAL ONCE
Status: CANCELLED | OUTPATIENT
Start: 2023-07-24 | End: 2023-07-24

## 2023-07-24 RX ORDER — LIDOCAINE 40 MG/G
CREAM TOPICAL ONCE
Status: DISCONTINUED | OUTPATIENT
Start: 2023-07-24 | End: 2023-07-24

## 2023-07-24 RX ORDER — LIDOCAINE HYDROCHLORIDE 20 MG/ML
JELLY TOPICAL ONCE
Status: DISCONTINUED | OUTPATIENT
Start: 2023-07-24 | End: 2023-07-24

## 2023-07-24 RX ORDER — CLOBETASOL PROPIONATE 0.5 MG/G
OINTMENT TOPICAL ONCE
Status: CANCELLED | OUTPATIENT
Start: 2023-07-24 | End: 2023-07-24

## 2023-07-24 RX ORDER — BETAMETHASONE DIPROPIONATE 0.05 %
OINTMENT (GRAM) TOPICAL ONCE
OUTPATIENT
Start: 2023-07-24 | End: 2023-07-24

## 2023-07-24 RX ORDER — BETAMETHASONE DIPROPIONATE 0.05 %
OINTMENT (GRAM) TOPICAL ONCE
Status: DISCONTINUED | OUTPATIENT
Start: 2023-07-24 | End: 2023-07-24

## 2023-07-24 RX ORDER — BACITRACIN ZINC AND POLYMYXIN B SULFATE 500; 1000 [USP'U]/G; [USP'U]/G
OINTMENT TOPICAL ONCE
Status: DISCONTINUED | OUTPATIENT
Start: 2023-07-24 | End: 2023-07-24

## 2023-07-24 RX ORDER — SODIUM CHLOR/HYPOCHLOROUS ACID 0.033 %
SOLUTION, IRRIGATION IRRIGATION ONCE
OUTPATIENT
Start: 2023-07-24 | End: 2023-07-24

## 2023-07-24 RX ORDER — GINSENG 100 MG
CAPSULE ORAL ONCE
Status: DISCONTINUED | OUTPATIENT
Start: 2023-07-24 | End: 2023-07-24

## 2023-07-24 NOTE — DISCHARGE INSTRUCTIONS
Follow up as scheduled with Dr. Mac Mcfadden in wound care. Continue to place silver alginate to left foot wound. Cover with dry dressing. Change dressing daily. SIGNS OF INFECTION  - Redness, swelling, skin hot  - Wound bed turns black or stringy yellow  - Foul odor  - Increased drainage or pus  - Increased pain  - Fever greater than 100F    CALL YOUR DOCTOR OR SEEK MEDICAL ATTENTION IF SIGNS OF INFECTION.   DO NOT WAIT UNTIL YOUR NEXT APPOINTMENT    Call the Wound Care Nurse with any other questions or concerns- [phone]

## 2023-07-24 NOTE — PROGRESS NOTES
Procedures    Initiate Outpatient Wound Care Protocol     Cleanse wound with saline    If wound contains bioburden or contamination cleanse with wound cleanser or antimicrobial solution     For normal periwound tissue without irritation nor maceration, apply topical skin protectant    For periwound tissue with irritation and/or maceration, apply zinc based product, topical steroid cream/ointment, or equivalent     For wounds with dry firm black eschar and/or without exudate, apply betadine and leave open to air      For wounds with scant/small to no exudate or drainage, apply wound gel, hydrocolloid, polymer, or equivalent and cover with secondary dressing/foam      For wounds with moderate/large exudate or drainage, apply alginate, hydrofiber, polymer, or equivalent and cover with secondary dressing/foam    For wounds with nonviable tissue requiring removal, apply chemical or mechanical debrider and cover with secondary dressing/foam    For wounds with tunneling, dead space, or cavity, fill or pack with strip/gauze/kerlex to fit and cover with secondary dressing/foam    For wounds with adequate granulation or epithelization, apply wound gel, hydrocolloid, polymer, collagen, or transparent film, and cover secondary dry dressing/foam    For wounds that need additional secondary dressing to help pad or control additional drainage/exudates, add foam, absorbent pad or hydrocolloid    For wounds with suspected or known infection, apply antimicrobial mesh and/or antimicrobial alginate/hydrofiber, or antimicrobial solution moistened gauze/kerlex, or equivalent and cover with secondary dressing/foam    Compression Management needed for edema control, apply multilayer compression or tubular garment or equivalent    Offloading Management needed for pressure relief, apply offloading shoe/boot or equivalent     Standing Status:   Standing     Number of Occurrences:   1   Patient is low level medical decision making.   Patient

## 2023-07-31 ENCOUNTER — HOSPITAL ENCOUNTER (OUTPATIENT)
Dept: WOUND CARE | Age: 68
Discharge: HOME | End: 2023-08-01
Payer: MEDICARE

## 2023-07-31 VITALS
TEMPERATURE: 98.2 F | HEIGHT: 71 IN | WEIGHT: 263.2 LBS | BODY MASS INDEX: 36.85 KG/M2 | DIASTOLIC BLOOD PRESSURE: 64 MMHG | HEART RATE: 100 BPM | RESPIRATION RATE: 20 BRPM | SYSTOLIC BLOOD PRESSURE: 132 MMHG

## 2023-07-31 DIAGNOSIS — L97.522 ULCER OF LEFT FOOT WITH FAT LAYER EXPOSED (HCC): Primary | ICD-10-CM

## 2023-07-31 PROCEDURE — 11042 DBRDMT SUBQ TIS 1ST 20SQCM/<: CPT | Performed by: PODIATRIST

## 2023-07-31 PROCEDURE — 11042 DBRDMT SUBQ TIS 1ST 20SQCM/<: CPT

## 2023-07-31 RX ORDER — LIDOCAINE HYDROCHLORIDE 20 MG/ML
JELLY TOPICAL ONCE
OUTPATIENT
Start: 2023-07-31 | End: 2023-07-31

## 2023-07-31 RX ORDER — LIDOCAINE 50 MG/G
OINTMENT TOPICAL ONCE
OUTPATIENT
Start: 2023-07-31 | End: 2023-07-31

## 2023-07-31 RX ORDER — GINSENG 100 MG
CAPSULE ORAL ONCE
OUTPATIENT
Start: 2023-07-31 | End: 2023-07-31

## 2023-07-31 RX ORDER — SODIUM CHLOR/HYPOCHLOROUS ACID 0.033 %
SOLUTION, IRRIGATION IRRIGATION ONCE
OUTPATIENT
Start: 2023-07-31 | End: 2023-07-31

## 2023-07-31 RX ORDER — BACITRACIN ZINC AND POLYMYXIN B SULFATE 500; 1000 [USP'U]/G; [USP'U]/G
OINTMENT TOPICAL ONCE
OUTPATIENT
Start: 2023-07-31 | End: 2023-07-31

## 2023-07-31 RX ORDER — IBUPROFEN 200 MG
TABLET ORAL ONCE
OUTPATIENT
Start: 2023-07-31 | End: 2023-07-31

## 2023-07-31 RX ORDER — BETAMETHASONE DIPROPIONATE 0.05 %
OINTMENT (GRAM) TOPICAL ONCE
OUTPATIENT
Start: 2023-07-31 | End: 2023-07-31

## 2023-07-31 RX ORDER — LIDOCAINE 40 MG/G
CREAM TOPICAL ONCE
OUTPATIENT
Start: 2023-07-31 | End: 2023-07-31

## 2023-07-31 RX ORDER — CLOBETASOL PROPIONATE 0.5 MG/G
OINTMENT TOPICAL ONCE
OUTPATIENT
Start: 2023-07-31 | End: 2023-07-31

## 2023-07-31 RX ORDER — LIDOCAINE HYDROCHLORIDE 40 MG/ML
SOLUTION TOPICAL ONCE
OUTPATIENT
Start: 2023-07-31 | End: 2023-07-31

## 2023-07-31 RX ORDER — GENTAMICIN SULFATE 1 MG/G
OINTMENT TOPICAL ONCE
OUTPATIENT
Start: 2023-07-31 | End: 2023-07-31

## 2023-07-31 NOTE — DISCHARGE INSTRUCTIONS
Follow up as scheduled with Dr. Jen Santos in wound care. Continue to place silver alginate to left foot wound. Cover with dry dressing. Change dressing daily. SIGNS OF INFECTION  - Redness, swelling, skin hot  - Wound bed turns black or stringy yellow  - Foul odor  - Increased drainage or pus  - Increased pain  - Fever greater than 100F    CALL YOUR DOCTOR OR SEEK MEDICAL ATTENTION IF SIGNS OF INFECTION.   DO NOT WAIT UNTIL YOUR NEXT APPOINTMENT    Call the Wound Care Nurse with any other questions or concerns- [phone]

## 2023-07-31 NOTE — PROGRESS NOTES
for pressure relief, apply offloading shoe/boot or equivalent     Standing Status:   Standing     Number of Occurrences:   1   Sharp excisional debridement took place of the ulceration, sub-cutaneous in nature,  curette and tissue nippers were used for debridement. All non viable and necrotic tissue was removed to promote healing. Bleeding was present. See wound assessment note for post debridement measurements. DM foot ed and exam  Contact office with any questions/problems/concerns. Follow up in 1 week.

## 2023-08-01 ENCOUNTER — TELEPHONE (OUTPATIENT)
Dept: WOUND CARE | Age: 68
End: 2023-08-01

## 2023-08-01 NOTE — TELEPHONE ENCOUNTER
Faxed signed order and patient info to [facility] for wound vac authorization. Faxed home health referral to [facility] for wound vac changes.  [facility] has accepted patient and will start care on the 9th of August.

## 2023-08-07 ENCOUNTER — HOSPITAL ENCOUNTER (OUTPATIENT)
Dept: WOUND CARE | Age: 68
Discharge: HOME | End: 2023-08-08
Payer: MEDICARE

## 2023-08-07 VITALS
SYSTOLIC BLOOD PRESSURE: 124 MMHG | TEMPERATURE: 97.3 F | WEIGHT: 263 LBS | DIASTOLIC BLOOD PRESSURE: 78 MMHG | RESPIRATION RATE: 22 BRPM | HEIGHT: 71 IN | BODY MASS INDEX: 36.82 KG/M2 | HEART RATE: 86 BPM

## 2023-08-07 DIAGNOSIS — L97.522 ULCER OF LEFT FOOT WITH FAT LAYER EXPOSED (HCC): Primary | ICD-10-CM

## 2023-08-07 PROCEDURE — 11042 DBRDMT SUBQ TIS 1ST 20SQCM/<: CPT | Performed by: PODIATRIST

## 2023-08-07 PROCEDURE — 11042 DBRDMT SUBQ TIS 1ST 20SQCM/<: CPT

## 2023-08-07 RX ORDER — LIDOCAINE HYDROCHLORIDE 40 MG/ML
SOLUTION TOPICAL ONCE
OUTPATIENT
Start: 2023-08-07 | End: 2023-08-07

## 2023-08-07 RX ORDER — GINSENG 100 MG
CAPSULE ORAL ONCE
OUTPATIENT
Start: 2023-08-07 | End: 2023-08-07

## 2023-08-07 RX ORDER — IBUPROFEN 200 MG
TABLET ORAL ONCE
OUTPATIENT
Start: 2023-08-07 | End: 2023-08-07

## 2023-08-07 RX ORDER — LIDOCAINE 50 MG/G
OINTMENT TOPICAL ONCE
OUTPATIENT
Start: 2023-08-07 | End: 2023-08-07

## 2023-08-07 RX ORDER — SODIUM CHLOR/HYPOCHLOROUS ACID 0.033 %
SOLUTION, IRRIGATION IRRIGATION ONCE
OUTPATIENT
Start: 2023-08-07 | End: 2023-08-07

## 2023-08-07 RX ORDER — CLOBETASOL PROPIONATE 0.5 MG/G
OINTMENT TOPICAL ONCE
OUTPATIENT
Start: 2023-08-07 | End: 2023-08-07

## 2023-08-07 RX ORDER — LIDOCAINE 40 MG/G
CREAM TOPICAL ONCE
OUTPATIENT
Start: 2023-08-07 | End: 2023-08-07

## 2023-08-07 RX ORDER — BETAMETHASONE DIPROPIONATE 0.05 %
OINTMENT (GRAM) TOPICAL ONCE
OUTPATIENT
Start: 2023-08-07 | End: 2023-08-07

## 2023-08-07 RX ORDER — BACITRACIN ZINC AND POLYMYXIN B SULFATE 500; 1000 [USP'U]/G; [USP'U]/G
OINTMENT TOPICAL ONCE
OUTPATIENT
Start: 2023-08-07 | End: 2023-08-07

## 2023-08-07 RX ORDER — GENTAMICIN SULFATE 1 MG/G
OINTMENT TOPICAL ONCE
OUTPATIENT
Start: 2023-08-07 | End: 2023-08-07

## 2023-08-07 RX ORDER — LIDOCAINE HYDROCHLORIDE 20 MG/ML
JELLY TOPICAL ONCE
OUTPATIENT
Start: 2023-08-07 | End: 2023-08-07

## 2023-08-07 NOTE — PROGRESS NOTES
Subjective:  Ja Ross is a 69 y.o. male who presents to the office today for DM ulcer L foot. Drainage is similar overall. No issues seen at home. Patient presents in offloading boot and has been more compliant . No return of signs of infx. Dressing changed as advised. Currently denies F/C/N/V. Allergies   Allergen Reactions    Bee Venom Anaphylaxis    Naproxen      Other reaction(s): Unknown Reaction    Semaglutide      Other reaction(s): Constipation    Empagliflozin Rash       Past Medical History:   Diagnosis Date    Allergic rhinitis     Colon polyps     history of    Elevated WBC count     history of    Hyperlipidemia     Hypertension     Obesity     Pain, joint, hand, left 7/28/2021    Partial small bowel obstruction ([address]) 3/5/2023    Sepsis with acute renal failure ([address]) 3/5/2023    Type II or unspecified type diabetes mellitus without mention of complication, not stated as uncontrolled     Unspecified sleep apnea     Weakness of left hand 7/28/2021       Prior to Admission medications    Medication Sig Start Date End Date Taking? Authorizing Provider   Insulin Glargine-yfgn 100 UNIT/ML SOPN INJECT 40 UNITS UNDER SKIN AT BEDTIME FOR TYPE 2 DIABETES MELLITUS 6/14/23   Historical Provider, MD   lidocaine (LIDODERM) 5 % APPLY 1 PATCH TO SKIN EVERY DAY FOR UP TO 12 HOURS (ON FOR 12 HOURS AND OFF FOR 12 HOURS) WITHIN A 24-HOUR PERIOD 12/13/22   Historical Provider, MD   tamsulosin (FLOMAX) 0.4 MG capsule 1 capsule 3/27/23   Historical Provider, MD   mupirocin (BACTROBAN) 2 % ointment APPLY SMALL AMOUNT TOPICALLY EVERY 48 HOURS FOR SKIN INFECTION 6/20/23   Historical Provider, MD   silver sulfADIAZINE (SILVADENE) 1 % cream Apply topically daily. to foot blister 3/18/23   Timothy Mayes MD   atorvastatin (LIPITOR) 80 MG tablet 0.5 tablets 5/18/22   Historical Provider, MD   glipiZIDE (GLUCOTROL) 10 MG tablet 10 mg  Patient not taking: Reported on 6/26/2023 6/28/22   Historical Provider, MD

## 2023-08-07 NOTE — PLAN OF CARE
Problem: Chronic Conditions and Co-morbidities  Goal: Patient's chronic conditions and co-morbidity symptoms are monitored and maintained or improved  Outcome: Progressing     Problem: Cognitive:  Goal: Knowledge of wound care  Description: Knowledge of wound care  Outcome: Progressing  Goal: Understands risk factors for wounds  Description: Understands risk factors for wounds  Outcome: Progressing     Problem: Wound:  Goal: Will show signs of wound healing; wound closure and no evidence of infection  Description: Will show signs of wound healing; wound closure and no evidence of infection  Outcome: Progressing     Problem: Pressure Ulcer:  Goal: Signs of wound healing will improve  Description: Signs of wound healing will improve  Outcome: Progressing  Goal: Absence of new pressure ulcer  Description: Absence of new pressure ulcer  Outcome: Progressing  Goal: Will show no infection signs and symptoms  Description: Will show no infection signs and symptoms  Outcome: Progressing     Problem: Venous:  Goal: Signs of wound healing will improve  Description: Signs of wound healing will improve  Outcome: Progressing     Problem: Compression therapy:  Goal: Will be free from complications associated with compression therapy  Description: Will be free from complications associated with compression therapy  Outcome: Progressing     Problem: Weight control:  Goal: Ability to maintain an optimal weight for height and age will be supported  Description: Ability to maintain an optimal weight for height and age will be supported  Outcome: Progressing     Problem: Falls - Risk of:  Goal: Will remain free from falls  Description: Will remain free from falls  Outcome: Progressing     Problem: Blood Glucose:  Goal: Ability to maintain appropriate glucose levels will improve  Description: Ability to maintain appropriate glucose levels will improve  Outcome: Progressing

## 2023-08-08 NOTE — PROGRESS NOTES
Negative Pressure Wound Therapy    NAME:  Chris Powell  YOB: 1955  MEDICAL RECORD NUMBER:  [id number]  DATE:  8/7/2023    Applied Negative Pressure to left foot wound(s)/ulcer(s). [x] Applied skin barrier prep to rosa m-wound. [x] Cut strips of plastic drape to picture frame wound so that rosa m-wound is     covered with the drape. [x] If bridging dressing to less prominent site, cover any intact skin that will come in contact with the Negative Pressure Therapy sponge, gauze or channel drain with plastic drape. The sponge should never touch intact skin. [x] Cut sponge, gauze or channel drain to size which will fit into the wound/ulcer bed without being forced. [x] Be sure the sponge is large enough to hold the entire round plastic flange which is attached to the tubing. Never allow flange to be larger than the sponge or it will produce suction damaging intact skin. Total number of individual pieces of foam used within the wound bed: 3    [x] If bridging the dressing away from the primary site, be sure the bridge leads to a piece of sponge large enough to hold the entire flange without allowing any of the flange to overlap onto intact skin. [x] Covered sponge, gauze or channel drain with plastic drape. [x] Cut a hole in this plastic drape directly over the sponge the same size as the plastic drain tubing. [x] Removed plastic liner from flange and apply it directly over the hole you cut. [x] Removed the plastic cover from the flange. [x] Attached the tubing to the wound/ulcer Negative Pressure Therapy and turn it on to be sure a vacuum is created and that there are no leaks. [x] If air leaks occur, use plastic drape to patch them. [x] Secured Negative Pressure Therapy dressing with ace wrap loosely if located on an extremity. Maintain tubing outside of ace wrap. Tubing must not exert pressure on intact skin.     Applied per  Guidelines      Electronically signed by Sujit Bishop

## 2023-08-14 ENCOUNTER — APPOINTMENT (OUTPATIENT)
Dept: GENERAL RADIOLOGY | Age: 68
DRG: 623 | End: 2023-08-14
Payer: MEDICARE

## 2023-08-14 ENCOUNTER — HOSPITAL ENCOUNTER (OUTPATIENT)
Dept: WOUND CARE | Age: 68
Discharge: HOME | End: 2023-08-15
Payer: MEDICARE

## 2023-08-14 ENCOUNTER — HOSPITAL ENCOUNTER (INPATIENT)
Age: 68
LOS: 4 days | Discharge: HOME | DRG: 623 | End: 2023-08-18
Attending: EMERGENCY MEDICINE | Admitting: INTERNAL MEDICINE
Payer: MEDICARE

## 2023-08-14 VITALS
HEIGHT: 71 IN | SYSTOLIC BLOOD PRESSURE: 124 MMHG | RESPIRATION RATE: 24 BRPM | HEART RATE: 108 BPM | DIASTOLIC BLOOD PRESSURE: 80 MMHG | TEMPERATURE: 101.1 F | BODY MASS INDEX: 36.68 KG/M2

## 2023-08-14 DIAGNOSIS — Z79.4 TYPE 2 DIABETES MELLITUS WITH FOOT ULCER, WITH LONG-TERM CURRENT USE OF INSULIN (HCC): ICD-10-CM

## 2023-08-14 DIAGNOSIS — L03.119 CELLULITIS OF FOOT: ICD-10-CM

## 2023-08-14 DIAGNOSIS — L97.522 ULCER OF LEFT FOOT WITH FAT LAYER EXPOSED (HCC): Primary | ICD-10-CM

## 2023-08-14 DIAGNOSIS — L97.509 TYPE 2 DIABETES MELLITUS WITH FOOT ULCER, WITH LONG-TERM CURRENT USE OF INSULIN (HCC): ICD-10-CM

## 2023-08-14 DIAGNOSIS — E11.621 TYPE 2 DIABETES MELLITUS WITH FOOT ULCER, WITH LONG-TERM CURRENT USE OF INSULIN (HCC): ICD-10-CM

## 2023-08-14 DIAGNOSIS — A41.9 SEPSIS, DUE TO UNSPECIFIED ORGANISM, UNSPECIFIED WHETHER ACUTE ORGAN DYSFUNCTION PRESENT (HCC): Primary | ICD-10-CM

## 2023-08-14 DIAGNOSIS — E11.621 DIABETIC ULCER OF LEFT FOOT DUE TO TYPE 2 DIABETES MELLITUS (HCC): ICD-10-CM

## 2023-08-14 DIAGNOSIS — L97.529 DIABETIC ULCER OF LEFT FOOT DUE TO TYPE 2 DIABETES MELLITUS (HCC): ICD-10-CM

## 2023-08-14 DIAGNOSIS — L97.529 ULCER OF LEFT FOOT, UNSPECIFIED ULCER STAGE (HCC): ICD-10-CM

## 2023-08-14 DIAGNOSIS — L97.523 ULCER OF LEFT FOOT WITH NECROSIS OF MUSCLE (HCC): ICD-10-CM

## 2023-08-14 PROBLEM — E11.65 TYPE 2 DIABETES MELLITUS WITH HYPERGLYCEMIA, WITH LONG-TERM CURRENT USE OF INSULIN (HCC): Status: ACTIVE | Noted: 2023-08-14

## 2023-08-14 PROBLEM — N18.31 TYPE 2 DIABETES MELLITUS WITH STAGE 3A CHRONIC KIDNEY DISEASE, WITH LONG-TERM CURRENT USE OF INSULIN (HCC): Status: ACTIVE | Noted: 2023-08-14

## 2023-08-14 PROBLEM — K56.609 SBO (SMALL BOWEL OBSTRUCTION) (HCC): Status: RESOLVED | Noted: 2023-03-12 | Resolved: 2023-08-14

## 2023-08-14 PROBLEM — D72.829 LEUKOCYTOSIS: Status: ACTIVE | Noted: 2023-08-14

## 2023-08-14 PROBLEM — M75.110 PARTIAL THICKNESS ROTATOR CUFF TEAR: Status: ACTIVE | Noted: 2023-08-14

## 2023-08-14 PROBLEM — I10 ESSENTIAL (PRIMARY) HYPERTENSION: Status: ACTIVE | Noted: 2023-08-14

## 2023-08-14 PROBLEM — E11.22 TYPE 2 DIABETES MELLITUS WITH STAGE 3A CHRONIC KIDNEY DISEASE, WITH LONG-TERM CURRENT USE OF INSULIN (HCC): Status: ACTIVE | Noted: 2023-08-14

## 2023-08-14 PROBLEM — D12.5 BENIGN NEOPLASM OF SIGMOID COLON: Status: ACTIVE | Noted: 2023-08-14

## 2023-08-14 PROBLEM — M54.50 LOW BACK PAIN: Status: ACTIVE | Noted: 2023-08-14

## 2023-08-14 PROBLEM — E87.1 HYPONATREMIA: Status: ACTIVE | Noted: 2023-08-14

## 2023-08-14 PROBLEM — E78.5 HYPERLIPIDEMIA: Status: ACTIVE | Noted: 2023-08-14

## 2023-08-14 LAB
ANION GAP SERPL CALCULATED.3IONS-SCNC: 17 MMOL/L (ref 9–17)
BASOPHILS # BLD: 0.06 K/UL (ref 0–0.2)
BASOPHILS NFR BLD: 0 % (ref 0–2)
BILIRUB UR QL STRIP: NEGATIVE
BUN SERPL-MCNC: 23 MG/DL (ref 8–23)
BUN/CREAT SERPL: 16 (ref 9–20)
CALCIUM SERPL-MCNC: 9.6 MG/DL (ref 8.6–10.4)
CHARACTER UR: ABNORMAL
CHLORIDE SERPL-SCNC: 91 MMOL/L (ref 98–107)
CLARITY UR: CLEAR
CO2 SERPL-SCNC: 22 MMOL/L (ref 20–31)
COLOR UR: YELLOW
CREAT SERPL-MCNC: 1.4 MG/DL (ref 0.7–1.2)
EOSINOPHIL # BLD: 0.03 K/UL (ref 0–0.44)
EOSINOPHILS RELATIVE PERCENT: 0 % (ref 1–4)
EPI CELLS #/AREA URNS HPF: ABNORMAL /HPF (ref 0–5)
ERYTHROCYTE [DISTWIDTH] IN BLOOD BY AUTOMATED COUNT: 12.9 % (ref 11.8–14.4)
FLUAV AG SPEC QL: NEGATIVE
FLUBV AG SPEC QL: NEGATIVE
GFR SERPL CREATININE-BSD FRML MDRD: 54 ML/MIN/1.73M2
GLUCOSE BLD-MCNC: 145 MG/DL (ref 75–110)
GLUCOSE SERPL-MCNC: 195 MG/DL (ref 70–99)
GLUCOSE UR STRIP-MCNC: NEGATIVE MG/DL
HCT VFR BLD AUTO: 35.2 % (ref 40.7–50.3)
HGB BLD-MCNC: 11.7 G/DL (ref 13–17)
HGB UR QL STRIP.AUTO: NEGATIVE
IMM GRANULOCYTES # BLD AUTO: 0.06 K/UL (ref 0–0.3)
IMM GRANULOCYTES NFR BLD: 0 %
INR PPP: 1.2
KETONES UR STRIP-MCNC: NEGATIVE MG/DL
LACTATE BLDV-SCNC: 1.9 MMOL/L (ref 0.5–1.9)
LACTATE BLDV-SCNC: 4.8 MMOL/L (ref 0.5–1.9)
LEUKOCYTE ESTERASE UR QL STRIP: NEGATIVE
LYMPHOCYTES NFR BLD: 0.95 K/UL (ref 1.1–3.7)
LYMPHOCYTES RELATIVE PERCENT: 7 % (ref 24–43)
MAGNESIUM SERPL-MCNC: 1.9 MG/DL (ref 1.6–2.6)
MCH RBC QN AUTO: 27.2 PG (ref 25.2–33.5)
MCHC RBC AUTO-ENTMCNC: 33.2 G/DL (ref 25.2–33.5)
MCV RBC AUTO: 81.9 FL (ref 82.6–102.9)
MONOCYTES NFR BLD: 0.83 K/UL (ref 0.1–1.2)
MONOCYTES NFR BLD: 6 % (ref 3–12)
NEUTROPHILS NFR BLD: 86 % (ref 36–65)
NEUTS SEG NFR BLD: 11.84 K/UL (ref 1.5–8.1)
NITRITE UR QL STRIP: NEGATIVE
PARTIAL THROMBOPLASTIN TIME: 37 SEC (ref 23.9–33.8)
PH UR STRIP: 6 [PH] (ref 5–6)
PLATELET # BLD AUTO: 561 K/UL (ref 138–453)
PMV BLD AUTO: 9.5 FL (ref 8.1–13.5)
POTASSIUM SERPL-SCNC: 4.3 MMOL/L (ref 3.7–5.3)
PROT UR STRIP-MCNC: ABNORMAL MG/DL
PROTHROMBIN TIME: 14.3 SEC (ref 11.5–14.2)
RBC # BLD AUTO: 4.3 M/UL (ref 4.21–5.77)
RBC # BLD: ABNORMAL 10*6/UL
RBC #/AREA URNS HPF: ABNORMAL /HPF (ref 0–4)
SODIUM SERPL-SCNC: 130 MMOL/L (ref 135–144)
SP GR UR STRIP: 1.02 (ref 1.01–1.02)
UROBILINOGEN UR STRIP-ACNC: NORMAL EU/DL (ref 0–1)
WBC #/AREA URNS HPF: ABNORMAL /HPF (ref 0–4)
WBC OTHER # BLD: 13.8 K/UL (ref 3.5–11.3)

## 2023-08-14 PROCEDURE — 96374 THER/PROPH/DIAG INJ IV PUSH: CPT

## 2023-08-14 PROCEDURE — 6370000000 HC RX 637 (ALT 250 FOR IP): Performed by: EMERGENCY MEDICINE

## 2023-08-14 PROCEDURE — 82947 ASSAY GLUCOSE BLOOD QUANT: CPT

## 2023-08-14 PROCEDURE — 83605 ASSAY OF LACTIC ACID: CPT

## 2023-08-14 PROCEDURE — 87077 CULTURE AEROBIC IDENTIFY: CPT

## 2023-08-14 PROCEDURE — 2580000003 HC RX 258: Performed by: INTERNAL MEDICINE

## 2023-08-14 PROCEDURE — 36415 COLL VENOUS BLD VENIPUNCTURE: CPT

## 2023-08-14 PROCEDURE — 87186 SC STD MICRODIL/AGAR DIL: CPT

## 2023-08-14 PROCEDURE — 94760 N-INVAS EAR/PLS OXIMETRY 1: CPT

## 2023-08-14 PROCEDURE — 87205 SMEAR GRAM STAIN: CPT

## 2023-08-14 PROCEDURE — 6370000000 HC RX 637 (ALT 250 FOR IP): Performed by: NURSE PRACTITIONER

## 2023-08-14 PROCEDURE — 85610 PROTHROMBIN TIME: CPT

## 2023-08-14 PROCEDURE — 6360000002 HC RX W HCPCS: Performed by: INTERNAL MEDICINE

## 2023-08-14 PROCEDURE — 80048 BASIC METABOLIC PNL TOTAL CA: CPT

## 2023-08-14 PROCEDURE — 0JBP0ZZ EXCISION OF LEFT LOWER LEG SUBCUTANEOUS TISSUE AND FASCIA, OPEN APPROACH: ICD-10-PCS | Performed by: INTERNAL MEDICINE

## 2023-08-14 PROCEDURE — 87176 TISSUE HOMOGENIZATION CULTR: CPT

## 2023-08-14 PROCEDURE — 83735 ASSAY OF MAGNESIUM: CPT

## 2023-08-14 PROCEDURE — 87804 INFLUENZA ASSAY W/OPTIC: CPT

## 2023-08-14 PROCEDURE — 11043 DBRDMT MUSC&/FSCA 1ST 20/<: CPT

## 2023-08-14 PROCEDURE — 87076 CULTURE ANAEROBE IDENT EACH: CPT

## 2023-08-14 PROCEDURE — 73630 X-RAY EXAM OF FOOT: CPT

## 2023-08-14 PROCEDURE — 99285 EMERGENCY DEPT VISIT HI MDM: CPT

## 2023-08-14 PROCEDURE — 6360000002 HC RX W HCPCS: Performed by: EMERGENCY MEDICINE

## 2023-08-14 PROCEDURE — 87040 BLOOD CULTURE FOR BACTERIA: CPT

## 2023-08-14 PROCEDURE — 87185 SC STD ENZYME DETCJ PER NZM: CPT

## 2023-08-14 PROCEDURE — 71046 X-RAY EXAM CHEST 2 VIEWS: CPT

## 2023-08-14 PROCEDURE — 2060000000 HC ICU INTERMEDIATE R&B

## 2023-08-14 PROCEDURE — 2580000003 HC RX 258: Performed by: EMERGENCY MEDICINE

## 2023-08-14 PROCEDURE — 85730 THROMBOPLASTIN TIME PARTIAL: CPT

## 2023-08-14 PROCEDURE — 87070 CULTURE OTHR SPECIMN AEROBIC: CPT

## 2023-08-14 PROCEDURE — 0LBW0ZZ EXCISION OF LEFT FOOT TENDON, OPEN APPROACH: ICD-10-PCS | Performed by: INTERNAL MEDICINE

## 2023-08-14 PROCEDURE — 11043 DBRDMT MUSC&/FSCA 1ST 20/<: CPT | Performed by: PODIATRIST

## 2023-08-14 PROCEDURE — 96361 HYDRATE IV INFUSION ADD-ON: CPT

## 2023-08-14 PROCEDURE — 6370000000 HC RX 637 (ALT 250 FOR IP): Performed by: INTERNAL MEDICINE

## 2023-08-14 PROCEDURE — 99222 1ST HOSP IP/OBS MODERATE 55: CPT | Performed by: NURSE PRACTITIONER

## 2023-08-14 PROCEDURE — 99214 OFFICE O/P EST MOD 30 MIN: CPT | Performed by: PODIATRIST

## 2023-08-14 PROCEDURE — 81001 URINALYSIS AUTO W/SCOPE: CPT

## 2023-08-14 PROCEDURE — 99214 OFFICE O/P EST MOD 30 MIN: CPT

## 2023-08-14 PROCEDURE — 85025 COMPLETE CBC W/AUTO DIFF WBC: CPT

## 2023-08-14 PROCEDURE — 87075 CULTR BACTERIA EXCEPT BLOOD: CPT

## 2023-08-14 RX ORDER — LIDOCAINE HYDROCHLORIDE 20 MG/ML
JELLY TOPICAL ONCE
OUTPATIENT
Start: 2023-08-14 | End: 2023-08-14

## 2023-08-14 RX ORDER — INSULIN GLARGINE 100 [IU]/ML
40 INJECTION, SOLUTION SUBCUTANEOUS NIGHTLY
Status: DISCONTINUED | OUTPATIENT
Start: 2023-08-14 | End: 2023-08-14

## 2023-08-14 RX ORDER — ACETAMINOPHEN 325 MG/1
650 TABLET ORAL ONCE
Status: COMPLETED | OUTPATIENT
Start: 2023-08-14 | End: 2023-08-14

## 2023-08-14 RX ORDER — 0.9 % SODIUM CHLORIDE 0.9 %
1000 INTRAVENOUS SOLUTION INTRAVENOUS
Status: DISCONTINUED | OUTPATIENT
Start: 2023-08-14 | End: 2023-08-14

## 2023-08-14 RX ORDER — SODIUM CHLORIDE 0.9 % (FLUSH) 0.9 %
10 SYRINGE (ML) INJECTION PRN
Status: DISCONTINUED | OUTPATIENT
Start: 2023-08-14 | End: 2023-08-18 | Stop reason: HOSPADM

## 2023-08-14 RX ORDER — LIDOCAINE 40 MG/G
CREAM TOPICAL ONCE
OUTPATIENT
Start: 2023-08-14 | End: 2023-08-14

## 2023-08-14 RX ORDER — LISINOPRIL 20 MG/1
20 TABLET ORAL DAILY
Status: DISCONTINUED | OUTPATIENT
Start: 2023-08-15 | End: 2023-08-18 | Stop reason: HOSPADM

## 2023-08-14 RX ORDER — DEXTROSE MONOHYDRATE 100 MG/ML
125 INJECTION, SOLUTION INTRAVENOUS PRN
Status: DISCONTINUED | OUTPATIENT
Start: 2023-08-14 | End: 2023-08-18 | Stop reason: HOSPADM

## 2023-08-14 RX ORDER — CLINDAMYCIN HYDROCHLORIDE 300 MG/1
300 CAPSULE ORAL 3 TIMES DAILY
Qty: 30 CAPSULE | Refills: 0 | Status: ON HOLD
Start: 2023-08-14 | End: 2023-08-18 | Stop reason: HOSPADM

## 2023-08-14 RX ORDER — BETAMETHASONE DIPROPIONATE 0.05 %
OINTMENT (GRAM) TOPICAL ONCE
OUTPATIENT
Start: 2023-08-14 | End: 2023-08-14

## 2023-08-14 RX ORDER — LIDOCAINE HYDROCHLORIDE 40 MG/ML
SOLUTION TOPICAL ONCE
OUTPATIENT
Start: 2023-08-14 | End: 2023-08-14

## 2023-08-14 RX ORDER — SODIUM CHLOR/HYPOCHLOROUS ACID 0.033 %
SOLUTION, IRRIGATION IRRIGATION ONCE
OUTPATIENT
Start: 2023-08-14 | End: 2023-08-14

## 2023-08-14 RX ORDER — LISINOPRIL AND HYDROCHLOROTHIAZIDE 20; 12.5 MG/1; MG/1
1 TABLET ORAL DAILY
Status: CANCELLED | OUTPATIENT
Start: 2023-08-14

## 2023-08-14 RX ORDER — GABAPENTIN 300 MG/1
300 CAPSULE ORAL 2 TIMES DAILY
Status: DISCONTINUED | OUTPATIENT
Start: 2023-08-14 | End: 2023-08-18 | Stop reason: HOSPADM

## 2023-08-14 RX ORDER — ASPIRIN 81 MG/1
81 TABLET ORAL DAILY
Status: DISCONTINUED | OUTPATIENT
Start: 2023-08-14 | End: 2023-08-18 | Stop reason: HOSPADM

## 2023-08-14 RX ORDER — IBUPROFEN 200 MG
TABLET ORAL ONCE
OUTPATIENT
Start: 2023-08-14 | End: 2023-08-14

## 2023-08-14 RX ORDER — DEXTROSE MONOHYDRATE 100 MG/ML
INJECTION, SOLUTION INTRAVENOUS CONTINUOUS PRN
Status: DISCONTINUED | OUTPATIENT
Start: 2023-08-14 | End: 2023-08-18 | Stop reason: HOSPADM

## 2023-08-14 RX ORDER — ATORVASTATIN CALCIUM 40 MG/1
40 TABLET, FILM COATED ORAL NIGHTLY
Status: DISCONTINUED | OUTPATIENT
Start: 2023-08-14 | End: 2023-08-18 | Stop reason: HOSPADM

## 2023-08-14 RX ORDER — SODIUM CHLORIDE 0.9 % (FLUSH) 0.9 %
10 SYRINGE (ML) INJECTION EVERY 12 HOURS SCHEDULED
Status: DISCONTINUED | OUTPATIENT
Start: 2023-08-14 | End: 2023-08-18 | Stop reason: HOSPADM

## 2023-08-14 RX ORDER — INSULIN GLARGINE 100 [IU]/ML
30 INJECTION, SOLUTION SUBCUTANEOUS 2 TIMES DAILY
Status: DISCONTINUED | OUTPATIENT
Start: 2023-08-14 | End: 2023-08-17

## 2023-08-14 RX ORDER — ENOXAPARIN SODIUM 100 MG/ML
30 INJECTION SUBCUTANEOUS 2 TIMES DAILY
Status: DISCONTINUED | OUTPATIENT
Start: 2023-08-14 | End: 2023-08-18 | Stop reason: HOSPADM

## 2023-08-14 RX ORDER — DEXTROSE MONOHYDRATE 100 MG/ML
250 INJECTION, SOLUTION INTRAVENOUS PRN
Status: DISCONTINUED | OUTPATIENT
Start: 2023-08-14 | End: 2023-08-18 | Stop reason: HOSPADM

## 2023-08-14 RX ORDER — MULTIVITAMIN WITH IRON
1 TABLET ORAL DAILY
Status: DISCONTINUED | OUTPATIENT
Start: 2023-08-15 | End: 2023-08-18 | Stop reason: HOSPADM

## 2023-08-14 RX ORDER — TAMSULOSIN HYDROCHLORIDE 0.4 MG/1
0.4 CAPSULE ORAL DAILY
Status: DISCONTINUED | OUTPATIENT
Start: 2023-08-14 | End: 2023-08-18 | Stop reason: HOSPADM

## 2023-08-14 RX ORDER — SODIUM CHLORIDE 9 MG/ML
INJECTION, SOLUTION INTRAVENOUS CONTINUOUS
Status: DISCONTINUED | OUTPATIENT
Start: 2023-08-14 | End: 2023-08-18 | Stop reason: HOSPADM

## 2023-08-14 RX ORDER — HYDROCHLOROTHIAZIDE 12.5 MG/1
12.5 TABLET ORAL DAILY
Status: DISCONTINUED | OUTPATIENT
Start: 2023-08-15 | End: 2023-08-18 | Stop reason: HOSPADM

## 2023-08-14 RX ORDER — INSULIN LISPRO 100 [IU]/ML
0-4 INJECTION, SOLUTION INTRAVENOUS; SUBCUTANEOUS
Status: DISCONTINUED | OUTPATIENT
Start: 2023-08-14 | End: 2023-08-18 | Stop reason: HOSPADM

## 2023-08-14 RX ORDER — ACETAMINOPHEN 325 MG/1
650 TABLET ORAL EVERY 4 HOURS PRN
Status: DISCONTINUED | OUTPATIENT
Start: 2023-08-14 | End: 2023-08-18 | Stop reason: HOSPADM

## 2023-08-14 RX ORDER — ONDANSETRON 2 MG/ML
4 INJECTION INTRAMUSCULAR; INTRAVENOUS EVERY 6 HOURS PRN
Status: DISCONTINUED | OUTPATIENT
Start: 2023-08-14 | End: 2023-08-18 | Stop reason: HOSPADM

## 2023-08-14 RX ORDER — LIDOCAINE 50 MG/G
OINTMENT TOPICAL ONCE
OUTPATIENT
Start: 2023-08-14 | End: 2023-08-14

## 2023-08-14 RX ORDER — SODIUM CHLORIDE 9 MG/ML
INJECTION, SOLUTION INTRAVENOUS PRN
Status: DISCONTINUED | OUTPATIENT
Start: 2023-08-14 | End: 2023-08-18 | Stop reason: HOSPADM

## 2023-08-14 RX ORDER — GENTAMICIN SULFATE 1 MG/G
OINTMENT TOPICAL ONCE
OUTPATIENT
Start: 2023-08-14 | End: 2023-08-14

## 2023-08-14 RX ORDER — CLOBETASOL PROPIONATE 0.5 MG/G
OINTMENT TOPICAL ONCE
OUTPATIENT
Start: 2023-08-14 | End: 2023-08-14

## 2023-08-14 RX ORDER — CIPROFLOXACIN 500 MG/1
500 TABLET, FILM COATED ORAL 2 TIMES DAILY
Qty: 20 TABLET | Refills: 0 | Status: ON HOLD
Start: 2023-08-14 | End: 2023-08-18 | Stop reason: HOSPADM

## 2023-08-14 RX ORDER — GINSENG 100 MG
CAPSULE ORAL ONCE
OUTPATIENT
Start: 2023-08-14 | End: 2023-08-14

## 2023-08-14 RX ORDER — 0.9 % SODIUM CHLORIDE 0.9 %
1000 INTRAVENOUS SOLUTION INTRAVENOUS ONCE
Status: COMPLETED | OUTPATIENT
Start: 2023-08-14 | End: 2023-08-14

## 2023-08-14 RX ORDER — INSULIN LISPRO 100 [IU]/ML
5 INJECTION, SOLUTION INTRAVENOUS; SUBCUTANEOUS
Status: DISCONTINUED | OUTPATIENT
Start: 2023-08-14 | End: 2023-08-17

## 2023-08-14 RX ORDER — HYDROCODONE BITARTRATE AND ACETAMINOPHEN 5; 325 MG/1; MG/1
2 TABLET ORAL EVERY 4 HOURS PRN
Status: DISCONTINUED | OUTPATIENT
Start: 2023-08-14 | End: 2023-08-18 | Stop reason: HOSPADM

## 2023-08-14 RX ORDER — BACITRACIN ZINC AND POLYMYXIN B SULFATE 500; 1000 [USP'U]/G; [USP'U]/G
OINTMENT TOPICAL ONCE
OUTPATIENT
Start: 2023-08-14 | End: 2023-08-14

## 2023-08-14 RX ORDER — HYDROCODONE BITARTRATE AND ACETAMINOPHEN 5; 325 MG/1; MG/1
1 TABLET ORAL EVERY 4 HOURS PRN
Status: DISCONTINUED | OUTPATIENT
Start: 2023-08-14 | End: 2023-08-18 | Stop reason: HOSPADM

## 2023-08-14 RX ORDER — INSULIN LISPRO 100 [IU]/ML
0-4 INJECTION, SOLUTION INTRAVENOUS; SUBCUTANEOUS NIGHTLY
Status: DISCONTINUED | OUTPATIENT
Start: 2023-08-14 | End: 2023-08-18 | Stop reason: HOSPADM

## 2023-08-14 RX ORDER — LISINOPRIL AND HYDROCHLOROTHIAZIDE 20; 12.5 MG/1; MG/1
1 TABLET ORAL DAILY
Status: DISCONTINUED | OUTPATIENT
Start: 2023-08-14 | End: 2023-08-14 | Stop reason: CLARIF

## 2023-08-14 RX ADMIN — INSULIN GLARGINE 30 UNITS: 100 INJECTION, SOLUTION SUBCUTANEOUS at 22:02

## 2023-08-14 RX ADMIN — ATORVASTATIN CALCIUM 40 MG: 40 TABLET, FILM COATED ORAL at 20:42

## 2023-08-14 RX ADMIN — VANCOMYCIN HYDROCHLORIDE 2500 MG: 1 INJECTION, POWDER, LYOPHILIZED, FOR SOLUTION INTRAVENOUS at 17:36

## 2023-08-14 RX ADMIN — TAMSULOSIN HYDROCHLORIDE 0.4 MG: 0.4 CAPSULE ORAL at 18:24

## 2023-08-14 RX ADMIN — SODIUM CHLORIDE 1000 ML: 9 INJECTION, SOLUTION INTRAVENOUS at 15:00

## 2023-08-14 RX ADMIN — GABAPENTIN 300 MG: 300 CAPSULE ORAL at 20:42

## 2023-08-14 RX ADMIN — ACETAMINOPHEN 650 MG: 325 TABLET ORAL at 18:24

## 2023-08-14 RX ADMIN — ACETAMINOPHEN 650 MG: 325 TABLET ORAL at 14:32

## 2023-08-14 RX ADMIN — SODIUM CHLORIDE: 9 INJECTION, SOLUTION INTRAVENOUS at 18:29

## 2023-08-14 RX ADMIN — ENOXAPARIN SODIUM 30 MG: 100 INJECTION SUBCUTANEOUS at 20:42

## 2023-08-14 RX ADMIN — ASPIRIN 81 MG: 81 TABLET, COATED ORAL at 18:24

## 2023-08-14 RX ADMIN — HYDROCODONE BITARTRATE AND ACETAMINOPHEN 2 TABLET: 5; 325 TABLET ORAL at 20:41

## 2023-08-14 RX ADMIN — CEFEPIME 2000 MG: 2 INJECTION, POWDER, FOR SOLUTION INTRAVENOUS at 16:59

## 2023-08-14 ASSESSMENT — ENCOUNTER SYMPTOMS
NAUSEA: 0
EYE REDNESS: 0
SHORTNESS OF BREATH: 0
COUGH: 1
DIARRHEA: 0
CHEST TIGHTNESS: 0
VOMITING: 0
ABDOMINAL PAIN: 0
CONSTIPATION: 0

## 2023-08-14 ASSESSMENT — PAIN DESCRIPTION - ORIENTATION
ORIENTATION: LOWER
ORIENTATION: LEFT
ORIENTATION: LOWER

## 2023-08-14 ASSESSMENT — PAIN DESCRIPTION - LOCATION
LOCATION: BACK
LOCATION: FOOT
LOCATION: BACK

## 2023-08-14 ASSESSMENT — LIFESTYLE VARIABLES
HOW OFTEN DO YOU HAVE A DRINK CONTAINING ALCOHOL: NEVER
HOW MANY STANDARD DRINKS CONTAINING ALCOHOL DO YOU HAVE ON A TYPICAL DAY: PATIENT DOES NOT DRINK

## 2023-08-14 ASSESSMENT — PAIN DESCRIPTION - DESCRIPTORS: DESCRIPTORS: SHARP

## 2023-08-14 ASSESSMENT — PAIN SCALES - GENERAL
PAINLEVEL_OUTOF10: 10
PAINLEVEL_OUTOF10: 6
PAINLEVEL_OUTOF10: 7

## 2023-08-14 ASSESSMENT — PAIN - FUNCTIONAL ASSESSMENT
PAIN_FUNCTIONAL_ASSESSMENT: 0-10
PAIN_FUNCTIONAL_ASSESSMENT: ACTIVITIES ARE NOT PREVENTED

## 2023-08-14 ASSESSMENT — PAIN DESCRIPTION - PAIN TYPE: TYPE: CHRONIC PAIN

## 2023-08-14 NOTE — PLAN OF CARE
Problem: Chronic Conditions and Co-morbidities  Goal: Patient's chronic conditions and co-morbidity symptoms are monitored and maintained or improved  Outcome: Not Progressing     Problem: Cognitive:  Goal: Knowledge of wound care  Description: Knowledge of wound care  Outcome: Progressing  Goal: Understands risk factors for wounds  Description: Understands risk factors for wounds  Outcome: Progressing     Problem: Wound:  Goal: Will show signs of wound healing; wound closure and no evidence of infection  Description: Will show signs of wound healing; wound closure and no evidence of infection  Outcome: Progressing     Problem: Pressure Ulcer:  Goal: Signs of wound healing will improve  Description: Signs of wound healing will improve  Outcome: Progressing  Goal: Absence of new pressure ulcer  Description: Absence of new pressure ulcer  Outcome: Progressing  Goal: Will show no infection signs and symptoms  Description: Will show no infection signs and symptoms  Outcome: Progressing     Problem: Venous:  Goal: Signs of wound healing will improve  Description: Signs of wound healing will improve  Outcome: Progressing     Problem: Compression therapy:  Goal: Will be free from complications associated with compression therapy  Description: Will be free from complications associated with compression therapy  Outcome: Progressing     Problem: Weight control:  Goal: Ability to maintain an optimal weight for height and age will be supported  Description: Ability to maintain an optimal weight for height and age will be supported  Outcome: Progressing     Problem: Falls - Risk of:  Goal: Will remain free from falls  Description: Will remain free from falls  Outcome: Progressing     Problem: Blood Glucose:  Goal: Ability to maintain appropriate glucose levels will improve  Description: Ability to maintain appropriate glucose levels will improve  Outcome: Progressing     Problem: Chronic Conditions and Co-morbidities  Goal:

## 2023-08-14 NOTE — DISCHARGE INSTRUCTIONS
Hold wound vac to left foot for 1 week. Start packing left foot wound with iodoform packing and covering with dry dressing. Changing daily.  antibiotics. Follow up with Dr. Marizol Vazquez in 1 week as scheduled. SIGNS OF INFECTION  - Redness, swelling, skin hot  - Wound bed turns black or stringy yellow  - Foul odor  - Increased drainage or pus  - Increased pain  - Fever greater than 100F    CALL YOUR DOCTOR OR SEEK MEDICAL ATTENTION IF SIGNS OF INFECTION.   DO NOT WAIT UNTIL YOUR NEXT APPOINTMENT    Call the Wound Care Nurse with any other questions or concerns- [phone]

## 2023-08-14 NOTE — ED TRIAGE NOTES
Pt sent from podiatry office for worsening left foot infection. Developed fever yesterday. Occasional sharp pains to foot. Left foot is red and swollen with 4x4 cm round open area to ball of foot, recently packed with gauze that is not removed.

## 2023-08-14 NOTE — PROGRESS NOTES
tissue requiring removal, apply chemical or mechanical debrider and cover with secondary dressing/foam    For wounds with tunneling, dead space, or cavity, fill or pack with strip/gauze/kerlex to fit and cover with secondary dressing/foam    For wounds with adequate granulation or epithelization, apply wound gel, hydrocolloid, polymer, collagen, or transparent film, and cover secondary dry dressing/foam    For wounds that need additional secondary dressing to help pad or control additional drainage/exudates, add foam, absorbent pad or hydrocolloid    For wounds with suspected or known infection, apply antimicrobial mesh and/or antimicrobial alginate/hydrofiber, or antimicrobial solution moistened gauze/kerlex, or equivalent and cover with secondary dressing/foam    Compression Management needed for edema control, apply multilayer compression or tubular garment or equivalent    Offloading Management needed for pressure relief, apply offloading shoe/boot or equivalent     Standing Status:   Standing     Number of Occurrences:   1    XR FOOT LEFT (MIN 3 VIEWS)     Standing Status:   Future     Standing Expiration Date:   8/14/2024     Scheduling Instructions:      WB     Patient has moderate level medical decision making. Patient has chronic condition with exacerbation along with undiagnosed new problem. Patient has 2 new prescriptions and 1 new test.  Moderate risk morbidity with worsening diabetic foot infection. DM foot ed and exam  Contact office with any questions/problems/concerns. Follow up in 1 week.

## 2023-08-15 ENCOUNTER — TELEPHONE (OUTPATIENT)
Dept: SURGERY | Age: 68
End: 2023-08-15

## 2023-08-15 ENCOUNTER — TELEPHONE (OUTPATIENT)
Dept: PODIATRY | Age: 68
End: 2023-08-15

## 2023-08-15 LAB
ALBUMIN SERPL-MCNC: 3.6 G/DL (ref 3.5–5.2)
ALBUMIN/GLOB SERPL: 1.1 {RATIO} (ref 1–2.5)
ALP SERPL-CCNC: 96 U/L (ref 40–129)
ALT SERPL-CCNC: 14 U/L (ref 5–41)
ANION GAP SERPL CALCULATED.3IONS-SCNC: 12 MMOL/L (ref 9–17)
AST SERPL-CCNC: 14 U/L
BASOPHILS # BLD: 0 K/UL (ref 0–0.2)
BASOPHILS NFR BLD: 0 % (ref 0–2)
BILIRUB SERPL-MCNC: 0.7 MG/DL (ref 0.3–1.2)
BUN SERPL-MCNC: 21 MG/DL (ref 8–23)
BUN/CREAT SERPL: 18 (ref 9–20)
CALCIUM SERPL-MCNC: 8.7 MG/DL (ref 8.6–10.4)
CHLORIDE SERPL-SCNC: 102 MMOL/L (ref 98–107)
CO2 SERPL-SCNC: 20 MMOL/L (ref 20–31)
CREAT SERPL-MCNC: 1.2 MG/DL (ref 0.7–1.2)
EOSINOPHIL # BLD: 0 K/UL (ref 0–0.4)
EOSINOPHILS RELATIVE PERCENT: 0 % (ref 1–8)
ERYTHROCYTE [DISTWIDTH] IN BLOOD BY AUTOMATED COUNT: 13 % (ref 11.8–14.4)
GFR SERPL CREATININE-BSD FRML MDRD: >60 ML/MIN/1.73M2
GLUCOSE BLD-MCNC: 119 MG/DL (ref 75–110)
GLUCOSE BLD-MCNC: 176 MG/DL (ref 75–110)
GLUCOSE BLD-MCNC: 228 MG/DL (ref 75–110)
GLUCOSE SERPL-MCNC: 131 MG/DL (ref 70–99)
HCT VFR BLD AUTO: 30.9 % (ref 40.7–50.3)
HGB BLD-MCNC: 10.3 G/DL (ref 13–17)
IMM GRANULOCYTES # BLD AUTO: 0 K/UL (ref 0–0.3)
IMM GRANULOCYTES NFR BLD: 0 %
LYMPHOCYTES NFR BLD: 0.5 K/UL (ref 1–4.8)
LYMPHOCYTES RELATIVE PERCENT: 5 % (ref 15–43)
MCH RBC QN AUTO: 27.2 PG (ref 25.2–33.5)
MCHC RBC AUTO-ENTMCNC: 33.3 G/DL (ref 25.2–33.5)
MCV RBC AUTO: 81.5 FL (ref 82.6–102.9)
MONOCYTES NFR BLD: 0.2 K/UL (ref 0.1–1.2)
MONOCYTES NFR BLD: 2 % (ref 6–14)
MORPHOLOGY: ABNORMAL
MORPHOLOGY: ABNORMAL
NEUTROPHILS NFR BLD: 93 % (ref 44–74)
NEUTS SEG NFR BLD: 9.2 K/UL (ref 1.5–8.1)
NRBC BLD-RTO: 0 PER 100 WBC
PLATELET # BLD AUTO: 434 K/UL (ref 138–453)
PMV BLD AUTO: 8.7 FL (ref 8.1–13.5)
POTASSIUM SERPL-SCNC: 4.2 MMOL/L (ref 3.7–5.3)
PROT SERPL-MCNC: 7 G/DL (ref 6.4–8.3)
RBC # BLD AUTO: 3.79 M/UL (ref 4.21–5.77)
SODIUM SERPL-SCNC: 134 MMOL/L (ref 135–144)
VANCOMYCIN SERPL-MCNC: 16.3 UG/ML
WBC OTHER # BLD: 9.9 K/UL (ref 3.5–11.3)

## 2023-08-15 PROCEDURE — 99213 OFFICE O/P EST LOW 20 MIN: CPT | Performed by: PODIATRIST

## 2023-08-15 PROCEDURE — 80202 ASSAY OF VANCOMYCIN: CPT

## 2023-08-15 PROCEDURE — 36415 COLL VENOUS BLD VENIPUNCTURE: CPT

## 2023-08-15 PROCEDURE — 6370000000 HC RX 637 (ALT 250 FOR IP): Performed by: INTERNAL MEDICINE

## 2023-08-15 PROCEDURE — 6370000000 HC RX 637 (ALT 250 FOR IP): Performed by: NURSE PRACTITIONER

## 2023-08-15 PROCEDURE — 6360000002 HC RX W HCPCS: Performed by: EMERGENCY MEDICINE

## 2023-08-15 PROCEDURE — 2580000003 HC RX 258: Performed by: INTERNAL MEDICINE

## 2023-08-15 PROCEDURE — 11042 DBRDMT SUBQ TIS 1ST 20SQCM/<: CPT | Performed by: PODIATRIST

## 2023-08-15 PROCEDURE — 2580000003 HC RX 258: Performed by: EMERGENCY MEDICINE

## 2023-08-15 PROCEDURE — 6360000002 HC RX W HCPCS: Performed by: INTERNAL MEDICINE

## 2023-08-15 PROCEDURE — 99231 SBSQ HOSP IP/OBS SF/LOW 25: CPT | Performed by: INTERNAL MEDICINE

## 2023-08-15 PROCEDURE — 85025 COMPLETE CBC W/AUTO DIFF WBC: CPT

## 2023-08-15 PROCEDURE — 82947 ASSAY GLUCOSE BLOOD QUANT: CPT

## 2023-08-15 PROCEDURE — 80053 COMPREHEN METABOLIC PANEL: CPT

## 2023-08-15 PROCEDURE — 2060000000 HC ICU INTERMEDIATE R&B

## 2023-08-15 RX ADMIN — HYDROCODONE BITARTRATE AND ACETAMINOPHEN 2 TABLET: 5; 325 TABLET ORAL at 20:57

## 2023-08-15 RX ADMIN — CEFEPIME 2000 MG: 2 INJECTION, POWDER, FOR SOLUTION INTRAVENOUS at 04:43

## 2023-08-15 RX ADMIN — INSULIN LISPRO 5 UNITS: 100 INJECTION, SOLUTION INTRAVENOUS; SUBCUTANEOUS at 17:23

## 2023-08-15 RX ADMIN — ACETAMINOPHEN 650 MG: 325 TABLET ORAL at 02:58

## 2023-08-15 RX ADMIN — CEFEPIME 2000 MG: 2 INJECTION, POWDER, FOR SOLUTION INTRAVENOUS at 17:23

## 2023-08-15 RX ADMIN — INSULIN LISPRO 5 UNITS: 100 INJECTION, SOLUTION INTRAVENOUS; SUBCUTANEOUS at 12:37

## 2023-08-15 RX ADMIN — ENOXAPARIN SODIUM 30 MG: 100 INJECTION SUBCUTANEOUS at 09:05

## 2023-08-15 RX ADMIN — THERA TABS 1 TABLET: TAB at 09:06

## 2023-08-15 RX ADMIN — VANCOMYCIN HYDROCHLORIDE 750 MG: 750 INJECTION, POWDER, LYOPHILIZED, FOR SOLUTION INTRAVENOUS at 09:05

## 2023-08-15 RX ADMIN — SODIUM CHLORIDE: 9 INJECTION, SOLUTION INTRAVENOUS at 03:03

## 2023-08-15 RX ADMIN — SODIUM CHLORIDE: 9 INJECTION, SOLUTION INTRAVENOUS at 16:35

## 2023-08-15 RX ADMIN — ASPIRIN 81 MG: 81 TABLET, COATED ORAL at 09:06

## 2023-08-15 RX ADMIN — HYDROCHLOROTHIAZIDE 12.5 MG: 12.5 TABLET ORAL at 09:06

## 2023-08-15 RX ADMIN — INSULIN LISPRO 5 UNITS: 100 INJECTION, SOLUTION INTRAVENOUS; SUBCUTANEOUS at 09:05

## 2023-08-15 RX ADMIN — SODIUM CHLORIDE, PRESERVATIVE FREE 10 ML: 5 INJECTION INTRAVENOUS at 21:03

## 2023-08-15 RX ADMIN — ENOXAPARIN SODIUM 30 MG: 100 INJECTION SUBCUTANEOUS at 20:58

## 2023-08-15 RX ADMIN — ATORVASTATIN CALCIUM 40 MG: 40 TABLET, FILM COATED ORAL at 20:57

## 2023-08-15 RX ADMIN — ACETAMINOPHEN 650 MG: 325 TABLET ORAL at 13:38

## 2023-08-15 RX ADMIN — GABAPENTIN 300 MG: 300 CAPSULE ORAL at 20:57

## 2023-08-15 RX ADMIN — GABAPENTIN 300 MG: 300 CAPSULE ORAL at 09:06

## 2023-08-15 RX ADMIN — INSULIN GLARGINE 30 UNITS: 100 INJECTION, SOLUTION SUBCUTANEOUS at 09:05

## 2023-08-15 RX ADMIN — INSULIN LISPRO 1 UNITS: 100 INJECTION, SOLUTION INTRAVENOUS; SUBCUTANEOUS at 12:37

## 2023-08-15 RX ADMIN — VANCOMYCIN HYDROCHLORIDE 750 MG: 750 INJECTION, POWDER, LYOPHILIZED, FOR SOLUTION INTRAVENOUS at 21:05

## 2023-08-15 RX ADMIN — TAMSULOSIN HYDROCHLORIDE 0.4 MG: 0.4 CAPSULE ORAL at 09:06

## 2023-08-15 RX ADMIN — INSULIN GLARGINE 30 UNITS: 100 INJECTION, SOLUTION SUBCUTANEOUS at 20:58

## 2023-08-15 RX ADMIN — LISINOPRIL 20 MG: 20 TABLET ORAL at 09:06

## 2023-08-15 ASSESSMENT — PAIN SCALES - GENERAL: PAINLEVEL_OUTOF10: 8

## 2023-08-15 ASSESSMENT — PAIN DESCRIPTION - LOCATION: LOCATION: CHEST

## 2023-08-15 ASSESSMENT — PAIN - FUNCTIONAL ASSESSMENT: PAIN_FUNCTIONAL_ASSESSMENT: ACTIVITIES ARE NOT PREVENTED

## 2023-08-15 ASSESSMENT — PAIN DESCRIPTION - PAIN TYPE: TYPE: ACUTE PAIN

## 2023-08-15 NOTE — CARE COORDINATION
Case Management Assessment  Initial Evaluation    Date/Time of Evaluation: 8/15/2023 12:10 PM  Assessment Completed by: Rhoda Meckel, RN    If patient is discharged prior to next notation, then this note serves as note for discharge by case management. Patient Name: Arnie Saucedo                   YOB: 1955  Diagnosis: Diabetic ulcer of left foot due to type 2 diabetes mellitus ([address] ) [E11.621, L97.529]  Ulcer of left foot, unspecified ulcer stage ([address]) [L97.529]  Sepsis, due to unspecified organism, unspecified whether acute organ dysfunction present [facility]) [A41.9]                   Date / Time: 8/14/2023  2:01 PM    Patient Admission Status: Inpatient   Readmission Risk (Low < 19, Mod (19-27), High > 27): Readmission Risk Score: 16.7    Current PCP: SAV Wilson NP  PCP verified by CM? Yes    Chart Reviewed: Yes      History Provided by: Patient  Patient Orientation: Alert and Oriented    Patient Cognition: Alert    Hospitalization in the last 30 days (Readmission):  No    If yes, Readmission Assessment in CM Navigator will be completed. Advance Directives:      Code Status: Full Code   Patient's Primary Decision Maker is: Legal Next of Kin      Discharge Planning:    Patient lives with: Children Type of Home: House  Primary Care Giver: Self  Patient Support Systems include: Children, Family Members   Current Financial resources: Medicare,  ([facility])  Current community resources: ECF/Home Care  Current services prior to admission: Durable Medical Equipment, Home Care            Current DME: Carolynne Felty, Joseph (Comment) (offloading boot)            Type of Home Care services:  Nursing Services    ADLS  Prior functional level: Independent in ADLs/IADLs  Current functional level: Independent in ADLs/IADLs    PT AM-PAC:   /24  OT AM-PAC:   /24    Family can provide assistance at DC:  Yes  Would you like Case Management to discuss the discharge plan with any other family members/significant Javy Rush RN  Case Management Department

## 2023-08-15 NOTE — H&P
HOSPITALIST ADMISSION H&P    REASON FOR ADMISSION:  Diabetic left foot ulcer -- Left foot cellulitis -- failed outpatient treatment -- possible sepsis  ESTIMATED LENGTH OF STAY:>2 midnights, 3-4 days    ATTENDING/ADMITTING PHYSICIAN: Santana Villela MD    HISTORY OF PRESENT ILLNESS:      The patient is a 69 y.o. male patient of SAV Navas NP who presents from the ER with c/o fevers. He was sent to ER from the podiatrists office due to fevers and worsening of his left foot ulcer with surrounding cellulitis s/p debridement today. He states he has had fevers the past 2 days and noticed increased left foot pain and redness. He has also had some productive cough and low appetite. He had a wound vac in place up until he saw podiatry today. In ER, he was febrile at 101.7F, tachycardic with heart rate in the 110's, WBC 13.8. Lactic acid 4.8 -->1.9. Chest xray showed no acute process. Xray of the left foot showed no acute osseous abnormality. Influenza negative. In ER, he received 650 mg tylenol, 1L IV NS, IV Vancomycin, and IV Cefepime. DMII -- HgbA1C 7 -- glucose 195 in ER    Hyponatremia, acute -- 130    CKD stage 3a -- stable    Hypertension -- stable    Last 2D echo 03/06/2023 pEF, Gr2DD, RVSP 34    Wounds and LDAs present prior to admission: foot ulcer (see photo in media)     See below for additional PMH. Patient ulpt-zhijwotmxd-kqxzaiuo-available records reviewed, including, but not limited to ER records, imaging results, lab results, office records, personal records, and [place] -- no signs of abuse or diversion.      Past Medical History:   Diagnosis Date    Allergic rhinitis     Colon polyps     history of    Elevated WBC count     history of    Hyperlipidemia     Hypertension     Obesity     Pain, joint, hand, left 7/28/2021    Partial small bowel obstruction ([address]) 3/5/2023    SBO (small bowel obstruction) ([address]) 3/12/2023    Sepsis with acute renal failure ([address]) 3/5/2023    Type II or unspecified Carb controlled diet, insulins, monitor and titrate prn   4. Acute hyponatremia -- may be pseudohyponatremia due to hyperglycemia, IV NS and recheck in AM, consider holding HCTZ if worsening  5. CKD stage 3a, stable -- Monitor I&O, renal function, and fluid balance. Avoid nephrotoxins when possible  6. RT protocols -- IS  7. PT/OT eval and treat  8. Home medications reviewed  9. DVT prophylaxis -- lovenox  10. See orders     Note that over 55 minutes was spent in reviewing and obtaining history, physical examination and evaluation of the patient, placing orders, providing education, coordinating care, reviewing test results, documenting in the medical record, and discussion/communicating with patient/caregiver/family.     Electronically signed by SAV [place] - CNP, NP-C, FNP-BC on 8/15/2023 at 3:26 AM  Hospitalist/Nocturnist

## 2023-08-15 NOTE — PLAN OF CARE
Problem: Discharge Planning  Goal: Discharge to home or other facility with appropriate resources  Outcome: Progressing  Flowsheets (Taken 8/14/2023 1802 by Lowell Redd RN)  Discharge to home or other facility with appropriate resources:   Identify barriers to discharge with patient and caregiver   Identify discharge learning needs (meds, wound care, etc)   Refer to discharge planning if patient needs post-hospital services based on physician order or complex needs related to functional status, cognitive ability or social support system     Problem: Pain  Goal: Verbalizes/displays adequate comfort level or baseline comfort level  Outcome: Progressing     Problem: Safety - Adult  Goal: Free from fall injury  Outcome: Progressing     Problem: ABCDS Injury Assessment  Goal: Absence of physical injury  Outcome: Progressing

## 2023-08-15 NOTE — CARE COORDINATION
DISCHARGE BARRIERS       Reason for Referral:  SW completed a Psychosocial Assessment for evaluation of patient's mental health, social status, and functional capacity within the community. Gladys Hauser is a 69 y.o. male admitted due to Diabetic ulcer of left foot due to type 2 diabetes mellitus ([address] Patient alone. SW provided supportive listening while patient discussed past medical history and events leading up to hospitalization. Mental Status:  Alert, oriented, and engaging during assessment. Decision Making:  Makes own decisions. Family/Social/Home Environment: lives with their son    Support: Discussed a good social support network     Current Services:  [facility]. Current DMEs: [employer]    PCP: SAV Tavares - NP and repots no issues affording medication.  status:   [employer]       ADLs and means of transportation: Independent in ADLs prior to hospitalization and able to transport self. Food insecurity or needed financial assistance: Denies any food insecurity or financial concerns at this time. ACP and Code Status:  SW discussed an Advance Directive which included the patient's choices for care and treatment in the case of a health event that adversely affects decision-making abilities. SW provided education and resources. Gladys Hauser has no questions at this time and has agreed to keep me up-to-date should anything change. Gladys Hauser is a Full Code status and has NO advanced directive - not interested in additional information. Collaborative List of SNF/ECF/HH were provided: offered, declined. Pt states he would like to continue current services with [facility]. No discharge order at this time. Anticipated Needs/Discharge Plan:  Spoke with patient/family/representative about discharge plan.  Patient/Family/Representative verbalizes understanding of the plan of care and denies discharge needs or further services at this time. SW provided business card. SW will continue to monitor needs and assist as appropriate.          Electronically signed by KHUSHBOO Mcdonald on 8/15/2023 at 12:46 PM

## 2023-08-15 NOTE — PLAN OF CARE
Problem: Discharge Planning  Goal: Discharge to home or other facility with appropriate resources  Outcome: Progressing     Problem: Pain  Goal: Verbalizes/displays adequate comfort level or baseline comfort level  Outcome: Progressing  Flowsheets (Taken 8/15/2023 1312)  Verbalizes/displays adequate comfort level or baseline comfort level:   Encourage patient to monitor pain and request assistance   Assess pain using appropriate pain scale   Administer analgesics based on type and severity of pain and evaluate response   Implement non-pharmacological measures as appropriate and evaluate response   Consider cultural and social influences on pain and pain management   Notify Licensed Independent Practitioner if interventions unsuccessful or patient reports new pain  Note: Patient has not had any c/o pain.      Problem: Safety - Adult  Goal: Free from fall injury  Outcome: Progressing     Problem: ABCDS Injury Assessment  Goal: Absence of physical injury  Outcome: Progressing     Problem: Chronic Conditions and Co-morbidities  Goal: Patient's chronic conditions and co-morbidity symptoms are monitored and maintained or improved  Outcome: Progressing

## 2023-08-15 NOTE — CONSULTS
Subjective:  Ja Ross is a 69 y.o. male who presents to the hospital for possible sepsis and cellulitis diabetic foot ulcer. Consulted for diabetic foot ulcer. Patient states his foot feels little better. Patient still has nausea but thinks his fever is better. Patient states he had an x-ray performed and lab work done. Patient is tolerated the IV antibiotics well. Allergies   Allergen Reactions    Bee Venom Anaphylaxis    Naproxen      Other reaction(s): Unknown Reaction    Semaglutide      Other reaction(s): Constipation    Empagliflozin Rash       Past Medical History:   Diagnosis Date    Allergic rhinitis     Colon polyps     history of    Elevated WBC count     history of    Hyperlipidemia     Hypertension     Obesity     Pain, joint, hand, left 7/28/2021    Partial small bowel obstruction ([address]) 3/5/2023    SBO (small bowel obstruction) ([address]) 3/12/2023    Sepsis with acute renal failure ([address]) 3/5/2023    Type II or unspecified type diabetes mellitus without mention of complication, not stated as uncontrolled     Unspecified sleep apnea     Weakness of left hand 7/28/2021       Prior to Admission medications    Medication Sig Start Date End Date Taking?  Authorizing Provider   ciprofloxacin (CIPRO) 500 MG tablet Take 1 tablet by mouth 2 times daily for 10 days 8/14/23 8/24/23  Konstantin Bain DPM   clindamycin (CLEOCIN) 300 MG capsule Take 1 capsule by mouth 3 times daily for 10 days 8/14/23 8/24/23  Manual Dessert OLVIN Reyna   Insulin Glargine-yfgn 100 UNIT/ML SOPN INJECT 40 UNITS UNDER SKIN AT BEDTIME FOR TYPE 2 DIABETES MELLITUS 6/14/23   Historical Provider, MD   lidocaine (LIDODERM) 5 % APPLY 1 PATCH TO SKIN EVERY DAY FOR UP TO 12 HOURS (ON FOR 12 HOURS AND OFF FOR 12 HOURS) WITHIN A 24-HOUR PERIOD  Patient not taking: Reported on 8/14/2023 12/13/22   Historical Provider, MD   tamsulosin (FLOMAX) 0.4 MG capsule 1 capsule 3/27/23   Historical Provider, MD   atorvastatin (LIPITOR) 80 MG tablet 0.5 over top. Change daily     Standing Status:   Standing     Number of Occurrences:   1    Full Code     Standing Status:   Standing     Number of Occurrences:   1    Pharmacy to Dose: Vancomycin     Standing Status:   Standing     Number of Occurrences:   1     Order Specific Question:   Antimicrobial Indications     Answer:   Skin and Soft Tissue Infection     Order Specific Question:   Skin duration of therapy     Answer:   7 days    Inpatient consult to Podiatry     Standing Status:   Standing     Number of Occurrences:   1     Order Specific Question:   Reason for Consult? Answer:   left foot wound     Order Specific Question:   Provider Contacted? Answer:   No     Order Specific Question:   When to contact consulting provider     Answer: Tomorrow    OT eval and treat     Standing Status:   Standing     Number of Occurrences:   1    PT evaluation and treat     Standing Status:   Standing     Number of Occurrences:   1    Initiate Oxygen Therapy Protocol     Initiate oxygen therapy if the patient has 1) SpO2 is less than 90%, 2) Cyanosis, Chest Pain, Dyspnea, or Altered level of consciousness AND SpO2 checked and it is less than 90%, or 3) patient on Home oxygen. To initiate oxygen therapy: nurse enters RT51 Nasal cannula oxygen order using Per Protocol order mode (if indication Home Oxygen then change L/min to same amount at home and change Wean to Room Air to No). Notify provider if initiate oxygen therapy unless already on Home Oxygen. Standing Status:   Standing     Number of Occurrences:   7    Incentive spirometry RT     Standing Status:   Standing     Number of Occurrences:   55    POCT Glucose     Standing Status:   Standing     Number of Occurrences:   25    POCT Glucose     Repeat blood glucose 15 minutes following intervention. If blood glucose is LESS THAN 70 mg/dL, repeat treatment and recheck blood glucose in 15 minutes x 2.   If blood glucose remains LESS THAN 70 mg/dL, call Status:   Standing     Number of Occurrences:   [id number]    HYPOGLYCEMIA TREATMENT: blood glucose LESS THAN 70 mg/dL and patient NOT ALERT or NPO     Give dextrose 10% bolus intravenously. If patient does not respond within 15 minutes, repeat dose x 1. If blood glucose fails to stabilize after 2 intravenous boluses start dextrose 10% at 100 mL/hour and repeat blood glucose at 30 and 60 minutes. If blood glucose is GREATER THAN 70 mg/dL after 60 minutes, discontinue dextrose 10% infusion. If blood glucose remains LESS THAN 70 mg/dL after two dextrose 10% boluses, notify provider. If no intravenous access, administer glucagon 1 mg. After administration, attempt intravenous access and start dextrose 10% infusion as specified above. Standing Status:   Standing     Number of Occurrences:   [id number]    Vital signs per unit routine     Standing Status:   Standing     Number of Occurrences:   1    Up with assistance     Standing Status:   Standing     Number of Occurrences:   1    Admission/Observation order previously placed     Standing Status:   Standing     Number of Occurrences:   1    Telemetry monitoring - 72 hour duration     Standing Status:   Standing     Number of Occurrences:   1     Order Specific Question:   Patient may go off unit without monitor     Answer:   No    Elevate extremity     LLE     Standing Status:   Standing     Number of Occurrences:   1    Intake and output     Standing Status:   Standing     Number of Occurrences:   1    Daily weights     Standing Status:   Standing     Number of Occurrences:   1    Change dressing     Apply iodoform gauze packing to L foot ulcer with dry dressing over top.   Change daily     Standing Status:   Standing     Number of Occurrences:   1    Full Code     Standing Status:   Standing     Number of Occurrences:   1    Pharmacy to Dose: Vancomycin     Standing Status:   Standing     Number of Occurrences:   1     Order Specific Question:   Antimicrobial Indications

## 2023-08-15 NOTE — PROGRESS NOTES
rounding in PCU. Assessment: Patient was sitting up in bed and open to a visit. Patient shared about his medical situation, his past  experiences in the [facility], and where he met his wife. Patient is recently  (17 months) and has the support of his two daughter, son, their families and attends the [employer]. 08/15/23 1605   Encounter Summary   Encounter Overview/Reason  Initial Encounter   Service Provided For: Patient   Referral/Consult From: [address] Children;Family members; Buddhism/darcy community;Friends/neighbors   Last Encounter  08/15/23   Complexity of Encounter Low   Begin Time 1456   End Time  1520   Total Time Calculated 24 min   Spiritual/Emotional needs   Type Spiritual Support   Assessment/Intervention/Outcome   Assessment Calm;Coping; Hopeful   Intervention Active listening;Prayer (assurance of)/[place]   Outcome Acceptance;Engaged in conversation;Expressed Gratitude;Grieving;Receptive   Plan and Referrals   Plan/Referrals Continue to visit, (comment)       Intervention: Engaged in conversation and active listening. Prayed with Patient. Outcome: Patient expressed appreciation for visit and offer of continued prayer. Plan: Chaplains are available on site or on call 24/7 for spiritual and emotional support.     Electronically signed by Ana Rosa Rios on 8/15/2023 at 4:06 PM

## 2023-08-15 NOTE — TELEPHONE ENCOUNTER
Inpatient consult:     Room 218    Ref Dr Dia Pastor     Dx Left foot wound     *PCU aware Dr Linsey Vyas is not on call*

## 2023-08-15 NOTE — PROGRESS NOTES
Physical Therapy    Patient refused physical therapy evaluation this morning. Reporting he is not feeling well enough and is too fatigued to participate. Nursing informed. Will try back at  a later time for therapy evaluation.      Naveen Sifuentes, PT

## 2023-08-16 LAB
ANION GAP SERPL CALCULATED.3IONS-SCNC: 10 MMOL/L (ref 9–17)
BASOPHILS # BLD: 0.06 K/UL (ref 0–0.2)
BASOPHILS NFR BLD: 1 % (ref 0–2)
BUN SERPL-MCNC: 18 MG/DL (ref 8–23)
BUN/CREAT SERPL: 18 (ref 9–20)
CALCIUM SERPL-MCNC: 8.4 MG/DL (ref 8.6–10.4)
CHLORIDE SERPL-SCNC: 103 MMOL/L (ref 98–107)
CO2 SERPL-SCNC: 18 MMOL/L (ref 20–31)
CREAT SERPL-MCNC: 1 MG/DL (ref 0.7–1.2)
EOSINOPHIL # BLD: 0.33 K/UL (ref 0–0.44)
EOSINOPHILS RELATIVE PERCENT: 4 % (ref 1–4)
ERYTHROCYTE [DISTWIDTH] IN BLOOD BY AUTOMATED COUNT: 13.1 % (ref 11.8–14.4)
GFR SERPL CREATININE-BSD FRML MDRD: >60 ML/MIN/1.73M2
GLUCOSE BLD-MCNC: 173 MG/DL (ref 75–110)
GLUCOSE BLD-MCNC: 217 MG/DL (ref 75–110)
GLUCOSE BLD-MCNC: 251 MG/DL (ref 75–110)
GLUCOSE SERPL-MCNC: 106 MG/DL (ref 70–99)
HCT VFR BLD AUTO: 31.7 % (ref 40.7–50.3)
HGB BLD-MCNC: 10.1 G/DL (ref 13–17)
IMM GRANULOCYTES # BLD AUTO: 0.04 K/UL (ref 0–0.3)
IMM GRANULOCYTES NFR BLD: 1 %
LYMPHOCYTES NFR BLD: 1.25 K/UL (ref 1.1–3.7)
LYMPHOCYTES RELATIVE PERCENT: 16 % (ref 24–43)
MCH RBC QN AUTO: 27.4 PG (ref 25.2–33.5)
MCHC RBC AUTO-ENTMCNC: 31.9 G/DL (ref 25.2–33.5)
MCV RBC AUTO: 85.9 FL (ref 82.6–102.9)
MONOCYTES NFR BLD: 0.77 K/UL (ref 0.1–1.2)
MONOCYTES NFR BLD: 10 % (ref 3–12)
NEUTROPHILS NFR BLD: 68 % (ref 36–65)
NEUTS SEG NFR BLD: 5.26 K/UL (ref 1.5–8.1)
NRBC BLD-RTO: 0 PER 100 WBC
PLATELET # BLD AUTO: 379 K/UL (ref 138–453)
PMV BLD AUTO: 9 FL (ref 8.1–13.5)
POTASSIUM SERPL-SCNC: 4 MMOL/L (ref 3.7–5.3)
RBC # BLD AUTO: 3.69 M/UL (ref 4.21–5.77)
SODIUM SERPL-SCNC: 131 MMOL/L (ref 135–144)
VANCOMYCIN SERPL-MCNC: 12.3 UG/ML
WBC OTHER # BLD: 7.7 K/UL (ref 3.5–11.3)

## 2023-08-16 PROCEDURE — 6360000002 HC RX W HCPCS: Performed by: INTERNAL MEDICINE

## 2023-08-16 PROCEDURE — 2580000003 HC RX 258: Performed by: INTERNAL MEDICINE

## 2023-08-16 PROCEDURE — 6370000000 HC RX 637 (ALT 250 FOR IP): Performed by: NURSE PRACTITIONER

## 2023-08-16 PROCEDURE — 80048 BASIC METABOLIC PNL TOTAL CA: CPT

## 2023-08-16 PROCEDURE — 6360000002 HC RX W HCPCS: Performed by: EMERGENCY MEDICINE

## 2023-08-16 PROCEDURE — 85025 COMPLETE CBC W/AUTO DIFF WBC: CPT

## 2023-08-16 PROCEDURE — 99231 SBSQ HOSP IP/OBS SF/LOW 25: CPT | Performed by: INTERNAL MEDICINE

## 2023-08-16 PROCEDURE — 80202 ASSAY OF VANCOMYCIN: CPT

## 2023-08-16 PROCEDURE — 97165 OT EVAL LOW COMPLEX 30 MIN: CPT | Performed by: OCCUPATIONAL THERAPIST

## 2023-08-16 PROCEDURE — 2580000003 HC RX 258: Performed by: EMERGENCY MEDICINE

## 2023-08-16 PROCEDURE — 97161 PT EVAL LOW COMPLEX 20 MIN: CPT | Performed by: PHYSICAL THERAPIST

## 2023-08-16 PROCEDURE — 82947 ASSAY GLUCOSE BLOOD QUANT: CPT

## 2023-08-16 PROCEDURE — 36415 COLL VENOUS BLD VENIPUNCTURE: CPT

## 2023-08-16 PROCEDURE — 6370000000 HC RX 637 (ALT 250 FOR IP): Performed by: INTERNAL MEDICINE

## 2023-08-16 PROCEDURE — 2060000000 HC ICU INTERMEDIATE R&B

## 2023-08-16 RX ORDER — LACTULOSE 10 G/15ML
20 SOLUTION ORAL 3 TIMES DAILY
Status: DISCONTINUED | OUTPATIENT
Start: 2023-08-16 | End: 2023-08-18

## 2023-08-16 RX ORDER — POLYETHYLENE GLYCOL 3350 17 G/17G
17 POWDER, FOR SOLUTION ORAL DAILY
Status: DISCONTINUED | OUTPATIENT
Start: 2023-08-16 | End: 2023-08-18 | Stop reason: HOSPADM

## 2023-08-16 RX ADMIN — ACETAMINOPHEN 650 MG: 325 TABLET ORAL at 18:19

## 2023-08-16 RX ADMIN — SODIUM CHLORIDE: 9 INJECTION, SOLUTION INTRAVENOUS at 13:17

## 2023-08-16 RX ADMIN — VANCOMYCIN HYDROCHLORIDE 1000 MG: 1 INJECTION, POWDER, LYOPHILIZED, FOR SOLUTION INTRAVENOUS at 20:27

## 2023-08-16 RX ADMIN — LACTULOSE 20 G: 20 SOLUTION ORAL at 20:27

## 2023-08-16 RX ADMIN — GABAPENTIN 300 MG: 300 CAPSULE ORAL at 08:40

## 2023-08-16 RX ADMIN — POLYETHYLENE GLYCOL 3350 17 G: 17 POWDER, FOR SOLUTION ORAL at 11:31

## 2023-08-16 RX ADMIN — LACTULOSE 20 G: 20 SOLUTION ORAL at 11:31

## 2023-08-16 RX ADMIN — INSULIN GLARGINE 30 UNITS: 100 INJECTION, SOLUTION SUBCUTANEOUS at 08:41

## 2023-08-16 RX ADMIN — CEFEPIME 2000 MG: 2 INJECTION, POWDER, FOR SOLUTION INTRAVENOUS at 16:56

## 2023-08-16 RX ADMIN — INSULIN LISPRO 1 UNITS: 100 INJECTION, SOLUTION INTRAVENOUS; SUBCUTANEOUS at 11:44

## 2023-08-16 RX ADMIN — SODIUM CHLORIDE, PRESERVATIVE FREE 10 ML: 5 INJECTION INTRAVENOUS at 20:27

## 2023-08-16 RX ADMIN — VANCOMYCIN HYDROCHLORIDE 1000 MG: 1 INJECTION, POWDER, LYOPHILIZED, FOR SOLUTION INTRAVENOUS at 09:50

## 2023-08-16 RX ADMIN — CEFEPIME 2000 MG: 2 INJECTION, POWDER, FOR SOLUTION INTRAVENOUS at 04:54

## 2023-08-16 RX ADMIN — LISINOPRIL 20 MG: 20 TABLET ORAL at 08:40

## 2023-08-16 RX ADMIN — HYDROCHLOROTHIAZIDE 12.5 MG: 12.5 TABLET ORAL at 08:40

## 2023-08-16 RX ADMIN — ATORVASTATIN CALCIUM 40 MG: 40 TABLET, FILM COATED ORAL at 20:27

## 2023-08-16 RX ADMIN — INSULIN GLARGINE 30 UNITS: 100 INJECTION, SOLUTION SUBCUTANEOUS at 20:27

## 2023-08-16 RX ADMIN — INSULIN LISPRO 5 UNITS: 100 INJECTION, SOLUTION INTRAVENOUS; SUBCUTANEOUS at 11:44

## 2023-08-16 RX ADMIN — SODIUM CHLORIDE: 9 INJECTION, SOLUTION INTRAVENOUS at 03:26

## 2023-08-16 RX ADMIN — TAMSULOSIN HYDROCHLORIDE 0.4 MG: 0.4 CAPSULE ORAL at 08:41

## 2023-08-16 RX ADMIN — ENOXAPARIN SODIUM 30 MG: 100 INJECTION SUBCUTANEOUS at 08:41

## 2023-08-16 RX ADMIN — THERA TABS 1 TABLET: TAB at 08:41

## 2023-08-16 RX ADMIN — LACTULOSE 20 G: 20 SOLUTION ORAL at 16:53

## 2023-08-16 RX ADMIN — SODIUM CHLORIDE: 9 INJECTION, SOLUTION INTRAVENOUS at 20:24

## 2023-08-16 RX ADMIN — ENOXAPARIN SODIUM 30 MG: 100 INJECTION SUBCUTANEOUS at 20:27

## 2023-08-16 RX ADMIN — GABAPENTIN 300 MG: 300 CAPSULE ORAL at 20:27

## 2023-08-16 RX ADMIN — ASPIRIN 81 MG: 81 TABLET, COATED ORAL at 08:40

## 2023-08-16 RX ADMIN — INSULIN LISPRO 5 UNITS: 100 INJECTION, SOLUTION INTRAVENOUS; SUBCUTANEOUS at 16:53

## 2023-08-16 NOTE — PLAN OF CARE
Problem: Discharge Planning  Goal: Discharge to home or other facility with appropriate resources  8/16/2023 1052 by Susan De Jesus RN  Outcome: Progressing  8/16/2023 0032 by Kayy Tay RN  Outcome: Progressing     Problem: Pain  Goal: Verbalizes/displays adequate comfort level or baseline comfort level  8/16/2023 1052 by Susan De Jesus RN  Outcome: Progressing  8/16/2023 0032 by Kayy Tay RN  Outcome: Progressing     Problem: Safety - Adult  Goal: Free from fall injury  8/16/2023 1052 by Susan De Jesus RN  Outcome: Progressing  8/16/2023 0032 by Kayy Tay RN  Outcome: Progressing     Problem: ABCDS Injury Assessment  Goal: Absence of physical injury  8/16/2023 1052 by Susan De Jesus RN  Outcome: Progressing  8/16/2023 0032 by Kayy Tay RN  Outcome: Progressing     Problem: Chronic Conditions and Co-morbidities  Goal: Patient's chronic conditions and co-morbidity symptoms are monitored and maintained or improved  8/16/2023 1052 by Susan De Jesus RN  Outcome: Progressing  8/16/2023 0032 by Kayy Tay RN  Outcome: Progressing     Problem: Nutrition Deficit:  Goal: Optimize nutritional status  Outcome: Progressing

## 2023-08-16 NOTE — PLAN OF CARE
Problem: Discharge Planning  Goal: Discharge to home or other facility with appropriate resources  8/16/2023 0032 by Joshua Hay RN  Outcome: Progressing  8/15/2023 1312 by Orelia Mcburney, RN  Outcome: Progressing     Problem: Pain  Goal: Verbalizes/displays adequate comfort level or baseline comfort level  8/16/2023 0032 by Joshua Hay RN  Outcome: Progressing  8/15/2023 1312 by Orelia Mcburney, RN  Outcome: Progressing  Flowsheets (Taken 8/15/2023 1312)  Verbalizes/displays adequate comfort level or baseline comfort level:   Encourage patient to monitor pain and request assistance   Assess pain using appropriate pain scale   Administer analgesics based on type and severity of pain and evaluate response   Implement non-pharmacological measures as appropriate and evaluate response   Consider cultural and social influences on pain and pain management   Notify Licensed Independent Practitioner if interventions unsuccessful or patient reports new pain  Note: Patient has not had any c/o pain.      Problem: Safety - Adult  Goal: Free from fall injury  8/16/2023 0032 by Joshua Hay RN  Outcome: Progressing  8/15/2023 1312 by Orelia Mcburney, RN  Outcome: Progressing     Problem: ABCDS Injury Assessment  Goal: Absence of physical injury  8/16/2023 0032 by Joshua Hay RN  Outcome: Progressing  8/15/2023 1312 by Orelia Mcburney, RN  Outcome: Progressing     Problem: Chronic Conditions and Co-morbidities  Goal: Patient's chronic conditions and co-morbidity symptoms are monitored and maintained or improved  8/16/2023 0032 by Joshua Hay RN  Outcome: Progressing  8/15/2023 1312 by Orelia Mcburney, RN  Outcome: Progressing

## 2023-08-16 NOTE — PROGRESS NOTES
Occupational Therapy  Facility/Department: [address] [place] [address]  Occupational Therapy Initial Assessment    Name: Camila Mccollum  : 1955  MRN: [id number]  Date of Service: 2023    Discharge Recommendations:  Home with assist PRN          Patient Diagnosis(es): The primary encounter diagnosis was Sepsis, due to unspecified organism, unspecified whether acute organ dysfunction present [facility]). A diagnosis of Ulcer of left foot, unspecified ulcer stage ([address]) was also pertinent to this visit. Past Medical History:  has a past medical history of Allergic rhinitis, Colon polyps, Elevated WBC count, Hyperlipidemia, Hypertension, Obesity, Pain, joint, hand, left, Partial small bowel obstruction (HCC), SBO (small bowel obstruction) ([address]), Sepsis with acute renal failure ([address]), Type II or unspecified type diabetes mellitus without mention of complication, not stated as uncontrolled, Unspecified sleep apnea, and Weakness of left hand. Past Surgical History:  has a past surgical history that includes Carpal tunnel release (Right, 12/3/2010); Colonoscopy (2009); toenail excision (Left, 2003); Inguinal hernia repair (Right, 1977); and laparoscopy (N/A, 3/5/2023).     Assessment   Prognosis: Good  Decision Making: Low Complexity  No Skilled OT: Patient refusal (patient declined education regarding adaptive equipment at this time and stated he did not need occupational therapy at this time.)        Plan   Occupational Therapy Plan  Times Per Week: acute care OT not recommended       Subjective   General  Chart Reviewed: Yes  Patient assessed for rehabilitation services?: Yes  Family / Caregiver Present: No  Referring Practitioner: IGOR Parker  Diagnosis: diabetic ulcer L foot     Social/Functional History  Social/Functional History  Lives With: Son  Type of Home: House  Home Layout: One level  Home Access: Stairs to enter without rails  Entrance Stairs - Number of Steps: 1  Bathroom Shower/Tub: Walk-in

## 2023-08-16 NOTE — PROGRESS NOTES
Comprehensive Nutrition Assessment    Type and Reason for Visit:  Initial    Nutrition Recommendations/Plan:   Provide diet rx as ordered   Provide ensure enlive QD   Monitor labs as they become available   Monitor skin integrity/weights     Malnutrition Assessment:  Malnutrition Status: At risk for malnutrition (Comment) (age, diabetic ulcers.) (08/16/23 0914)    Context:  Acute Illness     Findings of the 6 clinical characteristics of malnutrition:  Energy Intake:  No significant decrease in energy intake  Weight Loss:  No significant weight loss     Body Fat Loss:  No significant body fat loss     Muscle Mass Loss:  No significant muscle mass loss    Fluid Accumulation:  No significant fluid accumulation     Strength:  Not Performed    Nutrition Assessment:    sent to ER from the podiatrists office due to fevers and worsening of his left foot ulcer with surrounding cellulitis s/p debridement today, poor appetite noted in documentation, add Ensure enlive QD to provide 350 kcals and 20 grams of protein if 100% consumed to aid in wound healing and weight maintenance Patient receives home healh from [facility]. Wt 8/16 259#, wt 8/14 264# had a 5# loss x 2 days not sure accuracy, 3/18 wt was 255# had a 4# gain x 5 months. Nutrition Related Findings:    RLE trace edema, LLE +1 pitting edema, active sounds. D/C planning home with [facility]. Wound Type: Diabetic Ulcer (left plantar)       Current Nutrition Intake & Therapies:    Average Meal Intake: %     ADULT DIET; Regular; 4 carb choices (60 gm/meal)    Anthropometric Measures:  Height: 5' 11\" (180.3 cm)  Ideal Body Weight (IBW): 172 lbs (78 kg)       Current Body Weight: 259 lb (117.5 kg), 150.6 % IBW.  Weight Source: Bed Scale  Current BMI (kg/m2): 36.1        Weight Adjustment For: No Adjustment                 BMI Categories: Obese Class 2 (BMI 35.0 -39.9)    Estimated Daily Nutrient Needs:  Energy Requirements Based On: Kcal/kg  Weight Used for Energy

## 2023-08-16 NOTE — PROGRESS NOTES
Physical Therapy  Facility/Department: [address] [place] [address]  Physical Therapy Initial Assessment    Name: Karlene Pradhan  : 1955  MRN: [id number]  Date of Service: 2023    Discharge Recommendations:  Home with assist PRN          Patient Diagnosis(es): The primary encounter diagnosis was Sepsis, due to unspecified organism, unspecified whether acute organ dysfunction present [facility]). A diagnosis of Ulcer of left foot, unspecified ulcer stage ([address]) was also pertinent to this visit. Past Medical History:  has a past medical history of Allergic rhinitis, Colon polyps, Elevated WBC count, Hyperlipidemia, Hypertension, Obesity, Pain, joint, hand, left, Partial small bowel obstruction (HCC), SBO (small bowel obstruction) ([address]), Sepsis with acute renal failure ([address]), Type II or unspecified type diabetes mellitus without mention of complication, not stated as uncontrolled, Unspecified sleep apnea, and Weakness of left hand. Past Surgical History:  has a past surgical history that includes Carpal tunnel release (Right, 12/3/2010); Colonoscopy (2009); toenail excision (Left, 2003); Inguinal hernia repair (Right, 1977); and laparoscopy (N/A, 3/5/2023). Assessment   Body Structures, Functions, Activity Limitations Requiring Skilled Therapeutic Intervention: Decreased functional mobility ; Decreased endurance;Decreased balance  Therapy Prognosis: Good  Decision Making: Low Complexity  Activity Tolerance  Activity Tolerance: Patient tolerated treatment well;Treatment limited secondary to medical complications     Plan   Physcial Therapy Plan  General Plan: 3-5 times per week  Current Treatment Recommendations: Gait training, Transfer training  Safety Devices  Type of Devices: Left in bed, Call light within reach     Restrictions        Subjective   General  Chart Reviewed: Yes  Response To Previous Treatment: Not applicable  Family / Caregiver Present: No  Follows Commands: Within Functional Limits Social/Functional History  Social/Functional History  Lives With: Son  Type of Home: House  Home Layout: One level  Home Access: Stairs to enter without rails  Entrance Stairs - Number of Steps: 1  Bathroom Shower/Tub: Walk-in shower  Bathroom Toilet: Handicap height  Bathroom Equipment: Built-in shower seat, Grab bars in shower, Grab bars around toilet  Bathroom Accessibility: [address] [place] [address] Street: [employer] From: Family  ADL Assistance: [address] Independent  Homemaking Responsibilities: Yes  Ambulation Assistance: Independent  Transfer Assistance: Independent  Active : Yes  Mode of Transportation: [place]  Occupation: Retired  IADL Comments: L foot in off-load boot approx 1 mon  Vision/Hearing       Cognition   Orientation  Overall Orientation Status: Within Normal Limits     Objective   Pulse: 95  Heart Rate Source: Monitor  BP: (!) 129/54  BP Location: Left Arm  BP Method: Automatic  Patient Position: Semi fowlers  MAP (Calculated): 79  Respirations: 20  SpO2: 98 %  O2 Device: None (Room air)                             Bed mobility  Rolling to Left: Independent  Rolling to Right: Independent  Supine to Sit: Independent  Sit to Supine: Independent  Scooting: Independent  Transfers  Sit to Stand: Supervision  Stand to Sit: Independent  Ambulation  Surface: Level tile  Device: Single point cane  Other Apparatus: AFO  Assistance: Supervision  Comments: Slow with off-loading boot.  A walker may assist in the unloading of the L foot     Balance  Sitting - Static: Good  Sitting - Dynamic: Good  Standing - Static: Good  Standing - Dynamic: Fair           OutComes Score                                                  AM-PAC Score             Tinneti Score       Goals  Short Term Goals  Time Frame for Short Term Goals: 1 day  Short Term Goal 1: Assess functional status  Long Term Goals  Time Frame for Long Term Goals : 2-3 days  Long Term Goal 1: Gait with cane 40

## 2023-08-17 LAB
ANION GAP SERPL CALCULATED.3IONS-SCNC: 12 MMOL/L (ref 9–17)
BASOPHILS # BLD: 0.07 K/UL (ref 0–0.2)
BASOPHILS NFR BLD: 1 % (ref 0–2)
BUN SERPL-MCNC: 12 MG/DL (ref 8–23)
BUN/CREAT SERPL: 12 (ref 9–20)
CALCIUM SERPL-MCNC: 8.7 MG/DL (ref 8.6–10.4)
CHLORIDE SERPL-SCNC: 103 MMOL/L (ref 98–107)
CO2 SERPL-SCNC: 20 MMOL/L (ref 20–31)
CREAT SERPL-MCNC: 1 MG/DL (ref 0.7–1.2)
EOSINOPHIL # BLD: 0.42 K/UL (ref 0–0.44)
EOSINOPHILS RELATIVE PERCENT: 4 % (ref 1–4)
ERYTHROCYTE [DISTWIDTH] IN BLOOD BY AUTOMATED COUNT: 12.9 % (ref 11.8–14.4)
GFR SERPL CREATININE-BSD FRML MDRD: >60 ML/MIN/1.73M2
GLUCOSE BLD-MCNC: 138 MG/DL (ref 75–110)
GLUCOSE BLD-MCNC: 185 MG/DL (ref 75–110)
GLUCOSE BLD-MCNC: 211 MG/DL (ref 75–110)
GLUCOSE SERPL-MCNC: 136 MG/DL (ref 70–99)
HCT VFR BLD AUTO: 30.6 % (ref 40.7–50.3)
HGB BLD-MCNC: 9.7 G/DL (ref 13–17)
IMM GRANULOCYTES # BLD AUTO: 0.06 K/UL (ref 0–0.3)
IMM GRANULOCYTES NFR BLD: 1 %
LYMPHOCYTES NFR BLD: 1.22 K/UL (ref 1.1–3.7)
LYMPHOCYTES RELATIVE PERCENT: 12 % (ref 24–43)
MCH RBC QN AUTO: 27.3 PG (ref 25.2–33.5)
MCHC RBC AUTO-ENTMCNC: 31.7 G/DL (ref 25.2–33.5)
MCV RBC AUTO: 86.2 FL (ref 82.6–102.9)
MICROORGANISM SPEC CULT: ABNORMAL
MICROORGANISM/AGENT SPEC: ABNORMAL
MICROORGANISM/AGENT SPEC: ABNORMAL
MONOCYTES NFR BLD: 0.79 K/UL (ref 0.1–1.2)
MONOCYTES NFR BLD: 8 % (ref 3–12)
NEUTROPHILS NFR BLD: 74 % (ref 36–65)
NEUTS SEG NFR BLD: 7.48 K/UL (ref 1.5–8.1)
NRBC BLD-RTO: 0 PER 100 WBC
PLATELET # BLD AUTO: 435 K/UL (ref 138–453)
PMV BLD AUTO: 8.9 FL (ref 8.1–13.5)
POTASSIUM SERPL-SCNC: 4 MMOL/L (ref 3.7–5.3)
RBC # BLD AUTO: 3.55 M/UL (ref 4.21–5.77)
SODIUM SERPL-SCNC: 135 MMOL/L (ref 135–144)
SPECIMEN DESCRIPTION: ABNORMAL
WBC OTHER # BLD: 10 K/UL (ref 3.5–11.3)

## 2023-08-17 PROCEDURE — 2580000003 HC RX 258: Performed by: INTERNAL MEDICINE

## 2023-08-17 PROCEDURE — 2580000003 HC RX 258: Performed by: EMERGENCY MEDICINE

## 2023-08-17 PROCEDURE — 85025 COMPLETE CBC W/AUTO DIFF WBC: CPT

## 2023-08-17 PROCEDURE — 6370000000 HC RX 637 (ALT 250 FOR IP): Performed by: INTERNAL MEDICINE

## 2023-08-17 PROCEDURE — 97116 GAIT TRAINING THERAPY: CPT | Performed by: PHYSICAL THERAPY ASSISTANT

## 2023-08-17 PROCEDURE — 80048 BASIC METABOLIC PNL TOTAL CA: CPT

## 2023-08-17 PROCEDURE — 2060000000 HC ICU INTERMEDIATE R&B

## 2023-08-17 PROCEDURE — 99231 SBSQ HOSP IP/OBS SF/LOW 25: CPT | Performed by: INTERNAL MEDICINE

## 2023-08-17 PROCEDURE — 6360000002 HC RX W HCPCS: Performed by: INTERNAL MEDICINE

## 2023-08-17 PROCEDURE — 6360000002 HC RX W HCPCS: Performed by: EMERGENCY MEDICINE

## 2023-08-17 PROCEDURE — 36415 COLL VENOUS BLD VENIPUNCTURE: CPT

## 2023-08-17 PROCEDURE — 82947 ASSAY GLUCOSE BLOOD QUANT: CPT

## 2023-08-17 PROCEDURE — 6370000000 HC RX 637 (ALT 250 FOR IP): Performed by: NURSE PRACTITIONER

## 2023-08-17 RX ORDER — INSULIN LISPRO 100 [IU]/ML
7 INJECTION, SOLUTION INTRAVENOUS; SUBCUTANEOUS
Status: DISCONTINUED | OUTPATIENT
Start: 2023-08-17 | End: 2023-08-18 | Stop reason: HOSPADM

## 2023-08-17 RX ORDER — INSULIN GLARGINE 100 [IU]/ML
35 INJECTION, SOLUTION SUBCUTANEOUS 2 TIMES DAILY
Status: DISCONTINUED | OUTPATIENT
Start: 2023-08-17 | End: 2023-08-18 | Stop reason: HOSPADM

## 2023-08-17 RX ADMIN — TAMSULOSIN HYDROCHLORIDE 0.4 MG: 0.4 CAPSULE ORAL at 09:22

## 2023-08-17 RX ADMIN — ENOXAPARIN SODIUM 30 MG: 100 INJECTION SUBCUTANEOUS at 21:10

## 2023-08-17 RX ADMIN — INSULIN LISPRO 5 UNITS: 100 INJECTION, SOLUTION INTRAVENOUS; SUBCUTANEOUS at 09:00

## 2023-08-17 RX ADMIN — POLYETHYLENE GLYCOL 3350 17 G: 17 POWDER, FOR SOLUTION ORAL at 09:23

## 2023-08-17 RX ADMIN — SODIUM CHLORIDE: 9 INJECTION, SOLUTION INTRAVENOUS at 15:16

## 2023-08-17 RX ADMIN — INSULIN GLARGINE 30 UNITS: 100 INJECTION, SOLUTION SUBCUTANEOUS at 09:00

## 2023-08-17 RX ADMIN — ATORVASTATIN CALCIUM 40 MG: 40 TABLET, FILM COATED ORAL at 21:09

## 2023-08-17 RX ADMIN — SODIUM CHLORIDE: 9 INJECTION, SOLUTION INTRAVENOUS at 23:58

## 2023-08-17 RX ADMIN — SODIUM CHLORIDE, PRESERVATIVE FREE 10 ML: 5 INJECTION INTRAVENOUS at 21:12

## 2023-08-17 RX ADMIN — GABAPENTIN 300 MG: 300 CAPSULE ORAL at 21:09

## 2023-08-17 RX ADMIN — LACTULOSE 20 G: 20 SOLUTION ORAL at 15:15

## 2023-08-17 RX ADMIN — ENOXAPARIN SODIUM 30 MG: 100 INJECTION SUBCUTANEOUS at 09:23

## 2023-08-17 RX ADMIN — SODIUM CHLORIDE, PRESERVATIVE FREE 10 ML: 5 INJECTION INTRAVENOUS at 09:23

## 2023-08-17 RX ADMIN — CEFEPIME 2000 MG: 2 INJECTION, POWDER, FOR SOLUTION INTRAVENOUS at 04:44

## 2023-08-17 RX ADMIN — ASPIRIN 81 MG: 81 TABLET, COATED ORAL at 09:22

## 2023-08-17 RX ADMIN — LACTULOSE 20 G: 20 SOLUTION ORAL at 09:22

## 2023-08-17 RX ADMIN — SODIUM CHLORIDE: 9 INJECTION, SOLUTION INTRAVENOUS at 04:42

## 2023-08-17 RX ADMIN — CEFTRIAXONE 1000 MG: 1 INJECTION, POWDER, FOR SOLUTION INTRAMUSCULAR; INTRAVENOUS at 15:25

## 2023-08-17 RX ADMIN — ACETAMINOPHEN 650 MG: 325 TABLET ORAL at 07:44

## 2023-08-17 RX ADMIN — THERA TABS 1 TABLET: TAB at 09:22

## 2023-08-17 RX ADMIN — HYDROCHLOROTHIAZIDE 12.5 MG: 12.5 TABLET ORAL at 09:22

## 2023-08-17 RX ADMIN — LACTULOSE 20 G: 20 SOLUTION ORAL at 21:09

## 2023-08-17 RX ADMIN — INSULIN GLARGINE 35 UNITS: 100 INJECTION, SOLUTION SUBCUTANEOUS at 21:09

## 2023-08-17 RX ADMIN — GABAPENTIN 300 MG: 300 CAPSULE ORAL at 09:22

## 2023-08-17 RX ADMIN — INSULIN LISPRO 5 UNITS: 100 INJECTION, SOLUTION INTRAVENOUS; SUBCUTANEOUS at 12:25

## 2023-08-17 RX ADMIN — INSULIN LISPRO 7 UNITS: 100 INJECTION, SOLUTION INTRAVENOUS; SUBCUTANEOUS at 17:48

## 2023-08-17 RX ADMIN — LISINOPRIL 20 MG: 20 TABLET ORAL at 09:22

## 2023-08-17 RX ADMIN — VANCOMYCIN HYDROCHLORIDE 1000 MG: 1 INJECTION, POWDER, LYOPHILIZED, FOR SOLUTION INTRAVENOUS at 09:00

## 2023-08-17 NOTE — PROGRESS NOTES
Physical Therapy  Facility/Department: [facility]  PROGRESSIVE CARE  Daily Treatment Note  NAME: Lilian Waldron  : 1955  MRN: [id number]    Date of Service: 2023    Discharge Recommendations:  Home with assist PRN, Continue to assess pending progress        Patient Diagnosis(es): The primary encounter diagnosis was Sepsis, due to unspecified organism, unspecified whether acute organ dysfunction present ([address]). A diagnosis of Ulcer of left foot, unspecified ulcer stage ([address]) was also pertinent to this visit. Assessment   Activity Tolerance: Patient tolerated treatment well;Treatment limited secondary to medical complications     Plan    Physcial Therapy Plan  General Plan: 3-5 times per week  Current Treatment Recommendations: Gait training;Transfer training     Restrictions        Subjective    Subjective  Subjective: Patient in bedside chair upon arrival. Agreeable to session. Orientation  Overall Orientation Status: Within Normal Limits  Cognition  Overall Cognitive Status: WNL     Objective   Vitals     Bed Mobility Training  Bed Mobility Training: Yes  Overall Level of Assistance: Contact-guard assistance;Stand-by assistance  Supine to Sit: Stand-by assistance;Contact-guard assistance  Scooting: Stand-by assistance  Transfer Training  Transfer Training: Yes  Overall Level of Assistance: Stand-by assistance  Sit to Stand: Stand-by assistance  Stand to Sit: Stand-by assistance  Gait Training  Gait Training: Yes  Gait  Overall Level of Assistance: Stand-by assistance;Contact-guard assistance  Step Length: Left shortened;Right shortened  Distance (ft):  (Utilized off loading boot with gait training)  Assistive Device: Cane, straight  Neuromuscular Education  Neuromuscular Education: No  NDT Treatment: Gait ;Sitting;Standing  PT Exercises  Resistive Exercises: Seated HS curls, LAQ hip abd, ea with manual resistance. Safety Devices  Type of Devices: Gait belt;Nurse notified;Call light within reach; Left in

## 2023-08-17 NOTE — PROGRESS NOTES
rounding on PCU. Assessment: Patient is frustrated with persistent health problems, his wife passing and other events in his life. The  prayed for strengthening and healing in his life. Intervention: Engaged in conversation and prayer. Patient expressed gratitude for visit and offer of continued prayer. Plan: Chaplains are available 24/7 to help with spiritual and emotional concerns. 08/17/23 1028   Encounter Summary   Encounter Overview/Reason  Volunteer Encounter   Service Provided For: Patient and family together   Referral/Consult From:  [address] Children;Family members; Catholic/darcy community;Friends/neighbors   Last Encounter  08/17/23   Complexity of Encounter Low   Begin Time 1003   End Time  1033   Total Time Calculated 30 min   Spiritual/Emotional needs   Type Spiritual Support   Assessment/Intervention/Outcome   Assessment Calm; Anxious   Intervention Active listening;Prayer (assurance of)/[place];Sustaining Presence/Ministry of presence   Outcome Expressed Gratitude

## 2023-08-18 VITALS
HEIGHT: 71 IN | DIASTOLIC BLOOD PRESSURE: 52 MMHG | SYSTOLIC BLOOD PRESSURE: 122 MMHG | BODY MASS INDEX: 37.07 KG/M2 | OXYGEN SATURATION: 98 % | RESPIRATION RATE: 18 BRPM | TEMPERATURE: 98 F | HEART RATE: 81 BPM | WEIGHT: 264.8 LBS

## 2023-08-18 LAB
ANION GAP SERPL CALCULATED.3IONS-SCNC: 12 MMOL/L (ref 9–17)
BASOPHILS # BLD: 0.06 K/UL (ref 0–0.2)
BASOPHILS NFR BLD: 1 % (ref 0–2)
BUN SERPL-MCNC: 9 MG/DL (ref 8–23)
BUN/CREAT SERPL: 11 (ref 9–20)
CALCIUM SERPL-MCNC: 8.7 MG/DL (ref 8.6–10.4)
CHLORIDE SERPL-SCNC: 106 MMOL/L (ref 98–107)
CO2 SERPL-SCNC: 19 MMOL/L (ref 20–31)
CREAT SERPL-MCNC: 0.8 MG/DL (ref 0.7–1.2)
EOSINOPHIL # BLD: 0.41 K/UL (ref 0–0.44)
EOSINOPHILS RELATIVE PERCENT: 4 % (ref 1–4)
ERYTHROCYTE [DISTWIDTH] IN BLOOD BY AUTOMATED COUNT: 13 % (ref 11.8–14.4)
GFR SERPL CREATININE-BSD FRML MDRD: >60 ML/MIN/1.73M2
GLUCOSE BLD-MCNC: 177 MG/DL (ref 75–110)
GLUCOSE BLD-MCNC: 216 MG/DL (ref 75–110)
GLUCOSE SERPL-MCNC: 98 MG/DL (ref 70–99)
HCT VFR BLD AUTO: 29 % (ref 40.7–50.3)
HGB BLD-MCNC: 9.7 G/DL (ref 13–17)
IMM GRANULOCYTES # BLD AUTO: 0.1 K/UL (ref 0–0.3)
IMM GRANULOCYTES NFR BLD: 1 %
LYMPHOCYTES NFR BLD: 1.7 K/UL (ref 1.1–3.7)
LYMPHOCYTES RELATIVE PERCENT: 18 % (ref 24–43)
MCH RBC QN AUTO: 27.5 PG (ref 25.2–33.5)
MCHC RBC AUTO-ENTMCNC: 33.4 G/DL (ref 25.2–33.5)
MCV RBC AUTO: 82.2 FL (ref 82.6–102.9)
MONOCYTES NFR BLD: 0.77 K/UL (ref 0.1–1.2)
MONOCYTES NFR BLD: 8 % (ref 3–12)
NEUTROPHILS NFR BLD: 67 % (ref 36–65)
NEUTS SEG NFR BLD: 6.22 K/UL (ref 1.5–8.1)
NRBC BLD-RTO: 0 PER 100 WBC
PLATELET # BLD AUTO: 363 K/UL (ref 138–453)
PMV BLD AUTO: 9.9 FL (ref 8.1–13.5)
POTASSIUM SERPL-SCNC: 4.4 MMOL/L (ref 3.7–5.3)
RBC # BLD AUTO: 3.53 M/UL (ref 4.21–5.77)
RBC # BLD: ABNORMAL 10*6/UL
SODIUM SERPL-SCNC: 137 MMOL/L (ref 135–144)
WBC OTHER # BLD: 9.2 K/UL (ref 3.5–11.3)

## 2023-08-18 PROCEDURE — 2580000003 HC RX 258: Performed by: INTERNAL MEDICINE

## 2023-08-18 PROCEDURE — 85025 COMPLETE CBC W/AUTO DIFF WBC: CPT

## 2023-08-18 PROCEDURE — 80048 BASIC METABOLIC PNL TOTAL CA: CPT

## 2023-08-18 PROCEDURE — 99238 HOSP IP/OBS DSCHRG MGMT 30/<: CPT | Performed by: INTERNAL MEDICINE

## 2023-08-18 PROCEDURE — 36415 COLL VENOUS BLD VENIPUNCTURE: CPT

## 2023-08-18 PROCEDURE — 6370000000 HC RX 637 (ALT 250 FOR IP): Performed by: INTERNAL MEDICINE

## 2023-08-18 PROCEDURE — 6360000002 HC RX W HCPCS: Performed by: INTERNAL MEDICINE

## 2023-08-18 PROCEDURE — 82947 ASSAY GLUCOSE BLOOD QUANT: CPT

## 2023-08-18 RX ORDER — HYDROCODONE BITARTRATE AND ACETAMINOPHEN 5; 325 MG/1; MG/1
1 TABLET ORAL EVERY 4 HOURS PRN
Qty: 18 TABLET | Refills: 0 | Status: SHIPPED | OUTPATIENT
Start: 2023-08-18 | End: 2023-08-21

## 2023-08-18 RX ORDER — ENEMA 19; 7 G/133ML; G/133ML
1 ENEMA RECTAL ONCE
Status: COMPLETED | OUTPATIENT
Start: 2023-08-18 | End: 2023-08-18

## 2023-08-18 RX ORDER — DOCUSATE SODIUM 100 MG/1
200 CAPSULE, LIQUID FILLED ORAL DAILY
Status: DISCONTINUED | OUTPATIENT
Start: 2023-08-18 | End: 2023-08-18 | Stop reason: HOSPADM

## 2023-08-18 RX ORDER — LACTULOSE 10 G/15ML
20 SOLUTION ORAL 3 TIMES DAILY PRN
Qty: 900 ML | Refills: 0 | Status: SHIPPED | OUTPATIENT
Start: 2023-08-18 | End: 2023-08-28

## 2023-08-18 RX ORDER — LACTULOSE 10 G/15ML
20 SOLUTION ORAL 4 TIMES DAILY
Status: DISCONTINUED | OUTPATIENT
Start: 2023-08-18 | End: 2023-08-18 | Stop reason: HOSPADM

## 2023-08-18 RX ORDER — SELENIUM 50 MCG
1 TABLET ORAL 3 TIMES DAILY
Qty: 30 CAPSULE | Refills: 0 | COMMUNITY
Start: 2023-08-18 | End: 2023-08-28

## 2023-08-18 RX ORDER — BISACODYL 10 MG
20 SUPPOSITORY, RECTAL RECTAL DAILY PRN
Status: DISCONTINUED | OUTPATIENT
Start: 2023-08-18 | End: 2023-08-18 | Stop reason: HOSPADM

## 2023-08-18 RX ORDER — TAMSULOSIN HYDROCHLORIDE 0.4 MG/1
0.4 CAPSULE ORAL DAILY
Qty: 30 CAPSULE | Refills: 0
Start: 2023-08-18

## 2023-08-18 RX ORDER — CEFDINIR 300 MG/1
300 CAPSULE ORAL 2 TIMES DAILY
Qty: 16 CAPSULE | Refills: 0 | Status: SHIPPED | OUTPATIENT
Start: 2023-08-18 | End: 2023-08-26

## 2023-08-18 RX ADMIN — LACTULOSE 20 G: 20 SOLUTION ORAL at 08:50

## 2023-08-18 RX ADMIN — CEFTRIAXONE 1000 MG: 1 INJECTION, POWDER, FOR SOLUTION INTRAMUSCULAR; INTRAVENOUS at 15:13

## 2023-08-18 RX ADMIN — ENOXAPARIN SODIUM 30 MG: 100 INJECTION SUBCUTANEOUS at 08:50

## 2023-08-18 RX ADMIN — ACETAMINOPHEN 650 MG: 325 TABLET ORAL at 08:50

## 2023-08-18 RX ADMIN — INSULIN LISPRO 7 UNITS: 100 INJECTION, SOLUTION INTRAVENOUS; SUBCUTANEOUS at 17:17

## 2023-08-18 RX ADMIN — ASPIRIN 81 MG: 81 TABLET, COATED ORAL at 08:49

## 2023-08-18 RX ADMIN — DOCUSATE SODIUM 200 MG: 100 CAPSULE, LIQUID FILLED ORAL at 12:08

## 2023-08-18 RX ADMIN — LISINOPRIL 20 MG: 20 TABLET ORAL at 08:50

## 2023-08-18 RX ADMIN — LACTULOSE 20 G: 20 SOLUTION ORAL at 12:08

## 2023-08-18 RX ADMIN — INSULIN LISPRO 7 UNITS: 100 INJECTION, SOLUTION INTRAVENOUS; SUBCUTANEOUS at 12:08

## 2023-08-18 RX ADMIN — SODIUM CHLORIDE, PRESERVATIVE FREE 10 ML: 5 INJECTION INTRAVENOUS at 08:52

## 2023-08-18 RX ADMIN — SODIUM CHLORIDE: 9 INJECTION, SOLUTION INTRAVENOUS at 07:25

## 2023-08-18 RX ADMIN — Medication 1 ENEMA: at 15:13

## 2023-08-18 RX ADMIN — GABAPENTIN 300 MG: 300 CAPSULE ORAL at 08:49

## 2023-08-18 RX ADMIN — TAMSULOSIN HYDROCHLORIDE 0.4 MG: 0.4 CAPSULE ORAL at 08:50

## 2023-08-18 RX ADMIN — HYDROCHLOROTHIAZIDE 12.5 MG: 12.5 TABLET ORAL at 08:50

## 2023-08-18 RX ADMIN — ACETAMINOPHEN 650 MG: 325 TABLET ORAL at 01:01

## 2023-08-18 RX ADMIN — INSULIN GLARGINE 35 UNITS: 100 INJECTION, SOLUTION SUBCUTANEOUS at 08:51

## 2023-08-18 RX ADMIN — INSULIN LISPRO 1 UNITS: 100 INJECTION, SOLUTION INTRAVENOUS; SUBCUTANEOUS at 17:16

## 2023-08-18 RX ADMIN — POLYETHYLENE GLYCOL 3350 17 G: 17 POWDER, FOR SOLUTION ORAL at 08:51

## 2023-08-18 RX ADMIN — THERA TABS 1 TABLET: TAB at 08:50

## 2023-08-18 NOTE — PROGRESS NOTES
Physical Therapy  Attempted to see patient this date. Patient declined therapy due to headache at this time. Nursing informed. Will attempt later this date per schedule.      Berto Mata PTA

## 2023-08-18 NOTE — DISCHARGE INSTR - DIET

## 2023-08-18 NOTE — PROGRESS NOTES
Physician Progress Note      PATIENT:               Corinne Wesley  CSN #:                  [id number]  :                       1955  ADMIT DATE:       2023 2:01 PM  [address] DATE:  RESPONDING  PROVIDER #:        Chris Pan MD          QUERY TEXT:    Patient admitted with left diabetic foot ulcer/infection. Per H and P:  possible sepsis    If possible, please document in the progress notes and discharge summary if   sepsis was: The medical record reflects the following:  Risk Factors: DMII, CKD,  left foot uceration/infection  Clinical Indicators: fever, shaking chills; per H&P: \"In ER, he was febrile at   101.7F, tachycardic with heart rate in the 110's, WBC 13.8. Lactic acid 4.8   -->1. 9. \"  Treatment: IV NS bolus in ED,  IVF, IV cefepime, IV vancomycin, IV Rocephin,   8/15/23 debridement by [facility], MSN, RN, CCDS, CRCR  Clinical   .  Options provided:  -- Sepsis, present on admission, due to left foot diabetic ulcer/infection   confirmed after study  -- Sepsis ruled out after study  -- Other - I will add my own diagnosis  -- Disagree - Not applicable / Not valid  -- Disagree - Clinically unable to determine / Unknown  -- Refer to Clinical Documentation Reviewer    PROVIDER RESPONSE TEXT:    Sepsis ruled out after study.     Query created by: Franklin Cagle on 2023 2:49 PM      Electronically signed by:  Chris Pan MD 2023 3:08 PM

## 2023-08-18 NOTE — PLAN OF CARE
Problem: Discharge Planning  Goal: Discharge to home or other facility with appropriate resources  Outcome: Progressing  Flowsheets (Taken 8/17/2023 2100)  Discharge to home or other facility with appropriate resources:   Identify barriers to discharge with patient and caregiver   Arrange for needed discharge resources and transportation as appropriate     Problem: Pain  Goal: Verbalizes/displays adequate comfort level or baseline comfort level  Outcome: Progressing     Problem: Safety - Adult  Goal: Free from fall injury  Outcome: Progressing     Problem: ABCDS Injury Assessment  Goal: Absence of physical injury  Outcome: Progressing     Problem: Chronic Conditions and Co-morbidities  Goal: Patient's chronic conditions and co-morbidity symptoms are monitored and maintained or improved  Outcome: Progressing  Flowsheets (Taken 8/17/2023 2100)  Care Plan - Patient's Chronic Conditions and Co-Morbidity Symptoms are Monitored and Maintained or Improved:   Monitor and assess patient's chronic conditions and comorbid symptoms for stability, deterioration, or improvement   Collaborate with multidisciplinary team to address chronic and comorbid conditions and prevent exacerbation or deterioration     Problem: Nutrition Deficit:  Goal: Optimize nutritional status  Outcome: Progressing

## 2023-08-18 NOTE — PLAN OF CARE
Problem: Discharge Planning  Goal: Discharge to home or other facility with appropriate resources  8/18/2023 1001 by LUCINDA Oliva  Outcome: Progressing  Flowsheets (Taken 8/18/2023 0857)  Discharge to home or other facility with appropriate resources: Identify barriers to discharge with patient and caregiver  8/18/2023 0206 by Barbara Owens RN  Outcome: Progressing  Flowsheets (Taken 8/17/2023 2100)  Discharge to home or other facility with appropriate resources:   Identify barriers to discharge with patient and caregiver   Arrange for needed discharge resources and transportation as appropriate     Problem: Pain  Goal: Verbalizes/displays adequate comfort level or baseline comfort level  8/18/2023 1001 by LUCINDA Oliva  Outcome: Progressing  8/18/2023 0206 by Barbara Owens RN  Outcome: Progressing     Problem: Safety - Adult  Goal: Free from fall injury  8/18/2023 1001 by LUCINDA Oliva  Outcome: Progressing  8/18/2023 0206 by Barbara Owens RN  Outcome: Progressing     Problem: ABCDS Injury Assessment  Goal: Absence of physical injury  8/18/2023 1001 by LUCINDA Oliva  Outcome: Progressing  8/18/2023 0206 by Barbara Owens RN  Outcome: Progressing     Problem: Chronic Conditions and Co-morbidities  Goal: Patient's chronic conditions and co-morbidity symptoms are monitored and maintained or improved  8/18/2023 1001 by LUCINDA Oliva  Outcome: Progressing  Flowsheets (Taken 8/18/2023 0857)  Care Plan - Patient's Chronic Conditions and Co-Morbidity Symptoms are Monitored and Maintained or Improved: Monitor and assess patient's chronic conditions and comorbid symptoms for stability, deterioration, or improvement  8/18/2023 0206 by Barbara Owens RN  Outcome: Progressing  Flowsheets (Taken 8/17/2023 2100)  Care Plan - Patient's Chronic Conditions and Co-Morbidity Symptoms are Monitored and Maintained or Improved:   Monitor and assess patient's chronic conditions and comorbid symptoms for stability, deterioration, or improvement   Collaborate with multidisciplinary team to address chronic and comorbid conditions and prevent exacerbation or deterioration     Problem: Nutrition Deficit:  Goal: Optimize nutritional status  8/18/2023 1001 by Nora Quiroz RN  Outcome: Progressing  8/18/2023 0206 by Nathan Tian RN  Outcome: Progressing

## 2023-08-18 NOTE — DISCHARGE INSTR - ACTIVITY
Activity as tolerated    Wear offloading shoe left lower extremity at all times while weightbearing    Elevate left lower extremity

## 2023-08-18 NOTE — PROGRESS NOTES
Patient refuses to elevate his left foot. Patient had thrown the pillow on the floor and said that he was not going to do it. Education given but rejected by patient.

## 2023-08-18 NOTE — DISCHARGE INSTRUCTIONS
Follow up with Josesito Kirkland NP in 1 week    Follow up with Dr. Marizol Vazquez in 1 week    CBC and BMP Monday 8/21/23    Apply iodoform gauze packing to left foot ulcer with dry dressing over top. Change daily.

## 2023-08-19 LAB
MICROORGANISM SPEC CULT: NORMAL
MICROORGANISM SPEC CULT: NORMAL
SERVICE CMNT-IMP: NORMAL
SERVICE CMNT-IMP: NORMAL
SPECIMEN DESCRIPTION: NORMAL
SPECIMEN DESCRIPTION: NORMAL

## 2023-08-21 ENCOUNTER — HOSPITAL ENCOUNTER (OUTPATIENT)
Age: 68
Discharge: HOME | End: 2023-08-21
Payer: MEDICARE

## 2023-08-21 ENCOUNTER — HOSPITAL ENCOUNTER (OUTPATIENT)
Dept: WOUND CARE | Age: 68
Discharge: HOME | End: 2023-08-22
Payer: MEDICARE

## 2023-08-21 VITALS
WEIGHT: 264 LBS | SYSTOLIC BLOOD PRESSURE: 124 MMHG | BODY MASS INDEX: 36.96 KG/M2 | RESPIRATION RATE: 20 BRPM | HEIGHT: 71 IN | TEMPERATURE: 98.1 F | DIASTOLIC BLOOD PRESSURE: 60 MMHG | HEART RATE: 84 BPM

## 2023-08-21 DIAGNOSIS — Z79.4 TYPE 2 DIABETES MELLITUS WITH FOOT ULCER, WITH LONG-TERM CURRENT USE OF INSULIN (HCC): ICD-10-CM

## 2023-08-21 DIAGNOSIS — E11.621 TYPE 2 DIABETES MELLITUS WITH FOOT ULCER, WITH LONG-TERM CURRENT USE OF INSULIN (HCC): ICD-10-CM

## 2023-08-21 DIAGNOSIS — L97.524 ULCER OF LEFT FOOT WITH NECROSIS OF BONE (HCC): ICD-10-CM

## 2023-08-21 DIAGNOSIS — L97.509 TYPE 2 DIABETES MELLITUS WITH FOOT ULCER, WITH LONG-TERM CURRENT USE OF INSULIN (HCC): ICD-10-CM

## 2023-08-21 DIAGNOSIS — L97.522 ULCER OF LEFT FOOT WITH FAT LAYER EXPOSED (HCC): Primary | ICD-10-CM

## 2023-08-21 LAB
ANION GAP SERPL CALCULATED.3IONS-SCNC: 12 MMOL/L (ref 9–17)
BUN SERPL-MCNC: 16 MG/DL (ref 8–23)
BUN/CREAT SERPL: 15 (ref 9–20)
CALCIUM SERPL-MCNC: 9.8 MG/DL (ref 8.6–10.4)
CHLORIDE SERPL-SCNC: 98 MMOL/L (ref 98–107)
CO2 SERPL-SCNC: 25 MMOL/L (ref 20–31)
CREAT SERPL-MCNC: 1.1 MG/DL (ref 0.7–1.2)
ERYTHROCYTE [DISTWIDTH] IN BLOOD BY AUTOMATED COUNT: 13.2 % (ref 11.8–14.4)
GFR SERPL CREATININE-BSD FRML MDRD: >60 ML/MIN/1.73M2
GLUCOSE SERPL-MCNC: 146 MG/DL (ref 70–99)
HCT VFR BLD AUTO: 31.6 % (ref 40.7–50.3)
HGB BLD-MCNC: 10.1 G/DL (ref 13–17)
MCH RBC QN AUTO: 26.9 PG (ref 25.2–33.5)
MCHC RBC AUTO-ENTMCNC: 32 G/DL (ref 25.2–33.5)
MCV RBC AUTO: 84 FL (ref 82.6–102.9)
NRBC BLD-RTO: 0 PER 100 WBC
PLATELET # BLD AUTO: 502 K/UL (ref 138–453)
PMV BLD AUTO: 10.1 FL (ref 8.1–13.5)
POTASSIUM SERPL-SCNC: 4.2 MMOL/L (ref 3.7–5.3)
RBC # BLD AUTO: 3.76 M/UL (ref 4.21–5.77)
SODIUM SERPL-SCNC: 135 MMOL/L (ref 135–144)
WBC OTHER # BLD: 13.8 K/UL (ref 3.5–11.3)

## 2023-08-21 PROCEDURE — 11044 DBRDMT BONE 1ST 20 SQ CM/<: CPT

## 2023-08-21 PROCEDURE — 87205 SMEAR GRAM STAIN: CPT

## 2023-08-21 PROCEDURE — 80048 BASIC METABOLIC PNL TOTAL CA: CPT

## 2023-08-21 PROCEDURE — 87076 CULTURE ANAEROBE IDENT EACH: CPT

## 2023-08-21 PROCEDURE — 36415 COLL VENOUS BLD VENIPUNCTURE: CPT

## 2023-08-21 PROCEDURE — 87070 CULTURE OTHR SPECIMN AEROBIC: CPT

## 2023-08-21 PROCEDURE — 85027 COMPLETE CBC AUTOMATED: CPT

## 2023-08-21 PROCEDURE — 11044 DBRDMT BONE 1ST 20 SQ CM/<: CPT | Performed by: PODIATRIST

## 2023-08-21 PROCEDURE — 87186 SC STD MICRODIL/AGAR DIL: CPT

## 2023-08-21 PROCEDURE — 87075 CULTR BACTERIA EXCEPT BLOOD: CPT

## 2023-08-21 PROCEDURE — 87185 SC STD ENZYME DETCJ PER NZM: CPT

## 2023-08-21 PROCEDURE — 87077 CULTURE AEROBIC IDENTIFY: CPT

## 2023-08-21 RX ORDER — IBUPROFEN 200 MG
TABLET ORAL ONCE
OUTPATIENT
Start: 2023-08-21 | End: 2023-08-21

## 2023-08-21 RX ORDER — GINSENG 100 MG
CAPSULE ORAL ONCE
OUTPATIENT
Start: 2023-08-21 | End: 2023-08-21

## 2023-08-21 RX ORDER — GENTAMICIN SULFATE 1 MG/G
OINTMENT TOPICAL ONCE
OUTPATIENT
Start: 2023-08-21 | End: 2023-08-21

## 2023-08-21 RX ORDER — SODIUM CHLOR/HYPOCHLOROUS ACID 0.033 %
SOLUTION, IRRIGATION IRRIGATION ONCE
OUTPATIENT
Start: 2023-08-21 | End: 2023-08-21

## 2023-08-21 RX ORDER — LIDOCAINE HYDROCHLORIDE 40 MG/ML
SOLUTION TOPICAL ONCE
OUTPATIENT
Start: 2023-08-21 | End: 2023-08-21

## 2023-08-21 RX ORDER — LIDOCAINE 50 MG/G
OINTMENT TOPICAL ONCE
OUTPATIENT
Start: 2023-08-21 | End: 2023-08-21

## 2023-08-21 RX ORDER — LIDOCAINE HYDROCHLORIDE 20 MG/ML
JELLY TOPICAL ONCE
OUTPATIENT
Start: 2023-08-21 | End: 2023-08-21

## 2023-08-21 RX ORDER — BETAMETHASONE DIPROPIONATE 0.05 %
OINTMENT (GRAM) TOPICAL ONCE
OUTPATIENT
Start: 2023-08-21 | End: 2023-08-21

## 2023-08-21 RX ORDER — LIDOCAINE 40 MG/G
CREAM TOPICAL ONCE
OUTPATIENT
Start: 2023-08-21 | End: 2023-08-21

## 2023-08-21 RX ORDER — CLOBETASOL PROPIONATE 0.5 MG/G
OINTMENT TOPICAL ONCE
OUTPATIENT
Start: 2023-08-21 | End: 2023-08-21

## 2023-08-21 RX ORDER — BACITRACIN ZINC AND POLYMYXIN B SULFATE 500; 1000 [USP'U]/G; [USP'U]/G
OINTMENT TOPICAL ONCE
OUTPATIENT
Start: 2023-08-21 | End: 2023-08-21

## 2023-08-21 ASSESSMENT — PAIN SCALES - GENERAL: PAINLEVEL_OUTOF10: 2

## 2023-08-21 NOTE — DISCHARGE INSTRUCTIONS
Continue iodoform packing to left foot wound, cover with dry dressing. Change dressing daily. Continue to wear offload boot at all times. SIGNS OF INFECTION  - Redness, swelling, skin hot  - Wound bed turns black or stringy yellow  - Foul odor  - Increased drainage or pus  - Increased pain  - Fever greater than 100F    CALL YOUR DOCTOR OR SEEK MEDICAL ATTENTION IF SIGNS OF INFECTION.   DO NOT WAIT UNTIL YOUR NEXT APPOINTMENT    Call the Wound Care Nurse with any other questions or concerns- [phone]  Follow up as scheduled

## 2023-08-21 NOTE — DISCHARGE SUMMARY
[address]                 [address] [facility], [address] [address]                               DISCHARGE SUMMARY    PATIENT NAME: Katey Carroll                     :        1955  MED REC NO:   [id number]                             ROOM:       [id number]  ACCOUNT NO:   [de-identified]                           ADMIT DATE: 2023  PROVIDER:     Jensen Lopes MD                  DISCHARGE DATE:  2023    ATTENDING PHYSICIAN OF HOSPITALIZATION:  Annabelle Kinney MD    PERSONAL PHYSICIAN:  Dr. Ferny Hoyos. DIAGNOSES:  1. Left diabetic foot infection due to diabetes mellitus type 2,  2023, with fever, shaking chills. 2.  Status post left foot debridement, removal of flexor tendon first  metatarsal head by Dr. Natacha Kennedy on 2023. Tissue culture on  2023, E. coli sensitive to Rocephin, skin kenny group B  beta-hemolytic streptococcus. 3.  Elevated lactic acid level 4.8 at the time of arrival.  Sepsis ruled  out. 4.  Hyponatremia on 2023.  5.  Right bundle-branch block. 6.  Hypertension. Prior 2D echo of 2023, LA mildly dilated, mild  LVH, normal left ventricular systolic function, RA dilatation, normal  right ventricular systolic function, MAC, AV calcification with adequate  opening, RVSP 34 mmHg, AR mildly dilated at 3.8 cm, IVC not visualized,  grade 2 moderate diastolic dysfunction, LVEF 60%. 7.  Hyperlipidemia. 8.  Diabetes mellitus type 2.  9.  Chronic kidney disease, initially stage III, now at a stage II  level. 10.  Obstructive sleep apnea, does not utilize CPAP. 11.  Obesity. 12.  Constipation. 13.  Tobacco abuse, quit in . 14.  Anemia. Other medical problems set forth in the progress note of 2023,  incorporated for reference herein. HISTORY OF PRESENT ILLNESS AND HOSPITAL COURSE:  The patient is a  57-year-old white male.   The patient presented with left diabetic foot  infection, had been seen in the

## 2023-08-21 NOTE — PROGRESS NOTES
Subjective:  Arnie Saucedo is a 69 y.o. male who presents to the office today for  DM ulcer L foot. Pt in hospital last week. Overall feels much better. Pt on rx cefdinir. Patient presents in offloading boot. Dressing changed as advised. Currently denies F/C/N/V. Allergies   Allergen Reactions    Bee Venom Anaphylaxis    Naproxen      Other reaction(s): Unknown Reaction    Semaglutide      Other reaction(s): Constipation    Empagliflozin Rash       Past Medical History:   Diagnosis Date    Allergic rhinitis     Colon polyps     history of    Elevated WBC count     history of    Hyperlipidemia     Hypertension     Obesity     Pain, joint, hand, left 7/28/2021    Partial small bowel obstruction ([address]) 3/5/2023    SBO (small bowel obstruction) ([address]) 3/12/2023    Sepsis with acute renal failure ([address]) 3/5/2023    Type II or unspecified type diabetes mellitus without mention of complication, not stated as uncontrolled     Unspecified sleep apnea     Weakness of left hand 7/28/2021       Prior to Admission medications    Medication Sig Start Date End Date Taking? Authorizing Provider   HYDROcodone-acetaminophen (NORCO) 5-325 MG per tablet Take 1 tablet by mouth every 4 hours as needed for Pain for up to 3 days.  Max Daily Amount: 6 tablets 8/18/23 8/21/23  Jonnie Casper MD   lactulose [facility]) 10 GM/15ML solution Take 30 mLs by mouth 3 times daily as needed (constipation) 8/18/23 8/28/23  Jonnie Casper MD   tamsulosin [facility]) 0.4 MG capsule Take 1 capsule by mouth daily 8/18/23   Jonnie Casper MD   cefdinir (OMNICEF) 300 MG capsule Take 1 capsule by mouth 2 times daily for 8 days 8/18/23 8/26/23  Jonnie Casper MD   Lactobacillus (ACIDOPHILUS) CAPS capsule Take 1 capsule by mouth in the morning, at noon, and at bedtime for 10 days 8/18/23 8/28/23  Jonnie Casper MD   Insulin Glargine-yfgn 100 UNIT/ML SOPN INJECT 40 UNITS UNDER SKIN AT BEDTIME FOR TYPE 2 DIABETES MELLITUS 6/14/23   Historical Provider, MD

## 2023-08-21 NOTE — PLAN OF CARE
Problem: Chronic Conditions and Co-morbidities  Goal: Patient's chronic conditions and co-morbidity symptoms are monitored and maintained or improved  Outcome: Progressing     Problem: Pain  Goal: Verbalizes/displays adequate comfort level or baseline comfort level  Outcome: Progressing  Flowsheets (Taken 8/21/2023 1030)  Verbalizes/displays adequate comfort level or baseline comfort level: Assess pain using appropriate pain scale     Problem: Cognitive:  Goal: Knowledge of wound care  Description: Knowledge of wound care  Outcome: Progressing  Goal: Understands risk factors for wounds  Description: Understands risk factors for wounds  Outcome: Progressing     Problem: Wound:  Goal: Will show signs of wound healing; wound closure and no evidence of infection  Description: Will show signs of wound healing; wound closure and no evidence of infection  Outcome: Progressing     Problem: Weight control:  Goal: Ability to maintain an optimal weight for height and age will be supported  Description: Ability to maintain an optimal weight for height and age will be supported  Outcome: Progressing     Problem: Falls - Risk of:  Goal: Will remain free from falls  Description: Will remain free from falls  Outcome: Progressing     Problem: Blood Glucose:  Goal: Ability to maintain appropriate glucose levels will improve  Description: Ability to maintain appropriate glucose levels will improve  Outcome: Progressing

## 2023-08-23 LAB
MICROORGANISM SPEC CULT: ABNORMAL
MICROORGANISM/AGENT SPEC: ABNORMAL
MICROORGANISM/AGENT SPEC: ABNORMAL
SPECIMEN DESCRIPTION: ABNORMAL

## 2023-08-25 ENCOUNTER — OFFICE VISIT (OUTPATIENT)
Dept: PRIMARY CARE CLINIC | Age: 68
End: 2023-08-25
Payer: MEDICARE

## 2023-08-25 VITALS
HEIGHT: 71 IN | RESPIRATION RATE: 14 BRPM | SYSTOLIC BLOOD PRESSURE: 126 MMHG | BODY MASS INDEX: 36.12 KG/M2 | WEIGHT: 258 LBS | TEMPERATURE: 98.6 F | HEART RATE: 102 BPM | OXYGEN SATURATION: 92 % | DIASTOLIC BLOOD PRESSURE: 72 MMHG

## 2023-08-25 DIAGNOSIS — L03.116 CELLULITIS OF LEFT FOOT: Primary | ICD-10-CM

## 2023-08-25 PROCEDURE — 99214 OFFICE O/P EST MOD 30 MIN: CPT | Performed by: FAMILY MEDICINE

## 2023-08-25 PROCEDURE — 1036F TOBACCO NON-USER: CPT | Performed by: FAMILY MEDICINE

## 2023-08-25 PROCEDURE — 1123F ACP DISCUSS/DSCN MKR DOCD: CPT | Performed by: FAMILY MEDICINE

## 2023-08-25 PROCEDURE — 3078F DIAST BP <80 MM HG: CPT | Performed by: FAMILY MEDICINE

## 2023-08-25 PROCEDURE — 1111F DSCHRG MED/CURRENT MED MERGE: CPT | Performed by: FAMILY MEDICINE

## 2023-08-25 PROCEDURE — G8417 CALC BMI ABV UP PARAM F/U: HCPCS | Performed by: FAMILY MEDICINE

## 2023-08-25 PROCEDURE — 3017F COLORECTAL CA SCREEN DOC REV: CPT | Performed by: FAMILY MEDICINE

## 2023-08-25 PROCEDURE — G8427 DOCREV CUR MEDS BY ELIG CLIN: HCPCS | Performed by: FAMILY MEDICINE

## 2023-08-25 PROCEDURE — 99213 OFFICE O/P EST LOW 20 MIN: CPT | Performed by: FAMILY MEDICINE

## 2023-08-25 PROCEDURE — 3074F SYST BP LT 130 MM HG: CPT | Performed by: FAMILY MEDICINE

## 2023-08-25 RX ORDER — CHLORAL HYDRATE 500 MG
CAPSULE ORAL DAILY
COMMUNITY

## 2023-08-25 RX ORDER — METRONIDAZOLE 500 MG/1
500 TABLET ORAL 3 TIMES DAILY
Qty: 30 TABLET | Refills: 0 | Status: SHIPPED | OUTPATIENT
Start: 2023-08-25 | End: 2023-09-04

## 2023-08-25 SDOH — ECONOMIC STABILITY: FOOD INSECURITY: WITHIN THE PAST 12 MONTHS, YOU WORRIED THAT YOUR FOOD WOULD RUN OUT BEFORE YOU GOT MONEY TO BUY MORE.: NEVER TRUE

## 2023-08-25 SDOH — ECONOMIC STABILITY: FOOD INSECURITY: WITHIN THE PAST 12 MONTHS, THE FOOD YOU BOUGHT JUST DIDN'T LAST AND YOU DIDN'T HAVE MONEY TO GET MORE.: NEVER TRUE

## 2023-08-25 SDOH — ECONOMIC STABILITY: HOUSING INSECURITY
IN THE LAST 12 MONTHS, WAS THERE A TIME WHEN YOU DID NOT HAVE A STEADY PLACE TO SLEEP OR SLEPT IN A SHELTER (INCLUDING NOW)?: NO

## 2023-08-25 SDOH — ECONOMIC STABILITY: INCOME INSECURITY: HOW HARD IS IT FOR YOU TO PAY FOR THE VERY BASICS LIKE FOOD, HOUSING, MEDICAL CARE, AND HEATING?: NOT HARD AT ALL

## 2023-08-25 ASSESSMENT — PATIENT HEALTH QUESTIONNAIRE - PHQ9
SUM OF ALL RESPONSES TO PHQ QUESTIONS 1-9: 0
2. FEELING DOWN, DEPRESSED OR HOPELESS: 0
SUM OF ALL RESPONSES TO PHQ QUESTIONS 1-9: 0
SUM OF ALL RESPONSES TO PHQ9 QUESTIONS 1 & 2: 0
1. LITTLE INTEREST OR PLEASURE IN DOING THINGS: 0

## 2023-08-28 ENCOUNTER — HOSPITAL ENCOUNTER (OUTPATIENT)
Dept: WOUND CARE | Age: 68
Discharge: HOME | End: 2023-08-29
Payer: OTHER GOVERNMENT

## 2023-08-28 VITALS
BODY MASS INDEX: 35.42 KG/M2 | HEIGHT: 71 IN | TEMPERATURE: 99 F | HEART RATE: 88 BPM | WEIGHT: 253 LBS | DIASTOLIC BLOOD PRESSURE: 70 MMHG | SYSTOLIC BLOOD PRESSURE: 118 MMHG | RESPIRATION RATE: 18 BRPM

## 2023-08-28 DIAGNOSIS — M86.072 ACUTE HEMATOGENOUS OSTEOMYELITIS OF LEFT FOOT (HCC): ICD-10-CM

## 2023-08-28 DIAGNOSIS — L97.522 ULCER OF LEFT FOOT WITH FAT LAYER EXPOSED (HCC): Primary | ICD-10-CM

## 2023-08-28 PROBLEM — M86.9 OSTEOMYELITIS OF LEFT FOOT (HCC): Status: ACTIVE | Noted: 2023-08-28

## 2023-08-28 PROCEDURE — 99214 OFFICE O/P EST MOD 30 MIN: CPT | Performed by: PODIATRIST

## 2023-08-28 PROCEDURE — 11044 DBRDMT BONE 1ST 20 SQ CM/<: CPT

## 2023-08-28 PROCEDURE — 11044 DBRDMT BONE 1ST 20 SQ CM/<: CPT | Performed by: PODIATRIST

## 2023-08-28 RX ORDER — CLOBETASOL PROPIONATE 0.5 MG/G
OINTMENT TOPICAL ONCE
OUTPATIENT
Start: 2023-08-28 | End: 2023-08-28

## 2023-08-28 RX ORDER — SODIUM CHLOR/HYPOCHLOROUS ACID 0.033 %
SOLUTION, IRRIGATION IRRIGATION ONCE
OUTPATIENT
Start: 2023-08-28 | End: 2023-08-28

## 2023-08-28 RX ORDER — LIDOCAINE HYDROCHLORIDE 20 MG/ML
JELLY TOPICAL ONCE
OUTPATIENT
Start: 2023-08-28 | End: 2023-08-28

## 2023-08-28 RX ORDER — LIDOCAINE HYDROCHLORIDE 40 MG/ML
SOLUTION TOPICAL ONCE
OUTPATIENT
Start: 2023-08-28 | End: 2023-08-28

## 2023-08-28 RX ORDER — AMOXICILLIN AND CLAVULANATE POTASSIUM 875; 125 MG/1; MG/1
1 TABLET, FILM COATED ORAL 2 TIMES DAILY
Qty: 20 TABLET | Refills: 0 | Status: SHIPPED | OUTPATIENT
Start: 2023-08-28 | End: 2023-09-07

## 2023-08-28 RX ORDER — LIDOCAINE 50 MG/G
OINTMENT TOPICAL ONCE
OUTPATIENT
Start: 2023-08-28 | End: 2023-08-28

## 2023-08-28 RX ORDER — GENTAMICIN SULFATE 1 MG/G
OINTMENT TOPICAL ONCE
OUTPATIENT
Start: 2023-08-28 | End: 2023-08-28

## 2023-08-28 RX ORDER — BETAMETHASONE DIPROPIONATE 0.05 %
OINTMENT (GRAM) TOPICAL ONCE
OUTPATIENT
Start: 2023-08-28 | End: 2023-08-28

## 2023-08-28 RX ORDER — IBUPROFEN 200 MG
TABLET ORAL ONCE
OUTPATIENT
Start: 2023-08-28 | End: 2023-08-28

## 2023-08-28 RX ORDER — BACITRACIN ZINC AND POLYMYXIN B SULFATE 500; 1000 [USP'U]/G; [USP'U]/G
OINTMENT TOPICAL ONCE
OUTPATIENT
Start: 2023-08-28 | End: 2023-08-28

## 2023-08-28 RX ORDER — CIPROFLOXACIN 500 MG/1
500 TABLET, FILM COATED ORAL 2 TIMES DAILY
COMMUNITY

## 2023-08-28 RX ORDER — GINSENG 100 MG
CAPSULE ORAL ONCE
OUTPATIENT
Start: 2023-08-28 | End: 2023-08-28

## 2023-08-28 RX ORDER — LIDOCAINE 40 MG/G
CREAM TOPICAL ONCE
OUTPATIENT
Start: 2023-08-28 | End: 2023-08-28

## 2023-08-28 ASSESSMENT — PAIN SCALES - GENERAL: PAINLEVEL_OUTOF10: 0

## 2023-08-28 NOTE — PROGRESS NOTES
cover with secondary dressing/foam    Compression Management needed for edema control, apply multilayer compression or tubular garment or equivalent    Offloading Management needed for pressure relief, apply offloading shoe/boot or equivalent     Standing Status:   Standing     Number of Occurrences:   1     Orders Placed This Encounter   Medications    amoxicillin-clavulanate (AUGMENTIN) 875-125 MG per tablet     Sig: Take 1 tablet by mouth 2 times daily for 10 days     Dispense:  20 tablet     Refill:  0       DM foot ed and exam  Contact office with any questions/problems/concerns. Follow up in 1 week.

## 2023-08-30 ENCOUNTER — HOSPITAL ENCOUNTER (OUTPATIENT)
Age: 68
Discharge: HOME | End: 2023-08-30
Payer: MEDICARE

## 2023-08-30 ENCOUNTER — OFFICE VISIT (OUTPATIENT)
Dept: FAMILY MEDICINE CLINIC | Age: 68
End: 2023-08-30
Payer: OTHER GOVERNMENT

## 2023-08-30 ENCOUNTER — HOSPITAL ENCOUNTER (OUTPATIENT)
Dept: NON INVASIVE DIAGNOSTICS | Age: 68
Discharge: HOME | End: 2023-08-30
Payer: MEDICARE

## 2023-08-30 VITALS
BODY MASS INDEX: 35.14 KG/M2 | HEART RATE: 102 BPM | OXYGEN SATURATION: 100 % | WEIGHT: 251 LBS | SYSTOLIC BLOOD PRESSURE: 102 MMHG | HEIGHT: 71 IN | DIASTOLIC BLOOD PRESSURE: 62 MMHG

## 2023-08-30 DIAGNOSIS — L97.529 DIABETIC ULCER OF LEFT FOOT DUE TO TYPE 2 DIABETES MELLITUS (HCC): ICD-10-CM

## 2023-08-30 DIAGNOSIS — Z01.818 PRE-OP EVALUATION: ICD-10-CM

## 2023-08-30 DIAGNOSIS — Z01.818 PRE-OP EVALUATION: Primary | ICD-10-CM

## 2023-08-30 DIAGNOSIS — E11.621 DIABETIC ULCER OF LEFT FOOT DUE TO TYPE 2 DIABETES MELLITUS (HCC): ICD-10-CM

## 2023-08-30 PROCEDURE — 2022F DILAT RTA XM EVC RTNOPTHY: CPT | Performed by: NURSE PRACTITIONER

## 2023-08-30 PROCEDURE — G8417 CALC BMI ABV UP PARAM F/U: HCPCS | Performed by: NURSE PRACTITIONER

## 2023-08-30 PROCEDURE — 3078F DIAST BP <80 MM HG: CPT | Performed by: NURSE PRACTITIONER

## 2023-08-30 PROCEDURE — 1036F TOBACCO NON-USER: CPT | Performed by: NURSE PRACTITIONER

## 2023-08-30 PROCEDURE — G8427 DOCREV CUR MEDS BY ELIG CLIN: HCPCS | Performed by: NURSE PRACTITIONER

## 2023-08-30 PROCEDURE — 83036 HEMOGLOBIN GLYCOSYLATED A1C: CPT

## 2023-08-30 PROCEDURE — 3017F COLORECTAL CA SCREEN DOC REV: CPT | Performed by: NURSE PRACTITIONER

## 2023-08-30 PROCEDURE — 1111F DSCHRG MED/CURRENT MED MERGE: CPT | Performed by: NURSE PRACTITIONER

## 2023-08-30 PROCEDURE — 1123F ACP DISCUSS/DSCN MKR DOCD: CPT | Performed by: NURSE PRACTITIONER

## 2023-08-30 PROCEDURE — 99214 OFFICE O/P EST MOD 30 MIN: CPT | Performed by: NURSE PRACTITIONER

## 2023-08-30 PROCEDURE — 3051F HG A1C>EQUAL 7.0%<8.0%: CPT | Performed by: NURSE PRACTITIONER

## 2023-08-30 PROCEDURE — 3074F SYST BP LT 130 MM HG: CPT | Performed by: NURSE PRACTITIONER

## 2023-08-30 PROCEDURE — 36415 COLL VENOUS BLD VENIPUNCTURE: CPT

## 2023-08-30 PROCEDURE — 93005 ELECTROCARDIOGRAM TRACING: CPT

## 2023-08-30 ASSESSMENT — ENCOUNTER SYMPTOMS
BLOOD IN STOOL: 0
CONSTIPATION: 0
DIARRHEA: 0
SHORTNESS OF BREATH: 0
ABDOMINAL PAIN: 0

## 2023-08-31 ENCOUNTER — APPOINTMENT (OUTPATIENT)
Dept: GENERAL RADIOLOGY | Age: 68
End: 2023-08-31
Attending: PODIATRIST
Payer: OTHER GOVERNMENT

## 2023-08-31 ENCOUNTER — ANESTHESIA (OUTPATIENT)
Dept: OPERATING ROOM | Age: 68
End: 2023-08-31
Payer: OTHER GOVERNMENT

## 2023-08-31 ENCOUNTER — ANESTHESIA EVENT (OUTPATIENT)
Dept: OPERATING ROOM | Age: 68
End: 2023-08-31
Payer: OTHER GOVERNMENT

## 2023-08-31 ENCOUNTER — HOSPITAL ENCOUNTER (OUTPATIENT)
Age: 68
Setting detail: OUTPATIENT SURGERY
Discharge: HOME | End: 2023-08-31
Attending: PODIATRIST | Admitting: PODIATRIST
Payer: OTHER GOVERNMENT

## 2023-08-31 VITALS
RESPIRATION RATE: 16 BRPM | BODY MASS INDEX: 34.58 KG/M2 | OXYGEN SATURATION: 99 % | TEMPERATURE: 98.4 F | DIASTOLIC BLOOD PRESSURE: 63 MMHG | WEIGHT: 247 LBS | HEIGHT: 71 IN | SYSTOLIC BLOOD PRESSURE: 119 MMHG | HEART RATE: 79 BPM

## 2023-08-31 DIAGNOSIS — L97.529 DIABETIC ULCER OF TOE OF LEFT FOOT ASSOCIATED WITH TYPE 2 DIABETES MELLITUS, UNSPECIFIED ULCER STAGE (HCC): ICD-10-CM

## 2023-08-31 DIAGNOSIS — M86.072 ACUTE HEMATOGENOUS OSTEOMYELITIS OF LEFT FOOT (HCC): Primary | ICD-10-CM

## 2023-08-31 DIAGNOSIS — E11.621 TYPE 2 DIABETES MELLITUS WITH FOOT ULCER, WITH LONG-TERM CURRENT USE OF INSULIN (HCC): ICD-10-CM

## 2023-08-31 DIAGNOSIS — L97.524 ULCER OF LEFT FOOT WITH NECROSIS OF BONE (HCC): ICD-10-CM

## 2023-08-31 DIAGNOSIS — Z79.4 TYPE 2 DIABETES MELLITUS WITH FOOT ULCER, WITH LONG-TERM CURRENT USE OF INSULIN (HCC): ICD-10-CM

## 2023-08-31 DIAGNOSIS — E11.621 DIABETIC ULCER OF TOE OF LEFT FOOT ASSOCIATED WITH TYPE 2 DIABETES MELLITUS, UNSPECIFIED ULCER STAGE (HCC): ICD-10-CM

## 2023-08-31 DIAGNOSIS — M86.9 OSTEOMYELITIS OF LEFT FOOT, UNSPECIFIED TYPE (HCC): ICD-10-CM

## 2023-08-31 DIAGNOSIS — L97.509 TYPE 2 DIABETES MELLITUS WITH FOOT ULCER, WITH LONG-TERM CURRENT USE OF INSULIN (HCC): ICD-10-CM

## 2023-08-31 LAB
EST. AVERAGE GLUCOSE BLD GHB EST-MCNC: 226 MG/DL
GLUCOSE BLD-MCNC: 122 MG/DL (ref 75–110)
HBA1C MFR BLD: 9.5 % (ref 4–6)

## 2023-08-31 PROCEDURE — 3700000001 HC ADD 15 MINUTES (ANESTHESIA): Performed by: PODIATRIST

## 2023-08-31 PROCEDURE — 3600000002 HC SURGERY LEVEL 2 BASE: Performed by: PODIATRIST

## 2023-08-31 PROCEDURE — 73630 X-RAY EXAM OF FOOT: CPT

## 2023-08-31 PROCEDURE — 2580000003 HC RX 258: Performed by: PODIATRIST

## 2023-08-31 PROCEDURE — 2500000003 HC RX 250 WO HCPCS: Performed by: PODIATRIST

## 2023-08-31 PROCEDURE — 88311 DECALCIFY TISSUE: CPT

## 2023-08-31 PROCEDURE — 3700000000 HC ANESTHESIA ATTENDED CARE: Performed by: PODIATRIST

## 2023-08-31 PROCEDURE — 2709999900 HC NON-CHARGEABLE SUPPLY: Performed by: PODIATRIST

## 2023-08-31 PROCEDURE — 28810 AMPUTATION TOE & METATARSAL: CPT | Performed by: PODIATRIST

## 2023-08-31 PROCEDURE — 82947 ASSAY GLUCOSE BLOOD QUANT: CPT

## 2023-08-31 PROCEDURE — 7100000011 HC PHASE II RECOVERY - ADDTL 15 MIN: Performed by: PODIATRIST

## 2023-08-31 PROCEDURE — 6360000002 HC RX W HCPCS: Performed by: PODIATRIST

## 2023-08-31 PROCEDURE — 7100000010 HC PHASE II RECOVERY - FIRST 15 MIN: Performed by: PODIATRIST

## 2023-08-31 PROCEDURE — 88305 TISSUE EXAM BY PATHOLOGIST: CPT

## 2023-08-31 PROCEDURE — 3600000012 HC SURGERY LEVEL 2 ADDTL 15MIN: Performed by: PODIATRIST

## 2023-08-31 PROCEDURE — 6360000002 HC RX W HCPCS: Performed by: NURSE ANESTHETIST, CERTIFIED REGISTERED

## 2023-08-31 PROCEDURE — 2500000003 HC RX 250 WO HCPCS: Performed by: NURSE ANESTHETIST, CERTIFIED REGISTERED

## 2023-08-31 RX ORDER — SODIUM CHLORIDE 0.9 % (FLUSH) 0.9 %
5-40 SYRINGE (ML) INJECTION EVERY 12 HOURS SCHEDULED
Status: DISCONTINUED | OUTPATIENT
Start: 2023-08-31 | End: 2023-08-31 | Stop reason: HOSPADM

## 2023-08-31 RX ORDER — SODIUM CHLORIDE 9 MG/ML
INJECTION, SOLUTION INTRAVENOUS PRN
Status: DISCONTINUED | OUTPATIENT
Start: 2023-08-31 | End: 2023-08-31 | Stop reason: HOSPADM

## 2023-08-31 RX ORDER — SODIUM CHLORIDE 0.9 % (FLUSH) 0.9 %
5-40 SYRINGE (ML) INJECTION PRN
Status: DISCONTINUED | OUTPATIENT
Start: 2023-08-31 | End: 2023-08-31 | Stop reason: HOSPADM

## 2023-08-31 RX ORDER — DIPHENHYDRAMINE HYDROCHLORIDE 50 MG/ML
12.5 INJECTION INTRAMUSCULAR; INTRAVENOUS
Status: DISCONTINUED | OUTPATIENT
Start: 2023-08-31 | End: 2023-08-31 | Stop reason: HOSPADM

## 2023-08-31 RX ORDER — SODIUM CHLORIDE, SODIUM LACTATE, POTASSIUM CHLORIDE, CALCIUM CHLORIDE 600; 310; 30; 20 MG/100ML; MG/100ML; MG/100ML; MG/100ML
INJECTION, SOLUTION INTRAVENOUS CONTINUOUS
Status: DISCONTINUED | OUTPATIENT
Start: 2023-08-31 | End: 2023-08-31 | Stop reason: HOSPADM

## 2023-08-31 RX ORDER — HYDROCODONE BITARTRATE AND ACETAMINOPHEN 5; 325 MG/1; MG/1
1 TABLET ORAL EVERY 6 HOURS PRN
Qty: 28 TABLET | Refills: 0 | Status: SHIPPED | OUTPATIENT
Start: 2023-08-31 | End: 2023-09-07

## 2023-08-31 RX ORDER — PROPOFOL 10 MG/ML
INJECTION, EMULSION INTRAVENOUS CONTINUOUS PRN
Status: DISCONTINUED | OUTPATIENT
Start: 2023-08-31 | End: 2023-08-31 | Stop reason: SDUPTHER

## 2023-08-31 RX ORDER — LIDOCAINE HYDROCHLORIDE 20 MG/ML
INJECTION, SOLUTION EPIDURAL; INFILTRATION; INTRACAUDAL; PERINEURAL PRN
Status: DISCONTINUED | OUTPATIENT
Start: 2023-08-31 | End: 2023-08-31 | Stop reason: SDUPTHER

## 2023-08-31 RX ORDER — ONDANSETRON 2 MG/ML
4 INJECTION INTRAMUSCULAR; INTRAVENOUS
Status: DISCONTINUED | OUTPATIENT
Start: 2023-08-31 | End: 2023-08-31 | Stop reason: HOSPADM

## 2023-08-31 RX ADMIN — SODIUM CHLORIDE, POTASSIUM CHLORIDE, SODIUM LACTATE AND CALCIUM CHLORIDE: 600; 310; 30; 20 INJECTION, SOLUTION INTRAVENOUS at 11:39

## 2023-08-31 RX ADMIN — LIDOCAINE HYDROCHLORIDE 50 MG: 20 INJECTION, SOLUTION EPIDURAL; INFILTRATION; INTRACAUDAL; PERINEURAL at 12:13

## 2023-08-31 RX ADMIN — PROPOFOL 75 MCG/KG/MIN: 10 INJECTION, EMULSION INTRAVENOUS at 12:12

## 2023-08-31 RX ADMIN — Medication 2000 MG: at 12:11

## 2023-08-31 ASSESSMENT — PAIN SCALES - GENERAL
PAINLEVEL_OUTOF10: 0
PAINLEVEL_OUTOF10: 0

## 2023-08-31 ASSESSMENT — PAIN - FUNCTIONAL ASSESSMENT: PAIN_FUNCTIONAL_ASSESSMENT: 0-10

## 2023-08-31 NOTE — DISCHARGE INSTRUCTIONS
Follow up appointment with Dr Marizol Vazquez in 1 week at [facility]. Call Surgeon if you have:  Temperature greater than 100.4  Persistent nausea and vomiting  Severe uncontrolled pain  Redness, tenderness, or signs of infection (pain, swelling, redness, odor or green/yellow discharge around the site)  Difficulty breathing, headache or visual disturbances  Hives  Persistent dizziness or light-headedness  Extreme fatigue  Any other questions or concerns you may have after discharge    In an emergency, call 911 or go to an Emergency Department at a nearby hospital    Post procedure: Watch for swelling. Watch for drainage coming thru the dressing. If cast or dressing feel too tight please contact the office or present to urgent care. Medications:  Resume regular pre operative medications as previously prescribed. Take post operative Rx pain medications as ordered. When pain subsides you may take OTC tylenol or anti inflammatory PRN as long as no issues of previous allergies with those medications. It is important to bring a complete, current list of your medications to any medical appointments or hospitalizations. Activity: Use offloading device (surgical shoe/cam walker/cast) 100% of the time Weightbearing. Use cane or crutches as needed also. Surgical site area: Always keep dressings, casts, splints, or offloading devices clean and dry. Do NOT remove dressing but reinforce as needed. If the dressing becomes soaked contact our office but if it's an off time you may change the dressing at home. Ice and elevate the limb on a regular basis. Ice 4-5 times daily for 20 minutes for first 3-4 days. Then continue 2-3 times per day till follow up appointment. Diet: Resume your usual diet. Good nutrition promotes healing. Increase fluid intake.      If any questions or concerns arise please call Dr Kanchan Kyle office at [phone], [facility] at [phone], or after hours call [facility]

## 2023-08-31 NOTE — OP NOTE
Sagittal saw to cut from dorsal distal to proximal plantar, 1st met head removed. Noted to have medial edges of mpj area to have bone infection changes seen. Area flushed with normal saline. 3-0 vicryl to close capsule and sub q tissue, 2-0 nylon to close skin in simple interuppted technique. Plantar ulcer was not able to be closed primarily, not enough movement in the skin. 2-0 nylon was able to closer approximalte the skin edges, just not a primary closure. .  A dry sterile dressing took place with adaptic, 4X4s, abd pad, siria and ACE wrap. Patient had a prompt hyperemic response noted to the digits upon release of the tourniquet. Patient was taken to the post operative unit with vital signs stable and neurovascular status intact. They will ice and elevate as directed. Other orders were:xrays. A cam walker was dispensed for appropriate offloading. Rx given were norco #28. Post operative directions were dispensed. Follow up in 1  weeks.      Electronically signed by July Patel DPM on 8/31/2023 at 1:08 PM

## 2023-08-31 NOTE — INTERVAL H&P NOTE
Update History & Physical    The patient's History and Physical was reviewed with the patient and I examined the patient. There was no change. The surgical site was confirmed by the patient and me. Plan: The risks, benefits, expected outcome, and alternative to the recommended procedure have been discussed with the patient. Patient understands and wants to proceed with the procedure.      Electronically signed by Talya Morse DPM on 8/31/2023 at 11:51 AM

## 2023-08-31 NOTE — PROGRESS NOTES
rounding on Surgery    Assessment: Patient is prepared for surgery to amputate a toe. His two daughters are present for support. Patient made statements that he would rather let it go and join his wife who  15 months ago. Daughters became very upset with this statement and while to tone was teasing the undercurrent was tense. Intervention: Engaged in conversation.  expressed understanding of personal experience and that patient might be ready, his family is not. Attempted to bring purpose to life in front of him. Patient expressed appreciation for visit and offer of continued prayer. Plan: Chaplains are available on site or on call  for spiritual and emotional support. 23 1216   Encounter Summary   Encounter Overview/Reason  Spiritual/Emotional Needs   Service Provided For: Patient and family together   Referral/Consult From: [address] [place] [address]   Last Encounter  23   Complexity of Encounter Moderate   Begin Time 1152   End Time  1200   Total Time Calculated 8 min   Assessment/Intervention/Outcome   Assessment Anxious; Complicated grieving   Intervention Active listening;Prayer (assurance of)/[place];Sustaining Presence/Ministry of presence; Confronted/Challenged   Outcome Coping; Acceptance;Engaged in conversation;Expressed Gratitude

## 2023-08-31 NOTE — ANESTHESIA PRE PROCEDURE
Department of Anesthesiology  Preprocedure Note       Name:  Cornelio Smiley   Age:  77 y.o.  :  1955                                          MRN:  [id number]         Date:  2023      Surgeon: Coy Haley):  Cheryl Gregory DPM    Procedure: Procedure(s):  Left 1st ray amputation    Medications prior to admission:   Prior to Admission medications    Medication Sig Start Date End Date Taking? Authorizing Provider   ciprofloxacin (CIPRO) 500 MG tablet Take 1 tablet by mouth 2 times daily    Historical Provider, MD   amoxicillin-clavulanate (AUGMENTIN) 875-125 MG per tablet Take 1 tablet by mouth 2 times daily for 10 days 23  Cheryl Gregory DPM   Omega-3 Fatty Acids (FISH OIL) 1000 MG capsule Take by mouth daily    Historical Provider, MD   metroNIDAZOLE (FLAGYL) 500 MG tablet Take 1 tablet by mouth 3 times daily for 10 days 23  Olivia Rivas MD   [facility]) 0.4 MG capsule Take 1 capsule by mouth daily 23   Yarely Nunez MD   Insulin Glargine-yfgn 100 UNIT/ML SOPN INJECT 40 UNITS UNDER SKIN AT BEDTIME FOR TYPE 2 DIABETES MELLITUS 23   Historical Provider, MD   atorvastatin (LIPITOR) 80 MG tablet 0.5 tablets 22   Historical Provider, MD   vitamin D (CHOLECALCIFEROL) 25 MCG (1000 UT) TABS tablet TAKE TWO TABLETS BY MOUTH EVERY DAY 14   Historical Provider, MD   sildenafil (VIAGRA) 50 MG tablet 0.5 tablets 22   Historical Provider, MD   gabapentin (NEURONTIN) 300 MG capsule Take 1 capsule by mouth 2 times daily. Historical Provider, MD   lisinopril-hydrochlorothiazide (PRINZIDE;ZESTORETIC) 20-12.5 MG per tablet Take 1 tablet by mouth daily.  14   VERNON Canchola   metFORMIN (GLUCOPHAGE) 1000 MG tablet Take 0.5 tablets by mouth 2 times daily    Historical Provider, MD   loratadine (CLARITIN) 10 MG tablet Take 1 tablet by mouth daily    Historical Provider, MD   aspirin 81 MG tablet Take 1 tablet by mouth daily    Historical Provider, MD

## 2023-08-31 NOTE — ANESTHESIA POSTPROCEDURE EVALUATION
Department of Anesthesiology  Postprocedure Note    Patient: Irina Bojorquez  MRN: [id number]  [facility] 1955  Date of evaluation: 8/31/2023      Procedure Summary     Date: 08/31/23 Room / Location: [address] [place] [address]    Anesthesia Start: 1210 Anesthesia Stop: 6463    Procedure: Left 1st ray amputation (Left: Foot) Diagnosis:       Osteomyelitis of left foot, unspecified type ([address])      Diabetic ulcer of toe of left foot associated with type 2 diabetes mellitus, unspecified ulcer stage ([address])      (Osteomyelitis of left foot, unspecified type ([address]) [M86.9])      (Diabetic ulcer of toe of left foot associated with type 2 diabetes mellitus, unspecified ulcer stage ([address]) [E32.402, L97.529])    Surgeons: Mario Kaur DPM Responsible Provider: SAV Valentino CRNA    Anesthesia Type: MAC ASA Status: 3          Anesthesia Type: No value filed.     Yelena Phase I: Yelena Score: 10    Yelena Phase II:        Anesthesia Post Evaluation    Patient location during evaluation: bedside  Patient participation: complete - patient participated  Level of consciousness: awake and alert  Pain score: 0  Airway patency: patent  Nausea & Vomiting: no nausea and no vomiting  Complications: no  Cardiovascular status: blood pressure returned to baseline  Respiratory status: acceptable  Hydration status: euvolemic  Pain management: adequate

## 2023-09-01 LAB
EKG ATRIAL RATE: 98 BPM
EKG P AXIS: 57 DEGREES
EKG P-R INTERVAL: 166 MS
EKG Q-T INTERVAL: 418 MS
EKG QRS DURATION: 146 MS
EKG QTC CALCULATION (BAZETT): 533 MS
EKG R AXIS: 41 DEGREES
EKG T AXIS: 9 DEGREES
EKG VENTRICULAR RATE: 98 BPM

## 2023-09-05 ENCOUNTER — HOSPITAL ENCOUNTER (OUTPATIENT)
Dept: WOUND CARE | Age: 68
Discharge: HOME | End: 2023-09-06
Payer: MEDICARE

## 2023-09-05 VITALS
SYSTOLIC BLOOD PRESSURE: 124 MMHG | TEMPERATURE: 98.5 F | DIASTOLIC BLOOD PRESSURE: 60 MMHG | BODY MASS INDEX: 35.64 KG/M2 | RESPIRATION RATE: 24 BRPM | HEART RATE: 100 BPM | WEIGHT: 254.6 LBS | HEIGHT: 71 IN

## 2023-09-05 DIAGNOSIS — Z89.432 PARTIAL NONTRAUMATIC AMPUTATION OF FOOT, LEFT (HCC): ICD-10-CM

## 2023-09-05 DIAGNOSIS — L97.522 ULCER OF LEFT FOOT WITH FAT LAYER EXPOSED (HCC): Primary | ICD-10-CM

## 2023-09-05 PROCEDURE — 99024 POSTOP FOLLOW-UP VISIT: CPT | Performed by: PODIATRIST

## 2023-09-05 PROCEDURE — 99213 OFFICE O/P EST LOW 20 MIN: CPT

## 2023-09-05 RX ORDER — LIDOCAINE HYDROCHLORIDE 40 MG/ML
SOLUTION TOPICAL ONCE
OUTPATIENT
Start: 2023-09-05 | End: 2023-09-05

## 2023-09-05 RX ORDER — BETAMETHASONE DIPROPIONATE 0.05 %
OINTMENT (GRAM) TOPICAL ONCE
OUTPATIENT
Start: 2023-09-05 | End: 2023-09-05

## 2023-09-05 RX ORDER — IBUPROFEN 200 MG
TABLET ORAL ONCE
OUTPATIENT
Start: 2023-09-05 | End: 2023-09-05

## 2023-09-05 RX ORDER — GINSENG 100 MG
CAPSULE ORAL ONCE
OUTPATIENT
Start: 2023-09-05 | End: 2023-09-05

## 2023-09-05 RX ORDER — LIDOCAINE HYDROCHLORIDE 20 MG/ML
JELLY TOPICAL ONCE
OUTPATIENT
Start: 2023-09-05 | End: 2023-09-05

## 2023-09-05 RX ORDER — BACITRACIN ZINC AND POLYMYXIN B SULFATE 500; 1000 [USP'U]/G; [USP'U]/G
OINTMENT TOPICAL ONCE
OUTPATIENT
Start: 2023-09-05 | End: 2023-09-05

## 2023-09-05 RX ORDER — LIDOCAINE 50 MG/G
OINTMENT TOPICAL ONCE
OUTPATIENT
Start: 2023-09-05 | End: 2023-09-05

## 2023-09-05 RX ORDER — GENTAMICIN SULFATE 1 MG/G
OINTMENT TOPICAL ONCE
OUTPATIENT
Start: 2023-09-05 | End: 2023-09-05

## 2023-09-05 RX ORDER — SODIUM CHLOR/HYPOCHLOROUS ACID 0.033 %
SOLUTION, IRRIGATION IRRIGATION ONCE
OUTPATIENT
Start: 2023-09-05 | End: 2023-09-05

## 2023-09-05 RX ORDER — LIDOCAINE 40 MG/G
CREAM TOPICAL ONCE
OUTPATIENT
Start: 2023-09-05 | End: 2023-09-05

## 2023-09-05 RX ORDER — CLOBETASOL PROPIONATE 0.5 MG/G
OINTMENT TOPICAL ONCE
OUTPATIENT
Start: 2023-09-05 | End: 2023-09-05

## 2023-09-05 ASSESSMENT — PAIN SCALES - GENERAL: PAINLEVEL_OUTOF10: 0

## 2023-09-05 NOTE — DISCHARGE INSTRUCTIONS
Follow up as scheduled with Dr. Moses Agarwal in wound care. Sutures are to remain in place and will be evaluated at next appointment. Adaptic, abd pad and kerlix has been placed on left foot. Wrap foot in ace wrap. If excessive drainage change outer dry dressing. SIGNS OF INFECTION  - Redness, swelling, skin hot  - Wound bed turns black or stringy yellow  - Foul odor  - Increased drainage or pus  - Increased pain  - Fever greater than 100F    CALL YOUR DOCTOR OR SEEK MEDICAL ATTENTION IF SIGNS OF INFECTION.   DO NOT WAIT UNTIL YOUR NEXT APPOINTMENT    Call the Wound Care Nurse with any other questions or concerns- [phone]

## 2023-09-06 LAB — SURGICAL PATHOLOGY REPORT: NORMAL

## 2023-09-11 ENCOUNTER — HOSPITAL ENCOUNTER (OUTPATIENT)
Dept: WOUND CARE | Age: 68
Discharge: HOME | End: 2023-09-12
Payer: MEDICARE

## 2023-09-11 VITALS
BODY MASS INDEX: 35.7 KG/M2 | TEMPERATURE: 98 F | HEIGHT: 71 IN | RESPIRATION RATE: 20 BRPM | DIASTOLIC BLOOD PRESSURE: 72 MMHG | SYSTOLIC BLOOD PRESSURE: 112 MMHG | WEIGHT: 255 LBS | HEART RATE: 88 BPM

## 2023-09-11 DIAGNOSIS — L97.522 ULCER OF LEFT FOOT WITH FAT LAYER EXPOSED (HCC): Primary | ICD-10-CM

## 2023-09-11 PROCEDURE — 99024 POSTOP FOLLOW-UP VISIT: CPT | Performed by: PODIATRIST

## 2023-09-11 PROCEDURE — 99213 OFFICE O/P EST LOW 20 MIN: CPT

## 2023-09-11 RX ORDER — LIDOCAINE 40 MG/G
CREAM TOPICAL ONCE
OUTPATIENT
Start: 2023-09-11 | End: 2023-09-11

## 2023-09-11 RX ORDER — SODIUM CHLOR/HYPOCHLOROUS ACID 0.033 %
SOLUTION, IRRIGATION IRRIGATION ONCE
OUTPATIENT
Start: 2023-09-11 | End: 2023-09-11

## 2023-09-11 RX ORDER — GINSENG 100 MG
CAPSULE ORAL ONCE
OUTPATIENT
Start: 2023-09-11 | End: 2023-09-11

## 2023-09-11 RX ORDER — GENTAMICIN SULFATE 1 MG/G
OINTMENT TOPICAL ONCE
OUTPATIENT
Start: 2023-09-11 | End: 2023-09-11

## 2023-09-11 RX ORDER — LIDOCAINE HYDROCHLORIDE 20 MG/ML
JELLY TOPICAL ONCE
OUTPATIENT
Start: 2023-09-11 | End: 2023-09-11

## 2023-09-11 RX ORDER — LIDOCAINE HYDROCHLORIDE 40 MG/ML
SOLUTION TOPICAL ONCE
OUTPATIENT
Start: 2023-09-11 | End: 2023-09-11

## 2023-09-11 RX ORDER — BACITRACIN ZINC AND POLYMYXIN B SULFATE 500; 1000 [USP'U]/G; [USP'U]/G
OINTMENT TOPICAL ONCE
OUTPATIENT
Start: 2023-09-11 | End: 2023-09-11

## 2023-09-11 RX ORDER — BETAMETHASONE DIPROPIONATE 0.05 %
OINTMENT (GRAM) TOPICAL ONCE
OUTPATIENT
Start: 2023-09-11 | End: 2023-09-11

## 2023-09-11 RX ORDER — CLOBETASOL PROPIONATE 0.5 MG/G
OINTMENT TOPICAL ONCE
OUTPATIENT
Start: 2023-09-11 | End: 2023-09-11

## 2023-09-11 RX ORDER — LIDOCAINE 50 MG/G
OINTMENT TOPICAL ONCE
OUTPATIENT
Start: 2023-09-11 | End: 2023-09-11

## 2023-09-11 RX ORDER — IBUPROFEN 200 MG
TABLET ORAL ONCE
OUTPATIENT
Start: 2023-09-11 | End: 2023-09-11

## 2023-09-11 ASSESSMENT — PAIN SCALES - GENERAL: PAINLEVEL_OUTOF10: 0

## 2023-09-11 NOTE — DISCHARGE INSTRUCTIONS
Keep sutures in place until next appt with Dr. Paulo Perera. Change foam dressing to left foot wounds. Changing Monday/Wednesday/Friday. Follow up with Dr. Paulo Perera in 1 week as scheduled. Continue to wear offloading boot. SIGNS OF INFECTION  - Redness, swelling, skin hot  - Wound bed turns black or stringy yellow  - Foul odor  - Increased drainage or pus  - Increased pain  - Fever greater than 100F    CALL YOUR DOCTOR OR SEEK MEDICAL ATTENTION IF SIGNS OF INFECTION.   DO NOT WAIT UNTIL YOUR NEXT APPOINTMENT    Call the Wound Care Nurse with any other questions or concerns- [phone]

## 2023-09-11 NOTE — PLAN OF CARE
Problem: Chronic Conditions and Co-morbidities  Goal: Patient's chronic conditions and co-morbidity symptoms are monitored and maintained or improved  Outcome: Progressing     Problem: Pain  Goal: Verbalizes/displays adequate comfort level or baseline comfort level  Outcome: Progressing  Flowsheets (Taken 9/11/2023 3610)  Verbalizes/displays adequate comfort level or baseline comfort level: Assess pain using appropriate pain scale     Problem: Cognitive:  Goal: Knowledge of wound care  Description: Knowledge of wound care  Outcome: Progressing  Goal: Understands risk factors for wounds  Description: Understands risk factors for wounds  Outcome: Progressing     Problem: Wound:  Goal: Will show signs of wound healing; wound closure and no evidence of infection  Description: Will show signs of wound healing; wound closure and no evidence of infection  Outcome: Progressing     Problem: Weight control:  Goal: Ability to maintain an optimal weight for height and age will be supported  Description: Ability to maintain an optimal weight for height and age will be supported  Outcome: Progressing     Problem: Falls - Risk of:  Goal: Will remain free from falls  Description: Will remain free from falls  Outcome: Progressing

## 2023-09-12 ENCOUNTER — TELEPHONE (OUTPATIENT)
Dept: WOUND CARE | Age: 68
End: 2023-09-12

## 2023-09-12 NOTE — TELEPHONE ENCOUNTER
Patient returning [employer]. Sent back machine and cords. Left at [employer] pick-up at [facility].      Tracking #: Neo Segura [id number]

## 2023-09-14 RX ORDER — CEPHALEXIN 500 MG/1
500 CAPSULE ORAL 3 TIMES DAILY
COMMUNITY
Start: 2023-09-14 | End: 2023-09-24

## 2023-09-18 ENCOUNTER — HOSPITAL ENCOUNTER (OUTPATIENT)
Dept: WOUND CARE | Age: 68
Discharge: HOME | End: 2023-09-19
Payer: MEDICARE

## 2023-09-18 VITALS
BODY MASS INDEX: 36.6 KG/M2 | WEIGHT: 261.4 LBS | DIASTOLIC BLOOD PRESSURE: 68 MMHG | SYSTOLIC BLOOD PRESSURE: 132 MMHG | HEART RATE: 84 BPM | TEMPERATURE: 98.1 F | HEIGHT: 71 IN | RESPIRATION RATE: 24 BRPM

## 2023-09-18 DIAGNOSIS — L97.522 ULCER OF LEFT FOOT WITH FAT LAYER EXPOSED (HCC): Primary | ICD-10-CM

## 2023-09-18 PROCEDURE — 11042 DBRDMT SUBQ TIS 1ST 20SQCM/<: CPT | Performed by: PODIATRIST

## 2023-09-18 PROCEDURE — 11042 DBRDMT SUBQ TIS 1ST 20SQCM/<: CPT

## 2023-09-18 RX ORDER — BACITRACIN ZINC AND POLYMYXIN B SULFATE 500; 1000 [USP'U]/G; [USP'U]/G
OINTMENT TOPICAL ONCE
OUTPATIENT
Start: 2023-09-18 | End: 2023-09-18

## 2023-09-18 RX ORDER — BETAMETHASONE DIPROPIONATE 0.05 %
OINTMENT (GRAM) TOPICAL ONCE
OUTPATIENT
Start: 2023-09-18 | End: 2023-09-18

## 2023-09-18 RX ORDER — LIDOCAINE 40 MG/G
CREAM TOPICAL ONCE
OUTPATIENT
Start: 2023-09-18 | End: 2023-09-18

## 2023-09-18 RX ORDER — LIDOCAINE HYDROCHLORIDE 20 MG/ML
JELLY TOPICAL ONCE
OUTPATIENT
Start: 2023-09-18 | End: 2023-09-18

## 2023-09-18 RX ORDER — GINSENG 100 MG
CAPSULE ORAL ONCE
OUTPATIENT
Start: 2023-09-18 | End: 2023-09-18

## 2023-09-18 RX ORDER — CLOBETASOL PROPIONATE 0.5 MG/G
OINTMENT TOPICAL ONCE
OUTPATIENT
Start: 2023-09-18 | End: 2023-09-18

## 2023-09-18 RX ORDER — IBUPROFEN 200 MG
TABLET ORAL ONCE
OUTPATIENT
Start: 2023-09-18 | End: 2023-09-18

## 2023-09-18 RX ORDER — GENTAMICIN SULFATE 1 MG/G
OINTMENT TOPICAL ONCE
OUTPATIENT
Start: 2023-09-18 | End: 2023-09-18

## 2023-09-18 RX ORDER — LIDOCAINE HYDROCHLORIDE 40 MG/ML
SOLUTION TOPICAL ONCE
OUTPATIENT
Start: 2023-09-18 | End: 2023-09-18

## 2023-09-18 RX ORDER — LIDOCAINE 50 MG/G
OINTMENT TOPICAL ONCE
OUTPATIENT
Start: 2023-09-18 | End: 2023-09-18

## 2023-09-18 RX ORDER — TRIAMCINOLONE ACETONIDE 1 MG/G
OINTMENT TOPICAL ONCE
OUTPATIENT
Start: 2023-09-18 | End: 2023-09-18

## 2023-09-18 RX ORDER — SODIUM CHLOR/HYPOCHLOROUS ACID 0.033 %
SOLUTION, IRRIGATION IRRIGATION ONCE
OUTPATIENT
Start: 2023-09-18 | End: 2023-09-18

## 2023-09-18 ASSESSMENT — PAIN SCALES - GENERAL: PAINLEVEL_OUTOF10: 0

## 2023-09-18 NOTE — DISCHARGE INSTRUCTIONS
Follow up as scheduled with Dr. Donaldo Amor at 1:15PM on 9/25/2023 in wound care. Place iodoform packing to left foot wounds. Change dressing daily. Wear offloading boot when up and walking. SIGNS OF INFECTION  - Redness, swelling, skin hot  - Wound bed turns black or stringy yellow  - Foul odor  - Increased drainage or pus  - Increased pain  - Fever greater than 100F    CALL YOUR DOCTOR OR SEEK MEDICAL ATTENTION IF SIGNS OF INFECTION.   DO NOT WAIT UNTIL YOUR NEXT APPOINTMENT    Call the Wound Care Nurse with any other questions or concerns- [phone]

## 2023-09-25 ENCOUNTER — HOSPITAL ENCOUNTER (OUTPATIENT)
Dept: WOUND CARE | Age: 68
Discharge: HOME | End: 2023-09-26
Payer: MEDICARE

## 2023-09-25 VITALS
SYSTOLIC BLOOD PRESSURE: 128 MMHG | TEMPERATURE: 97.6 F | RESPIRATION RATE: 18 BRPM | WEIGHT: 260 LBS | DIASTOLIC BLOOD PRESSURE: 70 MMHG | BODY MASS INDEX: 36.4 KG/M2 | HEIGHT: 71 IN | HEART RATE: 84 BPM

## 2023-09-25 DIAGNOSIS — L97.522 ULCER OF LEFT FOOT WITH FAT LAYER EXPOSED (HCC): Primary | ICD-10-CM

## 2023-09-25 PROCEDURE — 11042 DBRDMT SUBQ TIS 1ST 20SQCM/<: CPT | Performed by: PODIATRIST

## 2023-09-25 RX ORDER — GENTAMICIN SULFATE 1 MG/G
OINTMENT TOPICAL ONCE
OUTPATIENT
Start: 2023-09-25 | End: 2023-09-25

## 2023-09-25 RX ORDER — IBUPROFEN 200 MG
TABLET ORAL ONCE
OUTPATIENT
Start: 2023-09-25 | End: 2023-09-25

## 2023-09-25 RX ORDER — BETAMETHASONE DIPROPIONATE 0.05 %
OINTMENT (GRAM) TOPICAL ONCE
OUTPATIENT
Start: 2023-09-25 | End: 2023-09-25

## 2023-09-25 RX ORDER — BACITRACIN ZINC AND POLYMYXIN B SULFATE 500; 1000 [USP'U]/G; [USP'U]/G
OINTMENT TOPICAL ONCE
OUTPATIENT
Start: 2023-09-25 | End: 2023-09-25

## 2023-09-25 RX ORDER — LIDOCAINE HYDROCHLORIDE 20 MG/ML
JELLY TOPICAL ONCE
OUTPATIENT
Start: 2023-09-25 | End: 2023-09-25

## 2023-09-25 RX ORDER — TRIAMCINOLONE ACETONIDE 1 MG/G
OINTMENT TOPICAL ONCE
OUTPATIENT
Start: 2023-09-25 | End: 2023-09-25

## 2023-09-25 RX ORDER — SODIUM CHLOR/HYPOCHLOROUS ACID 0.033 %
SOLUTION, IRRIGATION IRRIGATION ONCE
OUTPATIENT
Start: 2023-09-25 | End: 2023-09-25

## 2023-09-25 RX ORDER — GINSENG 100 MG
CAPSULE ORAL ONCE
OUTPATIENT
Start: 2023-09-25 | End: 2023-09-25

## 2023-09-25 RX ORDER — CLOBETASOL PROPIONATE 0.5 MG/G
OINTMENT TOPICAL ONCE
OUTPATIENT
Start: 2023-09-25 | End: 2023-09-25

## 2023-09-25 RX ORDER — LIDOCAINE HYDROCHLORIDE 40 MG/ML
SOLUTION TOPICAL ONCE
OUTPATIENT
Start: 2023-09-25 | End: 2023-09-25

## 2023-09-25 RX ORDER — LIDOCAINE 50 MG/G
OINTMENT TOPICAL ONCE
OUTPATIENT
Start: 2023-09-25 | End: 2023-09-25

## 2023-09-25 RX ORDER — LIDOCAINE 40 MG/G
CREAM TOPICAL ONCE
OUTPATIENT
Start: 2023-09-25 | End: 2023-09-25

## 2023-09-25 NOTE — PROGRESS NOTES
None 09/25/23 1320   Valery-wound Assessment Intact 09/25/23 1320   Margins Defined edges 09/25/23 1320   Wound Thickness Description not for Pressure Injury Full thickness 09/25/23 1320   Number of days: 197       Wound 03/12/23 Foot Anterior;Right (Active)   Number of days: 197       Wound 07/10/23 Left;Plantar (Active)   Wound Image   09/18/23 1334   Wound Etiology Diabetic 09/25/23 1320   Dressing Status Old drainage noted 09/25/23 1320   Wound Cleansed Cleansed with saline 09/25/23 1320   Dressing/Treatment Iodoform gauze 09/25/23 1320   Offloading for Diabetic Foot Ulcers Offloading boot 09/25/23 1320   Dressing Change Due 09/26/23 09/25/23 1320   Wound Length (cm) 0.9 cm 09/25/23 1320   Wound Width (cm) 0.7 cm 09/25/23 1320   Wound Depth (cm) 0.1 cm 09/25/23 1320   Wound Surface Area (cm^2) 0.63 cm^2 09/25/23 1320   Change in Wound Size % (l*w) 92.47 09/25/23 1320   Wound Volume (cm^3) 0.063 cm^3 09/25/23 1320   Wound Healing % 92 09/25/23 1320   Post-Procedure Length (cm) 1 cm 09/18/23 1334   Post-Procedure Width (cm) 2.1 cm 09/18/23 1334   Post-Procedure Depth (cm) 0.4 cm 09/18/23 1334   Post-Procedure Surface Area (cm^2) 2.1 cm^2 09/18/23 1334   Post-Procedure Volume (cm^3) 0.84 cm^3 09/18/23 1334   Undermining Starts ___ O'Clock 9 07/31/23 1321   Undermining Maxium Distance (cm) 0.5 07/31/23 1321   Wound Assessment Pink/red 09/25/23 1320   Drainage Amount Moderate (25-50%) 09/25/23 1320   Drainage Description Serosanguinous 09/25/23 1320   Odor None 09/25/23 1320   Valery-wound Assessment Intact 09/25/23 1320   Margins Defined edges 09/25/23 1320   Wound Thickness Description not for Pressure Injury Full thickness 09/25/23 1320   Number of days: 77         Assessment:  Status post 17  days from 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

## 2023-09-25 NOTE — PLAN OF CARE
Problem: Chronic Conditions and Co-morbidities  Goal: Patient's chronic conditions and co-morbidity symptoms are monitored and maintained or improved  Outcome: Progressing     Problem: Cognitive:  Goal: Knowledge of wound care  Description: Knowledge of wound care  Outcome: Progressing  Goal: Understands risk factors for wounds  Description: Understands risk factors for wounds  Outcome: Progressing     Problem: Wound:  Goal: Will show signs of wound healing; wound closure and no evidence of infection  Description: Will show signs of wound healing; wound closure and no evidence of infection  Outcome: Progressing       Problem: Venous:  Goal: Signs of wound healing will improve  Description: Signs of wound healing will improve  Outcome: Progressing     Problem: Weight control:  Goal: Ability to maintain an optimal weight for height and age will be supported  Description: Ability to maintain an optimal weight for height and age will be supported  Outcome: Progressing     Problem: Falls - Risk of:  Goal: Will remain free from falls  Description: Will remain free from falls  Outcome: Progressing     Problem: Blood Glucose:  Goal: Ability to maintain appropriate glucose levels will improve  Description: Ability to maintain appropriate glucose levels will improve  Outcome: Progressing

## 2023-09-25 NOTE — DISCHARGE INSTRUCTIONS
Continue iodoform packing to both left plantar wound and left anterior wound ( lay a piece on top of small open area). Changing daily. Continue wearing offloading boot. Follow up with Dr. Mac Mcfadden in 1 week as scheduled. SIGNS OF INFECTION  - Redness, swelling, skin hot  - Wound bed turns black or stringy yellow  - Foul odor  - Increased drainage or pus  - Increased pain  - Fever greater than 100F    CALL YOUR DOCTOR OR SEEK MEDICAL ATTENTION IF SIGNS OF INFECTION.   DO NOT WAIT UNTIL YOUR NEXT APPOINTMENT    Call the Wound Care Nurse with any other questions or concerns- [phone]

## 2023-09-28 ENCOUNTER — TELEPHONE (OUTPATIENT)
Dept: PODIATRY | Age: 68
End: 2023-09-28

## 2023-09-28 RX ORDER — AMOXICILLIN AND CLAVULANATE POTASSIUM 875; 125 MG/1; MG/1
1 TABLET, FILM COATED ORAL 2 TIMES DAILY
Qty: 20 TABLET | Refills: 0 | Status: SHIPPED | OUTPATIENT
Start: 2023-09-28 | End: 2023-10-08

## 2023-09-28 NOTE — TELEPHONE ENCOUNTER
Fax received from Oliver Pereira RN from [facility] during patient's home health visit today, she noticed warmth, bloody drainage, and redness at left great toe amputation site. She states symptoms were not present during her last visit on 9/27/23. Patient uses [address] in [facility]. Please advise.

## 2023-09-28 NOTE — TELEPHONE ENCOUNTER
Left message for patient that Augmentin had been sent to [facility], patient to take one tab by mouth twice per day for 10 days, to call with questions or concerns [phone]

## 2023-10-02 ENCOUNTER — HOSPITAL ENCOUNTER (OUTPATIENT)
Dept: WOUND CARE | Age: 68
Discharge: HOME | End: 2023-10-03
Payer: MEDICARE

## 2023-10-02 VITALS
TEMPERATURE: 97.6 F | RESPIRATION RATE: 20 BRPM | HEART RATE: 88 BPM | DIASTOLIC BLOOD PRESSURE: 78 MMHG | SYSTOLIC BLOOD PRESSURE: 132 MMHG

## 2023-10-02 DIAGNOSIS — L97.522 ULCER OF LEFT FOOT WITH FAT LAYER EXPOSED (HCC): Primary | ICD-10-CM

## 2023-10-02 PROCEDURE — 11042 DBRDMT SUBQ TIS 1ST 20SQCM/<: CPT

## 2023-10-02 PROCEDURE — 11042 DBRDMT SUBQ TIS 1ST 20SQCM/<: CPT | Performed by: PODIATRIST

## 2023-10-02 RX ORDER — SODIUM CHLOR/HYPOCHLOROUS ACID 0.033 %
SOLUTION, IRRIGATION IRRIGATION ONCE
OUTPATIENT
Start: 2023-10-02 | End: 2023-10-02

## 2023-10-02 RX ORDER — BETAMETHASONE DIPROPIONATE 0.05 %
OINTMENT (GRAM) TOPICAL ONCE
OUTPATIENT
Start: 2023-10-02 | End: 2023-10-02

## 2023-10-02 RX ORDER — BACITRACIN ZINC AND POLYMYXIN B SULFATE 500; 1000 [USP'U]/G; [USP'U]/G
OINTMENT TOPICAL ONCE
OUTPATIENT
Start: 2023-10-02 | End: 2023-10-02

## 2023-10-02 RX ORDER — LIDOCAINE HYDROCHLORIDE 20 MG/ML
JELLY TOPICAL ONCE
OUTPATIENT
Start: 2023-10-02 | End: 2023-10-02

## 2023-10-02 RX ORDER — LIDOCAINE HYDROCHLORIDE 40 MG/ML
SOLUTION TOPICAL ONCE
OUTPATIENT
Start: 2023-10-02 | End: 2023-10-02

## 2023-10-02 RX ORDER — LIDOCAINE 50 MG/G
OINTMENT TOPICAL ONCE
OUTPATIENT
Start: 2023-10-02 | End: 2023-10-02

## 2023-10-02 RX ORDER — GENTAMICIN SULFATE 1 MG/G
OINTMENT TOPICAL ONCE
OUTPATIENT
Start: 2023-10-02 | End: 2023-10-02

## 2023-10-02 RX ORDER — IBUPROFEN 200 MG
TABLET ORAL ONCE
OUTPATIENT
Start: 2023-10-02 | End: 2023-10-02

## 2023-10-02 RX ORDER — TRIAMCINOLONE ACETONIDE 1 MG/G
OINTMENT TOPICAL ONCE
OUTPATIENT
Start: 2023-10-02 | End: 2023-10-02

## 2023-10-02 RX ORDER — CLOBETASOL PROPIONATE 0.5 MG/G
OINTMENT TOPICAL ONCE
OUTPATIENT
Start: 2023-10-02 | End: 2023-10-02

## 2023-10-02 RX ORDER — GINSENG 100 MG
CAPSULE ORAL ONCE
OUTPATIENT
Start: 2023-10-02 | End: 2023-10-02

## 2023-10-02 RX ORDER — LIDOCAINE 40 MG/G
CREAM TOPICAL ONCE
OUTPATIENT
Start: 2023-10-02 | End: 2023-10-02

## 2023-10-02 ASSESSMENT — PAIN SCALES - GENERAL: PAINLEVEL_OUTOF10: 10

## 2023-10-02 NOTE — PROGRESS NOTES
Subjective:  Patient presents today with ulcer L foot. Pt noticed more draiange and swelling in area end of last week. Pt got the po abx that was sent in for him. Dressings changed as advised. Patient has been compliant with the off loading device. Patient has no fever or chills no nausea and no vomiting. Past Medical History:   Diagnosis Date    Allergic rhinitis     Colon polyps     history of    Elevated WBC count     history of    Hyperlipidemia     Hypertension     Obesity     Pain, joint, hand, left 7/28/2021    Partial small bowel obstruction ([address]) 3/5/2023    SBO (small bowel obstruction) ([address]) 3/12/2023    Sepsis with acute renal failure ([address]) 3/5/2023    Type II or unspecified type diabetes mellitus without mention of complication, not stated as uncontrolled     Unspecified sleep apnea     Weakness of left hand 7/28/2021     Current Outpatient Medications on File Prior to Encounter   Medication Sig Dispense Refill    amoxicillin-clavulanate (AUGMENTIN) 875-125 MG per tablet Take 1 tablet by mouth 2 times daily for 10 days 20 tablet 0    Omega-3 Fatty Acids (FISH OIL) 1000 MG capsule Take by mouth daily      tamsulosin (FLOMAX) 0.4 MG capsule Take 1 capsule by mouth daily 30 capsule 0    Insulin Glargine-yfgn 100 UNIT/ML SOPN INJECT 40 UNITS UNDER SKIN AT BEDTIME FOR TYPE 2 DIABETES MELLITUS      atorvastatin (LIPITOR) 80 MG tablet 0.5 tablets      vitamin D (CHOLECALCIFEROL) 25 MCG (1000 UT) TABS tablet TAKE TWO TABLETS BY MOUTH EVERY DAY      sildenafil (VIAGRA) 50 MG tablet 0.5 tablets      gabapentin (NEURONTIN) 300 MG capsule Take 1 capsule by mouth 2 times daily. lisinopril-hydrochlorothiazide (PRINZIDE;ZESTORETIC) 20-12.5 MG per tablet Take 1 tablet by mouth daily.  30 tablet 1    metFORMIN (GLUCOPHAGE) 1000 MG tablet Take 0.5 tablets by mouth 2 times daily      loratadine (CLARITIN) 10 MG tablet Take 1 tablet by mouth daily      aspirin 81 MG tablet Take 1 tablet by mouth daily

## 2023-10-02 NOTE — PATIENT INSTRUCTIONS
Continue packing to left plantar wound. Cover with dry dressing. Change daily. Continue to wear offloading boot. SIGNS OF INFECTION  - Redness, swelling, skin hot  - Wound bed turns black or stringy yellow  - Foul odor  - Increased drainage or pus  - Increased pain  - Fever greater than 100F    CALL YOUR DOCTOR OR SEEK MEDICAL ATTENTION IF SIGNS OF INFECTION.   DO NOT WAIT UNTIL YOUR NEXT APPOINTMENT    Call the Wound Care Nurse with any other questions or concerns- [phone]  Follow up as scheduled

## 2023-10-09 ENCOUNTER — HOSPITAL ENCOUNTER (OUTPATIENT)
Dept: WOUND CARE | Age: 68
Discharge: HOME | End: 2023-10-10
Attending: PODIATRIST | Admitting: PODIATRIST
Payer: OTHER GOVERNMENT

## 2023-10-09 ENCOUNTER — HOSPITAL ENCOUNTER (OUTPATIENT)
Dept: GENERAL RADIOLOGY | Age: 68
Discharge: HOME | End: 2023-10-11
Payer: OTHER GOVERNMENT

## 2023-10-09 VITALS
DIASTOLIC BLOOD PRESSURE: 72 MMHG | HEART RATE: 80 BPM | RESPIRATION RATE: 18 BRPM | HEIGHT: 71 IN | WEIGHT: 262 LBS | TEMPERATURE: 98.5 F | BODY MASS INDEX: 36.68 KG/M2 | SYSTOLIC BLOOD PRESSURE: 128 MMHG

## 2023-10-09 DIAGNOSIS — L97.522 ULCER OF LEFT FOOT WITH FAT LAYER EXPOSED (HCC): Primary | ICD-10-CM

## 2023-10-09 DIAGNOSIS — M86.072 ACUTE HEMATOGENOUS OSTEOMYELITIS OF LEFT FOOT (HCC): ICD-10-CM

## 2023-10-09 DIAGNOSIS — L97.522 ULCER OF LEFT FOOT WITH FAT LAYER EXPOSED (HCC): ICD-10-CM

## 2023-10-09 PROCEDURE — 73630 X-RAY EXAM OF FOOT: CPT

## 2023-10-09 PROCEDURE — 11042 DBRDMT SUBQ TIS 1ST 20SQCM/<: CPT | Performed by: PODIATRIST

## 2023-10-09 PROCEDURE — 11042 DBRDMT SUBQ TIS 1ST 20SQCM/<: CPT

## 2023-10-09 RX ORDER — LIDOCAINE 50 MG/G
OINTMENT TOPICAL ONCE
OUTPATIENT
Start: 2023-10-09 | End: 2023-10-09

## 2023-10-09 RX ORDER — LIDOCAINE HYDROCHLORIDE 40 MG/ML
SOLUTION TOPICAL ONCE
OUTPATIENT
Start: 2023-10-09 | End: 2023-10-09

## 2023-10-09 RX ORDER — IBUPROFEN 200 MG
TABLET ORAL ONCE
OUTPATIENT
Start: 2023-10-09 | End: 2023-10-09

## 2023-10-09 RX ORDER — GENTAMICIN SULFATE 1 MG/G
OINTMENT TOPICAL ONCE
OUTPATIENT
Start: 2023-10-09 | End: 2023-10-09

## 2023-10-09 RX ORDER — CLOBETASOL PROPIONATE 0.5 MG/G
OINTMENT TOPICAL ONCE
OUTPATIENT
Start: 2023-10-09 | End: 2023-10-09

## 2023-10-09 RX ORDER — CLINDAMYCIN HYDROCHLORIDE 300 MG/1
300 CAPSULE ORAL 3 TIMES DAILY
Qty: 30 CAPSULE | Refills: 0 | Status: SHIPPED | OUTPATIENT
Start: 2023-10-09 | End: 2023-10-19

## 2023-10-09 RX ORDER — LIDOCAINE 40 MG/G
CREAM TOPICAL ONCE
OUTPATIENT
Start: 2023-10-09 | End: 2023-10-09

## 2023-10-09 RX ORDER — SODIUM CHLOR/HYPOCHLOROUS ACID 0.033 %
SOLUTION, IRRIGATION IRRIGATION ONCE
OUTPATIENT
Start: 2023-10-09 | End: 2023-10-09

## 2023-10-09 RX ORDER — GINSENG 100 MG
CAPSULE ORAL ONCE
OUTPATIENT
Start: 2023-10-09 | End: 2023-10-09

## 2023-10-09 RX ORDER — BACITRACIN ZINC AND POLYMYXIN B SULFATE 500; 1000 [USP'U]/G; [USP'U]/G
OINTMENT TOPICAL ONCE
OUTPATIENT
Start: 2023-10-09 | End: 2023-10-09

## 2023-10-09 RX ORDER — LIDOCAINE HYDROCHLORIDE 20 MG/ML
JELLY TOPICAL ONCE
OUTPATIENT
Start: 2023-10-09 | End: 2023-10-09

## 2023-10-09 RX ORDER — BETAMETHASONE DIPROPIONATE 0.05 %
OINTMENT (GRAM) TOPICAL ONCE
OUTPATIENT
Start: 2023-10-09 | End: 2023-10-09

## 2023-10-09 RX ORDER — TRIAMCINOLONE ACETONIDE 1 MG/G
OINTMENT TOPICAL ONCE
OUTPATIENT
Start: 2023-10-09 | End: 2023-10-09

## 2023-10-09 ASSESSMENT — PAIN SCALES - GENERAL: PAINLEVEL_OUTOF10: 0

## 2023-10-09 NOTE — PATIENT INSTRUCTIONS
Iodoform gauze packing to foot wound daily, hydrofera blue foam, siria to hold in place and ACE wrap    Continue to use off loading cam walker at all times on left foot. Get xray of left foot     Return next week as scheduled.

## 2023-10-09 NOTE — PLAN OF CARE
Problem: Chronic Conditions and Co-morbidities  Goal: Patient's chronic conditions and co-morbidity symptoms are monitored and maintained or improved  Outcome: Progressing     Problem: Cognitive:  Goal: Knowledge of wound care  Description: Knowledge of wound care  Outcome: Progressing  Goal: Understands risk factors for wounds  Description: Understands risk factors for wounds  Outcome: Progressing     Problem: Wound:  Goal: Will show signs of wound healing; wound closure and no evidence of infection  Description: Will show signs of wound healing; wound closure and no evidence of infection  Outcome: Progressing     Problem: Pressure Ulcer:  Goal: Signs of wound healing will improve  Description: Signs of wound healing will improve  Outcome: Progressing  Goal: Absence of new pressure ulcer  Description: Absence of new pressure ulcer  Outcome: Progressing  Goal: Will show no infection signs and symptoms  Description: Will show no infection signs and symptoms  Outcome: Progressing     Problem: Arterial:  Goal: Optimize blood flow for wound healing  Description: Optimize blood flow for wound healing  Outcome: Progressing     Problem: Arterial:  Goal: Optimize blood flow for wound healing  Description: Optimize blood flow for wound healing  Outcome: Progressing     Problem: Falls - Risk of:  Goal: Will remain free from falls  Description: Will remain free from falls  Outcome: Progressing

## 2023-10-09 NOTE — PROGRESS NOTES
Subjective:  Patient presents today with ulcer L foot. No return of swelling or redness patient states. Patient is taking antibiotic as advised. Dressings changed as advised. Patient has been compliant with the off loading device. Patient has no fever or chills no nausea and no vomiting. Past Medical History:   Diagnosis Date    Allergic rhinitis     Colon polyps     history of    Elevated WBC count     history of    Hyperlipidemia     Hypertension     Obesity     Pain, joint, hand, left 7/28/2021    Partial small bowel obstruction ([address]) 3/5/2023    SBO (small bowel obstruction) ([address]) 3/12/2023    Sepsis with acute renal failure ([address]) 3/5/2023    Type II or unspecified type diabetes mellitus without mention of complication, not stated as uncontrolled     Unspecified sleep apnea     Weakness of left hand 7/28/2021     Current Outpatient Medications on File Prior to Encounter   Medication Sig Dispense Refill    Omega-3 Fatty Acids (FISH OIL) 1000 MG capsule Take by mouth daily      tamsulosin (FLOMAX) 0.4 MG capsule Take 1 capsule by mouth daily 30 capsule 0    Insulin Glargine-yfgn 100 UNIT/ML SOPN INJECT 40 UNITS UNDER SKIN AT BEDTIME FOR TYPE 2 DIABETES MELLITUS      atorvastatin (LIPITOR) 80 MG tablet 0.5 tablets      vitamin D (CHOLECALCIFEROL) 25 MCG (1000 UT) TABS tablet TAKE TWO TABLETS BY MOUTH EVERY DAY      sildenafil (VIAGRA) 50 MG tablet 0.5 tablets      gabapentin (NEURONTIN) 300 MG capsule Take 1 capsule by mouth 2 times daily. lisinopril-hydrochlorothiazide (PRINZIDE;ZESTORETIC) 20-12.5 MG per tablet Take 1 tablet by mouth daily. 30 tablet 1    metFORMIN (GLUCOPHAGE) 1000 MG tablet Take 0.5 tablets by mouth 2 times daily      loratadine (CLARITIN) 10 MG tablet Take 1 tablet by mouth daily      aspirin 81 MG tablet Take 1 tablet by mouth daily      Multiple Vitamins-Minerals (MULTIVITAMIN PO) Take  by mouth daily. No current facility-administered medications on file prior to encounter.

## 2023-10-16 ENCOUNTER — HOSPITAL ENCOUNTER (OUTPATIENT)
Dept: WOUND CARE | Age: 68
Discharge: HOME | End: 2023-10-17
Payer: OTHER GOVERNMENT

## 2023-10-16 VITALS
SYSTOLIC BLOOD PRESSURE: 122 MMHG | TEMPERATURE: 97.7 F | BODY MASS INDEX: 37.8 KG/M2 | HEIGHT: 71 IN | HEART RATE: 82 BPM | DIASTOLIC BLOOD PRESSURE: 80 MMHG | WEIGHT: 270 LBS | RESPIRATION RATE: 20 BRPM

## 2023-10-16 DIAGNOSIS — L97.522 ULCER OF LEFT FOOT WITH FAT LAYER EXPOSED (HCC): Primary | ICD-10-CM

## 2023-10-16 PROCEDURE — 11042 DBRDMT SUBQ TIS 1ST 20SQCM/<: CPT

## 2023-10-16 PROCEDURE — 11042 DBRDMT SUBQ TIS 1ST 20SQCM/<: CPT | Performed by: PODIATRIST

## 2023-10-16 RX ORDER — GINSENG 100 MG
CAPSULE ORAL ONCE
OUTPATIENT
Start: 2023-10-16 | End: 2023-10-16

## 2023-10-16 RX ORDER — LIDOCAINE 40 MG/G
CREAM TOPICAL ONCE
OUTPATIENT
Start: 2023-10-16 | End: 2023-10-16

## 2023-10-16 RX ORDER — BETAMETHASONE DIPROPIONATE 0.05 %
OINTMENT (GRAM) TOPICAL ONCE
OUTPATIENT
Start: 2023-10-16 | End: 2023-10-16

## 2023-10-16 RX ORDER — LIDOCAINE HYDROCHLORIDE 40 MG/ML
SOLUTION TOPICAL ONCE
OUTPATIENT
Start: 2023-10-16 | End: 2023-10-16

## 2023-10-16 RX ORDER — GENTAMICIN SULFATE 1 MG/G
OINTMENT TOPICAL ONCE
OUTPATIENT
Start: 2023-10-16 | End: 2023-10-16

## 2023-10-16 RX ORDER — LIDOCAINE HYDROCHLORIDE 20 MG/ML
JELLY TOPICAL ONCE
OUTPATIENT
Start: 2023-10-16 | End: 2023-10-16

## 2023-10-16 RX ORDER — CLOBETASOL PROPIONATE 0.5 MG/G
OINTMENT TOPICAL ONCE
OUTPATIENT
Start: 2023-10-16 | End: 2023-10-16

## 2023-10-16 RX ORDER — CLINDAMYCIN HYDROCHLORIDE 300 MG/1
300 CAPSULE ORAL 3 TIMES DAILY
Qty: 30 CAPSULE | Refills: 0 | Status: SHIPPED | OUTPATIENT
Start: 2023-10-16 | End: 2023-10-26

## 2023-10-16 RX ORDER — IBUPROFEN 200 MG
TABLET ORAL ONCE
OUTPATIENT
Start: 2023-10-16 | End: 2023-10-16

## 2023-10-16 RX ORDER — LIDOCAINE 50 MG/G
OINTMENT TOPICAL ONCE
OUTPATIENT
Start: 2023-10-16 | End: 2023-10-16

## 2023-10-16 RX ORDER — SODIUM CHLOR/HYPOCHLOROUS ACID 0.033 %
SOLUTION, IRRIGATION IRRIGATION ONCE
OUTPATIENT
Start: 2023-10-16 | End: 2023-10-16

## 2023-10-16 RX ORDER — BACITRACIN ZINC AND POLYMYXIN B SULFATE 500; 1000 [USP'U]/G; [USP'U]/G
OINTMENT TOPICAL ONCE
OUTPATIENT
Start: 2023-10-16 | End: 2023-10-16

## 2023-10-16 RX ORDER — TRIAMCINOLONE ACETONIDE 1 MG/G
OINTMENT TOPICAL ONCE
OUTPATIENT
Start: 2023-10-16 | End: 2023-10-16

## 2023-10-16 NOTE — DISCHARGE INSTRUCTIONS
Continue Iodoform packing and hydraferablue in left foot wound. Changing daily. Continue offloading boot. Follow up with Dr. Radha Mora in 1 week as scheduled. SIGNS OF INFECTION  - Redness, swelling, skin hot  - Wound bed turns black or stringy yellow  - Foul odor  - Increased drainage or pus  - Increased pain  - Fever greater than 100F    CALL YOUR DOCTOR OR SEEK MEDICAL ATTENTION IF SIGNS OF INFECTION.   DO NOT WAIT UNTIL YOUR NEXT APPOINTMENT    Call the Wound Care Nurse with any other questions or concerns- [phone]

## 2023-10-23 ENCOUNTER — HOSPITAL ENCOUNTER (OUTPATIENT)
Dept: WOUND CARE | Age: 68
Discharge: HOME | End: 2023-10-24
Attending: PODIATRIST
Payer: OTHER GOVERNMENT

## 2023-10-23 VITALS
DIASTOLIC BLOOD PRESSURE: 78 MMHG | TEMPERATURE: 97.6 F | HEART RATE: 72 BPM | WEIGHT: 270 LBS | BODY MASS INDEX: 37.8 KG/M2 | RESPIRATION RATE: 20 BRPM | SYSTOLIC BLOOD PRESSURE: 134 MMHG | HEIGHT: 71 IN

## 2023-10-23 DIAGNOSIS — L97.522 ULCER OF LEFT FOOT WITH FAT LAYER EXPOSED (HCC): Primary | ICD-10-CM

## 2023-10-23 PROCEDURE — 11042 DBRDMT SUBQ TIS 1ST 20SQCM/<: CPT | Performed by: PODIATRIST

## 2023-10-23 PROCEDURE — 11042 DBRDMT SUBQ TIS 1ST 20SQCM/<: CPT

## 2023-10-23 RX ORDER — BACITRACIN ZINC AND POLYMYXIN B SULFATE 500; 1000 [USP'U]/G; [USP'U]/G
OINTMENT TOPICAL ONCE
OUTPATIENT
Start: 2023-10-23 | End: 2023-10-23

## 2023-10-23 RX ORDER — GENTAMICIN SULFATE 1 MG/G
OINTMENT TOPICAL ONCE
OUTPATIENT
Start: 2023-10-23 | End: 2023-10-23

## 2023-10-23 RX ORDER — IBUPROFEN 200 MG
TABLET ORAL ONCE
OUTPATIENT
Start: 2023-10-23 | End: 2023-10-23

## 2023-10-23 RX ORDER — TRIAMCINOLONE ACETONIDE 1 MG/G
OINTMENT TOPICAL ONCE
OUTPATIENT
Start: 2023-10-23 | End: 2023-10-23

## 2023-10-23 RX ORDER — LIDOCAINE HYDROCHLORIDE 20 MG/ML
JELLY TOPICAL ONCE
OUTPATIENT
Start: 2023-10-23 | End: 2023-10-23

## 2023-10-23 RX ORDER — LIDOCAINE 40 MG/G
CREAM TOPICAL ONCE
OUTPATIENT
Start: 2023-10-23 | End: 2023-10-23

## 2023-10-23 RX ORDER — LIDOCAINE 50 MG/G
OINTMENT TOPICAL ONCE
OUTPATIENT
Start: 2023-10-23 | End: 2023-10-23

## 2023-10-23 RX ORDER — BETAMETHASONE DIPROPIONATE 0.05 %
OINTMENT (GRAM) TOPICAL ONCE
OUTPATIENT
Start: 2023-10-23 | End: 2023-10-23

## 2023-10-23 RX ORDER — CLOBETASOL PROPIONATE 0.5 MG/G
OINTMENT TOPICAL ONCE
OUTPATIENT
Start: 2023-10-23 | End: 2023-10-23

## 2023-10-23 RX ORDER — GINSENG 100 MG
CAPSULE ORAL ONCE
OUTPATIENT
Start: 2023-10-23 | End: 2023-10-23

## 2023-10-23 RX ORDER — LIDOCAINE HYDROCHLORIDE 40 MG/ML
SOLUTION TOPICAL ONCE
OUTPATIENT
Start: 2023-10-23 | End: 2023-10-23

## 2023-10-23 RX ORDER — SODIUM CHLOR/HYPOCHLOROUS ACID 0.033 %
SOLUTION, IRRIGATION IRRIGATION ONCE
OUTPATIENT
Start: 2023-10-23 | End: 2023-10-23

## 2023-10-23 ASSESSMENT — PAIN SCALES - GENERAL: PAINLEVEL_OUTOF10: 0

## 2023-10-23 NOTE — PROGRESS NOTES
removal, apply chemical or mechanical debrider and cover with secondary dressing/foam    For wounds with tunneling, dead space, or cavity, fill or pack with strip/gauze/kerlex to fit and cover with secondary dressing/foam    For wounds with adequate granulation or epithelization, apply wound gel, hydrocolloid, polymer, collagen, or transparent film, and cover secondary dry dressing/foam    For wounds that need additional secondary dressing to help pad or control additional drainage/exudates, add foam, absorbent pad or hydrocolloid    For wounds with suspected or known infection, apply antimicrobial mesh and/or antimicrobial alginate/hydrofiber, or antimicrobial solution moistened gauze/kerlex, or equivalent and cover with secondary dressing/foam    Compression Management needed for edema control, apply multilayer compression or tubular garment or equivalent    Offloading Management needed for pressure relief, apply offloading shoe/boot or equivalent     Standing Status:   Standing     Number of Occurrences:   1   Continue course of Rx antibiotic  Dressing: iodoform gauze packing qd  Continue offloading    Patient will follow up in 1 weeks. Patient will call clinic immediately if any increase in pain or signs and symptoms of infection arise.

## 2023-10-23 NOTE — PATIENT INSTRUCTIONS
Apply daily Iodoform packing into left foot wound and cover with hydrafera blue foam. Cover with gauze and ACE wrap. SIGNS OF INFECTION  - Redness, swelling, skin hot  - Wound bed turns black or stringy yellow  - Foul odor  - Increased drainage or pus  - Increased pain  - Fever greater than 100F    CALL YOUR DOCTOR OR SEEK MEDICAL ATTENTION IF SIGNS OF INFECTION.   DO NOT WAIT UNTIL YOUR NEXT APPOINTMENT    Call the Wound Care Nurse with any other questions or concerns- [phone]

## 2023-10-30 ENCOUNTER — HOSPITAL ENCOUNTER (OUTPATIENT)
Dept: WOUND CARE | Age: 68
Discharge: HOME | End: 2023-10-31
Attending: PODIATRIST
Payer: OTHER GOVERNMENT

## 2023-10-30 VITALS
BODY MASS INDEX: 38.37 KG/M2 | SYSTOLIC BLOOD PRESSURE: 138 MMHG | HEART RATE: 76 BPM | RESPIRATION RATE: 18 BRPM | DIASTOLIC BLOOD PRESSURE: 72 MMHG | WEIGHT: 268 LBS | TEMPERATURE: 97.4 F | HEIGHT: 70 IN

## 2023-10-30 DIAGNOSIS — L97.522 ULCER OF LEFT FOOT WITH FAT LAYER EXPOSED (HCC): Primary | ICD-10-CM

## 2023-10-30 PROCEDURE — 11044 DBRDMT BONE 1ST 20 SQ CM/<: CPT

## 2023-10-30 PROCEDURE — 11044 DBRDMT BONE 1ST 20 SQ CM/<: CPT | Performed by: PODIATRIST

## 2023-10-30 RX ORDER — BACITRACIN ZINC AND POLYMYXIN B SULFATE 500; 1000 [USP'U]/G; [USP'U]/G
OINTMENT TOPICAL ONCE
OUTPATIENT
Start: 2023-10-30 | End: 2023-10-30

## 2023-10-30 RX ORDER — LIDOCAINE 50 MG/G
OINTMENT TOPICAL ONCE
OUTPATIENT
Start: 2023-10-30 | End: 2023-10-30

## 2023-10-30 RX ORDER — LIDOCAINE HYDROCHLORIDE 20 MG/ML
JELLY TOPICAL ONCE
OUTPATIENT
Start: 2023-10-30 | End: 2023-10-30

## 2023-10-30 RX ORDER — CLINDAMYCIN HYDROCHLORIDE 300 MG/1
300 CAPSULE ORAL 3 TIMES DAILY
Qty: 30 CAPSULE | Refills: 0 | Status: SHIPPED | OUTPATIENT
Start: 2023-10-30 | End: 2023-11-09

## 2023-10-30 RX ORDER — GENTAMICIN SULFATE 1 MG/G
OINTMENT TOPICAL ONCE
OUTPATIENT
Start: 2023-10-30 | End: 2023-10-30

## 2023-10-30 RX ORDER — IBUPROFEN 200 MG
TABLET ORAL ONCE
OUTPATIENT
Start: 2023-10-30 | End: 2023-10-30

## 2023-10-30 RX ORDER — CLOBETASOL PROPIONATE 0.5 MG/G
OINTMENT TOPICAL ONCE
OUTPATIENT
Start: 2023-10-30 | End: 2023-10-30

## 2023-10-30 RX ORDER — GINSENG 100 MG
CAPSULE ORAL ONCE
OUTPATIENT
Start: 2023-10-30 | End: 2023-10-30

## 2023-10-30 RX ORDER — LIDOCAINE 40 MG/G
CREAM TOPICAL ONCE
OUTPATIENT
Start: 2023-10-30 | End: 2023-10-30

## 2023-10-30 RX ORDER — LIDOCAINE HYDROCHLORIDE 40 MG/ML
SOLUTION TOPICAL ONCE
OUTPATIENT
Start: 2023-10-30 | End: 2023-10-30

## 2023-10-30 RX ORDER — BETAMETHASONE DIPROPIONATE 0.05 %
OINTMENT (GRAM) TOPICAL ONCE
OUTPATIENT
Start: 2023-10-30 | End: 2023-10-30

## 2023-10-30 RX ORDER — SODIUM CHLOR/HYPOCHLOROUS ACID 0.033 %
SOLUTION, IRRIGATION IRRIGATION ONCE
OUTPATIENT
Start: 2023-10-30 | End: 2023-10-30

## 2023-10-30 RX ORDER — TRIAMCINOLONE ACETONIDE 1 MG/G
OINTMENT TOPICAL ONCE
OUTPATIENT
Start: 2023-10-30 | End: 2023-10-30

## 2023-11-06 ENCOUNTER — HOSPITAL ENCOUNTER (OUTPATIENT)
Dept: WOUND CARE | Age: 68
Discharge: HOME | End: 2023-11-07
Attending: PODIATRIST
Payer: OTHER GOVERNMENT

## 2023-11-06 VITALS
DIASTOLIC BLOOD PRESSURE: 76 MMHG | HEIGHT: 70 IN | SYSTOLIC BLOOD PRESSURE: 136 MMHG | HEART RATE: 78 BPM | TEMPERATURE: 97.5 F | WEIGHT: 271 LBS | RESPIRATION RATE: 18 BRPM | BODY MASS INDEX: 38.8 KG/M2

## 2023-11-06 DIAGNOSIS — L97.522 ULCER OF LEFT FOOT WITH FAT LAYER EXPOSED (HCC): Primary | ICD-10-CM

## 2023-11-06 PROCEDURE — 11042 DBRDMT SUBQ TIS 1ST 20SQCM/<: CPT

## 2023-11-06 PROCEDURE — 11042 DBRDMT SUBQ TIS 1ST 20SQCM/<: CPT | Performed by: PODIATRIST

## 2023-11-06 RX ORDER — SODIUM CHLOR/HYPOCHLOROUS ACID 0.033 %
SOLUTION, IRRIGATION IRRIGATION ONCE
OUTPATIENT
Start: 2023-11-06 | End: 2023-11-06

## 2023-11-06 RX ORDER — LIDOCAINE 40 MG/G
CREAM TOPICAL ONCE
OUTPATIENT
Start: 2023-11-06 | End: 2023-11-06

## 2023-11-06 RX ORDER — LIDOCAINE HYDROCHLORIDE 40 MG/ML
SOLUTION TOPICAL ONCE
OUTPATIENT
Start: 2023-11-06 | End: 2023-11-06

## 2023-11-06 RX ORDER — BACITRACIN ZINC AND POLYMYXIN B SULFATE 500; 1000 [USP'U]/G; [USP'U]/G
OINTMENT TOPICAL ONCE
OUTPATIENT
Start: 2023-11-06 | End: 2023-11-06

## 2023-11-06 RX ORDER — CLOBETASOL PROPIONATE 0.5 MG/G
OINTMENT TOPICAL ONCE
OUTPATIENT
Start: 2023-11-06 | End: 2023-11-06

## 2023-11-06 RX ORDER — LIDOCAINE HYDROCHLORIDE 20 MG/ML
JELLY TOPICAL ONCE
OUTPATIENT
Start: 2023-11-06 | End: 2023-11-06

## 2023-11-06 RX ORDER — BETAMETHASONE DIPROPIONATE 0.05 %
OINTMENT (GRAM) TOPICAL ONCE
OUTPATIENT
Start: 2023-11-06 | End: 2023-11-06

## 2023-11-06 RX ORDER — IBUPROFEN 200 MG
TABLET ORAL ONCE
OUTPATIENT
Start: 2023-11-06 | End: 2023-11-06

## 2023-11-06 RX ORDER — GENTAMICIN SULFATE 1 MG/G
OINTMENT TOPICAL ONCE
OUTPATIENT
Start: 2023-11-06 | End: 2023-11-06

## 2023-11-06 RX ORDER — TRIAMCINOLONE ACETONIDE 1 MG/G
OINTMENT TOPICAL ONCE
OUTPATIENT
Start: 2023-11-06 | End: 2023-11-06

## 2023-11-06 RX ORDER — GINSENG 100 MG
CAPSULE ORAL ONCE
OUTPATIENT
Start: 2023-11-06 | End: 2023-11-06

## 2023-11-06 RX ORDER — LIDOCAINE 50 MG/G
OINTMENT TOPICAL ONCE
OUTPATIENT
Start: 2023-11-06 | End: 2023-11-06

## 2023-11-06 NOTE — PROGRESS NOTES
Subjective:  Patient presents today with ulcer L foot. No issues with po abx as of yet. Dressings changed as advised. Patient has been compliant with the off loading device. Patient has no fever or chills no nausea and no vomiting. Past Medical History:   Diagnosis Date    Allergic rhinitis     Colon polyps     history of    Elevated WBC count     history of    Hyperlipidemia     Hypertension     Obesity     Pain, joint, hand, left 7/28/2021    Partial small bowel obstruction ([address]) 3/5/2023    SBO (small bowel obstruction) ([address]) 3/12/2023    Sepsis with acute renal failure ([address]) 3/5/2023    Type II or unspecified type diabetes mellitus without mention of complication, not stated as uncontrolled     Unspecified sleep apnea     Weakness of left hand 7/28/2021     Current Outpatient Medications on File Prior to Encounter   Medication Sig Dispense Refill    clindamycin (CLEOCIN) 300 MG capsule Take 1 capsule by mouth 3 times daily for 10 days 30 capsule 0    Omega-3 Fatty Acids (FISH OIL) 1000 MG capsule Take by mouth daily      tamsulosin (FLOMAX) 0.4 MG capsule Take 1 capsule by mouth daily 30 capsule 0    Insulin Glargine-yfgn 100 UNIT/ML SOPN INJECT 40 UNITS UNDER SKIN AT BEDTIME FOR TYPE 2 DIABETES MELLITUS      atorvastatin (LIPITOR) 80 MG tablet 0.5 tablets      vitamin D (CHOLECALCIFEROL) 25 MCG (1000 UT) TABS tablet TAKE TWO TABLETS BY MOUTH EVERY DAY      sildenafil (VIAGRA) 50 MG tablet 0.5 tablets      gabapentin (NEURONTIN) 300 MG capsule Take 1 capsule by mouth 2 times daily. lisinopril-hydrochlorothiazide (PRINZIDE;ZESTORETIC) 20-12.5 MG per tablet Take 1 tablet by mouth daily. 30 tablet 1    metFORMIN (GLUCOPHAGE) 1000 MG tablet Take 0.5 tablets by mouth 2 times daily      loratadine (CLARITIN) 10 MG tablet Take 1 tablet by mouth daily      aspirin 81 MG tablet Take 1 tablet by mouth daily      Multiple Vitamins-Minerals (MULTIVITAMIN PO) Take  by mouth daily.        No current

## 2023-11-06 NOTE — PATIENT INSTRUCTIONS
Place Packing into wound. Cover with hydrofera blue, gauze, ACE wrap. Wear off loading boot. SIGNS OF INFECTION  - Redness, swelling, skin hot  - Wound bed turns black or stringy yellow  - Foul odor  - Increased drainage or pus  - Increased pain  - Fever greater than 100F    CALL YOUR DOCTOR OR SEEK MEDICAL ATTENTION IF SIGNS OF INFECTION.   DO NOT WAIT UNTIL YOUR NEXT APPOINTMENT    Call the Wound Care Nurse with any other questions or concerns- [phone]

## 2023-11-13 ENCOUNTER — HOSPITAL ENCOUNTER (OUTPATIENT)
Dept: WOUND CARE | Age: 68
Discharge: HOME | End: 2023-11-14
Attending: PODIATRIST
Payer: OTHER GOVERNMENT

## 2023-11-13 VITALS
RESPIRATION RATE: 18 BRPM | SYSTOLIC BLOOD PRESSURE: 130 MMHG | TEMPERATURE: 97.7 F | HEART RATE: 100 BPM | DIASTOLIC BLOOD PRESSURE: 70 MMHG

## 2023-11-13 DIAGNOSIS — L97.522 ULCER OF LEFT FOOT WITH FAT LAYER EXPOSED (HCC): Primary | ICD-10-CM

## 2023-11-13 PROCEDURE — 11042 DBRDMT SUBQ TIS 1ST 20SQCM/<: CPT | Performed by: PODIATRIST

## 2023-11-13 PROCEDURE — 11042 DBRDMT SUBQ TIS 1ST 20SQCM/<: CPT

## 2023-11-13 RX ORDER — LIDOCAINE 40 MG/G
CREAM TOPICAL ONCE
OUTPATIENT
Start: 2023-11-13 | End: 2023-11-13

## 2023-11-13 RX ORDER — BACITRACIN ZINC AND POLYMYXIN B SULFATE 500; 1000 [USP'U]/G; [USP'U]/G
OINTMENT TOPICAL ONCE
OUTPATIENT
Start: 2023-11-13 | End: 2023-11-13

## 2023-11-13 RX ORDER — LIDOCAINE HYDROCHLORIDE 20 MG/ML
JELLY TOPICAL ONCE
OUTPATIENT
Start: 2023-11-13 | End: 2023-11-13

## 2023-11-13 RX ORDER — TRIAMCINOLONE ACETONIDE 1 MG/G
OINTMENT TOPICAL ONCE
OUTPATIENT
Start: 2023-11-13 | End: 2023-11-13

## 2023-11-13 RX ORDER — GINSENG 100 MG
CAPSULE ORAL ONCE
OUTPATIENT
Start: 2023-11-13 | End: 2023-11-13

## 2023-11-13 RX ORDER — GENTAMICIN SULFATE 1 MG/G
OINTMENT TOPICAL ONCE
OUTPATIENT
Start: 2023-11-13 | End: 2023-11-13

## 2023-11-13 RX ORDER — BETAMETHASONE DIPROPIONATE 0.05 %
OINTMENT (GRAM) TOPICAL ONCE
OUTPATIENT
Start: 2023-11-13 | End: 2023-11-13

## 2023-11-13 RX ORDER — LIDOCAINE 50 MG/G
OINTMENT TOPICAL ONCE
OUTPATIENT
Start: 2023-11-13 | End: 2023-11-13

## 2023-11-13 RX ORDER — LIDOCAINE HYDROCHLORIDE 40 MG/ML
SOLUTION TOPICAL ONCE
OUTPATIENT
Start: 2023-11-13 | End: 2023-11-13

## 2023-11-13 RX ORDER — IBUPROFEN 200 MG
TABLET ORAL ONCE
OUTPATIENT
Start: 2023-11-13 | End: 2023-11-13

## 2023-11-13 RX ORDER — SODIUM CHLOR/HYPOCHLOROUS ACID 0.033 %
SOLUTION, IRRIGATION IRRIGATION ONCE
OUTPATIENT
Start: 2023-11-13 | End: 2023-11-13

## 2023-11-13 RX ORDER — CLOBETASOL PROPIONATE 0.5 MG/G
OINTMENT TOPICAL ONCE
OUTPATIENT
Start: 2023-11-13 | End: 2023-11-13

## 2023-11-13 NOTE — PATIENT INSTRUCTIONS
Apply foam to wound daily and cover with bandage. SIGNS OF INFECTION  - Redness, swelling, skin hot  - Wound bed turns black or stringy yellow  - Foul odor  - Increased drainage or pus  - Increased pain  - Fever greater than 100F    CALL YOUR DOCTOR OR SEEK MEDICAL ATTENTION IF SIGNS OF INFECTION.   DO NOT WAIT UNTIL YOUR NEXT APPOINTMENT    Call the Wound Care Nurse with any other questions or concerns- [phone]

## 2023-11-13 NOTE — PROGRESS NOTES
Subjective:  Patient presents today with ulcer L foot. Pt finishes po abx in a couple days. . Dressings changed as advised. Patient has been compliant with the off loading device. Patient has no fever or chills no nausea and no vomiting. Past Medical History:   Diagnosis Date    Allergic rhinitis     Colon polyps     history of    Elevated WBC count     history of    Hyperlipidemia     Hypertension     Obesity     Pain, joint, hand, left 7/28/2021    Partial small bowel obstruction ([address]) 3/5/2023    SBO (small bowel obstruction) ([address]) 3/12/2023    Sepsis with acute renal failure ([address]) 3/5/2023    Type II or unspecified type diabetes mellitus without mention of complication, not stated as uncontrolled     Unspecified sleep apnea     Weakness of left hand 7/28/2021     Current Outpatient Medications on File Prior to Encounter   Medication Sig Dispense Refill    Omega-3 Fatty Acids (FISH OIL) 1000 MG capsule Take by mouth daily      tamsulosin (FLOMAX) 0.4 MG capsule Take 1 capsule by mouth daily 30 capsule 0    Insulin Glargine-yfgn 100 UNIT/ML SOPN INJECT 40 UNITS UNDER SKIN AT BEDTIME FOR TYPE 2 DIABETES MELLITUS      atorvastatin (LIPITOR) 80 MG tablet 0.5 tablets      vitamin D (CHOLECALCIFEROL) 25 MCG (1000 UT) TABS tablet TAKE TWO TABLETS BY MOUTH EVERY DAY      sildenafil (VIAGRA) 50 MG tablet 0.5 tablets      gabapentin (NEURONTIN) 300 MG capsule Take 1 capsule by mouth 2 times daily. lisinopril-hydrochlorothiazide (PRINZIDE;ZESTORETIC) 20-12.5 MG per tablet Take 1 tablet by mouth daily. 30 tablet 1    metFORMIN (GLUCOPHAGE) 1000 MG tablet Take 0.5 tablets by mouth 2 times daily      loratadine (CLARITIN) 10 MG tablet Take 1 tablet by mouth daily      aspirin 81 MG tablet Take 1 tablet by mouth daily      Multiple Vitamins-Minerals (MULTIVITAMIN PO) Take  by mouth daily. No current facility-administered medications on file prior to encounter.      Allergies   Allergen Reactions    Bee Venom

## 2023-11-13 NOTE — PLAN OF CARE
Problem: Chronic Conditions and Co-morbidities  Goal: Patient's chronic conditions and co-morbidity symptoms are monitored and maintained or improved  Outcome: Progressing     Problem: Cognitive:  Goal: Knowledge of wound care  Description: Knowledge of wound care  Outcome: Progressing  Goal: Understands risk factors for wounds  Description: Understands risk factors for wounds  Outcome: Progressing     Problem: Wound:  Goal: Will show signs of wound healing; wound closure and no evidence of infection  Description: Will show signs of wound healing; wound closure and no evidence of infection  Outcome: Progressing     Problem: Pressure Ulcer:  Goal: Signs of wound healing will improve  Description: Signs of wound healing will improve  Outcome: Progressing  Goal: Absence of new pressure ulcer  Description: Absence of new pressure ulcer  Outcome: Progressing  Goal: Will show no infection signs and symptoms  Description: Will show no infection signs and symptoms  Outcome: Progressing     Problem: Blood Glucose:  Goal: Ability to maintain appropriate glucose levels will improve  Description: Ability to maintain appropriate glucose levels will improve  Outcome: Progressing

## 2023-11-20 ENCOUNTER — HOSPITAL ENCOUNTER (OUTPATIENT)
Dept: WOUND CARE | Age: 68
Discharge: HOME | End: 2023-11-21
Attending: PODIATRIST
Payer: MEDICARE

## 2023-11-20 VITALS
SYSTOLIC BLOOD PRESSURE: 126 MMHG | WEIGHT: 275 LBS | DIASTOLIC BLOOD PRESSURE: 64 MMHG | TEMPERATURE: 97.5 F | BODY MASS INDEX: 39.37 KG/M2 | HEART RATE: 66 BPM | HEIGHT: 70 IN | RESPIRATION RATE: 20 BRPM

## 2023-11-20 DIAGNOSIS — L97.522 ULCER OF LEFT FOOT WITH FAT LAYER EXPOSED (HCC): Primary | ICD-10-CM

## 2023-11-20 PROCEDURE — 97597 DBRDMT OPN WND 1ST 20 CM/<: CPT | Performed by: PODIATRIST

## 2023-11-20 PROCEDURE — 97597 DBRDMT OPN WND 1ST 20 CM/<: CPT

## 2023-11-20 RX ORDER — LIDOCAINE HYDROCHLORIDE 20 MG/ML
JELLY TOPICAL ONCE
OUTPATIENT
Start: 2023-11-20 | End: 2023-11-20

## 2023-11-20 RX ORDER — CLOBETASOL PROPIONATE 0.5 MG/G
OINTMENT TOPICAL ONCE
OUTPATIENT
Start: 2023-11-20 | End: 2023-11-20

## 2023-11-20 RX ORDER — LIDOCAINE 40 MG/G
CREAM TOPICAL ONCE
OUTPATIENT
Start: 2023-11-20 | End: 2023-11-20

## 2023-11-20 RX ORDER — IBUPROFEN 200 MG
TABLET ORAL ONCE
OUTPATIENT
Start: 2023-11-20 | End: 2023-11-20

## 2023-11-20 RX ORDER — BACITRACIN ZINC AND POLYMYXIN B SULFATE 500; 1000 [USP'U]/G; [USP'U]/G
OINTMENT TOPICAL ONCE
OUTPATIENT
Start: 2023-11-20 | End: 2023-11-20

## 2023-11-20 RX ORDER — LIDOCAINE 50 MG/G
OINTMENT TOPICAL ONCE
OUTPATIENT
Start: 2023-11-20 | End: 2023-11-20

## 2023-11-20 RX ORDER — TRIAMCINOLONE ACETONIDE 1 MG/G
OINTMENT TOPICAL ONCE
OUTPATIENT
Start: 2023-11-20 | End: 2023-11-20

## 2023-11-20 RX ORDER — BETAMETHASONE DIPROPIONATE 0.05 %
OINTMENT (GRAM) TOPICAL ONCE
OUTPATIENT
Start: 2023-11-20 | End: 2023-11-20

## 2023-11-20 RX ORDER — SODIUM CHLOR/HYPOCHLOROUS ACID 0.033 %
SOLUTION, IRRIGATION IRRIGATION ONCE
OUTPATIENT
Start: 2023-11-20 | End: 2023-11-20

## 2023-11-20 RX ORDER — LIDOCAINE HYDROCHLORIDE 40 MG/ML
SOLUTION TOPICAL ONCE
OUTPATIENT
Start: 2023-11-20 | End: 2023-11-20

## 2023-11-20 RX ORDER — GENTAMICIN SULFATE 1 MG/G
OINTMENT TOPICAL ONCE
OUTPATIENT
Start: 2023-11-20 | End: 2023-11-20

## 2023-11-20 RX ORDER — GINSENG 100 MG
CAPSULE ORAL ONCE
OUTPATIENT
Start: 2023-11-20 | End: 2023-11-20

## 2023-11-20 NOTE — DISCHARGE INSTRUCTIONS
Continue foam to left foot wound. Changing every other day or as needed. You are able to take dressing off and shower in between dressing changes. Follow up with Dr. Marisol Clinton in 1 week as scheduled. SIGNS OF INFECTION  - Redness, swelling, skin hot  - Wound bed turns black or stringy yellow  - Foul odor  - Increased drainage or pus  - Increased pain  - Fever greater than 100F    CALL YOUR DOCTOR OR SEEK MEDICAL ATTENTION IF SIGNS OF INFECTION.   DO NOT WAIT UNTIL YOUR NEXT APPOINTMENT    Call the Wound Care Nurse with any other questions or concerns- [phone]

## 2023-11-20 NOTE — PROGRESS NOTES
Subjective:  Patient presents today with ulcer L foot. Dressings changed as advised. Patient has been compliant with the off loading device. Patient has no fever or chills no nausea and no vomiting. Past Medical History:   Diagnosis Date    Allergic rhinitis     Colon polyps     history of    Elevated WBC count     history of    Hyperlipidemia     Hypertension     Obesity     Pain, joint, hand, left 7/28/2021    Partial small bowel obstruction ([address]) 3/5/2023    SBO (small bowel obstruction) ([address]) 3/12/2023    Sepsis with acute renal failure ([address]) 3/5/2023    Type II or unspecified type diabetes mellitus without mention of complication, not stated as uncontrolled     Unspecified sleep apnea     Weakness of left hand 7/28/2021     Current Outpatient Medications on File Prior to Encounter   Medication Sig Dispense Refill    Omega-3 Fatty Acids (FISH OIL) 1000 MG capsule Take by mouth daily      tamsulosin (FLOMAX) 0.4 MG capsule Take 1 capsule by mouth daily 30 capsule 0    Insulin Glargine-yfgn 100 UNIT/ML SOPN INJECT 40 UNITS UNDER SKIN AT BEDTIME FOR TYPE 2 DIABETES MELLITUS      atorvastatin (LIPITOR) 80 MG tablet 0.5 tablets      vitamin D (CHOLECALCIFEROL) 25 MCG (1000 UT) TABS tablet TAKE TWO TABLETS BY MOUTH EVERY DAY      sildenafil (VIAGRA) 50 MG tablet 0.5 tablets      gabapentin (NEURONTIN) 300 MG capsule Take 1 capsule by mouth 2 times daily. lisinopril-hydrochlorothiazide (PRINZIDE;ZESTORETIC) 20-12.5 MG per tablet Take 1 tablet by mouth daily. 30 tablet 1    metFORMIN (GLUCOPHAGE) 1000 MG tablet Take 0.5 tablets by mouth 2 times daily      loratadine (CLARITIN) 10 MG tablet Take 1 tablet by mouth daily      aspirin 81 MG tablet Take 1 tablet by mouth daily      Multiple Vitamins-Minerals (MULTIVITAMIN PO) Take  by mouth daily. No current facility-administered medications on file prior to encounter.      Allergies   Allergen Reactions    Bee Venom Anaphylaxis    Naproxen      Other

## 2023-11-27 ENCOUNTER — HOSPITAL ENCOUNTER (OUTPATIENT)
Dept: WOUND CARE | Age: 68
Discharge: HOME | End: 2023-11-28
Attending: PODIATRIST
Payer: MEDICARE

## 2023-11-27 VITALS
WEIGHT: 277 LBS | TEMPERATURE: 97.1 F | RESPIRATION RATE: 22 BRPM | HEART RATE: 89 BPM | SYSTOLIC BLOOD PRESSURE: 136 MMHG | BODY MASS INDEX: 39.65 KG/M2 | DIASTOLIC BLOOD PRESSURE: 85 MMHG | HEIGHT: 70 IN

## 2023-11-27 DIAGNOSIS — L97.522 ULCER OF LEFT FOOT WITH FAT LAYER EXPOSED (HCC): Primary | ICD-10-CM

## 2023-11-27 PROCEDURE — 99213 OFFICE O/P EST LOW 20 MIN: CPT

## 2023-11-27 PROCEDURE — 99213 OFFICE O/P EST LOW 20 MIN: CPT | Performed by: PODIATRIST

## 2023-11-27 RX ORDER — SODIUM CHLOR/HYPOCHLOROUS ACID 0.033 %
SOLUTION, IRRIGATION IRRIGATION ONCE
OUTPATIENT
Start: 2023-11-27 | End: 2023-11-27

## 2023-11-27 RX ORDER — BACITRACIN ZINC AND POLYMYXIN B SULFATE 500; 1000 [USP'U]/G; [USP'U]/G
OINTMENT TOPICAL ONCE
OUTPATIENT
Start: 2023-11-27 | End: 2023-11-27

## 2023-11-27 RX ORDER — LIDOCAINE HYDROCHLORIDE 20 MG/ML
JELLY TOPICAL ONCE
OUTPATIENT
Start: 2023-11-27 | End: 2023-11-27

## 2023-11-27 RX ORDER — CLOBETASOL PROPIONATE 0.5 MG/G
OINTMENT TOPICAL ONCE
OUTPATIENT
Start: 2023-11-27 | End: 2023-11-27

## 2023-11-27 RX ORDER — TRIAMCINOLONE ACETONIDE 1 MG/G
OINTMENT TOPICAL ONCE
OUTPATIENT
Start: 2023-11-27 | End: 2023-11-27

## 2023-11-27 RX ORDER — BETAMETHASONE DIPROPIONATE 0.05 %
OINTMENT (GRAM) TOPICAL ONCE
OUTPATIENT
Start: 2023-11-27 | End: 2023-11-27

## 2023-11-27 RX ORDER — IBUPROFEN 200 MG
TABLET ORAL ONCE
OUTPATIENT
Start: 2023-11-27 | End: 2023-11-27

## 2023-11-27 RX ORDER — GENTAMICIN SULFATE 1 MG/G
OINTMENT TOPICAL ONCE
OUTPATIENT
Start: 2023-11-27 | End: 2023-11-27

## 2023-11-27 RX ORDER — LIDOCAINE HYDROCHLORIDE 40 MG/ML
SOLUTION TOPICAL ONCE
OUTPATIENT
Start: 2023-11-27 | End: 2023-11-27

## 2023-11-27 RX ORDER — LIDOCAINE 50 MG/G
OINTMENT TOPICAL ONCE
OUTPATIENT
Start: 2023-11-27 | End: 2023-11-27

## 2023-11-27 RX ORDER — GINSENG 100 MG
CAPSULE ORAL ONCE
OUTPATIENT
Start: 2023-11-27 | End: 2023-11-27

## 2023-11-27 RX ORDER — LIDOCAINE 40 MG/G
CREAM TOPICAL ONCE
OUTPATIENT
Start: 2023-11-27 | End: 2023-11-27

## 2023-11-27 NOTE — DISCHARGE INSTRUCTIONS
Left foot is healed, no further dressing needed.   May wear cam walker for another week, transition into regualr shoe gear  Follow up with Dr Yohana Luevano in podiatry

## 2023-11-27 NOTE — PROGRESS NOTES
irritation and/or maceration, apply zinc based product, topical steroid cream/ointment, or equivalent     For wounds with dry firm black eschar and/or without exudate, apply betadine and leave open to air      For wounds with scant/small to no exudate or drainage, apply wound gel, hydrocolloid, polymer, or equivalent and cover with secondary dressing/foam      For wounds with moderate/large exudate or drainage, apply alginate, hydrofiber, polymer, or equivalent and cover with secondary dressing/foam    For wounds with nonviable tissue requiring removal, apply chemical or mechanical debrider and cover with secondary dressing/foam    For wounds with tunneling, dead space, or cavity, fill or pack with strip/gauze/kerlex to fit and cover with secondary dressing/foam    For wounds with adequate granulation or epithelization, apply wound gel, hydrocolloid, polymer, collagen, or transparent film, and cover secondary dry dressing/foam    For wounds that need additional secondary dressing to help pad or control additional drainage/exudates, add foam, absorbent pad or hydrocolloid    For wounds with suspected or known infection, apply antimicrobial mesh and/or antimicrobial alginate/hydrofiber, or antimicrobial solution moistened gauze/kerlex, or equivalent and cover with secondary dressing/foam    Compression Management needed for edema control, apply multilayer compression or tubular garment or equivalent    Offloading Management needed for pressure relief, apply offloading shoe/boot or equivalent     Standing Status:   Standing     Number of Occurrences:   1     Transitional offloading for 2 weeks with offloading device then work back into regular shoe gear.      Patient will follow up prn for wound, near future for DM foot care

## 2023-12-06 ENCOUNTER — OFFICE VISIT (OUTPATIENT)
Dept: PODIATRY | Age: 68
End: 2023-12-06
Payer: OTHER GOVERNMENT

## 2023-12-06 VITALS
SYSTOLIC BLOOD PRESSURE: 128 MMHG | BODY MASS INDEX: 40.2 KG/M2 | HEART RATE: 80 BPM | RESPIRATION RATE: 20 BRPM | WEIGHT: 280.2 LBS | DIASTOLIC BLOOD PRESSURE: 80 MMHG

## 2023-12-06 DIAGNOSIS — E11.42 DM TYPE 2 WITH DIABETIC PERIPHERAL NEUROPATHY (HCC): Primary | ICD-10-CM

## 2023-12-06 DIAGNOSIS — B35.1 DERMATOPHYTOSIS OF NAIL: ICD-10-CM

## 2023-12-06 PROCEDURE — 11720 DEBRIDE NAIL 1-5: CPT | Performed by: PODIATRIST

## 2023-12-06 PROCEDURE — 99999 PR OFFICE/OUTPT VISIT,PROCEDURE ONLY: CPT | Performed by: PODIATRIST

## 2023-12-06 NOTE — PROGRESS NOTES
Foot Care Worksheet  PCP: SAV Valero - NP  Last visit: 10 / 24 / 2023    Nail description: Thick , Yellow , Crumbly , Marked limitation of ambulation     Pain resulting from thickened and dystrophy of nail plate No    Nails involved  Right   5  (T5-T9)  Left     1, 5  (TA-T4)    Q7 1 Class A Finding - Non traumatic amputation of foot No    Q8 2 Class B Findings - Absent DP pulse No, Absent PT pulse No, Advanced tropic changes (3 required) Yes    Decrease hair growth Yes, Nail changes/thickening Yes, Pigmented changes/discoloration Yes, Skin texture (thin, shiny) No, Skin color (rubor/redness) No    Q9 1 Class B and 2 Class C Findings  Claudication No, Temperature change Yes, Paresthesia No, Burning No, Edema Yes    Subjective:  Yang Jacobo is a 77 y.o. male who presents to the office today for DM foot care. Currently denies F/C/N/V. Allergies   Allergen Reactions    Bee Venom Anaphylaxis    Naproxen      Other reaction(s): Unknown Reaction    Semaglutide      Other reaction(s): Constipation    Empagliflozin Rash       Past Medical History:   Diagnosis Date    Allergic rhinitis     Colon polyps     history of    Elevated WBC count     history of    Hyperlipidemia     Hypertension     Obesity     Pain, joint, hand, left 7/28/2021    Partial small bowel obstruction ([address]) 3/5/2023    SBO (small bowel obstruction) ([address]) 3/12/2023    Sepsis with acute renal failure ([address]) 3/5/2023    Type II or unspecified type diabetes mellitus without mention of complication, not stated as uncontrolled     Unspecified sleep apnea     Weakness of left hand 7/28/2021       Prior to Admission medications    Medication Sig Start Date End Date Taking?  Authorizing Provider   Omega-3 Fatty Acids (FISH OIL) 1000 MG capsule Take by mouth daily   Yes Provider, MD Soni   tamsulosin (FLOMAX) 0.4 MG capsule Take 1 capsule by mouth daily 8/18/23  Yes Salas William MD   Insulin Glargine-yfgn 100 UNIT/ML SOPN INJECT 40 UNITS

## 2023-12-18 PROBLEM — M20.42 HAMMER TOE OF LEFT FOOT: Status: ACTIVE | Noted: 2023-12-18

## 2023-12-18 PROBLEM — T14.8XXA BLOOD BLISTER: Status: ACTIVE | Noted: 2023-12-18

## 2024-03-05 ENCOUNTER — OFFICE VISIT (OUTPATIENT)
Dept: PODIATRY | Age: 69
End: 2024-03-05
Payer: OTHER GOVERNMENT

## 2024-03-05 VITALS
SYSTOLIC BLOOD PRESSURE: 136 MMHG | WEIGHT: 289 LBS | HEART RATE: 84 BPM | DIASTOLIC BLOOD PRESSURE: 78 MMHG | BODY MASS INDEX: 41.37 KG/M2 | HEIGHT: 70 IN

## 2024-03-05 DIAGNOSIS — E11.42 DM TYPE 2 WITH DIABETIC PERIPHERAL NEUROPATHY (HCC): ICD-10-CM

## 2024-03-05 DIAGNOSIS — L97.521 ULCER OF LEFT FOOT, LIMITED TO BREAKDOWN OF SKIN (HCC): Primary | ICD-10-CM

## 2024-03-05 DIAGNOSIS — M20.41 HAMMER TOES OF BOTH FEET: ICD-10-CM

## 2024-03-05 DIAGNOSIS — B35.1 DERMATOPHYTOSIS OF NAIL: ICD-10-CM

## 2024-03-05 DIAGNOSIS — M20.42 HAMMER TOES OF BOTH FEET: ICD-10-CM

## 2024-03-05 PROCEDURE — 3078F DIAST BP <80 MM HG: CPT | Performed by: PODIATRIST

## 2024-03-05 PROCEDURE — 3075F SYST BP GE 130 - 139MM HG: CPT | Performed by: PODIATRIST

## 2024-03-05 PROCEDURE — 97597 DBRDMT OPN WND 1ST 20 CM/<: CPT | Performed by: PODIATRIST

## 2024-03-05 PROCEDURE — 1123F ACP DISCUSS/DSCN MKR DOCD: CPT | Performed by: PODIATRIST

## 2024-03-05 PROCEDURE — 11720 DEBRIDE NAIL 1-5: CPT | Performed by: PODIATRIST

## 2024-03-05 PROCEDURE — 99213 OFFICE O/P EST LOW 20 MIN: CPT | Performed by: PODIATRIST

## 2024-03-05 NOTE — PROGRESS NOTES
Foot Care Worksheet  PCP: Cici Rodriguez APRN - NP  Last visit: 10 / 24 / 2023    Nail description: Thick , Yellow , Crumbly , Marked limitation of ambulation     Pain resulting from thickened and dystrophy of nail plate No    Nails involved  Right   5  (T5-T9)  Left     1, 5  (TA-T4)    Q7 1 Class A Finding - Non traumatic amputation of foot No    Q8 2 Class B Findings - Absent DP pulse No, Absent PT pulse No, Advanced tropic changes (3 required) Yes    Decrease hair growth Yes, Nail changes/thickening Yes, Pigmented changes/discoloration Yes, Skin texture (thin, shiny) No, Skin color (rubor/redness) No    Q9 1 Class B and 2 Class C Findings  Claudication No, Temperature change Yes, Paresthesia No, Burning No, Edema Yes    Subjective:  Kedar Medellin is a 68 y.o. male who presents to the office today for new spot left foot.  Patient has noticed minimal drainage.  No signs infection seen at home.  Patient is awaiting new insoles.  [facility] and [facility].  Patient also request DM foot care.   Currently denies F/C/N/V.     Allergies   Allergen Reactions    Bee Venom Anaphylaxis    Naproxen      Other reaction(s): Unknown Reaction    Semaglutide      Other reaction(s): Constipation    Empagliflozin Rash       Past Medical History:   Diagnosis Date    Allergic rhinitis     Colon polyps     history of    Elevated WBC count     history of    Hyperlipidemia     Hypertension     Obesity     Pain, joint, hand, left 7/28/2021    Partial small bowel obstruction ([facility]) 3/5/2023    SBO (small bowel obstruction) ([facility]) 3/12/2023    Sepsis with acute renal failure ([facility]) 3/5/2023    Type II or unspecified type diabetes mellitus without mention of complication, not stated as uncontrolled     Unspecified sleep apnea     Weakness of left hand 7/28/2021       Prior to Admission medications    Medication Sig Start Date End Date Taking? Authorizing Provider   Omega-3 Fatty Acids (FISH OIL) 1000 MG

## 2024-03-05 NOTE — PATIENT INSTRUCTIONS
Discontinue previous dressing. Apply Silvadene, an antimicrobial cream which is a prescription medicine. Apply once or twice a day to your wound as instructed by your doctor. Cover with a dry dressing. Call with any concerns.    SIGNS OF INFECTION  - Redness, swelling, skin hot  - Wound bed turns black or stringy yellow  - Foul odor  - Increased drainage or pus  - Increased pain  - Fever greater than 100F    CALL YOUR DOCTOR OR SEEK MEDICAL ATTENTION IF SIGNS OF INFECTION.  DO NOT WAIT UNTIL YOUR NEXT APPOINTMENT    Call the Wound Care Nurse with any other questions or concerns- [phone]

## 2024-03-06 ENCOUNTER — TELEPHONE (OUTPATIENT)
Dept: PODIATRY | Age: 69
End: 2024-03-06

## 2024-03-06 NOTE — TELEPHONE ENCOUNTER
Patient notified that a new script was sent into [facility], he states understanding.

## 2024-03-06 NOTE — TELEPHONE ENCOUNTER
----- Message from Kedar Medellin sent at 3/5/2024  7:37 PM EST -----  Regarding: Wound Care  Contact: [phone]  Dad told me he wasn’t able to find Silvadene at home. Can a refill be sent?   Melissa -dtr

## 2024-03-20 ENCOUNTER — OFFICE VISIT (OUTPATIENT)
Dept: PODIATRY | Age: 69
End: 2024-03-20
Payer: MEDICARE

## 2024-03-20 VITALS
DIASTOLIC BLOOD PRESSURE: 60 MMHG | HEART RATE: 88 BPM | WEIGHT: 291.2 LBS | SYSTOLIC BLOOD PRESSURE: 129 MMHG | RESPIRATION RATE: 20 BRPM | BODY MASS INDEX: 41.78 KG/M2 | TEMPERATURE: 97.8 F

## 2024-03-20 DIAGNOSIS — L97.521 ULCER OF LEFT FOOT, LIMITED TO BREAKDOWN OF SKIN (HCC): Primary | ICD-10-CM

## 2024-03-20 DIAGNOSIS — E11.42 DM TYPE 2 WITH DIABETIC PERIPHERAL NEUROPATHY (HCC): ICD-10-CM

## 2024-03-20 PROCEDURE — 97597 DBRDMT OPN WND 1ST 20 CM/<: CPT | Performed by: PODIATRIST

## 2024-03-20 PROCEDURE — 99999 PR OFFICE/OUTPT VISIT,PROCEDURE ONLY: CPT | Performed by: PODIATRIST

## 2024-03-20 PROCEDURE — 99214 OFFICE O/P EST MOD 30 MIN: CPT | Performed by: PODIATRIST

## 2024-03-20 NOTE — PROGRESS NOTES
Subjective:  Kedar Medellin is a 68 y.o. male who presents to the office today for ulcer l foot.  Dressings changed as advised.  No signs of infx seen at home. Currently denies F/C/N/V.     Allergies   Allergen Reactions    Bee Venom Anaphylaxis    Naproxen      Other reaction(s): Unknown Reaction    Semaglutide      Other reaction(s): Constipation    Empagliflozin Rash       Past Medical History:   Diagnosis Date    Allergic rhinitis     Colon polyps     history of    Elevated WBC count     history of    Hyperlipidemia     Hypertension     Obesity     Pain, joint, hand, left 7/28/2021    Partial small bowel obstruction (HCC) 3/5/2023    SBO (small bowel obstruction) (HCC) 3/12/2023    Sepsis with acute renal failure (HCC) 3/5/2023    Type II or unspecified type diabetes mellitus without mention of complication, not stated as uncontrolled     Unspecified sleep apnea     Weakness of left hand 7/28/2021       Prior to Admission medications    Medication Sig Start Date End Date Taking? Authorizing Provider   silver sulfADIAZINE (SILVADENE) 1 % cream Apply topically daily. 3/6/24  Yes Emory Reyna DPM   Omega-3 Fatty Acids (FISH OIL) 1000 MG capsule Take by mouth daily   Yes Provider, MD Soni   tamsulosin (FLOMAX) 0.4 MG capsule Take 1 capsule by mouth daily 8/18/23  Yes Barron Langley MD   Insulin Glargine-yfgn 100 UNIT/ML SOPN INJECT 40 UNITS UNDER SKIN AT BEDTIME FOR TYPE 2 DIABETES MELLITUS 6/14/23  Yes Provider, MD Soni   atorvastatin (LIPITOR) 80 MG tablet 0.5 tablets 5/18/22  Yes Provider, MD Soni   vitamin D (CHOLECALCIFEROL) 25 MCG (1000 UT) TABS tablet TAKE TWO TABLETS BY MOUTH EVERY DAY 5/29/14  Yes Provider, Historical, MD   sildenafil (VIAGRA) 50 MG tablet 0.5 tablets 5/18/22  Yes Provider, MD Soni   gabapentin (NEURONTIN) 300 MG capsule Take 1 capsule by mouth 2 times daily.   Yes ProviderSoni MD   lisinopril-hydrochlorothiazide (PRINZIDE;ZESTORETIC)

## 2024-03-20 NOTE — PATIENT INSTRUCTIONS
Silvadene cream to open wound and then cover with a dry dressing dialy.    Return to see  next week as scheduled.    Call the office with any changes to wound [phone]         You may receive a survey that will ask about your recent patient visit experience.   We value your feedback, so we kindly request that you take a few moments to complete our survey. Your valuable insights will help us ensure that we provide top-notch care, and A+ quality to you and your loved ones.     We strive for 5's!    [facility]

## 2024-04-02 ENCOUNTER — OFFICE VISIT (OUTPATIENT)
Dept: PODIATRY | Age: 69
End: 2024-04-02
Payer: OTHER GOVERNMENT

## 2024-04-02 VITALS
TEMPERATURE: 98.1 F | WEIGHT: 290.4 LBS | RESPIRATION RATE: 20 BRPM | SYSTOLIC BLOOD PRESSURE: 120 MMHG | DIASTOLIC BLOOD PRESSURE: 60 MMHG | BODY MASS INDEX: 41.67 KG/M2 | HEART RATE: 76 BPM

## 2024-04-02 DIAGNOSIS — L97.521 ULCER OF LEFT FOOT, LIMITED TO BREAKDOWN OF SKIN (HCC): ICD-10-CM

## 2024-04-02 DIAGNOSIS — E11.42 DM TYPE 2 WITH DIABETIC PERIPHERAL NEUROPATHY (HCC): Primary | ICD-10-CM

## 2024-04-02 PROCEDURE — 97597 DBRDMT OPN WND 1ST 20 CM/<: CPT | Performed by: PODIATRIST

## 2024-04-02 PROCEDURE — 99214 OFFICE O/P EST MOD 30 MIN: CPT | Performed by: PODIATRIST

## 2024-04-02 PROCEDURE — 99999 PR OFFICE/OUTPT VISIT,PROCEDURE ONLY: CPT | Performed by: PODIATRIST

## 2024-04-02 NOTE — PATIENT INSTRUCTIONS
Collagen dressing to left foot wound daily then cover with a dry dressing.    Return to see  next week as scheduled.

## 2024-04-08 ENCOUNTER — HOSPITAL ENCOUNTER (OUTPATIENT)
Dept: WOUND CARE | Age: 69
Discharge: HOME | End: 2024-04-09
Attending: PODIATRIST
Payer: MEDICARE

## 2024-04-08 VITALS
HEIGHT: 70 IN | SYSTOLIC BLOOD PRESSURE: 130 MMHG | BODY MASS INDEX: 41.29 KG/M2 | HEART RATE: 96 BPM | RESPIRATION RATE: 20 BRPM | DIASTOLIC BLOOD PRESSURE: 60 MMHG | WEIGHT: 288.4 LBS | TEMPERATURE: 98.6 F

## 2024-04-08 DIAGNOSIS — L97.522 ULCER OF LEFT FOOT WITH FAT LAYER EXPOSED (HCC): Primary | ICD-10-CM

## 2024-04-08 DIAGNOSIS — L97.521 ULCER OF LEFT FOOT, LIMITED TO BREAKDOWN OF SKIN (HCC): ICD-10-CM

## 2024-04-08 PROCEDURE — 97597 DBRDMT OPN WND 1ST 20 CM/<: CPT

## 2024-04-08 PROCEDURE — 97597 DBRDMT OPN WND 1ST 20 CM/<: CPT | Performed by: PODIATRIST

## 2024-04-08 NOTE — PLAN OF CARE
Problem: Chronic Conditions and Co-morbidities  Goal: Patient's chronic conditions and co-morbidity symptoms are monitored and maintained or improved  Outcome: Progressing     Problem: Cognitive:  Goal: Knowledge of wound care  Description: Knowledge of wound care  Outcome: Progressing  Goal: Understands risk factors for wounds  Description: Understands risk factors for wounds  Outcome: Progressing     Problem: Wound:  Goal: Will show signs of wound healing; wound closure and no evidence of infection  Description: Will show signs of wound healing; wound closure and no evidence of infection  Outcome: Progressing     Problem: Weight control:  Goal: Ability to maintain an optimal weight for height and age will be supported  Description: Ability to maintain an optimal weight for height and age will be supported  Outcome: Progressing     Problem: Falls - Risk of:  Goal: Will remain free from falls  Description: Will remain free from falls  Outcome: Progressing

## 2024-04-08 NOTE — PATIENT INSTRUCTIONS
Apply Collagen to foot wound daily and cover.  SIGNS OF INFECTION  - Redness, swelling, skin hot  - Wound bed turns black or stringy yellow  - Foul odor  - Increased drainage or pus  - Increased pain  - Fever greater than 100F    CALL YOUR DOCTOR OR SEEK MEDICAL ATTENTION IF SIGNS OF INFECTION.  DO NOT WAIT UNTIL YOUR NEXT APPOINTMENT    Call the Wound Care Nurse with any other questions or concerns- [phone]

## 2024-04-08 NOTE — PROGRESS NOTES
decreased bilateral. Hyperpigmentation present bilateral. Atrophic skin yes.   Neurological: Sensation Impaired to light touch to level of digits, bilateral.  Protective sensation intact  10/10 sites via 5.07/10g Farrar-Brandon Monofilament, bilateral.  negative Tinel's, bilateral.  negative Valleix sign, bilateral.  Vibratory abnormal  bilateral.  Reflexes Decreased bilateral.  Paresthesias positive.  Dysthesias negative.  Sharp/dull abnormal  bilateral.   Musculoskeletal: Muscle strength 5/5, bilateral.  Pain absent upon palpation bilateral. Normal medial longitudinal arch, bilateral.  Ankle ROM decreased,bilateral.  1st MPJ ROM within normal limits, bilateral.  Dorsally contracted digits present. No other foot deformities.   Integument:  Open lesion present, L.  Ulcer sub l 1st met head.  Positive callous and fibrin,  healthy tissue base,  no probing no undermining no malodor.  No surrounding signs of infx.   Interdigital maceration absent to web spaces bilateral.   Nails left 1, 5 and right 5 thickened, dystrophic and crumbly, discolored with subungual debris.   Fissures absent, bilateral. Hyperkeratotic tissue is absent.   Wound 03/12/23 Foot Anterior;Right (Active)   Number of days: 393       Wound 03/20/24 # left foot (Active)   Wound Image   04/08/24 1132   Dressing Status Old drainage noted 04/08/24 1132   Wound Cleansed Irrigated with saline 04/08/24 1132   Dressing/Treatment Collagen 04/02/24 1154   Offloading for Diabetic Foot Ulcers Offloading not ordered 04/08/24 1132   Dressing Change Due 04/09/24 04/08/24 1132   Wound Length (cm) 1 cm 04/08/24 1132   Wound Width (cm) 0.3 cm 04/08/24 1132   Wound Depth (cm) 0.1 cm 04/08/24 1132   Wound Surface Area (cm^2) 0.3 cm^2 04/08/24 1132   Change in Wound Size % (l*w) -11.11 04/08/24 1132   Wound Volume (cm^3) 0.03 cm^3 04/08/24 1132   Wound Healing % -11 04/08/24 1132   Post-Procedure Length (cm) 1.1 cm 04/02/24 1154   Post-Procedure Width (cm) 0.6 cm

## 2024-04-15 ENCOUNTER — HOSPITAL ENCOUNTER (OUTPATIENT)
Dept: WOUND CARE | Age: 69
Discharge: HOME | End: 2024-04-16
Attending: PODIATRIST
Payer: MEDICARE

## 2024-04-15 VITALS
WEIGHT: 292.4 LBS | RESPIRATION RATE: 20 BRPM | HEART RATE: 84 BPM | BODY MASS INDEX: 41.86 KG/M2 | DIASTOLIC BLOOD PRESSURE: 72 MMHG | HEIGHT: 70 IN | SYSTOLIC BLOOD PRESSURE: 132 MMHG | TEMPERATURE: 97.6 F

## 2024-04-15 PROCEDURE — 97597 DBRDMT OPN WND 1ST 20 CM/<: CPT

## 2024-04-15 PROCEDURE — 97597 DBRDMT OPN WND 1ST 20 CM/<: CPT | Performed by: PODIATRIST

## 2024-04-15 ASSESSMENT — PAIN SCALES - GENERAL: PAINLEVEL_OUTOF10: 0

## 2024-04-15 NOTE — PROGRESS NOTES
Subjective:  Kedar Medellin is a 68 y.o. male who presents to the office today for ulcer l foot.  Dressings changed as advised.  No signs of infx seen at home. Currently denies F/C/N/V.     Allergies   Allergen Reactions    Bee Venom Anaphylaxis    Naproxen      Other reaction(s): Unknown Reaction    Semaglutide      Other reaction(s): Constipation    Empagliflozin Rash       Past Medical History:   Diagnosis Date    Allergic rhinitis     Colon polyps     history of    Elevated WBC count     history of    Hyperlipidemia     Hypertension     Obesity     Pain, joint, hand, left 7/28/2021    Partial small bowel obstruction (HCC) 3/5/2023    SBO (small bowel obstruction) (HCC) 3/12/2023    Sepsis with acute renal failure (HCC) 3/5/2023    Type II or unspecified type diabetes mellitus without mention of complication, not stated as uncontrolled     Unspecified sleep apnea     Weakness of left hand 7/28/2021       Prior to Admission medications    Medication Sig Start Date End Date Taking? Authorizing Provider   silver sulfADIAZINE (SILVADENE) 1 % cream Apply topically daily. 3/6/24   Emory Reyna DPM   Omega-3 Fatty Acids (FISH OIL) 1000 MG capsule Take by mouth daily    Soni Johnson MD   tamsulosin (FLOMAX) 0.4 MG capsule Take 1 capsule by mouth daily 8/18/23   Barron Langley MD   Insulin Glargine-yfgn 100 UNIT/ML SOPN INJECT 40 UNITS UNDER SKIN AT BEDTIME FOR TYPE 2 DIABETES MELLITUS 6/14/23   Soni Johnson MD   atorvastatin (LIPITOR) 80 MG tablet 0.5 tablets 5/18/22   Soni Johnson MD   vitamin D (CHOLECALCIFEROL) 25 MCG (1000 UT) TABS tablet TAKE TWO TABLETS BY MOUTH EVERY DAY 5/29/14   Soni Johnson MD   sildenafil (VIAGRA) 50 MG tablet 0.5 tablets 5/18/22   Soni Johnson MD   gabapentin (NEURONTIN) 300 MG capsule Take 1 capsule by mouth 2 times daily.    Soni Johnson MD   lisinopril-hydrochlorothiazide (PRINZIDE;ZESTORETIC) 20-12.5 MG per tablet Take 1

## 2024-04-15 NOTE — PLAN OF CARE
Problem: Chronic Conditions and Co-morbidities  Goal: Patient's chronic conditions and co-morbidity symptoms are monitored and maintained or improved  Outcome: Progressing     Problem: Pain  Goal: Verbalizes/displays adequate comfort level or baseline comfort level  Outcome: Progressing  Flowsheets (Taken 4/15/2024 1315)  Verbalizes/displays adequate comfort level or baseline comfort level: Assess pain using appropriate pain scale     Problem: Cognitive:  Goal: Knowledge of wound care  Description: Knowledge of wound care  Outcome: Progressing  Goal: Understands risk factors for wounds  Description: Understands risk factors for wounds  Outcome: Progressing     Problem: Wound:  Goal: Will show signs of wound healing; wound closure and no evidence of infection  Description: Will show signs of wound healing; wound closure and no evidence of infection  Outcome: Progressing     Problem: Pressure Ulcer:  Goal: Signs of wound healing will improve  Description: Signs of wound healing will improve  Outcome: Progressing  Goal: Absence of new pressure ulcer  Description: Absence of new pressure ulcer  Outcome: Progressing  Goal: Will show no infection signs and symptoms  Description: Will show no infection signs and symptoms  Outcome: Progressing     Problem: Weight control:  Goal: Ability to maintain an optimal weight for height and age will be supported  Description: Ability to maintain an optimal weight for height and age will be supported  Outcome: Progressing     Problem: Falls - Risk of:  Goal: Will remain free from falls  Description: Will remain free from falls  Outcome: Progressing     Problem: Blood Glucose:  Goal: Ability to maintain appropriate glucose levels will improve  Description: Ability to maintain appropriate glucose levels will improve  Outcome: Progressing

## 2024-04-15 NOTE — PATIENT INSTRUCTIONS
Collagen to wound daily and cover. Wear off loading shoe.  SIGNS OF INFECTION  - Redness, swelling, skin hot  - Wound bed turns black or stringy yellow  - Foul odor  - Increased drainage or pus  - Increased pain  - Fever greater than 100F    CALL YOUR DOCTOR OR SEEK MEDICAL ATTENTION IF SIGNS OF INFECTION.  DO NOT WAIT UNTIL YOUR NEXT APPOINTMENT    Call the Wound Care Nurse with any other questions or concerns- [phone]

## 2024-04-22 ENCOUNTER — HOSPITAL ENCOUNTER (OUTPATIENT)
Dept: WOUND CARE | Age: 69
Discharge: HOME | End: 2024-04-23
Attending: PODIATRIST
Payer: MEDICARE

## 2024-04-22 VITALS
TEMPERATURE: 98 F | DIASTOLIC BLOOD PRESSURE: 74 MMHG | BODY MASS INDEX: 41.96 KG/M2 | RESPIRATION RATE: 20 BRPM | HEIGHT: 70 IN | SYSTOLIC BLOOD PRESSURE: 132 MMHG | HEART RATE: 88 BPM

## 2024-04-22 PROCEDURE — 97597 DBRDMT OPN WND 1ST 20 CM/<: CPT | Performed by: PODIATRIST

## 2024-04-22 PROCEDURE — 97597 DBRDMT OPN WND 1ST 20 CM/<: CPT

## 2024-04-22 NOTE — PLAN OF CARE
Problem: Chronic Conditions and Co-morbidities  Goal: Patient's chronic conditions and co-morbidity symptoms are monitored and maintained or improved  Outcome: Progressing     Problem: Pain  Goal: Verbalizes/displays adequate comfort level or baseline comfort level  Outcome: Progressing     Problem: Cognitive:  Goal: Knowledge of wound care  Description: Knowledge of wound care  Outcome: Progressing  Goal: Understands risk factors for wounds  Description: Understands risk factors for wounds  Outcome: Progressing     Problem: Wound:  Goal: Will show signs of wound healing; wound closure and no evidence of infection  Description: Will show signs of wound healing; wound closure and no evidence of infection  Outcome: Progressing     Problem: Weight control:  Goal: Ability to maintain an optimal weight for height and age will be supported  Description: Ability to maintain an optimal weight for height and age will be supported  Outcome: Progressing     Problem: Falls - Risk of:  Goal: Will remain free from falls  Description: Will remain free from falls  Outcome: Progressing     Problem: Blood Glucose:  Goal: Ability to maintain appropriate glucose levels will improve  Description: Ability to maintain appropriate glucose levels will improve  Outcome: Progressing

## 2024-04-22 NOTE — DISCHARGE INSTR - COC
Continuity of Care Form    Patient Name: Kedar Medellin   :  1955  MRN:  [id number]    Admit date:  2024  Discharge date:  ***    Code Status Order: Prior   Advance Directives:     Admitting Physician:  No admitting provider for patient encounter.  PCP: Cici Rodriguez APRN - NP    Discharging Nurse: ***  Discharging Hospital Unit/Room#: No information available for this encounter.  Discharging Unit Phone Number: ***    Emergency Contact:   Extended Emergency Contact Information  Primary Emergency Contact: SADI HUNTER  Home Phone: [phone]  Relation: Child   needed? No  Secondary Emergency Contact: Melissa Mccloud  Home Phone: [phone]  Relation: Child    Past Surgical History:  Past Surgical History:   Procedure Laterality Date    CARPAL TUNNEL RELEASE Right 12/3/2010    COLONOSCOPY  2009    polyps    INGUINAL HERNIA REPAIR Right 1977    LAPAROSCOPY N/A 3/5/2023    DIAGNOSTIC LAPAROSCOPY performed by Cody Zavala MD at [facility] OR    TOE AMPUTATION Left 2023    Left 1st ray amputation performed by Emory Reyna DPM at [facility] OR    TOENAIL EXCISION Left 2003    removal nail plate 1st digit       Immunization History:   Immunization History   Administered Date(s) Administered    COVID-19, MODERNA Bivalent, (age 12y+), IM, 50 mcg/0.5 mL 10/19/2022    COVID-19, MODERNA, (- formula), (age 12y+), IM, 50mcg/0.5mL 10/24/2023    Influenza Virus Vaccine 10/30/2007       Active Problems:  Patient Active Problem List   Diagnosis Code    Sepsis ([facility]) A41.9    Type 2 diabetes mellitus with diabetic polyneuropathy, with long-term current use of insulin ([facility]) E11.42, Z79.4    Ulcer of left foot with fat layer exposed ([facility]) L97.522    PVD (peripheral vascular disease) ([facility]) I73.9    Ulcer of left foot with necrosis of muscle ([facility]) L97.523    Cellulitis of foot L03.119    Diabetic ulcer of left foot due to type 2 diabetes mellitus ([facility]) E11.621, L97.529    Partial thickness

## 2024-04-22 NOTE — PROGRESS NOTES
1329   Post-Procedure Width (cm) 0.8 cm 04/15/24 1329   Post-Procedure Depth (cm) 0.2 cm 04/15/24 1329   Post-Procedure Surface Area (cm^2) 1.04 cm^2 04/15/24 1329   Post-Procedure Volume (cm^3) 0.208 cm^3 04/15/24 1329   Wound Assessment Pink/red 04/22/24 1324   Drainage Amount Small (< 25%) 04/22/24 1324   Drainage Description Serosanguinous 04/22/24 1324   Odor None 04/22/24 1324   Valery-wound Assessment Intact;Maceration;Hyperkeratosis (callous) 04/22/24 1324   Margins Undefined edges 04/22/24 1324   Wound Thickness Description not for Pressure Injury Full thickness 04/22/24 1324   Number of days: 33           Asessment: Patient is a 68 y.o. male with:   Hospital Problems             Last Modified POA    Type 2 diabetes mellitus with diabetic polyneuropathy, with long-term current use of insulin ([facility]) 4/22/2024 Yes    Ulcer of left foot, limited to breakdown of skin ([facility]) 4/22/2024 Yes       Plan: Patient examined and evaluated.  Current condition and treatment options discussed in detail.    DM foot ed and exam  Dressing: collagen daily.  Active wound management took place 100% non excisional with the use of  tissue nippers.  All non viable tissue (including epidermis and/or dermis) and bio burden was removed to promote healing.  Bleeding was present.  Please see chart for exact measurements, if not documented then size was less than 20 sq cm.   Patient should go back to offloading boot at all times weightbearing.  Patient will follow up in 1 weeks for ulcer

## 2024-04-30 ENCOUNTER — OFFICE VISIT (OUTPATIENT)
Dept: PODIATRY | Age: 69
End: 2024-04-30
Payer: OTHER GOVERNMENT

## 2024-04-30 VITALS
HEART RATE: 84 BPM | TEMPERATURE: 98.5 F | WEIGHT: 293.2 LBS | SYSTOLIC BLOOD PRESSURE: 132 MMHG | BODY MASS INDEX: 42.07 KG/M2 | DIASTOLIC BLOOD PRESSURE: 60 MMHG | RESPIRATION RATE: 20 BRPM

## 2024-04-30 DIAGNOSIS — L97.521 ULCER OF LEFT FOOT, LIMITED TO BREAKDOWN OF SKIN (HCC): ICD-10-CM

## 2024-04-30 DIAGNOSIS — E11.42 DM TYPE 2 WITH DIABETIC PERIPHERAL NEUROPATHY (HCC): Primary | ICD-10-CM

## 2024-04-30 PROCEDURE — 97597 DBRDMT OPN WND 1ST 20 CM/<: CPT | Performed by: PODIATRIST

## 2024-04-30 PROCEDURE — 99999 PR OFFICE/OUTPT VISIT,PROCEDURE ONLY: CPT | Performed by: PODIATRIST

## 2024-04-30 NOTE — PROGRESS NOTES
Subjective:  Kedar Medellin is a 68 y.o. male who presents to the office today for ulcer l foot.  Dressings changed as advised.  No signs of infx seen at home. Currently denies F/C/N/V.     Allergies   Allergen Reactions    Bee Venom Anaphylaxis    Naproxen      Other reaction(s): Unknown Reaction    Semaglutide      Other reaction(s): Constipation    Empagliflozin Rash       Past Medical History:   Diagnosis Date    Allergic rhinitis     Colon polyps     history of    Elevated WBC count     history of    Hyperlipidemia     Hypertension     Obesity     Pain, joint, hand, left 7/28/2021    Partial small bowel obstruction (HCC) 3/5/2023    SBO (small bowel obstruction) (HCC) 3/12/2023    Sepsis with acute renal failure (HCC) 3/5/2023    Type II or unspecified type diabetes mellitus without mention of complication, not stated as uncontrolled     Unspecified sleep apnea     Weakness of left hand 7/28/2021       Prior to Admission medications    Medication Sig Start Date End Date Taking? Authorizing Provider   silver sulfADIAZINE (SILVADENE) 1 % cream Apply topically daily. 3/6/24  Yes Emory Reyna DPM   Omega-3 Fatty Acids (FISH OIL) 1000 MG capsule Take by mouth daily   Yes Provider, MD Soni   tamsulosin (FLOMAX) 0.4 MG capsule Take 1 capsule by mouth daily 8/18/23  Yes Barron Langley MD   Insulin Glargine-yfgn 100 UNIT/ML SOPN INJECT 40 UNITS UNDER SKIN AT BEDTIME FOR TYPE 2 DIABETES MELLITUS 6/14/23  Yes Provider, MD Soni   atorvastatin (LIPITOR) 80 MG tablet 0.5 tablets 5/18/22  Yes Provider, MD Soni   vitamin D (CHOLECALCIFEROL) 25 MCG (1000 UT) TABS tablet TAKE TWO TABLETS BY MOUTH EVERY DAY 5/29/14  Yes Provider, Historical, MD   sildenafil (VIAGRA) 50 MG tablet 0.5 tablets 5/18/22  Yes Provider, MD Soni   gabapentin (NEURONTIN) 300 MG capsule Take 1 capsule by mouth 2 times daily.   Yes ProviderSoni MD   lisinopril-hydrochlorothiazide (PRINZIDE;ZESTORETIC) 20-12.5 MG

## 2024-04-30 NOTE — PATIENT INSTRUCTIONS
Continue to use collagen to the wound daily and cover with a dry dressing DAILY.    Continue to use the off loading boot at all times.    Return to see  next week as scheduled.

## 2024-05-06 ENCOUNTER — HOSPITAL ENCOUNTER (OUTPATIENT)
Dept: WOUND CARE | Age: 69
Discharge: HOME | End: 2024-05-07
Attending: PODIATRIST
Payer: OTHER GOVERNMENT

## 2024-05-06 VITALS
RESPIRATION RATE: 18 BRPM | TEMPERATURE: 98.1 F | WEIGHT: 294.2 LBS | HEIGHT: 70 IN | DIASTOLIC BLOOD PRESSURE: 78 MMHG | HEART RATE: 82 BPM | BODY MASS INDEX: 42.12 KG/M2 | SYSTOLIC BLOOD PRESSURE: 130 MMHG

## 2024-05-06 PROCEDURE — 97597 DBRDMT OPN WND 1ST 20 CM/<: CPT

## 2024-05-06 PROCEDURE — 97597 DBRDMT OPN WND 1ST 20 CM/<: CPT | Performed by: PODIATRIST

## 2024-05-06 NOTE — PLAN OF CARE
Problem: Chronic Conditions and Co-morbidities  Goal: Patient's chronic conditions and co-morbidity symptoms are monitored and maintained or improved  Outcome: Progressing     Problem: Cognitive:  Goal: Knowledge of wound care  Description: Knowledge of wound care  Outcome: Progressing  Goal: Understands risk factors for wounds  Description: Understands risk factors for wounds  Outcome: Progressing     Problem: Wound:  Goal: Will show signs of wound healing; wound closure and no evidence of infection  Description: Will show signs of wound healing; wound closure and no evidence of infection  Outcome: Progressing     Problem: Compression therapy:  Goal: Will be free from complications associated with compression therapy  Description: Will be free from complications associated with compression therapy  Outcome: Progressing     Problem: Weight control:  Goal: Ability to maintain an optimal weight for height and age will be supported  Description: Ability to maintain an optimal weight for height and age will be supported  Outcome: Progressing     Problem: Falls - Risk of:  Goal: Will remain free from falls  Description: Will remain free from falls  Outcome: Progressing

## 2024-05-13 ENCOUNTER — HOSPITAL ENCOUNTER (OUTPATIENT)
Dept: WOUND CARE | Age: 69
Discharge: HOME | End: 2024-05-14
Attending: PODIATRIST
Payer: OTHER GOVERNMENT

## 2024-05-13 VITALS
RESPIRATION RATE: 20 BRPM | HEIGHT: 70 IN | SYSTOLIC BLOOD PRESSURE: 138 MMHG | WEIGHT: 294 LBS | DIASTOLIC BLOOD PRESSURE: 78 MMHG | BODY MASS INDEX: 42.09 KG/M2 | TEMPERATURE: 97.5 F | HEART RATE: 78 BPM

## 2024-05-13 PROCEDURE — 97597 DBRDMT OPN WND 1ST 20 CM/<: CPT | Performed by: PODIATRIST

## 2024-05-13 PROCEDURE — 97597 DBRDMT OPN WND 1ST 20 CM/<: CPT

## 2024-05-13 NOTE — PROGRESS NOTES
Subjective:  Kedar Medellin is a 68 y.o. male who presents to the office today for ulcer l foot.  Dressings changed as advised. Pt has offloaded with cam walker as advised.  No signs of infx seen at home. Currently denies F/C/N/V.     Allergies   Allergen Reactions    Bee Venom Anaphylaxis    Naproxen      Other reaction(s): Unknown Reaction    Semaglutide      Other reaction(s): Constipation    Empagliflozin Rash       Past Medical History:   Diagnosis Date    Allergic rhinitis     Colon polyps     history of    Elevated WBC count     history of    Hyperlipidemia     Hypertension     Obesity     Pain, joint, hand, left 7/28/2021    Partial small bowel obstruction (HCC) 3/5/2023    SBO (small bowel obstruction) (HCC) 3/12/2023    Sepsis with acute renal failure (HCC) 3/5/2023    Type II or unspecified type diabetes mellitus without mention of complication, not stated as uncontrolled     Unspecified sleep apnea     Weakness of left hand 7/28/2021       Prior to Admission medications    Medication Sig Start Date End Date Taking? Authorizing Provider   silver sulfADIAZINE (SILVADENE) 1 % cream Apply topically daily. 3/6/24   Emory Reyna DPFREDA   Omega-3 Fatty Acids (FISH OIL) 1000 MG capsule Take by mouth daily    Soni Johnson MD   tamsulosin (FLOMAX) 0.4 MG capsule Take 1 capsule by mouth daily 8/18/23   Barron Langley MD   Insulin Glargine-yfgn 100 UNIT/ML SOPN INJECT 40 UNITS UNDER SKIN AT BEDTIME FOR TYPE 2 DIABETES MELLITUS 6/14/23   Soni Johnson MD   atorvastatin (LIPITOR) 80 MG tablet 0.5 tablets 5/18/22   Soni Johnson MD   vitamin D (CHOLECALCIFEROL) 25 MCG (1000 UT) TABS tablet TAKE TWO TABLETS BY MOUTH EVERY DAY 5/29/14   Soni Johnson MD   sildenafil (VIAGRA) 50 MG tablet 0.5 tablets 5/18/22   Soni Johnson MD   gabapentin (NEURONTIN) 300 MG capsule Take 1 capsule by mouth 2 times daily.    Soni Johnson MD   lisinopril-hydrochlorothiazide

## 2024-05-20 ENCOUNTER — HOSPITAL ENCOUNTER (OUTPATIENT)
Dept: WOUND CARE | Age: 69
Discharge: HOME | End: 2024-05-21
Attending: PODIATRIST
Payer: OTHER GOVERNMENT

## 2024-05-20 VITALS
DIASTOLIC BLOOD PRESSURE: 78 MMHG | SYSTOLIC BLOOD PRESSURE: 134 MMHG | BODY MASS INDEX: 41.95 KG/M2 | RESPIRATION RATE: 20 BRPM | HEIGHT: 70 IN | HEART RATE: 72 BPM | TEMPERATURE: 97.7 F | WEIGHT: 293 LBS

## 2024-05-20 PROCEDURE — 97597 DBRDMT OPN WND 1ST 20 CM/<: CPT | Performed by: PODIATRIST

## 2024-05-20 PROCEDURE — 97597 DBRDMT OPN WND 1ST 20 CM/<: CPT

## 2024-05-20 ASSESSMENT — PAIN SCALES - GENERAL: PAINLEVEL_OUTOF10: 0

## 2024-05-20 NOTE — PROGRESS NOTES
Subjective:  Kedar Medellin is a 68 y.o. male who presents to the office today for ulcer left foot.  Dressings changed at home with no issues.  Patient presents today in cam walker.  Currently denies F/C/N/V.     Allergies   Allergen Reactions    Bee Venom Anaphylaxis    Naproxen      Other reaction(s): Unknown Reaction    Semaglutide      Other reaction(s): Constipation    Empagliflozin Rash       Past Medical History:   Diagnosis Date    Allergic rhinitis     Colon polyps     history of    Elevated WBC count     history of    Hyperlipidemia     Hypertension     Obesity     Pain, joint, hand, left 7/28/2021    Partial small bowel obstruction (HCC) 3/5/2023    SBO (small bowel obstruction) (HCC) 3/12/2023    Sepsis with acute renal failure (HCC) 3/5/2023    Type II or unspecified type diabetes mellitus without mention of complication, not stated as uncontrolled     Unspecified sleep apnea     Weakness of left hand 7/28/2021       Prior to Admission medications    Medication Sig Start Date End Date Taking? Authorizing Provider   silver sulfADIAZINE (SILVADENE) 1 % cream Apply topically daily. 3/6/24   Emory Reyna DPM   Omega-3 Fatty Acids (FISH OIL) 1000 MG capsule Take by mouth daily    Soni Johnson MD   tamsulosin (FLOMAX) 0.4 MG capsule Take 1 capsule by mouth daily 8/18/23   Barron Langley MD   Insulin Glargine-yfgn 100 UNIT/ML SOPN INJECT 40 UNITS UNDER SKIN AT BEDTIME FOR TYPE 2 DIABETES MELLITUS 6/14/23   Soni Johnson MD   atorvastatin (LIPITOR) 80 MG tablet 0.5 tablets 5/18/22   Soni Johnson MD   vitamin D (CHOLECALCIFEROL) 25 MCG (1000 UT) TABS tablet TAKE TWO TABLETS BY MOUTH EVERY DAY 5/29/14   Soni Johnson MD   sildenafil (VIAGRA) 50 MG tablet 0.5 tablets 5/18/22   Soni Johnson MD   gabapentin (NEURONTIN) 300 MG capsule Take 1 capsule by mouth 2 times daily.    Soni Johnson MD   lisinopril-hydrochlorothiazide (PRINZIDE;ZESTORETIC) 20-12.5 MG

## 2024-05-21 ENCOUNTER — OFFICE VISIT (OUTPATIENT)
Dept: PODIATRY | Age: 69
End: 2024-05-21
Payer: OTHER GOVERNMENT

## 2024-05-21 VITALS
BODY MASS INDEX: 41.95 KG/M2 | HEART RATE: 85 BPM | DIASTOLIC BLOOD PRESSURE: 76 MMHG | OXYGEN SATURATION: 96 % | WEIGHT: 293 LBS | SYSTOLIC BLOOD PRESSURE: 138 MMHG | HEIGHT: 70 IN | RESPIRATION RATE: 18 BRPM

## 2024-05-21 DIAGNOSIS — B35.1 DERMATOPHYTOSIS OF NAIL: ICD-10-CM

## 2024-05-21 DIAGNOSIS — S98.132A AMPUTATED TOE OF LEFT FOOT (HCC): ICD-10-CM

## 2024-05-21 DIAGNOSIS — E11.42 DM TYPE 2 WITH DIABETIC PERIPHERAL NEUROPATHY (HCC): Primary | ICD-10-CM

## 2024-05-21 PROCEDURE — 11720 DEBRIDE NAIL 1-5: CPT | Performed by: PODIATRIST

## 2024-05-21 PROCEDURE — 99999 PR OFFICE/OUTPT VISIT,PROCEDURE ONLY: CPT | Performed by: PODIATRIST

## 2024-05-21 NOTE — PROGRESS NOTES
Foot Care Worksheet  PCP: Cici Rodriguez APRN - NP  Last visit: 10 / 24 / 2023    Nail description: Thick , Yellow , Crumbly , Marked limitation of ambulation     Pain resulting from thickened and dystrophy of nail plate No    Nails involved  Right   5  (T5-T9)  Left     1, 5  (TA-T4)    Q7 1 Class A Finding - Non traumatic amputation of foot yes      Subjective:  Kedar Medellin is a 68 y.o. male who presents to the office today for DM foot care.   Currently denies F/C/N/V.     Allergies   Allergen Reactions    Bee Venom Anaphylaxis    Naproxen      Other reaction(s): Unknown Reaction    Semaglutide      Other reaction(s): Constipation    Empagliflozin Rash       Past Medical History:   Diagnosis Date    Allergic rhinitis     Colon polyps     history of    Elevated WBC count     history of    Hyperlipidemia     Hypertension     Obesity     Pain, joint, hand, left 7/28/2021    Partial small bowel obstruction (HCC) 3/5/2023    SBO (small bowel obstruction) (HCC) 3/12/2023    Sepsis with acute renal failure (HCC) 3/5/2023    Type II or unspecified type diabetes mellitus without mention of complication, not stated as uncontrolled     Unspecified sleep apnea     Weakness of left hand 7/28/2021       Prior to Admission medications    Medication Sig Start Date End Date Taking? Authorizing Provider   silver sulfADIAZINE (SILVADENE) 1 % cream Apply topically daily. 3/6/24  Yes Emory Reyna DPM   Omega-3 Fatty Acids (FISH OIL) 1000 MG capsule Take by mouth daily   Yes Provider, MD Soni   tamsulosin (FLOMAX) 0.4 MG capsule Take 1 capsule by mouth daily 8/18/23  Yes Barron Langley MD   Insulin Glargine-yfgn 100 UNIT/ML SOPN INJECT 40 UNITS UNDER SKIN AT BEDTIME FOR TYPE 2 DIABETES MELLITUS 6/14/23  Yes Provider, MD Soni   atorvastatin (LIPITOR) 80 MG tablet 0.5 tablets 5/18/22  Yes Provider, MD Soni   vitamin D (CHOLECALCIFEROL) 25 MCG (1000 UT) TABS tablet TAKE TWO TABLETS BY MOUTH EVERY DAY 5/29/14

## 2024-05-28 ENCOUNTER — HOSPITAL ENCOUNTER (OUTPATIENT)
Dept: WOUND CARE | Age: 69
Discharge: HOME | End: 2024-05-29
Attending: PODIATRIST
Payer: MEDICARE

## 2024-05-28 ENCOUNTER — HOSPITAL ENCOUNTER (OUTPATIENT)
Dept: GENERAL RADIOLOGY | Age: 69
Discharge: HOME | End: 2024-05-30
Payer: MEDICARE

## 2024-05-28 VITALS
TEMPERATURE: 97.1 F | WEIGHT: 293.2 LBS | SYSTOLIC BLOOD PRESSURE: 128 MMHG | HEIGHT: 70 IN | RESPIRATION RATE: 18 BRPM | DIASTOLIC BLOOD PRESSURE: 74 MMHG | HEART RATE: 70 BPM | BODY MASS INDEX: 41.97 KG/M2

## 2024-05-28 PROCEDURE — 73630 X-RAY EXAM OF FOOT: CPT

## 2024-05-28 PROCEDURE — 97597 DBRDMT OPN WND 1ST 20 CM/<: CPT | Performed by: PODIATRIST

## 2024-05-28 PROCEDURE — 99213 OFFICE O/P EST LOW 20 MIN: CPT | Performed by: PODIATRIST

## 2024-05-28 PROCEDURE — 97597 DBRDMT OPN WND 1ST 20 CM/<: CPT

## 2024-05-28 NOTE — PROGRESS NOTES
Subjective:  Kedar Medellin is a 68 y.o. male who presents to the office today for chronic ulcer left foot.  Patient has offloaded as advised.  Dressings changed as advised.  Drainage similar overall.  No signs of infection seen at home.  Currently denies F/C/N/V.     Allergies   Allergen Reactions    Bee Venom Anaphylaxis    Naproxen      Other reaction(s): Unknown Reaction    Semaglutide      Other reaction(s): Constipation    Empagliflozin Rash       Past Medical History:   Diagnosis Date    Allergic rhinitis     Colon polyps     history of    Elevated WBC count     history of    Hyperlipidemia     Hypertension     Obesity     Pain, joint, hand, left 7/28/2021    Partial small bowel obstruction (HCC) 3/5/2023    SBO (small bowel obstruction) (HCC) 3/12/2023    Sepsis with acute renal failure (HCC) 3/5/2023    Type II or unspecified type diabetes mellitus without mention of complication, not stated as uncontrolled     Unspecified sleep apnea     Weakness of left hand 7/28/2021       Prior to Admission medications    Medication Sig Start Date End Date Taking? Authorizing Provider   silver sulfADIAZINE (SILVADENE) 1 % cream Apply topically daily. 3/6/24   Emory Reyna DPFREDA   Omega-3 Fatty Acids (FISH OIL) 1000 MG capsule Take by mouth daily    Soni Johnson MD   tamsulosin (FLOMAX) 0.4 MG capsule Take 1 capsule by mouth daily 8/18/23   Barron Langley MD   Insulin Glargine-yfgn 100 UNIT/ML SOPN INJECT 40 UNITS UNDER SKIN AT BEDTIME FOR TYPE 2 DIABETES MELLITUS 6/14/23   Soni Johnson MD   atorvastatin (LIPITOR) 80 MG tablet 0.5 tablets 5/18/22   Soni Johnson MD   vitamin D (CHOLECALCIFEROL) 25 MCG (1000 UT) TABS tablet TAKE TWO TABLETS BY MOUTH EVERY DAY 5/29/14   Soni Johnson MD   sildenafil (VIAGRA) 50 MG tablet 0.5 tablets 5/18/22   Soni Johnson MD   gabapentin (NEURONTIN) 300 MG capsule Take 1 capsule by mouth 2 times daily.    Soni Johnson MD

## 2024-05-28 NOTE — PLAN OF CARE
Problem: Chronic Conditions and Co-morbidities  Goal: Patient's chronic conditions and co-morbidity symptoms are monitored and maintained or improved  Outcome: Progressing     Problem: Pain  Goal: Verbalizes/displays adequate comfort level or baseline comfort level  Outcome: Progressing     Problem: Cognitive:  Goal: Knowledge of wound care  Description: Knowledge of wound care  Outcome: Progressing  Goal: Understands risk factors for wounds  Description: Understands risk factors for wounds  Outcome: Progressing     Problem: Wound:  Goal: Will show signs of wound healing; wound closure and no evidence of infection  Description: Will show signs of wound healing; wound closure and no evidence of infection  Outcome: Progressing     Problem: Weight control:  Goal: Ability to maintain an optimal weight for height and age will be supported  Description: Ability to maintain an optimal weight for height and age will be supported  Outcome: Progressing     Problem: Falls - Risk of:  Goal: Will remain free from falls  Description: Will remain free from falls  Outcome: Progressing

## 2024-06-03 ENCOUNTER — HOSPITAL ENCOUNTER (OUTPATIENT)
Dept: WOUND CARE | Age: 69
Discharge: HOME | End: 2024-06-04
Attending: PODIATRIST
Payer: OTHER GOVERNMENT

## 2024-06-03 VITALS
HEART RATE: 84 BPM | BODY MASS INDEX: 41.17 KG/M2 | HEIGHT: 70 IN | RESPIRATION RATE: 18 BRPM | WEIGHT: 287.6 LBS | DIASTOLIC BLOOD PRESSURE: 80 MMHG | TEMPERATURE: 97.9 F | SYSTOLIC BLOOD PRESSURE: 130 MMHG

## 2024-06-03 DIAGNOSIS — L97.522 ULCER OF LEFT FOOT WITH FAT LAYER EXPOSED (HCC): Primary | ICD-10-CM

## 2024-06-03 PROCEDURE — 97597 DBRDMT OPN WND 1ST 20 CM/<: CPT

## 2024-06-03 PROCEDURE — 97597 DBRDMT OPN WND 1ST 20 CM/<: CPT | Performed by: PODIATRIST

## 2024-06-03 RX ORDER — LIDOCAINE 50 MG/G
OINTMENT TOPICAL ONCE
OUTPATIENT
Start: 2024-06-03 | End: 2024-06-03

## 2024-06-03 RX ORDER — CLOBETASOL PROPIONATE 0.5 MG/G
OINTMENT TOPICAL ONCE
OUTPATIENT
Start: 2024-06-03 | End: 2024-06-03

## 2024-06-03 RX ORDER — LIDOCAINE 40 MG/G
CREAM TOPICAL ONCE
OUTPATIENT
Start: 2024-06-03 | End: 2024-06-03

## 2024-06-03 RX ORDER — LIDOCAINE HYDROCHLORIDE 20 MG/ML
JELLY TOPICAL ONCE
OUTPATIENT
Start: 2024-06-03 | End: 2024-06-03

## 2024-06-03 RX ORDER — GINSENG 100 MG
CAPSULE ORAL ONCE
OUTPATIENT
Start: 2024-06-03 | End: 2024-06-03

## 2024-06-03 RX ORDER — SODIUM CHLOR/HYPOCHLOROUS ACID 0.033 %
SOLUTION, IRRIGATION IRRIGATION ONCE
OUTPATIENT
Start: 2024-06-03 | End: 2024-06-03

## 2024-06-03 RX ORDER — LIDOCAINE HYDROCHLORIDE 40 MG/ML
SOLUTION TOPICAL ONCE
OUTPATIENT
Start: 2024-06-03 | End: 2024-06-03

## 2024-06-03 RX ORDER — TRIAMCINOLONE ACETONIDE 1 MG/G
OINTMENT TOPICAL ONCE
OUTPATIENT
Start: 2024-06-03 | End: 2024-06-03

## 2024-06-03 RX ORDER — BETAMETHASONE DIPROPIONATE 0.5 MG/G
CREAM TOPICAL ONCE
OUTPATIENT
Start: 2024-06-03 | End: 2024-06-03

## 2024-06-03 RX ORDER — IBUPROFEN 200 MG
TABLET ORAL ONCE
OUTPATIENT
Start: 2024-06-03 | End: 2024-06-03

## 2024-06-03 RX ORDER — BACITRACIN ZINC AND POLYMYXIN B SULFATE 500; 1000 [USP'U]/G; [USP'U]/G
OINTMENT TOPICAL ONCE
OUTPATIENT
Start: 2024-06-03 | End: 2024-06-03

## 2024-06-03 RX ORDER — GENTAMICIN SULFATE 1 MG/G
OINTMENT TOPICAL ONCE
OUTPATIENT
Start: 2024-06-03 | End: 2024-06-03

## 2024-06-03 ASSESSMENT — PAIN SCALES - GENERAL: PAINLEVEL_OUTOF10: 0

## 2024-06-03 NOTE — PLAN OF CARE
Problem: Chronic Conditions and Co-morbidities  Goal: Patient's chronic conditions and co-morbidity symptoms are monitored and maintained or improved  Outcome: Progressing     Problem: Cognitive:  Goal: Knowledge of wound care  Description: Knowledge of wound care  Outcome: Progressing  Goal: Understands risk factors for wounds  Description: Understands risk factors for wounds  Outcome: Progressing     Problem: Wound:  Goal: Will show signs of wound healing; wound closure and no evidence of infection  Description: Will show signs of wound healing; wound closure and no evidence of infection  Outcome: Progressing     Problem: Pressure Ulcer:  Goal: Signs of wound healing will improve  Description: Signs of wound healing will improve  Outcome: Progressing  Goal: Absence of new pressure ulcer  Description: Absence of new pressure ulcer  Outcome: Progressing  Goal: Will show no infection signs and symptoms  Description: Will show no infection signs and symptoms  Outcome: Progressing     Problem: Falls - Risk of:  Goal: Will remain free from falls  Description: Will remain free from falls  Outcome: Progressing     Problem: Blood Glucose:  Goal: Ability to maintain appropriate glucose levels will improve  Description: Ability to maintain appropriate glucose levels will improve  Outcome: Progressing

## 2024-06-03 NOTE — PROGRESS NOTES
Subjective:  Kedar Medellin is a 68 y.o. male who presents to the office today for chronic ulcer left foot.  Patient had x-rays taken and nursing results.  Patient has offloaded as advised.  Dressings changed as advised.  Drainage similar overall.  No signs of infection seen at home.  Currently denies F/C/N/V.     Allergies   Allergen Reactions    Bee Venom Anaphylaxis    Naproxen      Other reaction(s): Unknown Reaction    Semaglutide      Other reaction(s): Constipation    Empagliflozin Rash       Past Medical History:   Diagnosis Date    Allergic rhinitis     Colon polyps     history of    Elevated WBC count     history of    Hyperlipidemia     Hypertension     Obesity     Pain, joint, hand, left 7/28/2021    Partial small bowel obstruction (HCC) 3/5/2023    SBO (small bowel obstruction) (HCC) 3/12/2023    Sepsis with acute renal failure (HCC) 3/5/2023    Type II or unspecified type diabetes mellitus without mention of complication, not stated as uncontrolled     Unspecified sleep apnea     Weakness of left hand 7/28/2021       Prior to Admission medications    Medication Sig Start Date End Date Taking? Authorizing Provider   silver sulfADIAZINE (SILVADENE) 1 % cream Apply topically daily. 3/6/24   Emory Reyna DPFREDA   Omega-3 Fatty Acids (FISH OIL) 1000 MG capsule Take by mouth daily    Soni Johnson MD   tamsulosin (FLOMAX) 0.4 MG capsule Take 1 capsule by mouth daily 8/18/23   Barron Langley MD   Insulin Glargine-yfgn 100 UNIT/ML SOPN INJECT 40 UNITS UNDER SKIN AT BEDTIME FOR TYPE 2 DIABETES MELLITUS 6/14/23   Soni Johnson MD   atorvastatin (LIPITOR) 80 MG tablet 0.5 tablets 5/18/22   Soni Johnson MD   vitamin D (CHOLECALCIFEROL) 25 MCG (1000 UT) TABS tablet TAKE TWO TABLETS BY MOUTH EVERY DAY 5/29/14   Soni Johnson MD   sildenafil (VIAGRA) 50 MG tablet 0.5 tablets 5/18/22   Soni Johnson MD   gabapentin (NEURONTIN) 300 MG capsule Take 1 capsule by mouth 2

## 2024-06-03 NOTE — PATIENT INSTRUCTIONS
Apply piece of silver alginate inside wound daily and cover.  SIGNS OF INFECTION  - Redness, swelling, skin hot  - Wound bed turns black or stringy yellow  - Foul odor  - Increased drainage or pus  - Increased pain  - Fever greater than 100F    CALL YOUR DOCTOR OR SEEK MEDICAL ATTENTION IF SIGNS OF INFECTION.  DO NOT WAIT UNTIL YOUR NEXT APPOINTMENT    Call the Wound Care Nurse with any other questions or concerns- [phone]

## 2024-06-10 ENCOUNTER — HOSPITAL ENCOUNTER (OUTPATIENT)
Dept: WOUND CARE | Age: 69
Discharge: HOME | End: 2024-06-11
Attending: PODIATRIST
Payer: MEDICARE

## 2024-06-10 VITALS
WEIGHT: 294 LBS | HEIGHT: 70 IN | DIASTOLIC BLOOD PRESSURE: 74 MMHG | RESPIRATION RATE: 16 BRPM | BODY MASS INDEX: 42.09 KG/M2 | HEART RATE: 80 BPM | SYSTOLIC BLOOD PRESSURE: 128 MMHG | TEMPERATURE: 98.1 F

## 2024-06-10 DIAGNOSIS — L97.522 ULCER OF LEFT FOOT WITH FAT LAYER EXPOSED (HCC): Primary | ICD-10-CM

## 2024-06-10 PROCEDURE — 97597 DBRDMT OPN WND 1ST 20 CM/<: CPT

## 2024-06-10 PROCEDURE — 97597 DBRDMT OPN WND 1ST 20 CM/<: CPT | Performed by: PODIATRIST

## 2024-06-10 RX ORDER — LIDOCAINE 40 MG/G
CREAM TOPICAL ONCE
OUTPATIENT
Start: 2024-06-10 | End: 2024-06-10

## 2024-06-10 RX ORDER — BACITRACIN ZINC AND POLYMYXIN B SULFATE 500; 1000 [USP'U]/G; [USP'U]/G
OINTMENT TOPICAL ONCE
OUTPATIENT
Start: 2024-06-10 | End: 2024-06-10

## 2024-06-10 RX ORDER — GINSENG 100 MG
CAPSULE ORAL ONCE
OUTPATIENT
Start: 2024-06-10 | End: 2024-06-10

## 2024-06-10 RX ORDER — CLOBETASOL PROPIONATE 0.5 MG/G
OINTMENT TOPICAL ONCE
OUTPATIENT
Start: 2024-06-10 | End: 2024-06-10

## 2024-06-10 RX ORDER — LIDOCAINE HYDROCHLORIDE 40 MG/ML
SOLUTION TOPICAL ONCE
OUTPATIENT
Start: 2024-06-10 | End: 2024-06-10

## 2024-06-10 RX ORDER — LIDOCAINE 50 MG/G
OINTMENT TOPICAL ONCE
OUTPATIENT
Start: 2024-06-10 | End: 2024-06-10

## 2024-06-10 RX ORDER — BETAMETHASONE DIPROPIONATE 0.5 MG/G
CREAM TOPICAL ONCE
OUTPATIENT
Start: 2024-06-10 | End: 2024-06-10

## 2024-06-10 RX ORDER — TRIAMCINOLONE ACETONIDE 1 MG/G
OINTMENT TOPICAL ONCE
OUTPATIENT
Start: 2024-06-10 | End: 2024-06-10

## 2024-06-10 RX ORDER — LIDOCAINE HYDROCHLORIDE 20 MG/ML
JELLY TOPICAL ONCE
OUTPATIENT
Start: 2024-06-10 | End: 2024-06-10

## 2024-06-10 RX ORDER — GENTAMICIN SULFATE 1 MG/G
OINTMENT TOPICAL ONCE
OUTPATIENT
Start: 2024-06-10 | End: 2024-06-10

## 2024-06-10 RX ORDER — IBUPROFEN 200 MG
TABLET ORAL ONCE
OUTPATIENT
Start: 2024-06-10 | End: 2024-06-10

## 2024-06-10 RX ORDER — SODIUM CHLOR/HYPOCHLOROUS ACID 0.033 %
SOLUTION, IRRIGATION IRRIGATION ONCE
OUTPATIENT
Start: 2024-06-10 | End: 2024-06-10

## 2024-06-10 NOTE — PLAN OF CARE
Problem: Chronic Conditions and Co-morbidities  Goal: Patient's chronic conditions and co-morbidity symptoms are monitored and maintained or improved  Outcome: Progressing     Problem: Cognitive:  Goal: Knowledge of wound care  Description: Knowledge of wound care  Outcome: Progressing  Goal: Understands risk factors for wounds  Description: Understands risk factors for wounds  Outcome: Progressing     Problem: Wound:  Goal: Will show signs of wound healing; wound closure and no evidence of infection  Description: Will show signs of wound healing; wound closure and no evidence of infection  Outcome: Progressing     Problem: Pressure Ulcer:  Goal: Signs of wound healing will improve  Description: Signs of wound healing will improve  Outcome: Progressing  Goal: Absence of new pressure ulcer  Description: Absence of new pressure ulcer  Outcome: Progressing  Goal: Will show no infection signs and symptoms  Description: Will show no infection signs and symptoms  Outcome: Progressing     Problem: Weight control:  Goal: Ability to maintain an optimal weight for height and age will be supported  Description: Ability to maintain an optimal weight for height and age will be supported  Outcome: Progressing     Problem: Falls - Risk of:  Goal: Will remain free from falls  Description: Will remain free from falls  Outcome: Progressing     Problem: Blood Glucose:  Goal: Ability to maintain appropriate glucose levels will improve  Description: Ability to maintain appropriate glucose levels will improve  Outcome: Progressing

## 2024-06-10 NOTE — PROGRESS NOTES
1304   Wound Healing % -56 06/10/24 1304   Post-Procedure Length (cm) 0.5 cm 06/03/24 1336   Post-Procedure Width (cm) 0.7 cm 06/03/24 1336   Post-Procedure Depth (cm) 0.3 cm 06/03/24 1336   Post-Procedure Surface Area (cm^2) 0.35 cm^2 06/03/24 1336   Post-Procedure Volume (cm^3) 0.105 cm^3 06/03/24 1336   Wound Assessment Pink/red 06/10/24 1304   Drainage Amount Small (< 25%) 06/10/24 1304   Drainage Description Serosanguinous 06/10/24 1304   Odor None 06/10/24 1304   Valery-wound Assessment Intact 06/10/24 1304   Margins Undefined edges 06/10/24 1304   Wound Thickness Description not for Pressure Injury Full thickness 06/10/24 1304   Number of days: 82           Asessment: Patient is a 68 y.o. male with:   Hospital Problems             Last Modified POA    Type 2 diabetes mellitus with diabetic polyneuropathy, with long-term current use of insulin ([facility]) 6/10/2024 Yes    Partial nontraumatic amputation of foot, left ([facility]) 6/10/2024 Yes    Ulcer of left foot, limited to breakdown of skin ([facility]) 6/10/2024 Yes       Plan: Patient examined and evaluated.  Current condition and treatment options discussed in detail.    DM foot ed and exam  Dressing: silver algiante qd  Active wound management took place 100% non excisional with the use of  tissue nippers.  All non viable tissue (including epidermis and/or dermis) and bio burden was removed to promote healing.  Bleeding was present.  Please see chart for exact measurements, if not documented then size was less than 20 sq cm.   Continue offloading boot at all times weightbearing.  Patient will follow up in 1 weeks for ulcer

## 2024-06-17 ENCOUNTER — HOSPITAL ENCOUNTER (OUTPATIENT)
Dept: WOUND CARE | Age: 69
Discharge: HOME | End: 2024-06-18
Attending: PODIATRIST
Payer: MEDICARE

## 2024-06-17 VITALS
HEART RATE: 72 BPM | SYSTOLIC BLOOD PRESSURE: 136 MMHG | RESPIRATION RATE: 24 BRPM | BODY MASS INDEX: 41.26 KG/M2 | WEIGHT: 288.2 LBS | TEMPERATURE: 99 F | HEIGHT: 70 IN | DIASTOLIC BLOOD PRESSURE: 72 MMHG

## 2024-06-17 DIAGNOSIS — L97.522 ULCER OF LEFT FOOT WITH FAT LAYER EXPOSED (HCC): Primary | ICD-10-CM

## 2024-06-17 PROCEDURE — 97597 DBRDMT OPN WND 1ST 20 CM/<: CPT | Performed by: PODIATRIST

## 2024-06-17 PROCEDURE — 97597 DBRDMT OPN WND 1ST 20 CM/<: CPT

## 2024-06-17 RX ORDER — BETAMETHASONE DIPROPIONATE 0.5 MG/G
CREAM TOPICAL ONCE
OUTPATIENT
Start: 2024-06-17 | End: 2024-06-17

## 2024-06-17 RX ORDER — TRIAMCINOLONE ACETONIDE 1 MG/G
OINTMENT TOPICAL ONCE
OUTPATIENT
Start: 2024-06-17 | End: 2024-06-17

## 2024-06-17 RX ORDER — GINSENG 100 MG
CAPSULE ORAL ONCE
OUTPATIENT
Start: 2024-06-17 | End: 2024-06-17

## 2024-06-17 RX ORDER — IBUPROFEN 200 MG
TABLET ORAL ONCE
OUTPATIENT
Start: 2024-06-17 | End: 2024-06-17

## 2024-06-17 RX ORDER — CLOBETASOL PROPIONATE 0.5 MG/G
OINTMENT TOPICAL ONCE
OUTPATIENT
Start: 2024-06-17 | End: 2024-06-17

## 2024-06-17 RX ORDER — LIDOCAINE 40 MG/G
CREAM TOPICAL ONCE
OUTPATIENT
Start: 2024-06-17 | End: 2024-06-17

## 2024-06-17 RX ORDER — GENTAMICIN SULFATE 1 MG/G
OINTMENT TOPICAL ONCE
OUTPATIENT
Start: 2024-06-17 | End: 2024-06-17

## 2024-06-17 RX ORDER — LIDOCAINE 50 MG/G
OINTMENT TOPICAL ONCE
OUTPATIENT
Start: 2024-06-17 | End: 2024-06-17

## 2024-06-17 RX ORDER — LIDOCAINE HYDROCHLORIDE 40 MG/ML
SOLUTION TOPICAL ONCE
OUTPATIENT
Start: 2024-06-17 | End: 2024-06-17

## 2024-06-17 RX ORDER — LIDOCAINE HYDROCHLORIDE 20 MG/ML
JELLY TOPICAL ONCE
OUTPATIENT
Start: 2024-06-17 | End: 2024-06-17

## 2024-06-17 RX ORDER — SODIUM CHLOR/HYPOCHLOROUS ACID 0.033 %
SOLUTION, IRRIGATION IRRIGATION ONCE
OUTPATIENT
Start: 2024-06-17 | End: 2024-06-17

## 2024-06-17 RX ORDER — BACITRACIN ZINC AND POLYMYXIN B SULFATE 500; 1000 [USP'U]/G; [USP'U]/G
OINTMENT TOPICAL ONCE
OUTPATIENT
Start: 2024-06-17 | End: 2024-06-17

## 2024-06-17 NOTE — PLAN OF CARE
Problem: Chronic Conditions and Co-morbidities  Goal: Patient's chronic conditions and co-morbidity symptoms are monitored and maintained or improved  Outcome: Progressing     Problem: Pain  Goal: Verbalizes/displays adequate comfort level or baseline comfort level  Outcome: Progressing  Flowsheets (Taken 6/17/2024 1322)  Verbalizes/displays adequate comfort level or baseline comfort level: Assess pain using appropriate pain scale     Problem: Cognitive:  Goal: Knowledge of wound care  Description: Knowledge of wound care  Outcome: Progressing  Goal: Understands risk factors for wounds  Description: Understands risk factors for wounds  Outcome: Progressing     Problem: Wound:  Goal: Will show signs of wound healing; wound closure and no evidence of infection  Description: Will show signs of wound healing; wound closure and no evidence of infection  Outcome: Progressing     Problem: Compression therapy:  Goal: Will be free from complications associated with compression therapy  Description: Will be free from complications associated with compression therapy  Outcome: Progressing     Problem: Weight control:  Goal: Ability to maintain an optimal weight for height and age will be supported  Description: Ability to maintain an optimal weight for height and age will be supported  Outcome: Progressing     Problem: Falls - Risk of:  Goal: Will remain free from falls  Description: Will remain free from falls  Outcome: Progressing     Problem: Blood Glucose:  Goal: Ability to maintain appropriate glucose levels will improve  Description: Ability to maintain appropriate glucose levels will improve  Outcome: Progressing

## 2024-06-17 NOTE — PROGRESS NOTES
(PRINZIDE;ZESTORETIC) 20-12.5 MG per tablet Take 1 tablet by mouth daily. 14   Chong Sevilla PA   metFORMIN (GLUCOPHAGE) 1000 MG tablet Take 0.5 tablets by mouth 2 times daily    Soni Johnson MD   loratadine (CLARITIN) 10 MG tablet Take 1 tablet by mouth daily    Soni Johnson MD   aspirin 81 MG tablet Take 1 tablet by mouth daily    Soni Johnson MD   Multiple Vitamins-Minerals (MULTIVITAMIN PO) Take  by mouth daily.    Provider, MD Soni       Past Surgical History:   Procedure Laterality Date    CARPAL TUNNEL RELEASE Right 12/3/2010    COLONOSCOPY  2009    polyps    INGUINAL HERNIA REPAIR Right 1977    LAPAROSCOPY N/A 3/5/2023    DIAGNOSTIC LAPAROSCOPY performed by Cody Zavala MD at [facility] OR    TOE AMPUTATION Left 2023    Left 1st ray amputation performed by Emory Reyna DPM at [facility] OR    TOENAIL EXCISION Left 2003    removal nail plate 1st digit       Family History   Problem Relation Age of Onset    Heart Disease Mother     High Blood Pressure Mother     Diabetes Mother     Heart Disease Father     High Blood Pressure Other        Social History     Tobacco Use    Smoking status: Former     Current packs/day: 0.00     Average packs/day: 1 pack/day for 20.0 years (20.0 ttl pk-yrs)     Types: Cigarettes     Start date:      Quit date:      Years since quittin.4    Smokeless tobacco: Never   Substance Use Topics    Alcohol use: Not Currently       ROS: All 14 ROS systems reviewed and pertinent positives noted above, all others negative.    Lower Extremity Physical Examination:     Vitals:   Vitals:    24 1322   BP: 136/72   Pulse: 72   Resp: 24   Temp: 99 °F (37.2 °C)         General: AAO x 3 in NAD.     Vascular: DP and PT pulses palpable 2/4, bilateral.  CFT <3 seconds, bilateral.  Hair growth absent to the level of the digits, bilateral.  Edema present, bilateral.  Varicosities present, bilateral. Erythema absent, bilateral.

## 2024-06-18 LAB
ESTIMATED AVERAGE GLUCOSE: ABNORMAL
HBA1C MFR BLD: 9.7 %

## 2024-06-24 ENCOUNTER — HOSPITAL ENCOUNTER (OUTPATIENT)
Dept: WOUND CARE | Age: 69
Discharge: HOME | End: 2024-06-25
Attending: PODIATRIST
Payer: MEDICARE

## 2024-06-24 VITALS
TEMPERATURE: 97.3 F | SYSTOLIC BLOOD PRESSURE: 138 MMHG | HEIGHT: 70 IN | RESPIRATION RATE: 22 BRPM | BODY MASS INDEX: 41.37 KG/M2 | DIASTOLIC BLOOD PRESSURE: 72 MMHG | HEART RATE: 84 BPM | WEIGHT: 289 LBS

## 2024-06-24 DIAGNOSIS — L97.522 ULCER OF LEFT FOOT WITH FAT LAYER EXPOSED (HCC): Primary | ICD-10-CM

## 2024-06-24 PROCEDURE — 97597 DBRDMT OPN WND 1ST 20 CM/<: CPT

## 2024-06-24 PROCEDURE — 97597 DBRDMT OPN WND 1ST 20 CM/<: CPT | Performed by: PODIATRIST

## 2024-06-24 RX ORDER — LIDOCAINE HYDROCHLORIDE 20 MG/ML
JELLY TOPICAL ONCE
OUTPATIENT
Start: 2024-06-24 | End: 2024-06-24

## 2024-06-24 RX ORDER — LIDOCAINE HYDROCHLORIDE 40 MG/ML
SOLUTION TOPICAL ONCE
OUTPATIENT
Start: 2024-06-24 | End: 2024-06-24

## 2024-06-24 RX ORDER — TRIAMCINOLONE ACETONIDE 1 MG/G
OINTMENT TOPICAL ONCE
OUTPATIENT
Start: 2024-06-24 | End: 2024-06-24

## 2024-06-24 RX ORDER — BACITRACIN ZINC AND POLYMYXIN B SULFATE 500; 1000 [USP'U]/G; [USP'U]/G
OINTMENT TOPICAL ONCE
OUTPATIENT
Start: 2024-06-24 | End: 2024-06-24

## 2024-06-24 RX ORDER — GENTAMICIN SULFATE 1 MG/G
OINTMENT TOPICAL ONCE
OUTPATIENT
Start: 2024-06-24 | End: 2024-06-24

## 2024-06-24 RX ORDER — IBUPROFEN 200 MG
TABLET ORAL ONCE
OUTPATIENT
Start: 2024-06-24 | End: 2024-06-24

## 2024-06-24 RX ORDER — LIDOCAINE 50 MG/G
OINTMENT TOPICAL ONCE
OUTPATIENT
Start: 2024-06-24 | End: 2024-06-24

## 2024-06-24 RX ORDER — SODIUM CHLOR/HYPOCHLOROUS ACID 0.033 %
SOLUTION, IRRIGATION IRRIGATION ONCE
OUTPATIENT
Start: 2024-06-24 | End: 2024-06-24

## 2024-06-24 RX ORDER — GINSENG 100 MG
CAPSULE ORAL ONCE
OUTPATIENT
Start: 2024-06-24 | End: 2024-06-24

## 2024-06-24 RX ORDER — BETAMETHASONE DIPROPIONATE 0.5 MG/G
CREAM TOPICAL ONCE
OUTPATIENT
Start: 2024-06-24 | End: 2024-06-24

## 2024-06-24 RX ORDER — LIDOCAINE 40 MG/G
CREAM TOPICAL ONCE
OUTPATIENT
Start: 2024-06-24 | End: 2024-06-24

## 2024-06-24 RX ORDER — CLOBETASOL PROPIONATE 0.5 MG/G
OINTMENT TOPICAL ONCE
OUTPATIENT
Start: 2024-06-24 | End: 2024-06-24

## 2024-06-24 ASSESSMENT — PAIN SCALES - GENERAL: PAINLEVEL_OUTOF10: 0

## 2024-06-24 NOTE — PROGRESS NOTES
Subjective:  Kedar Medellin is a 68 y.o. male who presents to the office today for the ulcer.  Patient change dressings and offload as advised.  Drainage decreased.  Currently denies F/C/N/V.     Allergies   Allergen Reactions    Bee Venom Anaphylaxis    Naproxen      Other reaction(s): Unknown Reaction    Semaglutide      Other reaction(s): Constipation    Empagliflozin Rash       Past Medical History:   Diagnosis Date    Allergic rhinitis     Colon polyps     history of    Elevated WBC count     history of    Hyperlipidemia     Hypertension     Obesity     Pain, joint, hand, left 7/28/2021    Partial small bowel obstruction (HCC) 3/5/2023    SBO (small bowel obstruction) (HCC) 3/12/2023    Sepsis with acute renal failure (HCC) 3/5/2023    Type II or unspecified type diabetes mellitus without mention of complication, not stated as uncontrolled     Unspecified sleep apnea     Weakness of left hand 7/28/2021       Prior to Admission medications    Medication Sig Start Date End Date Taking? Authorizing Provider   silver sulfADIAZINE (SILVADENE) 1 % cream Apply topically daily. 3/6/24   Emory Reyna DPM   Omega-3 Fatty Acids (FISH OIL) 1000 MG capsule Take by mouth daily    Soni Johnson MD   tamsulosin (FLOMAX) 0.4 MG capsule Take 1 capsule by mouth daily 8/18/23   Barron Langley MD   Insulin Glargine-yfgn 100 UNIT/ML SOPN INJECT 40 UNITS UNDER SKIN AT BEDTIME FOR TYPE 2 DIABETES MELLITUS 6/14/23   Soni Johnson MD   atorvastatin (LIPITOR) 80 MG tablet 0.5 tablets 5/18/22   Soni Johnson MD   vitamin D (CHOLECALCIFEROL) 25 MCG (1000 UT) TABS tablet TAKE TWO TABLETS BY MOUTH EVERY DAY 5/29/14   Soni Johnson MD   sildenafil (VIAGRA) 50 MG tablet 0.5 tablets 5/18/22   Soni Johnson MD   gabapentin (NEURONTIN) 300 MG capsule Take 1 capsule by mouth 2 times daily.    Soni Johnson MD   lisinopril-hydrochlorothiazide (PRINZIDE;ZESTORETIC) 20-12.5 MG per tablet Take 1

## 2024-06-24 NOTE — DISCHARGE INSTRUCTIONS
Discontinue previous dressing. Start applying collagen to left foot wound and cover with dry dressing . Change daily.       Follow up with Dr. Reyna in 2 weeks as scheduled.       SIGNS OF INFECTION  - Redness, swelling, skin hot  - Wound bed turns black or stringy yellow  - Foul odor  - Increased drainage or pus  - Increased pain  - Fever greater than 100F    CALL YOUR DOCTOR OR SEEK MEDICAL ATTENTION IF SIGNS OF INFECTION.  DO NOT WAIT UNTIL YOUR NEXT APPOINTMENT    Call the Wound Care Nurse with any other questions or concerns- [phone]

## 2024-07-08 ENCOUNTER — HOSPITAL ENCOUNTER (OUTPATIENT)
Dept: WOUND CARE | Age: 69
Discharge: HOME | End: 2024-07-09
Attending: PODIATRIST
Payer: OTHER GOVERNMENT

## 2024-07-08 VITALS
HEART RATE: 84 BPM | SYSTOLIC BLOOD PRESSURE: 138 MMHG | TEMPERATURE: 97.7 F | WEIGHT: 291 LBS | RESPIRATION RATE: 20 BRPM | DIASTOLIC BLOOD PRESSURE: 78 MMHG | BODY MASS INDEX: 41.66 KG/M2 | HEIGHT: 70 IN

## 2024-07-08 DIAGNOSIS — L97.522 ULCER OF LEFT FOOT WITH FAT LAYER EXPOSED (HCC): Primary | ICD-10-CM

## 2024-07-08 PROCEDURE — 97597 DBRDMT OPN WND 1ST 20 CM/<: CPT | Performed by: PODIATRIST

## 2024-07-08 PROCEDURE — 97597 DBRDMT OPN WND 1ST 20 CM/<: CPT

## 2024-07-08 RX ORDER — LIDOCAINE HYDROCHLORIDE 40 MG/ML
SOLUTION TOPICAL ONCE
OUTPATIENT
Start: 2024-07-08 | End: 2024-07-08

## 2024-07-08 RX ORDER — LIDOCAINE HYDROCHLORIDE 20 MG/ML
JELLY TOPICAL ONCE
OUTPATIENT
Start: 2024-07-08 | End: 2024-07-08

## 2024-07-08 RX ORDER — GINSENG 100 MG
CAPSULE ORAL ONCE
OUTPATIENT
Start: 2024-07-08 | End: 2024-07-08

## 2024-07-08 RX ORDER — GENTAMICIN SULFATE 1 MG/G
OINTMENT TOPICAL ONCE
OUTPATIENT
Start: 2024-07-08 | End: 2024-07-08

## 2024-07-08 RX ORDER — IBUPROFEN 200 MG
TABLET ORAL ONCE
OUTPATIENT
Start: 2024-07-08 | End: 2024-07-08

## 2024-07-08 RX ORDER — LIDOCAINE 50 MG/G
OINTMENT TOPICAL ONCE
OUTPATIENT
Start: 2024-07-08 | End: 2024-07-08

## 2024-07-08 RX ORDER — SODIUM CHLOR/HYPOCHLOROUS ACID 0.033 %
SOLUTION, IRRIGATION IRRIGATION ONCE
OUTPATIENT
Start: 2024-07-08 | End: 2024-07-08

## 2024-07-08 RX ORDER — BETAMETHASONE DIPROPIONATE 0.5 MG/G
CREAM TOPICAL ONCE
OUTPATIENT
Start: 2024-07-08 | End: 2024-07-08

## 2024-07-08 RX ORDER — TRIAMCINOLONE ACETONIDE 1 MG/G
OINTMENT TOPICAL ONCE
OUTPATIENT
Start: 2024-07-08 | End: 2024-07-08

## 2024-07-08 RX ORDER — BACITRACIN ZINC AND POLYMYXIN B SULFATE 500; 1000 [USP'U]/G; [USP'U]/G
OINTMENT TOPICAL ONCE
OUTPATIENT
Start: 2024-07-08 | End: 2024-07-08

## 2024-07-08 RX ORDER — CLOBETASOL PROPIONATE 0.5 MG/G
OINTMENT TOPICAL ONCE
OUTPATIENT
Start: 2024-07-08 | End: 2024-07-08

## 2024-07-08 RX ORDER — LIDOCAINE 40 MG/G
CREAM TOPICAL ONCE
OUTPATIENT
Start: 2024-07-08 | End: 2024-07-08

## 2024-07-08 ASSESSMENT — PAIN SCALES - GENERAL: PAINLEVEL_OUTOF10: 0

## 2024-07-08 NOTE — PROGRESS NOTES
Subjective:  Kedar Medellin is a 68 y.o. male who presents to the office today for the right foot ulcer.  Dressing changes advised.  Patient presents in the offloading device.  No signs of infection seen at home..  Currently denies F/C/N/V.     Allergies   Allergen Reactions    Bee Venom Anaphylaxis    Naproxen      Other reaction(s): Unknown Reaction    Semaglutide      Other reaction(s): Constipation    Empagliflozin Rash       Past Medical History:   Diagnosis Date    Allergic rhinitis     Colon polyps     history of    Elevated WBC count     history of    Hyperlipidemia     Hypertension     Obesity     Pain, joint, hand, left 7/28/2021    Partial small bowel obstruction (HCC) 3/5/2023    SBO (small bowel obstruction) (HCC) 3/12/2023    Sepsis with acute renal failure (HCC) 3/5/2023    Type II or unspecified type diabetes mellitus without mention of complication, not stated as uncontrolled     Unspecified sleep apnea     Weakness of left hand 7/28/2021       Prior to Admission medications    Medication Sig Start Date End Date Taking? Authorizing Provider   silver sulfADIAZINE (SILVADENE) 1 % cream Apply topically daily. 3/6/24   Emory Reyna DPM   Omega-3 Fatty Acids (FISH OIL) 1000 MG capsule Take by mouth daily    Soni Johnson MD   tamsulosin (FLOMAX) 0.4 MG capsule Take 1 capsule by mouth daily 8/18/23   Barron Langley MD   Insulin Glargine-yfgn 100 UNIT/ML SOPN INJECT 40 UNITS UNDER SKIN AT BEDTIME FOR TYPE 2 DIABETES MELLITUS 6/14/23   Soni Johnson MD   atorvastatin (LIPITOR) 80 MG tablet 0.5 tablets 5/18/22   Soni Johnson MD   vitamin D (CHOLECALCIFEROL) 25 MCG (1000 UT) TABS tablet TAKE TWO TABLETS BY MOUTH EVERY DAY 5/29/14   Soni Johnson MD   sildenafil (VIAGRA) 50 MG tablet 0.5 tablets 5/18/22   Soni Johnson MD   gabapentin (NEURONTIN) 300 MG capsule Take 1 capsule by mouth 2 times daily.    Soni Johnson MD   lisinopril-hydrochlorothiazide

## 2024-07-08 NOTE — PATIENT INSTRUCTIONS
Cut piece of Duoderm slightly bigger than wound. Apply over foot wound daily and cover with bandage.  SIGNS OF INFECTION  - Redness, swelling, skin hot  - Wound bed turns black or stringy yellow  - Foul odor  - Increased drainage or pus  - Increased pain  - Fever greater than 100F    CALL YOUR DOCTOR OR SEEK MEDICAL ATTENTION IF SIGNS OF INFECTION.  DO NOT WAIT UNTIL YOUR NEXT APPOINTMENT    Call the Wound Care Nurse with any other questions or concerns- [phone]

## 2024-07-15 ENCOUNTER — HOSPITAL ENCOUNTER (OUTPATIENT)
Dept: WOUND CARE | Age: 69
Discharge: HOME | End: 2024-07-16
Attending: PODIATRIST
Payer: OTHER GOVERNMENT

## 2024-07-15 VITALS
DIASTOLIC BLOOD PRESSURE: 70 MMHG | BODY MASS INDEX: 41.95 KG/M2 | WEIGHT: 293 LBS | SYSTOLIC BLOOD PRESSURE: 130 MMHG | HEART RATE: 88 BPM | TEMPERATURE: 97.4 F | RESPIRATION RATE: 22 BRPM | HEIGHT: 70 IN

## 2024-07-15 DIAGNOSIS — L97.522 ULCER OF LEFT FOOT WITH FAT LAYER EXPOSED (HCC): Primary | ICD-10-CM

## 2024-07-15 PROCEDURE — 97597 DBRDMT OPN WND 1ST 20 CM/<: CPT | Performed by: PODIATRIST

## 2024-07-15 PROCEDURE — 97597 DBRDMT OPN WND 1ST 20 CM/<: CPT

## 2024-07-15 RX ORDER — BETAMETHASONE DIPROPIONATE 0.5 MG/G
CREAM TOPICAL ONCE
OUTPATIENT
Start: 2024-07-15 | End: 2024-07-15

## 2024-07-15 RX ORDER — SODIUM CHLOR/HYPOCHLOROUS ACID 0.033 %
SOLUTION, IRRIGATION IRRIGATION ONCE
OUTPATIENT
Start: 2024-07-15 | End: 2024-07-15

## 2024-07-15 RX ORDER — GINSENG 100 MG
CAPSULE ORAL ONCE
OUTPATIENT
Start: 2024-07-15 | End: 2024-07-15

## 2024-07-15 RX ORDER — LIDOCAINE 40 MG/G
CREAM TOPICAL ONCE
OUTPATIENT
Start: 2024-07-15 | End: 2024-07-15

## 2024-07-15 RX ORDER — LIDOCAINE HYDROCHLORIDE 40 MG/ML
SOLUTION TOPICAL ONCE
OUTPATIENT
Start: 2024-07-15 | End: 2024-07-15

## 2024-07-15 RX ORDER — LIDOCAINE HYDROCHLORIDE 20 MG/ML
JELLY TOPICAL ONCE
OUTPATIENT
Start: 2024-07-15 | End: 2024-07-15

## 2024-07-15 RX ORDER — IBUPROFEN 200 MG
TABLET ORAL ONCE
OUTPATIENT
Start: 2024-07-15 | End: 2024-07-15

## 2024-07-15 RX ORDER — GENTAMICIN SULFATE 1 MG/G
OINTMENT TOPICAL ONCE
OUTPATIENT
Start: 2024-07-15 | End: 2024-07-15

## 2024-07-15 RX ORDER — CLOBETASOL PROPIONATE 0.5 MG/G
OINTMENT TOPICAL ONCE
OUTPATIENT
Start: 2024-07-15 | End: 2024-07-15

## 2024-07-15 RX ORDER — LIDOCAINE 50 MG/G
OINTMENT TOPICAL ONCE
OUTPATIENT
Start: 2024-07-15 | End: 2024-07-15

## 2024-07-15 RX ORDER — TRIAMCINOLONE ACETONIDE 1 MG/G
OINTMENT TOPICAL ONCE
OUTPATIENT
Start: 2024-07-15 | End: 2024-07-15

## 2024-07-15 RX ORDER — BACITRACIN ZINC AND POLYMYXIN B SULFATE 500; 1000 [USP'U]/G; [USP'U]/G
OINTMENT TOPICAL ONCE
OUTPATIENT
Start: 2024-07-15 | End: 2024-07-15

## 2024-07-15 NOTE — PROGRESS NOTES
Subjective:  Kedar Medellin is a 68 y.o. male who presents to the office today for the L foot ulcer.  Dressing changes advised.  Patient presents in the offloading device.  No signs of infection seen at home..  Currently denies F/C/N/V.     Allergies   Allergen Reactions    Bee Venom Anaphylaxis    Naproxen      Other reaction(s): Unknown Reaction    Semaglutide      Other reaction(s): Constipation    Empagliflozin Rash       Past Medical History:   Diagnosis Date    Allergic rhinitis     Colon polyps     history of    Elevated WBC count     history of    Hyperlipidemia     Hypertension     Obesity     Pain, joint, hand, left 7/28/2021    Partial small bowel obstruction (HCC) 3/5/2023    SBO (small bowel obstruction) (HCC) 3/12/2023    Sepsis with acute renal failure (HCC) 3/5/2023    Type II or unspecified type diabetes mellitus without mention of complication, not stated as uncontrolled     Unspecified sleep apnea     Weakness of left hand 7/28/2021       Prior to Admission medications    Medication Sig Start Date End Date Taking? Authorizing Provider   silver sulfADIAZINE (SILVADENE) 1 % cream Apply topically daily. 3/6/24   Emory Reyna DPM   Omega-3 Fatty Acids (FISH OIL) 1000 MG capsule Take by mouth daily    Soni Johnson MD   tamsulosin (FLOMAX) 0.4 MG capsule Take 1 capsule by mouth daily 8/18/23   Barron Langley MD   Insulin Glargine-yfgn 100 UNIT/ML SOPN INJECT 40 UNITS UNDER SKIN AT BEDTIME FOR TYPE 2 DIABETES MELLITUS 6/14/23   Soni Johnson MD   atorvastatin (LIPITOR) 80 MG tablet 0.5 tablets 5/18/22   Soni Johnson MD   vitamin D (CHOLECALCIFEROL) 25 MCG (1000 UT) TABS tablet TAKE TWO TABLETS BY MOUTH EVERY DAY 5/29/14   Soni Johnson MD   sildenafil (VIAGRA) 50 MG tablet 0.5 tablets 5/18/22   Soni Johnson MD   gabapentin (NEURONTIN) 300 MG capsule Take 1 capsule by mouth 2 times daily.    Soni Johnson MD   lisinopril-hydrochlorothiazide

## 2024-07-29 ENCOUNTER — HOSPITAL ENCOUNTER (OUTPATIENT)
Dept: WOUND CARE | Age: 69
Discharge: HOME | End: 2024-07-30
Attending: PODIATRIST
Payer: OTHER GOVERNMENT

## 2024-07-29 VITALS
WEIGHT: 294 LBS | RESPIRATION RATE: 16 BRPM | TEMPERATURE: 98.4 F | SYSTOLIC BLOOD PRESSURE: 136 MMHG | HEIGHT: 70 IN | DIASTOLIC BLOOD PRESSURE: 84 MMHG | HEART RATE: 78 BPM | BODY MASS INDEX: 42.09 KG/M2

## 2024-07-29 DIAGNOSIS — L97.522 ULCER OF LEFT FOOT WITH FAT LAYER EXPOSED (HCC): Primary | ICD-10-CM

## 2024-07-29 PROCEDURE — 97597 DBRDMT OPN WND 1ST 20 CM/<: CPT

## 2024-07-29 PROCEDURE — 97597 DBRDMT OPN WND 1ST 20 CM/<: CPT | Performed by: PODIATRIST

## 2024-07-29 RX ORDER — GINSENG 100 MG
CAPSULE ORAL ONCE
OUTPATIENT
Start: 2024-07-29 | End: 2024-07-29

## 2024-07-29 RX ORDER — LIDOCAINE HYDROCHLORIDE 20 MG/ML
JELLY TOPICAL ONCE
OUTPATIENT
Start: 2024-07-29 | End: 2024-07-29

## 2024-07-29 RX ORDER — IBUPROFEN 200 MG
TABLET ORAL ONCE
OUTPATIENT
Start: 2024-07-29 | End: 2024-07-29

## 2024-07-29 RX ORDER — BACITRACIN ZINC AND POLYMYXIN B SULFATE 500; 1000 [USP'U]/G; [USP'U]/G
OINTMENT TOPICAL ONCE
OUTPATIENT
Start: 2024-07-29 | End: 2024-07-29

## 2024-07-29 RX ORDER — CLOBETASOL PROPIONATE 0.5 MG/G
OINTMENT TOPICAL ONCE
OUTPATIENT
Start: 2024-07-29 | End: 2024-07-29

## 2024-07-29 RX ORDER — LIDOCAINE 40 MG/G
CREAM TOPICAL ONCE
OUTPATIENT
Start: 2024-07-29 | End: 2024-07-29

## 2024-07-29 RX ORDER — TRIAMCINOLONE ACETONIDE 1 MG/G
OINTMENT TOPICAL ONCE
OUTPATIENT
Start: 2024-07-29 | End: 2024-07-29

## 2024-07-29 RX ORDER — LIDOCAINE HYDROCHLORIDE 40 MG/ML
SOLUTION TOPICAL ONCE
OUTPATIENT
Start: 2024-07-29 | End: 2024-07-29

## 2024-07-29 RX ORDER — LIDOCAINE 50 MG/G
OINTMENT TOPICAL ONCE
OUTPATIENT
Start: 2024-07-29 | End: 2024-07-29

## 2024-07-29 RX ORDER — GENTAMICIN SULFATE 1 MG/G
OINTMENT TOPICAL ONCE
OUTPATIENT
Start: 2024-07-29 | End: 2024-07-29

## 2024-07-29 RX ORDER — BETAMETHASONE DIPROPIONATE 0.5 MG/G
CREAM TOPICAL ONCE
OUTPATIENT
Start: 2024-07-29 | End: 2024-07-29

## 2024-07-29 RX ORDER — SODIUM CHLOR/HYPOCHLOROUS ACID 0.033 %
SOLUTION, IRRIGATION IRRIGATION ONCE
OUTPATIENT
Start: 2024-07-29 | End: 2024-07-29

## 2024-07-29 ASSESSMENT — PAIN SCALES - GENERAL: PAINLEVEL_OUTOF10: 0

## 2024-07-29 NOTE — PLAN OF CARE
Problem: Chronic Conditions and Co-morbidities  Goal: Patient's chronic conditions and co-morbidity symptoms are monitored and maintained or improved  Outcome: Progressing     Problem: Cognitive:  Goal: Knowledge of wound care  Description: Knowledge of wound care  Outcome: Progressing  Goal: Understands risk factors for wounds  Description: Understands risk factors for wounds  Outcome: Progressing     Problem: Wound:  Goal: Will show signs of wound healing; wound closure and no evidence of infection  Description: Will show signs of wound healing; wound closure and no evidence of infection  Outcome: Progressing     Problem: Weight control:  Goal: Ability to maintain an optimal weight for height and age will be supported  Description: Ability to maintain an optimal weight for height and age will be supported  Outcome: Progressing     Problem: Falls - Risk of:  Goal: Will remain free from falls  Description: Will remain free from falls  Outcome: Progressing     Problem: Blood Glucose:  Goal: Ability to maintain appropriate glucose levels will improve  Description: Ability to maintain appropriate glucose levels will improve  Outcome: Progressing

## 2024-07-29 NOTE — PROGRESS NOTES
will be detrimental to the healing of the condition/ulceration.  Patient needs the device to help support the foot and reduce pain.  This device is medically necessary due to above listed diagnosis.   Patient will follow up in 2 weeks for ulcer

## 2024-08-05 ENCOUNTER — HOSPITAL ENCOUNTER (OUTPATIENT)
Dept: WOUND CARE | Age: 69
Discharge: HOME | End: 2024-08-06
Attending: PODIATRIST
Payer: OTHER GOVERNMENT

## 2024-08-05 VITALS
WEIGHT: 295 LBS | SYSTOLIC BLOOD PRESSURE: 122 MMHG | BODY MASS INDEX: 42.23 KG/M2 | HEART RATE: 72 BPM | DIASTOLIC BLOOD PRESSURE: 70 MMHG | TEMPERATURE: 97.2 F | RESPIRATION RATE: 16 BRPM | HEIGHT: 70 IN

## 2024-08-05 DIAGNOSIS — L97.522 ULCER OF LEFT FOOT WITH FAT LAYER EXPOSED (HCC): Primary | ICD-10-CM

## 2024-08-05 PROCEDURE — 97597 DBRDMT OPN WND 1ST 20 CM/<: CPT

## 2024-08-05 PROCEDURE — 97597 DBRDMT OPN WND 1ST 20 CM/<: CPT | Performed by: PODIATRIST

## 2024-08-05 RX ORDER — CLOBETASOL PROPIONATE 0.5 MG/G
OINTMENT TOPICAL ONCE
OUTPATIENT
Start: 2024-08-05 | End: 2024-08-05

## 2024-08-05 RX ORDER — LIDOCAINE HYDROCHLORIDE 20 MG/ML
JELLY TOPICAL ONCE
OUTPATIENT
Start: 2024-08-05 | End: 2024-08-05

## 2024-08-05 RX ORDER — TRIAMCINOLONE ACETONIDE 1 MG/G
OINTMENT TOPICAL ONCE
OUTPATIENT
Start: 2024-08-05 | End: 2024-08-05

## 2024-08-05 RX ORDER — GINSENG 100 MG
CAPSULE ORAL ONCE
OUTPATIENT
Start: 2024-08-05 | End: 2024-08-05

## 2024-08-05 RX ORDER — LIDOCAINE HYDROCHLORIDE 40 MG/ML
SOLUTION TOPICAL ONCE
OUTPATIENT
Start: 2024-08-05 | End: 2024-08-05

## 2024-08-05 RX ORDER — BACITRACIN ZINC AND POLYMYXIN B SULFATE 500; 1000 [USP'U]/G; [USP'U]/G
OINTMENT TOPICAL ONCE
OUTPATIENT
Start: 2024-08-05 | End: 2024-08-05

## 2024-08-05 RX ORDER — BETAMETHASONE DIPROPIONATE 0.5 MG/G
CREAM TOPICAL ONCE
OUTPATIENT
Start: 2024-08-05 | End: 2024-08-05

## 2024-08-05 RX ORDER — SODIUM CHLOR/HYPOCHLOROUS ACID 0.033 %
SOLUTION, IRRIGATION IRRIGATION ONCE
OUTPATIENT
Start: 2024-08-05 | End: 2024-08-05

## 2024-08-05 RX ORDER — LIDOCAINE 50 MG/G
OINTMENT TOPICAL ONCE
OUTPATIENT
Start: 2024-08-05 | End: 2024-08-05

## 2024-08-05 RX ORDER — LIDOCAINE 40 MG/G
CREAM TOPICAL ONCE
OUTPATIENT
Start: 2024-08-05 | End: 2024-08-05

## 2024-08-05 RX ORDER — IBUPROFEN 200 MG
TABLET ORAL ONCE
OUTPATIENT
Start: 2024-08-05 | End: 2024-08-05

## 2024-08-05 RX ORDER — GENTAMICIN SULFATE 1 MG/G
OINTMENT TOPICAL ONCE
OUTPATIENT
Start: 2024-08-05 | End: 2024-08-05

## 2024-08-05 ASSESSMENT — PAIN SCALES - GENERAL: PAINLEVEL_OUTOF10: 0

## 2024-08-05 NOTE — PROGRESS NOTES
Change in Wound Size % (l*w) -55.56 08/05/24 1306   Wound Volume (cm^3) 0.126 cm^3 08/05/24 1306   Wound Healing % -367 08/05/24 1306   Post-Procedure Length (cm) 0.5 cm 07/08/24 1145   Post-Procedure Width (cm) 0.9 cm 07/08/24 1145   Post-Procedure Depth (cm) 0.3 cm 07/08/24 1145   Post-Procedure Surface Area (cm^2) 0.45 cm^2 07/08/24 1145   Post-Procedure Volume (cm^3) 0.135 cm^3 07/08/24 1145   Wound Assessment Pink/red 08/05/24 1306   Drainage Amount Small (< 25%) 08/05/24 1306   Drainage Description Serosanguinous 08/05/24 1306   Odor None 08/05/24 1306   Valery-wound Assessment Maceration;Edematous 08/05/24 1306   Margins Undefined edges 08/05/24 1306   Wound Thickness Description not for Pressure Injury Full thickness 08/05/24 1306   Number of days: 138         Asessment: Patient is a 68 y.o. male with:   Hospital Problems             Last Modified POA    Type 2 diabetes mellitus with diabetic polyneuropathy, with long-term current use of insulin ([facility]) 8/5/2024 Yes    Ulcer of left foot, limited to breakdown of skin ([facility]) 8/5/2024 Yes       Plan: Patient examined and evaluated.  Current condition and treatment options discussed in detail.    DM foot ed and exam  Dressing: duoderm qd  Active wound management took place 100% non excisional with the use of  tissue nippers.  All non viable tissue (including epidermis and/or dermis) and bio burden was removed to promote healing.  Bleeding was present.  Please see chart for exact measurements, if not documented then size was less than 20 sq cm.   Continue offloading boot at all times weightbearing.  Continue offloading boot  Patient will follow up in 1 weeks for ulcer

## 2024-08-07 ENCOUNTER — COMMUNITY OUTREACH (OUTPATIENT)
Dept: FAMILY MEDICINE CLINIC | Age: 69
End: 2024-08-07

## 2024-08-07 ENCOUNTER — OFFICE VISIT (OUTPATIENT)
Dept: PODIATRY | Age: 69
End: 2024-08-07
Payer: OTHER GOVERNMENT

## 2024-08-07 VITALS
HEART RATE: 100 BPM | SYSTOLIC BLOOD PRESSURE: 134 MMHG | WEIGHT: 295 LBS | DIASTOLIC BLOOD PRESSURE: 62 MMHG | RESPIRATION RATE: 20 BRPM | BODY MASS INDEX: 42.33 KG/M2

## 2024-08-07 DIAGNOSIS — E11.42 DM TYPE 2 WITH DIABETIC PERIPHERAL NEUROPATHY (HCC): ICD-10-CM

## 2024-08-07 DIAGNOSIS — S98.132A AMPUTATED TOE OF LEFT FOOT (HCC): ICD-10-CM

## 2024-08-07 DIAGNOSIS — I87.2 CHRONIC VENOUS INSUFFICIENCY: ICD-10-CM

## 2024-08-07 DIAGNOSIS — L97.911 ULCER OF RIGHT LEG, LIMITED TO BREAKDOWN OF SKIN (HCC): Primary | ICD-10-CM

## 2024-08-07 DIAGNOSIS — B35.1 DERMATOPHYTOSIS OF NAIL: ICD-10-CM

## 2024-08-07 PROCEDURE — 3075F SYST BP GE 130 - 139MM HG: CPT | Performed by: PODIATRIST

## 2024-08-07 PROCEDURE — 1123F ACP DISCUSS/DSCN MKR DOCD: CPT | Performed by: PODIATRIST

## 2024-08-07 PROCEDURE — 99213 OFFICE O/P EST LOW 20 MIN: CPT | Performed by: PODIATRIST

## 2024-08-07 PROCEDURE — 99214 OFFICE O/P EST MOD 30 MIN: CPT | Performed by: PODIATRIST

## 2024-08-07 PROCEDURE — 29580 STRAPPING UNNA BOOT: CPT | Performed by: PODIATRIST

## 2024-08-07 PROCEDURE — 3078F DIAST BP <80 MM HG: CPT | Performed by: PODIATRIST

## 2024-08-07 PROCEDURE — 11720 DEBRIDE NAIL 1-5: CPT | Performed by: PODIATRIST

## 2024-08-07 NOTE — PATIENT INSTRUCTIONS
Today a compression dressing has been applied to your lower leg. Its purpose is to reduce swelling and help treat your wound. It will stay on until your next appointment.  1. It must stay dry. You can shower with a bag covering it or purchase a shower boot at  a medical supply store.  2. If the dressing falls more than 2 inches or gets wet, call us ([phone]) to come in  to have it changed.  3. If the top or bottom rolls, you can snip through it to relieve the constriction.  4. To help reduce swelling, when sitting elevate your leg, preferably to heart level.   Avoid standing in one place for long periods of time.  5.  A rare complication is a decrease of arterial blood flow to your toes. If your   leg becomes more painful with numbness or tingling or a purple color to your toes,  carefully remove the dressing by cutting it off or unwrapping it. If normal sensation does not return to the limb, seek medical help.

## 2024-08-07 NOTE — PROGRESS NOTES
Patient's HM shows they are overdue for A1C and Colorectal Screening.   Care Everywhere and  files searched.   updated with 2024 A1c.     GI Consult says pt had [facility] done [facility]

## 2024-08-07 NOTE — PROGRESS NOTES
All 14 ROS systems reviewed and pertinent positives noted above, all others negative.    Lower Extremity Physical Examination:     Vitals:   Vitals:    08/07/24 1328   Resp: 20         General: AAO x 3 in NAD.     Vascular: DP and PT pulses palpable 2/4, bilateral.  CFT <3 seconds, bilateral.  Hair growth absent to the level of the digits, bilateral.  Edema present, bilateral.  Varicosities present, bilateral. Erythema absent, bilateral. Distal Rubor absent bilateral.  Temperature decreased bilateral. Hyperpigmentation present bilateral. Atrophic skin yes.   Neurological: Sensation Impaired to light touch to level of digits, bilateral.  Protective sensation intact  10/10 sites via 5.07/10g Mascot-Brandon Monofilament, bilateral.  negative Tinel's, bilateral.  negative Valleix sign, bilateral.  Vibratory abnormal  bilateral.  Reflexes Decreased bilateral.  Paresthesias positive.  Dysthesias negative.  Sharp/dull abnormal  bilateral.   Musculoskeletal: Muscle strength 5/5, bilateral.  Pain absent upon palpation bilateral. Normal medial longitudinal arch, bilateral.  Ankle ROM decreased,bilateral.  1st MPJ ROM within normal limits, bilateral.  Dorsally contracted digits present.  toe amp L hallux.  Integument:  Open lesion present, bilateral.  Also subleft first ray.  Ulcer new anterior medial right leg distal aspect healthy red granular base, no probing no undermining no malodor.  No surrounding signs of infx. .   Interdigital maceration absent to web spaces bilateral.   Nails left 1, 5 and right 5 thickened, dystrophic and crumbly, discolored with subungual debris.   Fissures absent, bilateral. Hyperkeratotic tissue is absent.      Asessment: Patient is a 68 y.o. male with:    Diagnosis Orders   1. Ulcer of right leg, limited to breakdown of skin (HCC)        2. DM type 2 with diabetic peripheral neuropathy (HCC)        3. Dermatophytosis of nail        4. Amputated toe of left foot (formerly [facility])        5. Chronic venous

## 2024-08-09 ENCOUNTER — HOSPITAL ENCOUNTER (EMERGENCY)
Age: 69
Discharge: HOME | End: 2024-08-09
Attending: EMERGENCY MEDICINE
Payer: OTHER GOVERNMENT

## 2024-08-09 ENCOUNTER — APPOINTMENT (OUTPATIENT)
Dept: GENERAL RADIOLOGY | Age: 69
End: 2024-08-09
Payer: OTHER GOVERNMENT

## 2024-08-09 VITALS
HEART RATE: 93 BPM | DIASTOLIC BLOOD PRESSURE: 50 MMHG | OXYGEN SATURATION: 95 % | RESPIRATION RATE: 18 BRPM | SYSTOLIC BLOOD PRESSURE: 123 MMHG | TEMPERATURE: 99.6 F

## 2024-08-09 DIAGNOSIS — R11.0 NAUSEA: ICD-10-CM

## 2024-08-09 DIAGNOSIS — N17.9 AKI (ACUTE KIDNEY INJURY) (HCC): ICD-10-CM

## 2024-08-09 DIAGNOSIS — U07.1 COVID-19: Primary | ICD-10-CM

## 2024-08-09 LAB
ALBUMIN SERPL-MCNC: 4 G/DL (ref 3.5–5.2)
ALBUMIN/GLOB SERPL: 1.1 {RATIO} (ref 1–2.5)
ALP SERPL-CCNC: 77 U/L (ref 40–129)
ALT SERPL-CCNC: 30 U/L (ref 5–41)
ANION GAP SERPL CALCULATED.3IONS-SCNC: 15 MMOL/L (ref 9–17)
AST SERPL-CCNC: 34 U/L
BASOPHILS # BLD: 0.05 K/UL (ref 0–0.2)
BASOPHILS NFR BLD: 1 % (ref 0–2)
BILIRUB SERPL-MCNC: 0.5 MG/DL (ref 0.3–1.2)
BNP SERPL-MCNC: <36 PG/ML
BUN SERPL-MCNC: 24 MG/DL (ref 8–23)
BUN/CREAT SERPL: 18 (ref 9–20)
CALCIUM SERPL-MCNC: 8.9 MG/DL (ref 8.6–10.4)
CHLORIDE SERPL-SCNC: 94 MMOL/L (ref 98–107)
CO2 SERPL-SCNC: 24 MMOL/L (ref 20–31)
CREAT SERPL-MCNC: 1.3 MG/DL (ref 0.7–1.2)
EOSINOPHIL # BLD: 0.16 K/UL (ref 0–0.44)
EOSINOPHILS RELATIVE PERCENT: 2 % (ref 1–4)
ERYTHROCYTE [DISTWIDTH] IN BLOOD BY AUTOMATED COUNT: 13.7 % (ref 11.8–14.4)
FLUAV AG SPEC QL: NEGATIVE
FLUBV AG SPEC QL: NEGATIVE
GFR, ESTIMATED: 59 ML/MIN/1.73M2
GLUCOSE SERPL-MCNC: 135 MG/DL (ref 70–99)
HCT VFR BLD AUTO: 34.5 % (ref 40.7–50.3)
HGB BLD-MCNC: 11.8 G/DL (ref 13–17)
IMM GRANULOCYTES # BLD AUTO: 0.04 K/UL (ref 0–0.3)
IMM GRANULOCYTES NFR BLD: 1 %
LACTATE BLDV-SCNC: 2.1 MMOL/L (ref 0.5–2.2)
LIPASE SERPL-CCNC: 32 U/L (ref 13–60)
LYMPHOCYTES NFR BLD: 0.86 K/UL (ref 1.1–3.7)
LYMPHOCYTES RELATIVE PERCENT: 13 % (ref 24–43)
MCH RBC QN AUTO: 28.9 PG (ref 25.2–33.5)
MCHC RBC AUTO-ENTMCNC: 34.2 G/DL (ref 25.2–33.5)
MCV RBC AUTO: 84.6 FL (ref 82.6–102.9)
MONOCYTES NFR BLD: 0.66 K/UL (ref 0.1–1.2)
MONOCYTES NFR BLD: 10 % (ref 3–12)
NEUTROPHILS NFR BLD: 73 % (ref 36–65)
NEUTS SEG NFR BLD: 5.04 K/UL (ref 1.5–8.1)
NRBC BLD-RTO: 0 PER 100 WBC
PLATELET # BLD AUTO: 282 K/UL (ref 138–453)
PMV BLD AUTO: 8.9 FL (ref 8.1–13.5)
POTASSIUM SERPL-SCNC: 3.9 MMOL/L (ref 3.7–5.3)
PROT SERPL-MCNC: 7.6 G/DL (ref 6.4–8.3)
RBC # BLD AUTO: 4.08 M/UL (ref 4.21–5.77)
RSV BY PCR: NEGATIVE
SARS-COV-2 RDRP RESP QL NAA+PROBE: DETECTED
SODIUM SERPL-SCNC: 133 MMOL/L (ref 135–144)
SPECIMEN DESCRIPTION: ABNORMAL
SPECIMEN SOURCE: NORMAL
TROPONIN I SERPL HS-MCNC: 50 NG/L (ref 0–22)
TROPONIN I SERPL HS-MCNC: 51 NG/L (ref 0–22)
WBC OTHER # BLD: 6.8 K/UL (ref 3.5–11.3)

## 2024-08-09 PROCEDURE — 99285 EMERGENCY DEPT VISIT HI MDM: CPT

## 2024-08-09 PROCEDURE — 83605 ASSAY OF LACTIC ACID: CPT

## 2024-08-09 PROCEDURE — 80053 COMPREHEN METABOLIC PANEL: CPT

## 2024-08-09 PROCEDURE — 2580000003 HC RX 258: Performed by: EMERGENCY MEDICINE

## 2024-08-09 PROCEDURE — 96374 THER/PROPH/DIAG INJ IV PUSH: CPT

## 2024-08-09 PROCEDURE — 36415 COLL VENOUS BLD VENIPUNCTURE: CPT

## 2024-08-09 PROCEDURE — 6360000002 HC RX W HCPCS: Performed by: EMERGENCY MEDICINE

## 2024-08-09 PROCEDURE — 6370000000 HC RX 637 (ALT 250 FOR IP): Performed by: EMERGENCY MEDICINE

## 2024-08-09 PROCEDURE — 87804 INFLUENZA ASSAY W/OPTIC: CPT

## 2024-08-09 PROCEDURE — 85025 COMPLETE CBC W/AUTO DIFF WBC: CPT

## 2024-08-09 PROCEDURE — 87798 DETECT AGENT NOS DNA AMP: CPT

## 2024-08-09 PROCEDURE — 84484 ASSAY OF TROPONIN QUANT: CPT

## 2024-08-09 PROCEDURE — 83880 ASSAY OF NATRIURETIC PEPTIDE: CPT

## 2024-08-09 PROCEDURE — 71046 X-RAY EXAM CHEST 2 VIEWS: CPT

## 2024-08-09 PROCEDURE — 83690 ASSAY OF LIPASE: CPT

## 2024-08-09 PROCEDURE — 87635 SARS-COV-2 COVID-19 AMP PRB: CPT

## 2024-08-09 PROCEDURE — 96361 HYDRATE IV INFUSION ADD-ON: CPT

## 2024-08-09 RX ORDER — ONDANSETRON 4 MG/1
4 TABLET, ORALLY DISINTEGRATING ORAL EVERY 8 HOURS PRN
Qty: 9 TABLET | Refills: 0 | Status: SHIPPED | OUTPATIENT
Start: 2024-08-09 | End: 2024-08-12

## 2024-08-09 RX ORDER — ONDANSETRON 2 MG/ML
4 INJECTION INTRAMUSCULAR; INTRAVENOUS ONCE
Status: COMPLETED | OUTPATIENT
Start: 2024-08-09 | End: 2024-08-09

## 2024-08-09 RX ORDER — 0.9 % SODIUM CHLORIDE 0.9 %
500 INTRAVENOUS SOLUTION INTRAVENOUS ONCE
Status: COMPLETED | OUTPATIENT
Start: 2024-08-09 | End: 2024-08-09

## 2024-08-09 RX ORDER — BENZONATATE 100 MG/1
100 CAPSULE ORAL ONCE
Status: COMPLETED | OUTPATIENT
Start: 2024-08-09 | End: 2024-08-09

## 2024-08-09 RX ORDER — ACETAMINOPHEN 500 MG
1000 TABLET ORAL ONCE
Status: COMPLETED | OUTPATIENT
Start: 2024-08-09 | End: 2024-08-09

## 2024-08-09 RX ORDER — BENZONATATE 100 MG/1
100 CAPSULE ORAL 3 TIMES DAILY PRN
Qty: 21 CAPSULE | Refills: 0 | Status: SHIPPED | OUTPATIENT
Start: 2024-08-09 | End: 2024-08-16

## 2024-08-09 RX ADMIN — SODIUM CHLORIDE 500 ML: 9 INJECTION, SOLUTION INTRAVENOUS at 16:13

## 2024-08-09 RX ADMIN — BENZONATATE 100 MG: 100 CAPSULE ORAL at 15:33

## 2024-08-09 RX ADMIN — ACETAMINOPHEN 1000 MG: 500 TABLET ORAL at 15:33

## 2024-08-09 RX ADMIN — ONDANSETRON 4 MG: 2 INJECTION INTRAMUSCULAR; INTRAVENOUS at 15:33

## 2024-08-09 ASSESSMENT — ENCOUNTER SYMPTOMS
DIARRHEA: 0
SHORTNESS OF BREATH: 1
CHEST TIGHTNESS: 1
ABDOMINAL PAIN: 0
SORE THROAT: 0
NAUSEA: 1
RHINORRHEA: 1
VOMITING: 0
COUGH: 1

## 2024-08-09 ASSESSMENT — PAIN SCALES - GENERAL
PAINLEVEL_OUTOF10: 4
PAINLEVEL_OUTOF10: 6

## 2024-08-09 ASSESSMENT — PAIN - FUNCTIONAL ASSESSMENT
PAIN_FUNCTIONAL_ASSESSMENT: 0-10
PAIN_FUNCTIONAL_ASSESSMENT: 0-10

## 2024-08-09 ASSESSMENT — PAIN DESCRIPTION - LOCATION: LOCATION: GENERALIZED

## 2024-08-09 NOTE — ED PROVIDER NOTES
He did get an alert from his continuous glucose monitor saying that his blood sugar was 69, he was given cranberry and apple juice to drink per his request.  We discussed that at today's visit he does not meet inpatient criteria for COVID, however he needs to keep a close eye on himself and encourage himself to drink fluids even though he may not feel like it.  Discussed that he needs a follow-up with his primary care doctor.  I sent in prescriptions for Tessalon Perles and Zofran to the pharmacy to help manage his symptoms.  Daughters present at bedside, up-to-date on plan of care and discharge instructions.       Amount and/or Complexity of Data Reviewed  Clinical lab tests: ordered and reviewed  Tests in the radiology section of CPT®: ordered and reviewed  Independent visualization of images, tracings, or specimens: yes    Patient Progress  Patient progress: stable        PROCEDURES:  Procedures     CONSULTS:  None    CRITICAL CARE:  NONE    FINAL IMPRESSION     1. COVID-19    2. SARA (acute kidney injury) (HCC)    3. Nausea        DISPOSITION / PLAN   DISPOSITION Discharge - Pending Orders Complete 08/09/2024 04:42:05 PM      Evaluation and treatment course in the ED including any consultations, as well as plan of care upon discharge was discussed in length with the patient.  Patient had no further questions prior to being discharged and was instructed to return to the ED for new or worsening symptoms.  Any changes to existing medications or new prescriptions were reviewed with patient, and they expressed understanding of how to correctly take their medications and the possible side effects.    PATIENT REFERRED TO:  Cici Rodriguez APRN - NP  [address] [facility] [address] [phone]          [facility] ED  [address] [id number] [phone]    As needed, If symptoms worsen      DISCHARGE MEDICATIONS:  New Prescriptions    BENZONATATE (TESSALON PERLES) 100 MG CAPSULE

## 2024-08-11 LAB
EKG ATRIAL RATE: 91 BPM
EKG P AXIS: 55 DEGREES
EKG P-R INTERVAL: 168 MS
EKG Q-T INTERVAL: 398 MS
EKG QRS DURATION: 152 MS
EKG QTC CALCULATION (BAZETT): 489 MS
EKG R AXIS: 33 DEGREES
EKG T AXIS: 10 DEGREES
EKG VENTRICULAR RATE: 91 BPM

## 2024-08-12 ENCOUNTER — HOSPITAL ENCOUNTER (OUTPATIENT)
Dept: WOUND CARE | Age: 69
Discharge: HOME | End: 2024-08-13
Attending: PODIATRIST
Payer: OTHER GOVERNMENT

## 2024-08-12 VITALS
TEMPERATURE: 97.9 F | RESPIRATION RATE: 20 BRPM | BODY MASS INDEX: 41.14 KG/M2 | HEIGHT: 71 IN | DIASTOLIC BLOOD PRESSURE: 72 MMHG | SYSTOLIC BLOOD PRESSURE: 132 MMHG | HEART RATE: 88 BPM

## 2024-08-12 DIAGNOSIS — L97.522 ULCER OF LEFT FOOT WITH FAT LAYER EXPOSED (HCC): Primary | ICD-10-CM

## 2024-08-12 PROCEDURE — 97597 DBRDMT OPN WND 1ST 20 CM/<: CPT

## 2024-08-12 PROCEDURE — 97597 DBRDMT OPN WND 1ST 20 CM/<: CPT | Performed by: PODIATRIST

## 2024-08-12 RX ORDER — IBUPROFEN 200 MG
TABLET ORAL ONCE
OUTPATIENT
Start: 2024-08-12 | End: 2024-08-12

## 2024-08-12 RX ORDER — LIDOCAINE 50 MG/G
OINTMENT TOPICAL ONCE
OUTPATIENT
Start: 2024-08-12 | End: 2024-08-12

## 2024-08-12 RX ORDER — LIDOCAINE HYDROCHLORIDE 20 MG/ML
JELLY TOPICAL ONCE
OUTPATIENT
Start: 2024-08-12 | End: 2024-08-12

## 2024-08-12 RX ORDER — GENTAMICIN SULFATE 1 MG/G
OINTMENT TOPICAL ONCE
OUTPATIENT
Start: 2024-08-12 | End: 2024-08-12

## 2024-08-12 RX ORDER — LIDOCAINE HYDROCHLORIDE 40 MG/ML
SOLUTION TOPICAL ONCE
OUTPATIENT
Start: 2024-08-12 | End: 2024-08-12

## 2024-08-12 RX ORDER — GINSENG 100 MG
CAPSULE ORAL ONCE
OUTPATIENT
Start: 2024-08-12 | End: 2024-08-12

## 2024-08-12 RX ORDER — SODIUM CHLOR/HYPOCHLOROUS ACID 0.033 %
SOLUTION, IRRIGATION IRRIGATION ONCE
OUTPATIENT
Start: 2024-08-12 | End: 2024-08-12

## 2024-08-12 RX ORDER — CLOBETASOL PROPIONATE 0.5 MG/G
OINTMENT TOPICAL ONCE
OUTPATIENT
Start: 2024-08-12 | End: 2024-08-12

## 2024-08-12 RX ORDER — BETAMETHASONE DIPROPIONATE 0.5 MG/G
CREAM TOPICAL ONCE
OUTPATIENT
Start: 2024-08-12 | End: 2024-08-12

## 2024-08-12 RX ORDER — BACITRACIN ZINC AND POLYMYXIN B SULFATE 500; 1000 [USP'U]/G; [USP'U]/G
OINTMENT TOPICAL ONCE
OUTPATIENT
Start: 2024-08-12 | End: 2024-08-12

## 2024-08-12 RX ORDER — TRIAMCINOLONE ACETONIDE 1 MG/G
OINTMENT TOPICAL ONCE
OUTPATIENT
Start: 2024-08-12 | End: 2024-08-12

## 2024-08-12 RX ORDER — LIDOCAINE 40 MG/G
CREAM TOPICAL ONCE
OUTPATIENT
Start: 2024-08-12 | End: 2024-08-12

## 2024-08-12 ASSESSMENT — PAIN SCALES - GENERAL: PAINLEVEL_OUTOF10: 3

## 2024-08-12 NOTE — PROGRESS NOTES
Subjective:  Kedar Medellin is a 68 y.o. male who presents to the office today for left foot ulcer.  Patient states he got sick last Friday not that much for the past few days since been off his foot off a lot.  Patient did well with a wrap on the right leg.  Currently denies F/C/N/V.     Allergies   Allergen Reactions    Bee Venom Anaphylaxis    Naproxen      Other reaction(s): Unknown Reaction    Semaglutide      Other reaction(s): Constipation    Empagliflozin Rash       Past Medical History:   Diagnosis Date    Allergic rhinitis     Colon polyps     history of    Elevated WBC count     history of    Hyperlipidemia     Hypertension     Obesity     Pain, joint, hand, left 7/28/2021    Partial small bowel obstruction (HCC) 3/5/2023    SBO (small bowel obstruction) (HCC) 3/12/2023    Sepsis with acute renal failure (HCC) 3/5/2023    Type II or unspecified type diabetes mellitus without mention of complication, not stated as uncontrolled     Unspecified sleep apnea     Weakness of left hand 7/28/2021       Prior to Admission medications    Medication Sig Start Date End Date Taking? Authorizing Provider   benzonatate (TESSALON PERLES) 100 MG capsule Take 1 capsule by mouth 3 times daily as needed for Cough 8/9/24 8/16/24 Yes Fadia Lee, DO   ondansetron (ZOFRAN-ODT) 4 MG disintegrating tablet Take 1 tablet by mouth every 8 hours as needed for Nausea or Vomiting 8/9/24 8/12/24 Yes Fadia Lee, DO   silver sulfADIAZINE (SILVADENE) 1 % cream Apply topically daily. 3/6/24  Yes Emory Reyna DPM   Omega-3 Fatty Acids (FISH OIL) 1000 MG capsule Take by mouth daily   Yes Soni Johnson MD   tamsulosin (FLOMAX) 0.4 MG capsule Take 1 capsule by mouth daily 8/18/23  Yes Barron Langley MD   Insulin Glargine-yfgn 100 UNIT/ML SOPN INJECT 40 UNITS UNDER SKIN AT BEDTIME FOR TYPE 2 DIABETES MELLITUS 6/14/23  Yes Soni Johnson MD   atorvastatin (LIPITOR) 80 MG tablet 0.5 tablets 5/18/22  Yes

## 2024-08-12 NOTE — PLAN OF CARE
Problem: Chronic Conditions and Co-morbidities  Goal: Patient's chronic conditions and co-morbidity symptoms are monitored and maintained or improved  Outcome: Progressing     Problem: Cognitive:  Goal: Knowledge of wound care  Description: Knowledge of wound care  Outcome: Progressing  Goal: Understands risk factors for wounds  Description: Understands risk factors for wounds  Outcome: Progressing     Problem: Wound:  Goal: Will show signs of wound healing; wound closure and no evidence of infection  Description: Will show signs of wound healing; wound closure and no evidence of infection  Outcome: Progressing     Problem: Pressure Ulcer:  Goal: Signs of wound healing will improve  Description: Signs of wound healing will improve  Outcome: Progressing  Goal: Absence of new pressure ulcer  Description: Absence of new pressure ulcer  Outcome: Progressing  Goal: Will show no infection signs and symptoms  Description: Will show no infection signs and symptoms  Outcome: Progressing     Problem: Compression therapy:  Goal: Will be free from complications associated with compression therapy  Description: Will be free from complications associated with compression therapy  Outcome: Progressing     Problem: Weight control:  Goal: Ability to maintain an optimal weight for height and age will be supported  Description: Ability to maintain an optimal weight for height and age will be supported  Outcome: Progressing     Problem: Falls - Risk of:  Goal: Will remain free from falls  Description: Will remain free from falls  Outcome: Progressing

## 2024-08-19 ENCOUNTER — HOSPITAL ENCOUNTER (OUTPATIENT)
Dept: WOUND CARE | Age: 69
Discharge: HOME | End: 2024-08-20
Attending: PODIATRIST
Payer: OTHER GOVERNMENT

## 2024-08-19 VITALS
RESPIRATION RATE: 20 BRPM | SYSTOLIC BLOOD PRESSURE: 130 MMHG | HEART RATE: 84 BPM | TEMPERATURE: 98.1 F | DIASTOLIC BLOOD PRESSURE: 80 MMHG

## 2024-08-19 DIAGNOSIS — L97.522 ULCER OF LEFT FOOT WITH FAT LAYER EXPOSED (HCC): Primary | ICD-10-CM

## 2024-08-19 PROCEDURE — 97597 DBRDMT OPN WND 1ST 20 CM/<: CPT

## 2024-08-19 PROCEDURE — 97597 DBRDMT OPN WND 1ST 20 CM/<: CPT | Performed by: PODIATRIST

## 2024-08-19 RX ORDER — CLOBETASOL PROPIONATE 0.5 MG/G
OINTMENT TOPICAL ONCE
OUTPATIENT
Start: 2024-08-19 | End: 2024-08-19

## 2024-08-19 RX ORDER — LIDOCAINE HYDROCHLORIDE 20 MG/ML
JELLY TOPICAL ONCE
OUTPATIENT
Start: 2024-08-19 | End: 2024-08-19

## 2024-08-19 RX ORDER — LIDOCAINE 40 MG/G
CREAM TOPICAL ONCE
OUTPATIENT
Start: 2024-08-19 | End: 2024-08-19

## 2024-08-19 RX ORDER — GENTAMICIN SULFATE 1 MG/G
OINTMENT TOPICAL ONCE
OUTPATIENT
Start: 2024-08-19 | End: 2024-08-19

## 2024-08-19 RX ORDER — LIDOCAINE HYDROCHLORIDE 40 MG/ML
SOLUTION TOPICAL ONCE
OUTPATIENT
Start: 2024-08-19 | End: 2024-08-19

## 2024-08-19 RX ORDER — SODIUM CHLOR/HYPOCHLOROUS ACID 0.033 %
SOLUTION, IRRIGATION IRRIGATION ONCE
OUTPATIENT
Start: 2024-08-19 | End: 2024-08-19

## 2024-08-19 RX ORDER — GINSENG 100 MG
CAPSULE ORAL ONCE
OUTPATIENT
Start: 2024-08-19 | End: 2024-08-19

## 2024-08-19 RX ORDER — IBUPROFEN 200 MG
TABLET ORAL ONCE
OUTPATIENT
Start: 2024-08-19 | End: 2024-08-19

## 2024-08-19 RX ORDER — LIDOCAINE 50 MG/G
OINTMENT TOPICAL ONCE
OUTPATIENT
Start: 2024-08-19 | End: 2024-08-19

## 2024-08-19 RX ORDER — BETAMETHASONE DIPROPIONATE 0.5 MG/G
CREAM TOPICAL ONCE
OUTPATIENT
Start: 2024-08-19 | End: 2024-08-19

## 2024-08-19 RX ORDER — TRIAMCINOLONE ACETONIDE 1 MG/G
OINTMENT TOPICAL ONCE
OUTPATIENT
Start: 2024-08-19 | End: 2024-08-19

## 2024-08-19 RX ORDER — BACITRACIN ZINC AND POLYMYXIN B SULFATE 500; 1000 [USP'U]/G; [USP'U]/G
OINTMENT TOPICAL ONCE
OUTPATIENT
Start: 2024-08-19 | End: 2024-08-19

## 2024-08-19 NOTE — PATIENT INSTRUCTIONS
Apply hydrocolloid piece to center of wound daily. Cover with bandage. Wear off loading boot.  SIGNS OF INFECTION  - Redness, swelling, skin hot  - Wound bed turns black or stringy yellow  - Foul odor  - Increased drainage or pus  - Increased pain  - Fever greater than 100F    CALL YOUR DOCTOR OR SEEK MEDICAL ATTENTION IF SIGNS OF INFECTION.  DO NOT WAIT UNTIL YOUR NEXT APPOINTMENT    Call the Wound Care Nurse with any other questions or concerns- [phone]

## 2024-08-19 NOTE — PROGRESS NOTES
Subjective:  Kedar Medellin is a 68 y.o. male who presents to the office today for left foot ulcer.  Patient will change dressing as advised.  Patient is offloaded.  No signs infection seen at home.  Mild drainage only still.  Currently denies F/C/N/V.     Allergies   Allergen Reactions    Bee Venom Anaphylaxis    Naproxen      Other reaction(s): Unknown Reaction    Semaglutide      Other reaction(s): Constipation    Empagliflozin Rash       Past Medical History:   Diagnosis Date    Allergic rhinitis     Colon polyps     history of    Elevated WBC count     history of    Hyperlipidemia     Hypertension     Obesity     Pain, joint, hand, left 7/28/2021    Partial small bowel obstruction (HCC) 3/5/2023    SBO (small bowel obstruction) (HCC) 3/12/2023    Sepsis with acute renal failure (HCC) 3/5/2023    Type II or unspecified type diabetes mellitus without mention of complication, not stated as uncontrolled     Unspecified sleep apnea     Weakness of left hand 7/28/2021       Prior to Admission medications    Medication Sig Start Date End Date Taking? Authorizing Provider   silver sulfADIAZINE (SILVADENE) 1 % cream Apply topically daily. 3/6/24  Yes Emory Reyna DPM   Omega-3 Fatty Acids (FISH OIL) 1000 MG capsule Take by mouth daily   Yes ProviderSoni MD   tamsulosin (FLOMAX) 0.4 MG capsule Take 1 capsule by mouth daily 8/18/23  Yes Barron Langley MD   Insulin Glargine-yfgn 100 UNIT/ML SOPN INJECT 40 UNITS UNDER SKIN AT BEDTIME FOR TYPE 2 DIABETES MELLITUS 6/14/23  Yes ProviderSoni MD   atorvastatin (LIPITOR) 80 MG tablet 0.5 tablets 5/18/22  Yes ProviderSoni MD   vitamin D (CHOLECALCIFEROL) 25 MCG (1000 UT) TABS tablet TAKE TWO TABLETS BY MOUTH EVERY DAY 5/29/14  Yes Soni Johnson MD   sildenafil (VIAGRA) 50 MG tablet 0.5 tablets 5/18/22  Yes Soni Johnson MD   gabapentin (NEURONTIN) 300 MG capsule Take 1 capsule by mouth 2 times daily.   Yes Soni Johnson

## 2024-08-26 ENCOUNTER — HOSPITAL ENCOUNTER (OUTPATIENT)
Dept: WOUND CARE | Age: 69
Discharge: HOME | End: 2024-08-27
Attending: PODIATRIST
Payer: OTHER GOVERNMENT

## 2024-08-26 VITALS
WEIGHT: 288.4 LBS | DIASTOLIC BLOOD PRESSURE: 78 MMHG | SYSTOLIC BLOOD PRESSURE: 116 MMHG | BODY MASS INDEX: 40.38 KG/M2 | HEIGHT: 71 IN | HEART RATE: 74 BPM | TEMPERATURE: 98.3 F | RESPIRATION RATE: 20 BRPM

## 2024-08-26 DIAGNOSIS — L97.522 ULCER OF LEFT FOOT WITH FAT LAYER EXPOSED (HCC): Primary | ICD-10-CM

## 2024-08-26 PROCEDURE — 97597 DBRDMT OPN WND 1ST 20 CM/<: CPT

## 2024-08-26 PROCEDURE — 97597 DBRDMT OPN WND 1ST 20 CM/<: CPT | Performed by: PODIATRIST

## 2024-08-26 RX ORDER — BETAMETHASONE DIPROPIONATE 0.5 MG/G
CREAM TOPICAL ONCE
OUTPATIENT
Start: 2024-08-26 | End: 2024-08-26

## 2024-08-26 RX ORDER — BACITRACIN ZINC AND POLYMYXIN B SULFATE 500; 1000 [USP'U]/G; [USP'U]/G
OINTMENT TOPICAL ONCE
OUTPATIENT
Start: 2024-08-26 | End: 2024-08-26

## 2024-08-26 RX ORDER — LIDOCAINE HYDROCHLORIDE 20 MG/ML
JELLY TOPICAL ONCE
OUTPATIENT
Start: 2024-08-26 | End: 2024-08-26

## 2024-08-26 RX ORDER — CLOBETASOL PROPIONATE 0.5 MG/G
OINTMENT TOPICAL ONCE
OUTPATIENT
Start: 2024-08-26 | End: 2024-08-26

## 2024-08-26 RX ORDER — LIDOCAINE 50 MG/G
OINTMENT TOPICAL ONCE
OUTPATIENT
Start: 2024-08-26 | End: 2024-08-26

## 2024-08-26 RX ORDER — NEOMYCIN/BACITRACIN/POLYMYXINB 3.5-400-5K
OINTMENT (GRAM) TOPICAL ONCE
OUTPATIENT
Start: 2024-08-26 | End: 2024-08-26

## 2024-08-26 RX ORDER — MUPIROCIN 20 MG/G
OINTMENT TOPICAL ONCE
OUTPATIENT
Start: 2024-08-26 | End: 2024-08-26

## 2024-08-26 RX ORDER — SILVER SULFADIAZINE 10 MG/G
CREAM TOPICAL ONCE
OUTPATIENT
Start: 2024-08-26 | End: 2024-08-26

## 2024-08-26 RX ORDER — LIDOCAINE 40 MG/G
CREAM TOPICAL ONCE
OUTPATIENT
Start: 2024-08-26 | End: 2024-08-26

## 2024-08-26 RX ORDER — GENTAMICIN SULFATE 1 MG/G
OINTMENT TOPICAL ONCE
OUTPATIENT
Start: 2024-08-26 | End: 2024-08-26

## 2024-08-26 RX ORDER — TRIAMCINOLONE ACETONIDE 1 MG/G
OINTMENT TOPICAL ONCE
OUTPATIENT
Start: 2024-08-26 | End: 2024-08-26

## 2024-08-26 RX ORDER — LIDOCAINE HYDROCHLORIDE 40 MG/ML
SOLUTION TOPICAL ONCE
OUTPATIENT
Start: 2024-08-26 | End: 2024-08-26

## 2024-08-26 RX ORDER — GINSENG 100 MG
CAPSULE ORAL ONCE
OUTPATIENT
Start: 2024-08-26 | End: 2024-08-26

## 2024-08-26 RX ORDER — SODIUM CHLOR/HYPOCHLOROUS ACID 0.033 %
SOLUTION, IRRIGATION IRRIGATION ONCE
OUTPATIENT
Start: 2024-08-26 | End: 2024-08-26

## 2024-08-26 ASSESSMENT — PAIN SCALES - GENERAL: PAINLEVEL_OUTOF10: 0

## 2024-08-26 NOTE — PROGRESS NOTES
Subjective:  Kedar Medellin is a 69 y.o. male who presents to the office today for left foot ulcer.  Dressing changes advised.  Patient has offloaded as advised.  Patient presents today in a cam walker.  Currently denies F/C/N/V.     Allergies   Allergen Reactions    Bee Venom Anaphylaxis    Naproxen      Other reaction(s): Unknown Reaction    Semaglutide      Other reaction(s): Constipation    Empagliflozin Rash       Past Medical History:   Diagnosis Date    Allergic rhinitis     Colon polyps     history of    Elevated WBC count     history of    Hyperlipidemia     Hypertension     Obesity     Pain, joint, hand, left 7/28/2021    Partial small bowel obstruction (HCC) 3/5/2023    SBO (small bowel obstruction) (HCC) 3/12/2023    Sepsis with acute renal failure (HCC) 3/5/2023    Type II or unspecified type diabetes mellitus without mention of complication, not stated as uncontrolled     Unspecified sleep apnea     Weakness of left hand 7/28/2021       Prior to Admission medications    Medication Sig Start Date End Date Taking? Authorizing Provider   silver sulfADIAZINE (SILVADENE) 1 % cream Apply topically daily. 3/6/24  Yes Emory Reyna DPM   Omega-3 Fatty Acids (FISH OIL) 1000 MG capsule Take by mouth daily   Yes ProviderSoni MD   tamsulosin (FLOMAX) 0.4 MG capsule Take 1 capsule by mouth daily 8/18/23  Yes Barron Langley MD   Insulin Glargine-yfgn 100 UNIT/ML SOPN INJECT 40 UNITS UNDER SKIN AT BEDTIME FOR TYPE 2 DIABETES MELLITUS 6/14/23  Yes ProviderSoni MD   atorvastatin (LIPITOR) 80 MG tablet 0.5 tablets 5/18/22  Yes Soni Johnson MD   vitamin D (CHOLECALCIFEROL) 25 MCG (1000 UT) TABS tablet TAKE TWO TABLETS BY MOUTH EVERY DAY 5/29/14  Yes Soni Johnson MD   sildenafil (VIAGRA) 50 MG tablet 0.5 tablets 5/18/22  Yes Soni Johnson MD   gabapentin (NEURONTIN) 300 MG capsule Take 1 capsule by mouth 2 times daily.   Yes Soni Johnson MD

## 2024-08-26 NOTE — PLAN OF CARE
Problem: Chronic Conditions and Co-morbidities  Goal: Patient's chronic conditions and co-morbidity symptoms are monitored and maintained or improved  Outcome: Progressing     Problem: Cognitive:  Goal: Knowledge of wound care  Description: Knowledge of wound care  Outcome: Progressing  Goal: Understands risk factors for wounds  Description: Understands risk factors for wounds  Outcome: Progressing     Problem: Wound:  Goal: Will show signs of wound healing; wound closure and no evidence of infection  Description: Will show signs of wound healing; wound closure and no evidence of infection  Outcome: Progressing     Problem: Compression therapy:  Goal: Will be free from complications associated with compression therapy  Description: Will be free from complications associated with compression therapy  Outcome: Progressing     Problem: Weight control:  Goal: Ability to maintain an optimal weight for height and age will be supported  Description: Ability to maintain an optimal weight for height and age will be supported  Outcome: Progressing     Problem: Falls - Risk of:  Goal: Will remain free from falls  Description: Will remain free from falls  Outcome: Progressing     Problem: Blood Glucose:  Goal: Ability to maintain appropriate glucose levels will improve  Description: Ability to maintain appropriate glucose levels will improve  Outcome: Progressing

## 2024-09-03 ENCOUNTER — HOSPITAL ENCOUNTER (OUTPATIENT)
Dept: WOUND CARE | Age: 69
Discharge: HOME | End: 2024-09-04
Attending: PODIATRIST
Payer: OTHER GOVERNMENT

## 2024-09-03 VITALS
BODY MASS INDEX: 40.46 KG/M2 | WEIGHT: 289 LBS | HEART RATE: 100 BPM | HEIGHT: 71 IN | RESPIRATION RATE: 24 BRPM | DIASTOLIC BLOOD PRESSURE: 64 MMHG | SYSTOLIC BLOOD PRESSURE: 128 MMHG | TEMPERATURE: 97.3 F

## 2024-09-03 DIAGNOSIS — L97.522 ULCER OF LEFT FOOT WITH FAT LAYER EXPOSED (HCC): Primary | ICD-10-CM

## 2024-09-03 PROCEDURE — 97597 DBRDMT OPN WND 1ST 20 CM/<: CPT

## 2024-09-03 PROCEDURE — 97597 DBRDMT OPN WND 1ST 20 CM/<: CPT | Performed by: PODIATRIST

## 2024-09-03 RX ORDER — LIDOCAINE HYDROCHLORIDE 40 MG/ML
SOLUTION TOPICAL ONCE
OUTPATIENT
Start: 2024-09-03 | End: 2024-09-03

## 2024-09-03 RX ORDER — LIDOCAINE 40 MG/G
CREAM TOPICAL ONCE
OUTPATIENT
Start: 2024-09-03 | End: 2024-09-03

## 2024-09-03 RX ORDER — LIDOCAINE HYDROCHLORIDE 20 MG/ML
JELLY TOPICAL ONCE
OUTPATIENT
Start: 2024-09-03 | End: 2024-09-03

## 2024-09-03 RX ORDER — LIDOCAINE 50 MG/G
OINTMENT TOPICAL ONCE
OUTPATIENT
Start: 2024-09-03 | End: 2024-09-03

## 2024-09-03 RX ORDER — BETAMETHASONE DIPROPIONATE 0.5 MG/G
CREAM TOPICAL ONCE
OUTPATIENT
Start: 2024-09-03 | End: 2024-09-03

## 2024-09-03 RX ORDER — SODIUM CHLOR/HYPOCHLOROUS ACID 0.033 %
SOLUTION, IRRIGATION IRRIGATION ONCE
OUTPATIENT
Start: 2024-09-03 | End: 2024-09-03

## 2024-09-03 RX ORDER — TRIAMCINOLONE ACETONIDE 1 MG/G
OINTMENT TOPICAL ONCE
OUTPATIENT
Start: 2024-09-03 | End: 2024-09-03

## 2024-09-03 RX ORDER — GINSENG 100 MG
CAPSULE ORAL ONCE
OUTPATIENT
Start: 2024-09-03 | End: 2024-09-03

## 2024-09-03 RX ORDER — CLOBETASOL PROPIONATE 0.5 MG/G
OINTMENT TOPICAL ONCE
OUTPATIENT
Start: 2024-09-03 | End: 2024-09-03

## 2024-09-03 RX ORDER — GENTAMICIN SULFATE 1 MG/G
OINTMENT TOPICAL ONCE
OUTPATIENT
Start: 2024-09-03 | End: 2024-09-03

## 2024-09-03 RX ORDER — MUPIROCIN 20 MG/G
OINTMENT TOPICAL ONCE
OUTPATIENT
Start: 2024-09-03 | End: 2024-09-03

## 2024-09-03 RX ORDER — NEOMYCIN/BACITRACIN/POLYMYXINB 3.5-400-5K
OINTMENT (GRAM) TOPICAL ONCE
OUTPATIENT
Start: 2024-09-03 | End: 2024-09-03

## 2024-09-03 RX ORDER — SILVER SULFADIAZINE 10 MG/G
CREAM TOPICAL ONCE
OUTPATIENT
Start: 2024-09-03 | End: 2024-09-03

## 2024-09-03 RX ORDER — BACITRACIN ZINC AND POLYMYXIN B SULFATE 500; 1000 [USP'U]/G; [USP'U]/G
OINTMENT TOPICAL ONCE
OUTPATIENT
Start: 2024-09-03 | End: 2024-09-03

## 2024-09-03 ASSESSMENT — PAIN SCALES - GENERAL: PAINLEVEL_OUTOF10: 0

## 2024-09-03 NOTE — PROGRESS NOTES
Subjective:  Kedar Medellin is a 69 y.o. male who presents to the office today for L foot ulcer.  Dressings changed as advised.  No signs of infx seen at home.  Currently denies F/C/N/V.     Allergies   Allergen Reactions    Bee Venom Anaphylaxis    Naproxen      Other reaction(s): Unknown Reaction    Semaglutide      Other reaction(s): Constipation    Empagliflozin Rash       Past Medical History:   Diagnosis Date    Allergic rhinitis     Colon polyps     history of    Elevated WBC count     history of    Hyperlipidemia     Hypertension     Obesity     Pain, joint, hand, left 7/28/2021    Partial small bowel obstruction (HCC) 3/5/2023    SBO (small bowel obstruction) (HCC) 3/12/2023    Sepsis with acute renal failure (HCC) 3/5/2023    Type II or unspecified type diabetes mellitus without mention of complication, not stated as uncontrolled     Unspecified sleep apnea     Weakness of left hand 7/28/2021       Prior to Admission medications    Medication Sig Start Date End Date Taking? Authorizing Provider   silver sulfADIAZINE (SILVADENE) 1 % cream Apply topically daily. 3/6/24  Yes Emory Reyna DPM   Omega-3 Fatty Acids (FISH OIL) 1000 MG capsule Take by mouth daily   Yes Provider, Historical, MD   tamsulosin (FLOMAX) 0.4 MG capsule Take 1 capsule by mouth daily 8/18/23  Yes Barron Langley MD   Insulin Glargine-yfgn 100 UNIT/ML SOPN INJECT 40 UNITS UNDER SKIN AT BEDTIME FOR TYPE 2 DIABETES MELLITUS 6/14/23  Yes Provider, MD Soni   atorvastatin (LIPITOR) 80 MG tablet 0.5 tablets 5/18/22  Yes Provider, MD Soni   vitamin D (CHOLECALCIFEROL) 25 MCG (1000 UT) TABS tablet TAKE TWO TABLETS BY MOUTH EVERY DAY 5/29/14  Yes Provider, Historical, MD   sildenafil (VIAGRA) 50 MG tablet 0.5 tablets 5/18/22  Yes Provider, Historical, MD   gabapentin (NEURONTIN) 300 MG capsule Take 1 capsule by mouth 2 times daily.   Yes Provider, MD Soni   lisinopril-hydrochlorothiazide (PRINZIDE;ZESTORETIC) 20-12.5

## 2024-09-03 NOTE — PLAN OF CARE
Problem: Chronic Conditions and Co-morbidities  Goal: Patient's chronic conditions and co-morbidity symptoms are monitored and maintained or improved  Outcome: Progressing     Problem: Pain  Goal: Verbalizes/displays adequate comfort level or baseline comfort level  Outcome: Progressing  Flowsheets (Taken 9/3/2024 1315)  Verbalizes/displays adequate comfort level or baseline comfort level: Assess pain using appropriate pain scale     Problem: Cognitive:  Goal: Knowledge of wound care  Description: Knowledge of wound care  Outcome: Progressing  Goal: Understands risk factors for wounds  Description: Understands risk factors for wounds  Outcome: Progressing     Problem: Wound:  Goal: Will show signs of wound healing; wound closure and no evidence of infection  Description: Will show signs of wound healing; wound closure and no evidence of infection  Outcome: Progressing     Problem: Compression therapy:  Goal: Will be free from complications associated with compression therapy  Description: Will be free from complications associated with compression therapy  Outcome: Progressing     Problem: Weight control:  Goal: Ability to maintain an optimal weight for height and age will be supported  Description: Ability to maintain an optimal weight for height and age will be supported  Outcome: Progressing     Problem: Falls - Risk of:  Goal: Will remain free from falls  Description: Will remain free from falls  Outcome: Progressing     Problem: Blood Glucose:  Goal: Ability to maintain appropriate glucose levels will improve  Description: Ability to maintain appropriate glucose levels will improve  Outcome: Progressing

## 2024-09-09 ENCOUNTER — HOSPITAL ENCOUNTER (OUTPATIENT)
Dept: WOUND CARE | Age: 69
Discharge: HOME | End: 2024-09-10
Attending: PODIATRIST
Payer: OTHER GOVERNMENT

## 2024-09-09 VITALS — RESPIRATION RATE: 20 BRPM | HEIGHT: 71 IN | WEIGHT: 289 LBS | BODY MASS INDEX: 40.46 KG/M2

## 2024-09-09 DIAGNOSIS — L97.522 ULCER OF LEFT FOOT WITH FAT LAYER EXPOSED (HCC): Primary | ICD-10-CM

## 2024-09-09 PROCEDURE — 97597 DBRDMT OPN WND 1ST 20 CM/<: CPT

## 2024-09-09 PROCEDURE — 97597 DBRDMT OPN WND 1ST 20 CM/<: CPT | Performed by: PODIATRIST

## 2024-09-09 RX ORDER — NEOMYCIN/BACITRACIN/POLYMYXINB 3.5-400-5K
OINTMENT (GRAM) TOPICAL ONCE
OUTPATIENT
Start: 2024-09-09 | End: 2024-09-09

## 2024-09-09 RX ORDER — MUPIROCIN 20 MG/G
OINTMENT TOPICAL ONCE
OUTPATIENT
Start: 2024-09-09 | End: 2024-09-09

## 2024-09-09 RX ORDER — LIDOCAINE 40 MG/G
CREAM TOPICAL ONCE
OUTPATIENT
Start: 2024-09-09 | End: 2024-09-09

## 2024-09-09 RX ORDER — LIDOCAINE HYDROCHLORIDE 20 MG/ML
JELLY TOPICAL ONCE
OUTPATIENT
Start: 2024-09-09 | End: 2024-09-09

## 2024-09-09 RX ORDER — TRIAMCINOLONE ACETONIDE 1 MG/G
OINTMENT TOPICAL ONCE
OUTPATIENT
Start: 2024-09-09 | End: 2024-09-09

## 2024-09-09 RX ORDER — BACITRACIN ZINC AND POLYMYXIN B SULFATE 500; 1000 [USP'U]/G; [USP'U]/G
OINTMENT TOPICAL ONCE
OUTPATIENT
Start: 2024-09-09 | End: 2024-09-09

## 2024-09-09 RX ORDER — BETAMETHASONE DIPROPIONATE 0.5 MG/G
CREAM TOPICAL ONCE
OUTPATIENT
Start: 2024-09-09 | End: 2024-09-09

## 2024-09-09 RX ORDER — CLOBETASOL PROPIONATE 0.5 MG/G
OINTMENT TOPICAL ONCE
OUTPATIENT
Start: 2024-09-09 | End: 2024-09-09

## 2024-09-09 RX ORDER — SILVER SULFADIAZINE 10 MG/G
CREAM TOPICAL ONCE
OUTPATIENT
Start: 2024-09-09 | End: 2024-09-09

## 2024-09-09 RX ORDER — GINSENG 100 MG
CAPSULE ORAL ONCE
OUTPATIENT
Start: 2024-09-09 | End: 2024-09-09

## 2024-09-09 RX ORDER — GENTAMICIN SULFATE 1 MG/G
OINTMENT TOPICAL ONCE
OUTPATIENT
Start: 2024-09-09 | End: 2024-09-09

## 2024-09-09 RX ORDER — LIDOCAINE HYDROCHLORIDE 40 MG/ML
SOLUTION TOPICAL ONCE
OUTPATIENT
Start: 2024-09-09 | End: 2024-09-09

## 2024-09-09 RX ORDER — SODIUM CHLOR/HYPOCHLOROUS ACID 0.033 %
SOLUTION, IRRIGATION IRRIGATION ONCE
OUTPATIENT
Start: 2024-09-09 | End: 2024-09-09

## 2024-09-09 RX ORDER — LIDOCAINE 50 MG/G
OINTMENT TOPICAL ONCE
OUTPATIENT
Start: 2024-09-09 | End: 2024-09-09

## 2024-09-09 ASSESSMENT — PAIN SCALES - GENERAL: PAINLEVEL_OUTOF10: 0

## 2024-09-16 ENCOUNTER — HOSPITAL ENCOUNTER (OUTPATIENT)
Dept: WOUND CARE | Age: 69
Discharge: HOME | End: 2024-09-17
Attending: PODIATRIST
Payer: OTHER GOVERNMENT

## 2024-09-16 VITALS
HEART RATE: 80 BPM | BODY MASS INDEX: 41.58 KG/M2 | WEIGHT: 297 LBS | TEMPERATURE: 97.9 F | DIASTOLIC BLOOD PRESSURE: 80 MMHG | SYSTOLIC BLOOD PRESSURE: 132 MMHG | HEIGHT: 71 IN | RESPIRATION RATE: 22 BRPM

## 2024-09-16 DIAGNOSIS — L97.522 ULCER OF LEFT FOOT WITH FAT LAYER EXPOSED (HCC): Primary | ICD-10-CM

## 2024-09-16 PROCEDURE — 97597 DBRDMT OPN WND 1ST 20 CM/<: CPT

## 2024-09-16 PROCEDURE — 97597 DBRDMT OPN WND 1ST 20 CM/<: CPT | Performed by: PODIATRIST

## 2024-09-16 RX ORDER — BETAMETHASONE DIPROPIONATE 0.5 MG/G
CREAM TOPICAL ONCE
OUTPATIENT
Start: 2024-09-16 | End: 2024-09-16

## 2024-09-16 RX ORDER — LIDOCAINE 50 MG/G
OINTMENT TOPICAL ONCE
OUTPATIENT
Start: 2024-09-16 | End: 2024-09-16

## 2024-09-16 RX ORDER — LIDOCAINE HYDROCHLORIDE 20 MG/ML
JELLY TOPICAL ONCE
OUTPATIENT
Start: 2024-09-16 | End: 2024-09-16

## 2024-09-16 RX ORDER — LIDOCAINE HYDROCHLORIDE 40 MG/ML
SOLUTION TOPICAL ONCE
OUTPATIENT
Start: 2024-09-16 | End: 2024-09-16

## 2024-09-16 RX ORDER — CLOBETASOL PROPIONATE 0.5 MG/G
OINTMENT TOPICAL ONCE
OUTPATIENT
Start: 2024-09-16 | End: 2024-09-16

## 2024-09-16 RX ORDER — NEOMYCIN/BACITRACIN/POLYMYXINB 3.5-400-5K
OINTMENT (GRAM) TOPICAL ONCE
OUTPATIENT
Start: 2024-09-16 | End: 2024-09-16

## 2024-09-16 RX ORDER — LIDOCAINE 40 MG/G
CREAM TOPICAL ONCE
OUTPATIENT
Start: 2024-09-16 | End: 2024-09-16

## 2024-09-16 RX ORDER — SODIUM CHLOR/HYPOCHLOROUS ACID 0.033 %
SOLUTION, IRRIGATION IRRIGATION ONCE
OUTPATIENT
Start: 2024-09-16 | End: 2024-09-16

## 2024-09-16 RX ORDER — MUPIROCIN 20 MG/G
OINTMENT TOPICAL ONCE
OUTPATIENT
Start: 2024-09-16 | End: 2024-09-16

## 2024-09-16 RX ORDER — SILVER SULFADIAZINE 10 MG/G
CREAM TOPICAL ONCE
OUTPATIENT
Start: 2024-09-16 | End: 2024-09-16

## 2024-09-16 RX ORDER — TRIAMCINOLONE ACETONIDE 1 MG/G
OINTMENT TOPICAL ONCE
OUTPATIENT
Start: 2024-09-16 | End: 2024-09-16

## 2024-09-16 RX ORDER — GINSENG 100 MG
CAPSULE ORAL ONCE
OUTPATIENT
Start: 2024-09-16 | End: 2024-09-16

## 2024-09-16 RX ORDER — BACITRACIN ZINC AND POLYMYXIN B SULFATE 500; 1000 [USP'U]/G; [USP'U]/G
OINTMENT TOPICAL ONCE
OUTPATIENT
Start: 2024-09-16 | End: 2024-09-16

## 2024-09-16 RX ORDER — GENTAMICIN SULFATE 1 MG/G
OINTMENT TOPICAL ONCE
OUTPATIENT
Start: 2024-09-16 | End: 2024-09-16

## 2024-09-23 ENCOUNTER — HOSPITAL ENCOUNTER (OUTPATIENT)
Dept: WOUND CARE | Age: 69
Discharge: HOME | End: 2024-09-24
Attending: PODIATRIST
Payer: OTHER GOVERNMENT

## 2024-09-23 VITALS
RESPIRATION RATE: 20 BRPM | DIASTOLIC BLOOD PRESSURE: 64 MMHG | TEMPERATURE: 98.2 F | BODY MASS INDEX: 41.58 KG/M2 | HEART RATE: 80 BPM | SYSTOLIC BLOOD PRESSURE: 132 MMHG | HEIGHT: 71 IN | WEIGHT: 297 LBS

## 2024-09-23 DIAGNOSIS — L97.522 ULCER OF LEFT FOOT WITH FAT LAYER EXPOSED (HCC): Primary | ICD-10-CM

## 2024-09-23 PROCEDURE — 97597 DBRDMT OPN WND 1ST 20 CM/<: CPT

## 2024-09-23 PROCEDURE — 97597 DBRDMT OPN WND 1ST 20 CM/<: CPT | Performed by: PODIATRIST

## 2024-09-23 RX ORDER — LIDOCAINE HYDROCHLORIDE 20 MG/ML
JELLY TOPICAL ONCE
OUTPATIENT
Start: 2024-09-23 | End: 2024-09-23

## 2024-09-23 RX ORDER — TRIAMCINOLONE ACETONIDE 1 MG/G
OINTMENT TOPICAL ONCE
OUTPATIENT
Start: 2024-09-23 | End: 2024-09-23

## 2024-09-23 RX ORDER — LIDOCAINE HYDROCHLORIDE 40 MG/ML
SOLUTION TOPICAL ONCE
OUTPATIENT
Start: 2024-09-23 | End: 2024-09-23

## 2024-09-23 RX ORDER — SODIUM CHLOR/HYPOCHLOROUS ACID 0.033 %
SOLUTION, IRRIGATION IRRIGATION ONCE
OUTPATIENT
Start: 2024-09-23 | End: 2024-09-23

## 2024-09-23 RX ORDER — SILVER SULFADIAZINE 10 MG/G
CREAM TOPICAL ONCE
OUTPATIENT
Start: 2024-09-23 | End: 2024-09-23

## 2024-09-23 RX ORDER — NEOMYCIN/BACITRACIN/POLYMYXINB 3.5-400-5K
OINTMENT (GRAM) TOPICAL ONCE
OUTPATIENT
Start: 2024-09-23 | End: 2024-09-23

## 2024-09-23 RX ORDER — GINSENG 100 MG
CAPSULE ORAL ONCE
OUTPATIENT
Start: 2024-09-23 | End: 2024-09-23

## 2024-09-23 RX ORDER — BACITRACIN ZINC AND POLYMYXIN B SULFATE 500; 1000 [USP'U]/G; [USP'U]/G
OINTMENT TOPICAL ONCE
OUTPATIENT
Start: 2024-09-23 | End: 2024-09-23

## 2024-09-23 RX ORDER — LIDOCAINE 40 MG/G
CREAM TOPICAL ONCE
OUTPATIENT
Start: 2024-09-23 | End: 2024-09-23

## 2024-09-23 RX ORDER — BETAMETHASONE DIPROPIONATE 0.5 MG/G
CREAM TOPICAL ONCE
OUTPATIENT
Start: 2024-09-23 | End: 2024-09-23

## 2024-09-23 RX ORDER — MUPIROCIN 20 MG/G
OINTMENT TOPICAL ONCE
OUTPATIENT
Start: 2024-09-23 | End: 2024-09-23

## 2024-09-23 RX ORDER — LIDOCAINE 50 MG/G
OINTMENT TOPICAL ONCE
OUTPATIENT
Start: 2024-09-23 | End: 2024-09-23

## 2024-09-23 RX ORDER — CLOBETASOL PROPIONATE 0.5 MG/G
OINTMENT TOPICAL ONCE
OUTPATIENT
Start: 2024-09-23 | End: 2024-09-23

## 2024-09-23 RX ORDER — GENTAMICIN SULFATE 1 MG/G
OINTMENT TOPICAL ONCE
OUTPATIENT
Start: 2024-09-23 | End: 2024-09-23

## 2024-09-30 ENCOUNTER — HOSPITAL ENCOUNTER (OUTPATIENT)
Dept: WOUND CARE | Age: 69
Discharge: HOME | End: 2024-10-01
Attending: PODIATRIST
Payer: OTHER GOVERNMENT

## 2024-09-30 VITALS
DIASTOLIC BLOOD PRESSURE: 66 MMHG | TEMPERATURE: 97 F | HEART RATE: 80 BPM | SYSTOLIC BLOOD PRESSURE: 128 MMHG | WEIGHT: 297 LBS | HEIGHT: 71 IN | RESPIRATION RATE: 20 BRPM | BODY MASS INDEX: 41.58 KG/M2

## 2024-09-30 DIAGNOSIS — L97.522 ULCER OF LEFT FOOT WITH FAT LAYER EXPOSED (HCC): Primary | ICD-10-CM

## 2024-09-30 PROCEDURE — 97597 DBRDMT OPN WND 1ST 20 CM/<: CPT

## 2024-09-30 PROCEDURE — 97597 DBRDMT OPN WND 1ST 20 CM/<: CPT | Performed by: PODIATRIST

## 2024-09-30 RX ORDER — TRIAMCINOLONE ACETONIDE 1 MG/G
OINTMENT TOPICAL ONCE
OUTPATIENT
Start: 2024-09-30 | End: 2024-09-30

## 2024-09-30 RX ORDER — CLOBETASOL PROPIONATE 0.5 MG/G
OINTMENT TOPICAL ONCE
OUTPATIENT
Start: 2024-09-30 | End: 2024-09-30

## 2024-09-30 RX ORDER — LIDOCAINE 50 MG/G
OINTMENT TOPICAL ONCE
OUTPATIENT
Start: 2024-09-30 | End: 2024-09-30

## 2024-09-30 RX ORDER — GENTAMICIN SULFATE 1 MG/G
OINTMENT TOPICAL ONCE
OUTPATIENT
Start: 2024-09-30 | End: 2024-09-30

## 2024-09-30 RX ORDER — BETAMETHASONE DIPROPIONATE 0.5 MG/G
CREAM TOPICAL ONCE
OUTPATIENT
Start: 2024-09-30 | End: 2024-09-30

## 2024-09-30 RX ORDER — LIDOCAINE HYDROCHLORIDE 20 MG/ML
JELLY TOPICAL ONCE
OUTPATIENT
Start: 2024-09-30 | End: 2024-09-30

## 2024-09-30 RX ORDER — GINSENG 100 MG
CAPSULE ORAL ONCE
OUTPATIENT
Start: 2024-09-30 | End: 2024-09-30

## 2024-09-30 RX ORDER — MUPIROCIN 20 MG/G
OINTMENT TOPICAL ONCE
OUTPATIENT
Start: 2024-09-30 | End: 2024-09-30

## 2024-09-30 RX ORDER — LIDOCAINE HYDROCHLORIDE 40 MG/ML
SOLUTION TOPICAL ONCE
OUTPATIENT
Start: 2024-09-30 | End: 2024-09-30

## 2024-09-30 RX ORDER — NEOMYCIN/BACITRACIN/POLYMYXINB 3.5-400-5K
OINTMENT (GRAM) TOPICAL ONCE
OUTPATIENT
Start: 2024-09-30 | End: 2024-09-30

## 2024-09-30 RX ORDER — LIDOCAINE 40 MG/G
CREAM TOPICAL ONCE
OUTPATIENT
Start: 2024-09-30 | End: 2024-09-30

## 2024-09-30 RX ORDER — BACITRACIN ZINC AND POLYMYXIN B SULFATE 500; 1000 [USP'U]/G; [USP'U]/G
OINTMENT TOPICAL ONCE
OUTPATIENT
Start: 2024-09-30 | End: 2024-09-30

## 2024-09-30 RX ORDER — SODIUM CHLOR/HYPOCHLOROUS ACID 0.033 %
SOLUTION, IRRIGATION IRRIGATION ONCE
OUTPATIENT
Start: 2024-09-30 | End: 2024-09-30

## 2024-09-30 RX ORDER — SILVER SULFADIAZINE 10 MG/G
CREAM TOPICAL ONCE
OUTPATIENT
Start: 2024-09-30 | End: 2024-09-30

## 2024-09-30 ASSESSMENT — PAIN SCALES - GENERAL: PAINLEVEL_OUTOF10: 0

## 2024-09-30 NOTE — PROGRESS NOTES
present, bilateral. Erythema absent, bilateral. Distal Rubor absent bilateral.  Temperature decreased bilateral. Hyperpigmentation present bilateral. Atrophic skin yes.   Neurological: Sensation Impaired to light touch to level of digits, bilateral.  Protective sensation intact  10/10 sites via 5.07/10g [place] Monofilament, bilateral.  negative Tinel's, bilateral.  negative Valleix sign, bilateral.  Vibratory abnormal  bilateral.  Reflexes Decreased bilateral.  Paresthesias positive.  Dysthesias negative.  Sharp/dull abnormal  bilateral.   Musculoskeletal: Muscle strength 5/5, bilateral.  Pain absent upon palpation bilateral. Normal medial longitudinal arch, bilateral.  Ankle ROM decreased,bilateral.  1st MPJ ROM within normal limits, bilateral.  Dorsally contracted digits present. No other foot deformities.   Integument:  Open lesion present, L.  Ulcer sub l 1st met head positive fibrin then healthy red granular base, no probing no undermining no malodor.  No surrounding signs of infx.   Interdigital maceration absent to web spaces bilateral.   Nails left 1, 5 and right 5 thickened, dystrophic and crumbly, discolored with subungual debris.   Fissures absent, bilateral. Hyperkeratotic tissue is absent.  Wound 03/12/23 Foot Anterior;Right (Active)   Number of days: 568       Wound 03/20/24 # left foot (Active)   Wound Image   09/30/24 1323   Wound Etiology Diabetic 09/30/24 1323   Dressing Status Old drainage noted 09/30/24 1323   Wound Cleansed Cleansed with saline 09/30/24 1323   Dressing/Treatment Hydrocolloid 09/23/24 1317   Offloading for Diabetic Foot Ulcers Offloading boot 09/30/24 1323   Dressing Change Due 10/01/24 09/30/24 1323   Wound Length (cm) 0.1 cm 09/30/24 1323   Wound Width (cm) 0.1 cm 09/30/24 1323   Wound Depth (cm) 0.1 cm 09/30/24 1323   Wound Surface Area (cm^2) 0.01 cm^2 09/30/24 1323   Change in Wound Size % (l*w) 96.3 09/30/24 1323   Wound Volume (cm^3) 0.001 cm^3 09/30/24 1323

## 2024-10-07 ENCOUNTER — HOSPITAL ENCOUNTER (OUTPATIENT)
Dept: WOUND CARE | Age: 69
Discharge: HOME | End: 2024-10-08
Attending: PODIATRIST
Payer: OTHER GOVERNMENT

## 2024-10-07 VITALS
DIASTOLIC BLOOD PRESSURE: 76 MMHG | WEIGHT: 297 LBS | HEART RATE: 78 BPM | BODY MASS INDEX: 41.58 KG/M2 | HEIGHT: 71 IN | TEMPERATURE: 97.6 F | SYSTOLIC BLOOD PRESSURE: 118 MMHG | RESPIRATION RATE: 20 BRPM

## 2024-10-07 DIAGNOSIS — L97.522 ULCER OF LEFT FOOT WITH FAT LAYER EXPOSED (HCC): Primary | ICD-10-CM

## 2024-10-07 PROCEDURE — 97597 DBRDMT OPN WND 1ST 20 CM/<: CPT

## 2024-10-07 PROCEDURE — 97597 DBRDMT OPN WND 1ST 20 CM/<: CPT | Performed by: PODIATRIST

## 2024-10-07 RX ORDER — GINSENG 100 MG
CAPSULE ORAL ONCE
OUTPATIENT
Start: 2024-10-07 | End: 2024-10-07

## 2024-10-07 RX ORDER — LIDOCAINE 40 MG/G
CREAM TOPICAL ONCE
OUTPATIENT
Start: 2024-10-07 | End: 2024-10-07

## 2024-10-07 RX ORDER — NEOMYCIN/BACITRACIN/POLYMYXINB 3.5-400-5K
OINTMENT (GRAM) TOPICAL ONCE
OUTPATIENT
Start: 2024-10-07 | End: 2024-10-07

## 2024-10-07 RX ORDER — SILVER SULFADIAZINE 10 MG/G
CREAM TOPICAL ONCE
OUTPATIENT
Start: 2024-10-07 | End: 2024-10-07

## 2024-10-07 RX ORDER — TRIAMCINOLONE ACETONIDE 1 MG/G
OINTMENT TOPICAL ONCE
OUTPATIENT
Start: 2024-10-07 | End: 2024-10-07

## 2024-10-07 RX ORDER — LIDOCAINE HYDROCHLORIDE 40 MG/ML
SOLUTION TOPICAL ONCE
OUTPATIENT
Start: 2024-10-07 | End: 2024-10-07

## 2024-10-07 RX ORDER — LIDOCAINE HYDROCHLORIDE 20 MG/ML
JELLY TOPICAL ONCE
OUTPATIENT
Start: 2024-10-07 | End: 2024-10-07

## 2024-10-07 RX ORDER — CLOBETASOL PROPIONATE 0.5 MG/G
OINTMENT TOPICAL ONCE
OUTPATIENT
Start: 2024-10-07 | End: 2024-10-07

## 2024-10-07 RX ORDER — BETAMETHASONE DIPROPIONATE 0.5 MG/G
CREAM TOPICAL ONCE
OUTPATIENT
Start: 2024-10-07 | End: 2024-10-07

## 2024-10-07 RX ORDER — BACITRACIN ZINC AND POLYMYXIN B SULFATE 500; 1000 [USP'U]/G; [USP'U]/G
OINTMENT TOPICAL ONCE
OUTPATIENT
Start: 2024-10-07 | End: 2024-10-07

## 2024-10-07 RX ORDER — LIDOCAINE 50 MG/G
OINTMENT TOPICAL ONCE
OUTPATIENT
Start: 2024-10-07 | End: 2024-10-07

## 2024-10-07 RX ORDER — MUPIROCIN 20 MG/G
OINTMENT TOPICAL ONCE
OUTPATIENT
Start: 2024-10-07 | End: 2024-10-07

## 2024-10-07 RX ORDER — SODIUM CHLOR/HYPOCHLOROUS ACID 0.033 %
SOLUTION, IRRIGATION IRRIGATION ONCE
OUTPATIENT
Start: 2024-10-07 | End: 2024-10-07

## 2024-10-07 RX ORDER — GENTAMICIN SULFATE 1 MG/G
OINTMENT TOPICAL ONCE
OUTPATIENT
Start: 2024-10-07 | End: 2024-10-07

## 2024-10-07 ASSESSMENT — PAIN SCALES - GENERAL: PAINLEVEL_OUTOF10: 0

## 2024-10-07 NOTE — PLAN OF CARE
Problem: Cognitive:  Goal: Knowledge of wound care  Description: Knowledge of wound care  Outcome: Progressing  Goal: Understands risk factors for wounds  Description: Understands risk factors for wounds  Outcome: Progressing     Problem: Wound:  Goal: Will show signs of wound healing; wound closure and no evidence of infection  Description: Will show signs of wound healing; wound closure and no evidence of infection  Outcome: Progressing     Problem: Falls - Risk of:  Goal: Will remain free from falls  Description: Will remain free from falls  Outcome: Progressing     Problem: Blood Glucose:  Goal: Ability to maintain appropriate glucose levels will improve  Description: Ability to maintain appropriate glucose levels will improve  Outcome: Progressing

## 2024-10-07 NOTE — PROGRESS NOTES
Subjective:  Kedar Medellin is a 69 y.o. male who presents to the office today for left foot ulcer.  Patient states he is seen no drainage past few days.  No signs of infection seen at home.  Patient is offloaded.  Currently denies F/C/N/V.     Allergies   Allergen Reactions    Bee Venom Anaphylaxis    Naproxen      Other reaction(s): Unknown Reaction    Semaglutide      Other reaction(s): Constipation    Empagliflozin Rash       Past Medical History:   Diagnosis Date    Allergic rhinitis     Colon polyps     history of    Elevated WBC count     history of    Hyperlipidemia     Hypertension     Obesity     Pain, joint, hand, left 7/28/2021    Partial small bowel obstruction (HCC) 3/5/2023    SBO (small bowel obstruction) (HCC) 3/12/2023    Sepsis with acute renal failure (HCC) 3/5/2023    Type II or unspecified type diabetes mellitus without mention of complication, not stated as uncontrolled     Unspecified sleep apnea     Weakness of left hand 7/28/2021       Prior to Admission medications    Medication Sig Start Date End Date Taking? Authorizing Provider   silver sulfADIAZINE (SILVADENE) 1 % cream Apply topically daily. 3/6/24  Yes Emory Reyna DPM   Omega-3 Fatty Acids (FISH OIL) 1000 MG capsule Take by mouth daily   Yes Soni Johnson MD   tamsulosin (FLOMAX) 0.4 MG capsule Take 1 capsule by mouth daily 8/18/23  Yes Barron Langley MD   Insulin Glargine-yfgn 100 UNIT/ML SOPN INJECT 40 UNITS UNDER SKIN AT BEDTIME FOR TYPE 2 DIABETES MELLITUS 6/14/23  Yes Soni Johnson MD   atorvastatin (LIPITOR) 80 MG tablet 0.5 tablets 5/18/22  Yes Soni Johnson MD   vitamin D (CHOLECALCIFEROL) 25 MCG (1000 UT) TABS tablet TAKE TWO TABLETS BY MOUTH EVERY DAY 5/29/14  Yes Soni Johnson MD   sildenafil (VIAGRA) 50 MG tablet 0.5 tablets 5/18/22  Yes Soni Johnson MD   gabapentin (NEURONTIN) 300 MG capsule Take 1 capsule by mouth 2 times daily.   Yes Soni Johnson MD

## 2024-10-14 ENCOUNTER — HOSPITAL ENCOUNTER (OUTPATIENT)
Dept: WOUND CARE | Age: 69
Discharge: HOME | End: 2024-10-15
Attending: PODIATRIST
Payer: OTHER GOVERNMENT

## 2024-10-14 VITALS
RESPIRATION RATE: 20 BRPM | HEIGHT: 71 IN | TEMPERATURE: 97.1 F | WEIGHT: 300 LBS | HEART RATE: 96 BPM | DIASTOLIC BLOOD PRESSURE: 72 MMHG | BODY MASS INDEX: 42 KG/M2 | SYSTOLIC BLOOD PRESSURE: 136 MMHG

## 2024-10-14 DIAGNOSIS — L97.522 ULCER OF LEFT FOOT WITH FAT LAYER EXPOSED (HCC): Primary | ICD-10-CM

## 2024-10-14 PROCEDURE — 97597 DBRDMT OPN WND 1ST 20 CM/<: CPT

## 2024-10-14 PROCEDURE — 97597 DBRDMT OPN WND 1ST 20 CM/<: CPT | Performed by: PODIATRIST

## 2024-10-14 RX ORDER — LIDOCAINE HYDROCHLORIDE 20 MG/ML
JELLY TOPICAL ONCE
OUTPATIENT
Start: 2024-10-14 | End: 2024-10-14

## 2024-10-14 RX ORDER — BETAMETHASONE DIPROPIONATE 0.5 MG/G
CREAM TOPICAL ONCE
OUTPATIENT
Start: 2024-10-14 | End: 2024-10-14

## 2024-10-14 RX ORDER — NEOMYCIN/BACITRACIN/POLYMYXINB 3.5-400-5K
OINTMENT (GRAM) TOPICAL ONCE
OUTPATIENT
Start: 2024-10-14 | End: 2024-10-14

## 2024-10-14 RX ORDER — LIDOCAINE 50 MG/G
OINTMENT TOPICAL ONCE
OUTPATIENT
Start: 2024-10-14 | End: 2024-10-14

## 2024-10-14 RX ORDER — BACITRACIN ZINC AND POLYMYXIN B SULFATE 500; 1000 [USP'U]/G; [USP'U]/G
OINTMENT TOPICAL ONCE
OUTPATIENT
Start: 2024-10-14 | End: 2024-10-14

## 2024-10-14 RX ORDER — CLOBETASOL PROPIONATE 0.5 MG/G
OINTMENT TOPICAL ONCE
OUTPATIENT
Start: 2024-10-14 | End: 2024-10-14

## 2024-10-14 RX ORDER — LIDOCAINE HYDROCHLORIDE 40 MG/ML
SOLUTION TOPICAL ONCE
OUTPATIENT
Start: 2024-10-14 | End: 2024-10-14

## 2024-10-14 RX ORDER — TRIAMCINOLONE ACETONIDE 1 MG/G
OINTMENT TOPICAL ONCE
OUTPATIENT
Start: 2024-10-14 | End: 2024-10-14

## 2024-10-14 RX ORDER — GENTAMICIN SULFATE 1 MG/G
OINTMENT TOPICAL ONCE
OUTPATIENT
Start: 2024-10-14 | End: 2024-10-14

## 2024-10-14 RX ORDER — SILVER SULFADIAZINE 10 MG/G
CREAM TOPICAL ONCE
OUTPATIENT
Start: 2024-10-14 | End: 2024-10-14

## 2024-10-14 RX ORDER — GINSENG 100 MG
CAPSULE ORAL ONCE
OUTPATIENT
Start: 2024-10-14 | End: 2024-10-14

## 2024-10-14 RX ORDER — MUPIROCIN 20 MG/G
OINTMENT TOPICAL ONCE
OUTPATIENT
Start: 2024-10-14 | End: 2024-10-14

## 2024-10-14 RX ORDER — SODIUM CHLOR/HYPOCHLOROUS ACID 0.033 %
SOLUTION, IRRIGATION IRRIGATION ONCE
OUTPATIENT
Start: 2024-10-14 | End: 2024-10-14

## 2024-10-14 RX ORDER — LIDOCAINE 40 MG/G
CREAM TOPICAL ONCE
OUTPATIENT
Start: 2024-10-14 | End: 2024-10-14

## 2024-10-14 NOTE — PROGRESS NOTES
Subjective:  Kedar Medellin is a 69 y.o. male who presents to the office today for left foot ulcer.  Decreased draiange again. No signs of infection seen at home.  Patient is offloaded.  Currently denies F/C/N/V.     Allergies   Allergen Reactions    Bee Venom Anaphylaxis    Naproxen      Other reaction(s): Unknown Reaction    Semaglutide      Other reaction(s): Constipation    Empagliflozin Rash       Past Medical History:   Diagnosis Date    Allergic rhinitis     Colon polyps     history of    Elevated WBC count     history of    Hyperlipidemia     Hypertension     Obesity     Pain, joint, hand, left 7/28/2021    Partial small bowel obstruction (HCC) 3/5/2023    SBO (small bowel obstruction) (HCC) 3/12/2023    Sepsis with acute renal failure (HCC) 3/5/2023    Type II or unspecified type diabetes mellitus without mention of complication, not stated as uncontrolled     Unspecified sleep apnea     Weakness of left hand 7/28/2021       Prior to Admission medications    Medication Sig Start Date End Date Taking? Authorizing Provider   silver sulfADIAZINE (SILVADENE) 1 % cream Apply topically daily. 3/6/24  Yes Emory Reyna DPM   Omega-3 Fatty Acids (FISH OIL) 1000 MG capsule Take by mouth daily   Yes ProviderSoni MD   tamsulosin (FLOMAX) 0.4 MG capsule Take 1 capsule by mouth daily 8/18/23  Yes Barron Langley MD   Insulin Glargine-yfgn 100 UNIT/ML SOPN INJECT 40 UNITS UNDER SKIN AT BEDTIME FOR TYPE 2 DIABETES MELLITUS 6/14/23  Yes ProviderSoni MD   atorvastatin (LIPITOR) 80 MG tablet 0.5 tablets 5/18/22  Yes Provider, MD Soni   vitamin D (CHOLECALCIFEROL) 25 MCG (1000 UT) TABS tablet TAKE TWO TABLETS BY MOUTH EVERY DAY 5/29/14  Yes ProviderSoni MD   sildenafil (VIAGRA) 50 MG tablet 0.5 tablets 5/18/22  Yes ProviderSoni MD   gabapentin (NEURONTIN) 300 MG capsule Take 1 capsule by mouth 2 times daily.   Yes ProviderSoni MD   lisinopril-hydrochlorothiazide

## 2024-10-14 NOTE — PLAN OF CARE
Problem: Cognitive:  Goal: Knowledge of wound care  Description: Knowledge of wound care  Outcome: Progressing  Goal: Understands risk factors for wounds  Description: Understands risk factors for wounds  Outcome: Progressing     Problem: Wound:  Goal: Will show signs of wound healing; wound closure and no evidence of infection  Description: Will show signs of wound healing; wound closure and no evidence of infection  Outcome: Progressing     Problem: Compression therapy:  Goal: Will be free from complications associated with compression therapy  Description: Will be free from complications associated with compression therapy  Outcome: Progressing     Problem: Weight control:  Goal: Ability to maintain an optimal weight for height and age will be supported  Description: Ability to maintain an optimal weight for height and age will be supported  Outcome: Progressing     Problem: Falls - Risk of:  Goal: Will remain free from falls  Description: Will remain free from falls  Outcome: Progressing     Problem: Blood Glucose:  Goal: Ability to maintain appropriate glucose levels will improve  Description: Ability to maintain appropriate glucose levels will improve  Outcome: Progressing

## 2024-10-21 ENCOUNTER — HOSPITAL ENCOUNTER (OUTPATIENT)
Dept: WOUND CARE | Age: 69
Discharge: HOME | End: 2024-10-22
Attending: PODIATRIST
Payer: OTHER GOVERNMENT

## 2024-10-21 VITALS
HEART RATE: 76 BPM | HEIGHT: 71 IN | DIASTOLIC BLOOD PRESSURE: 80 MMHG | TEMPERATURE: 97.3 F | WEIGHT: 301 LBS | BODY MASS INDEX: 42.14 KG/M2 | RESPIRATION RATE: 18 BRPM | SYSTOLIC BLOOD PRESSURE: 124 MMHG

## 2024-10-21 DIAGNOSIS — L97.522 ULCER OF LEFT FOOT WITH FAT LAYER EXPOSED (HCC): Primary | ICD-10-CM

## 2024-10-21 PROCEDURE — 97597 DBRDMT OPN WND 1ST 20 CM/<: CPT

## 2024-10-21 PROCEDURE — 97597 DBRDMT OPN WND 1ST 20 CM/<: CPT | Performed by: PODIATRIST

## 2024-10-21 RX ORDER — LIDOCAINE HYDROCHLORIDE 20 MG/ML
JELLY TOPICAL ONCE
OUTPATIENT
Start: 2024-10-21 | End: 2024-10-21

## 2024-10-21 RX ORDER — GENTAMICIN SULFATE 1 MG/G
OINTMENT TOPICAL ONCE
OUTPATIENT
Start: 2024-10-21 | End: 2024-10-21

## 2024-10-21 RX ORDER — LIDOCAINE HYDROCHLORIDE 40 MG/ML
SOLUTION TOPICAL ONCE
OUTPATIENT
Start: 2024-10-21 | End: 2024-10-21

## 2024-10-21 RX ORDER — NEOMYCIN/BACITRACIN/POLYMYXINB 3.5-400-5K
OINTMENT (GRAM) TOPICAL ONCE
OUTPATIENT
Start: 2024-10-21 | End: 2024-10-21

## 2024-10-21 RX ORDER — SODIUM CHLOR/HYPOCHLOROUS ACID 0.033 %
SOLUTION, IRRIGATION IRRIGATION ONCE
OUTPATIENT
Start: 2024-10-21 | End: 2024-10-21

## 2024-10-21 RX ORDER — BETAMETHASONE DIPROPIONATE 0.5 MG/G
CREAM TOPICAL ONCE
OUTPATIENT
Start: 2024-10-21 | End: 2024-10-21

## 2024-10-21 RX ORDER — LIDOCAINE 50 MG/G
OINTMENT TOPICAL ONCE
OUTPATIENT
Start: 2024-10-21 | End: 2024-10-21

## 2024-10-21 RX ORDER — TRIAMCINOLONE ACETONIDE 1 MG/G
OINTMENT TOPICAL ONCE
OUTPATIENT
Start: 2024-10-21 | End: 2024-10-21

## 2024-10-21 RX ORDER — MUPIROCIN 20 MG/G
OINTMENT TOPICAL ONCE
OUTPATIENT
Start: 2024-10-21 | End: 2024-10-21

## 2024-10-21 RX ORDER — CLOBETASOL PROPIONATE 0.5 MG/G
OINTMENT TOPICAL ONCE
OUTPATIENT
Start: 2024-10-21 | End: 2024-10-21

## 2024-10-21 RX ORDER — GINSENG 100 MG
CAPSULE ORAL ONCE
OUTPATIENT
Start: 2024-10-21 | End: 2024-10-21

## 2024-10-21 RX ORDER — LIDOCAINE 40 MG/G
CREAM TOPICAL ONCE
OUTPATIENT
Start: 2024-10-21 | End: 2024-10-21

## 2024-10-21 RX ORDER — SILVER SULFADIAZINE 10 MG/G
CREAM TOPICAL ONCE
OUTPATIENT
Start: 2024-10-21 | End: 2024-10-21

## 2024-10-21 RX ORDER — BACITRACIN ZINC AND POLYMYXIN B SULFATE 500; 1000 [USP'U]/G; [USP'U]/G
OINTMENT TOPICAL ONCE
OUTPATIENT
Start: 2024-10-21 | End: 2024-10-21

## 2024-10-21 NOTE — PLAN OF CARE
Problem: Cognitive:  Goal: Knowledge of wound care  Description: Knowledge of wound care  Outcome: Progressing  Goal: Understands risk factors for wounds  Description: Understands risk factors for wounds  Outcome: Progressing     Problem: Wound:  Goal: Will show signs of wound healing; wound closure and no evidence of infection  Description: Will show signs of wound healing; wound closure and no evidence of infection  Outcome: Progressing     Problem: Pressure Ulcer:  Goal: Signs of wound healing will improve  Description: Signs of wound healing will improve  Outcome: Progressing  Goal: Absence of new pressure ulcer  Description: Absence of new pressure ulcer  Outcome: Progressing  Goal: Will show no infection signs and symptoms  Description: Will show no infection signs and symptoms  Outcome: Progressing     Problem: Weight control:  Goal: Ability to maintain an optimal weight for height and age will be supported  Description: Ability to maintain an optimal weight for height and age will be supported  Outcome: Progressing     Problem: Falls - Risk of:  Goal: Will remain free from falls  Description: Will remain free from falls  Outcome: Progressing     Problem: Blood Glucose:  Goal: Ability to maintain appropriate glucose levels will improve  Description: Ability to maintain appropriate glucose levels will improve  Outcome: Progressing

## 2024-10-21 NOTE — PROGRESS NOTES
Subjective:  Kedar Medellin is a 69 y.o. male who presents to the office today for left foot ulcer.  Minimal draiange.  No signs of infection seen at home.  Patient is offloaded.  Currently denies F/C/N/V.     Allergies   Allergen Reactions    Bee Venom Anaphylaxis    Naproxen      Other reaction(s): Unknown Reaction    Semaglutide      Other reaction(s): Constipation    Empagliflozin Rash       Past Medical History:   Diagnosis Date    Allergic rhinitis     Colon polyps     history of    Elevated WBC count     history of    Hyperlipidemia     Hypertension     Obesity     Pain, joint, hand, left 7/28/2021    Partial small bowel obstruction (HCC) 3/5/2023    SBO (small bowel obstruction) (HCC) 3/12/2023    Sepsis with acute renal failure (HCC) 3/5/2023    Type II or unspecified type diabetes mellitus without mention of complication, not stated as uncontrolled     Unspecified sleep apnea     Weakness of left hand 7/28/2021       Prior to Admission medications    Medication Sig Start Date End Date Taking? Authorizing Provider   silver sulfADIAZINE (SILVADENE) 1 % cream Apply topically daily. 3/6/24  Yes Emory Reyna DPM   Omega-3 Fatty Acids (FISH OIL) 1000 MG capsule Take by mouth daily   Yes ProviderSoni MD   tamsulosin (FLOMAX) 0.4 MG capsule Take 1 capsule by mouth daily 8/18/23  Yes Barron Langley MD   Insulin Glargine-yfgn 100 UNIT/ML SOPN INJECT 40 UNITS UNDER SKIN AT BEDTIME FOR TYPE 2 DIABETES MELLITUS 6/14/23  Yes Provider, MD Soni   atorvastatin (LIPITOR) 80 MG tablet 0.5 tablets 5/18/22  Yes Provider, MD Soni   vitamin D (CHOLECALCIFEROL) 25 MCG (1000 UT) TABS tablet TAKE TWO TABLETS BY MOUTH EVERY DAY 5/29/14  Yes ProviderSoni MD   sildenafil (VIAGRA) 50 MG tablet 0.5 tablets 5/18/22  Yes ProviderSoni MD   gabapentin (NEURONTIN) 300 MG capsule Take 1 capsule by mouth 2 times daily.   Yes ProviderSoni MD   lisinopril-hydrochlorothiazide

## 2024-10-24 ENCOUNTER — OFFICE VISIT (OUTPATIENT)
Dept: PODIATRY | Age: 69
End: 2024-10-24
Payer: OTHER GOVERNMENT

## 2024-10-24 VITALS
HEART RATE: 84 BPM | DIASTOLIC BLOOD PRESSURE: 72 MMHG | WEIGHT: 301 LBS | BODY MASS INDEX: 41.98 KG/M2 | SYSTOLIC BLOOD PRESSURE: 130 MMHG | RESPIRATION RATE: 20 BRPM

## 2024-10-24 DIAGNOSIS — S98.132A AMPUTATED TOE OF LEFT FOOT (HCC): ICD-10-CM

## 2024-10-24 DIAGNOSIS — E11.42 DM TYPE 2 WITH DIABETIC PERIPHERAL NEUROPATHY (HCC): Primary | ICD-10-CM

## 2024-10-24 DIAGNOSIS — B35.1 DERMATOPHYTOSIS OF NAIL: ICD-10-CM

## 2024-10-24 PROCEDURE — 11720 DEBRIDE NAIL 1-5: CPT | Performed by: PODIATRIST

## 2024-10-24 PROCEDURE — 99999 PR OFFICE/OUTPT VISIT,PROCEDURE ONLY: CPT | Performed by: PODIATRIST

## 2024-10-24 NOTE — PROGRESS NOTES
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 male who presents to the office today DM foot care.   Currently denies F/C/N/V.     Allergies   Allergen Reactions    Bee Venom Anaphylaxis    Naproxen      Other reaction(s): Unknown Reaction    Semaglutide      Other reaction(s): Constipation    Empagliflozin Rash       Past Medical History:   Diagnosis Date    Allergic rhinitis     Colon polyps     history of    Elevated WBC count     history of    Hyperlipidemia     Hypertension     Obesity     Pain, joint, hand, left 7/28/2021    Partial small bowel obstruction (HCC) 3/5/2023    SBO (small bowel obstruction) (HCC) 3/12/2023    Sepsis with acute renal failure (HCC) 3/5/2023    Type II or unspecified type diabetes mellitus without mention of complication, not stated as uncontrolled     Unspecified sleep apnea     Weakness of left hand 7/28/2021       Prior to Admission medications    Medication Sig Start Date End Date Taking? Authorizing Provider   silver sulfADIAZINE (SILVADENE) 1 % cream Apply topically daily. 3/6/24  Yes Emory Reyna DPM   Omega-3 Fatty Acids (FISH OIL) 1000 MG capsule Take by mouth daily   Yes Provider, MD Soni   tamsulosin (FLOMAX) 0.4 MG capsule Take 1 capsule by mouth daily 8/18/23  Yes Barron Langley MD   Insulin Glargine-yfgn 100 UNIT/ML SOPN INJECT 40 UNITS UNDER SKIN AT BEDTIME FOR TYPE 2 DIABETES MELLITUS 6/14/23  Yes Provider, MD Soni   atorvastatin (LIPITOR) 80 MG tablet 0.5 tablets 5/18/22  Yes Provider, MD Soni   vitamin D (CHOLECALCIFEROL) 25 MCG (1000 UT) TABS tablet TAKE TWO TABLETS BY MOUTH EVERY DAY 5/29/14

## 2024-10-28 ENCOUNTER — HOSPITAL ENCOUNTER (OUTPATIENT)
Dept: WOUND CARE | Age: 69
Discharge: HOME | End: 2024-10-29
Attending: PODIATRIST
Payer: OTHER GOVERNMENT

## 2024-10-28 VITALS
SYSTOLIC BLOOD PRESSURE: 126 MMHG | HEART RATE: 72 BPM | TEMPERATURE: 97.1 F | RESPIRATION RATE: 18 BRPM | DIASTOLIC BLOOD PRESSURE: 80 MMHG

## 2024-10-28 DIAGNOSIS — L97.522 ULCER OF LEFT FOOT WITH FAT LAYER EXPOSED (HCC): Primary | ICD-10-CM

## 2024-10-28 PROCEDURE — 97597 DBRDMT OPN WND 1ST 20 CM/<: CPT

## 2024-10-28 PROCEDURE — 97597 DBRDMT OPN WND 1ST 20 CM/<: CPT | Performed by: PODIATRIST

## 2024-10-28 RX ORDER — LIDOCAINE HYDROCHLORIDE 40 MG/ML
SOLUTION TOPICAL ONCE
OUTPATIENT
Start: 2024-10-28 | End: 2024-10-28

## 2024-10-28 RX ORDER — LIDOCAINE HYDROCHLORIDE 20 MG/ML
JELLY TOPICAL ONCE
OUTPATIENT
Start: 2024-10-28 | End: 2024-10-28

## 2024-10-28 RX ORDER — LIDOCAINE 50 MG/G
OINTMENT TOPICAL ONCE
OUTPATIENT
Start: 2024-10-28 | End: 2024-10-28

## 2024-10-28 RX ORDER — SILVER SULFADIAZINE 10 MG/G
CREAM TOPICAL ONCE
OUTPATIENT
Start: 2024-10-28 | End: 2024-10-28

## 2024-10-28 RX ORDER — GENTAMICIN SULFATE 1 MG/G
OINTMENT TOPICAL ONCE
OUTPATIENT
Start: 2024-10-28 | End: 2024-10-28

## 2024-10-28 RX ORDER — LIDOCAINE 40 MG/G
CREAM TOPICAL ONCE
OUTPATIENT
Start: 2024-10-28 | End: 2024-10-28

## 2024-10-28 RX ORDER — NEOMYCIN/BACITRACIN/POLYMYXINB 3.5-400-5K
OINTMENT (GRAM) TOPICAL ONCE
OUTPATIENT
Start: 2024-10-28 | End: 2024-10-28

## 2024-10-28 RX ORDER — SODIUM CHLOR/HYPOCHLOROUS ACID 0.033 %
SOLUTION, IRRIGATION IRRIGATION ONCE
OUTPATIENT
Start: 2024-10-28 | End: 2024-10-28

## 2024-10-28 RX ORDER — CLOBETASOL PROPIONATE 0.5 MG/G
OINTMENT TOPICAL ONCE
OUTPATIENT
Start: 2024-10-28 | End: 2024-10-28

## 2024-10-28 RX ORDER — BETAMETHASONE DIPROPIONATE 0.5 MG/G
CREAM TOPICAL ONCE
OUTPATIENT
Start: 2024-10-28 | End: 2024-10-28

## 2024-10-28 RX ORDER — MUPIROCIN 20 MG/G
OINTMENT TOPICAL ONCE
OUTPATIENT
Start: 2024-10-28 | End: 2024-10-28

## 2024-10-28 RX ORDER — BACITRACIN ZINC AND POLYMYXIN B SULFATE 500; 1000 [USP'U]/G; [USP'U]/G
OINTMENT TOPICAL ONCE
OUTPATIENT
Start: 2024-10-28 | End: 2024-10-28

## 2024-10-28 RX ORDER — TRIAMCINOLONE ACETONIDE 1 MG/G
OINTMENT TOPICAL ONCE
OUTPATIENT
Start: 2024-10-28 | End: 2024-10-28

## 2024-10-28 RX ORDER — GINSENG 100 MG
CAPSULE ORAL ONCE
OUTPATIENT
Start: 2024-10-28 | End: 2024-10-28

## 2024-10-28 NOTE — PLAN OF CARE
Problem: Cognitive:  Goal: Knowledge of wound care  Description: Knowledge of wound care  Outcome: Progressing  Goal: Understands risk factors for wounds  Description: Understands risk factors for wounds  Outcome: Progressing     Problem: Wound:  Goal: Will show signs of wound healing; wound closure and no evidence of infection  Description: Will show signs of wound healing; wound closure and no evidence of infection  Outcome: Progressing     Problem: Pressure Ulcer:  Goal: Signs of wound healing will improve  Description: Signs of wound healing will improve  Outcome: Progressing  Goal: Absence of new pressure ulcer  Description: Absence of new pressure ulcer  Outcome: Progressing  Goal: Will show no infection signs and symptoms  Description: Will show no infection signs and symptoms  Outcome: Progressing     Problem: Arterial:  Goal: Optimize blood flow for wound healing  Description: Optimize blood flow for wound healing  Outcome: Progressing     Problem: Venous:  Goal: Signs of wound healing will improve  Description: Signs of wound healing will improve  Outcome: Progressing     Problem: Smoking cessation:  Goal: Ability to formulate a plan to maintain a tobacco-free life will be supported  Description: Ability to formulate a plan to maintain a tobacco-free life will be supported  Outcome: Progressing     Problem: Compression therapy:  Goal: Will be free from complications associated with compression therapy  Description: Will be free from complications associated with compression therapy  Outcome: Progressing     Problem: Weight control:  Goal: Ability to maintain an optimal weight for height and age will be supported  Description: Ability to maintain an optimal weight for height and age will be supported  Outcome: Progressing     Problem: Falls - Risk of:  Goal: Will remain free from falls  Description: Will remain free from falls  Outcome: Progressing     Problem: Blood Glucose:  Goal: Ability to maintain

## 2024-10-28 NOTE — PROGRESS NOTES
Subjective:  Kedar Medellin is a 69 y.o. male who presents to the office today for left foot ulcer. Drainage mild.   No signs of infection seen at home.  Patient is offloaded.  Currently denies F/C/N/V.     Allergies   Allergen Reactions    Bee Venom Anaphylaxis    Naproxen      Other reaction(s): Unknown Reaction    Semaglutide      Other reaction(s): Constipation    Empagliflozin Rash       Past Medical History:   Diagnosis Date    Allergic rhinitis     Colon polyps     history of    Elevated WBC count     history of    Hyperlipidemia     Hypertension     Obesity     Pain, joint, hand, left 7/28/2021    Partial small bowel obstruction (HCC) 3/5/2023    SBO (small bowel obstruction) (HCC) 3/12/2023    Sepsis with acute renal failure (HCC) 3/5/2023    Type II or unspecified type diabetes mellitus without mention of complication, not stated as uncontrolled     Unspecified sleep apnea     Weakness of left hand 7/28/2021       Prior to Admission medications    Medication Sig Start Date End Date Taking? Authorizing Provider   silver sulfADIAZINE (SILVADENE) 1 % cream Apply topically daily. 3/6/24  Yes Emory Reyna DPM   Omega-3 Fatty Acids (FISH OIL) 1000 MG capsule Take by mouth daily   Yes ProviderSoni MD   tamsulosin (FLOMAX) 0.4 MG capsule Take 1 capsule by mouth daily 8/18/23  Yes Barron Langley MD   Insulin Glargine-yfgn 100 UNIT/ML SOPN INJECT 40 UNITS UNDER SKIN AT BEDTIME FOR TYPE 2 DIABETES MELLITUS 6/14/23  Yes Provider, MD Soni   atorvastatin (LIPITOR) 80 MG tablet 0.5 tablets 5/18/22  Yes Provider, MD Soni   vitamin D (CHOLECALCIFEROL) 25 MCG (1000 UT) TABS tablet TAKE TWO TABLETS BY MOUTH EVERY DAY 5/29/14  Yes Provider, MD Soni   sildenafil (VIAGRA) 50 MG tablet 0.5 tablets 5/18/22  Yes ProviderSoni MD   gabapentin (NEURONTIN) 300 MG capsule Take 1 capsule by mouth 2 times daily.   Yes ProviderSoni MD   lisinopril-hydrochlorothiazide

## 2024-11-04 ENCOUNTER — APPOINTMENT (OUTPATIENT)
Dept: GENERAL RADIOLOGY | Age: 69
DRG: 372 | End: 2024-11-04
Payer: MEDICARE

## 2024-11-04 ENCOUNTER — HOSPITAL ENCOUNTER (OUTPATIENT)
Dept: WOUND CARE | Age: 69
Discharge: HOME | End: 2024-11-05
Payer: OTHER GOVERNMENT

## 2024-11-04 ENCOUNTER — APPOINTMENT (OUTPATIENT)
Dept: CT IMAGING | Age: 69
DRG: 372 | End: 2024-11-04
Payer: MEDICARE

## 2024-11-04 ENCOUNTER — HOSPITAL ENCOUNTER (INPATIENT)
Age: 69
LOS: 5 days | Discharge: HOME | DRG: 372 | End: 2024-11-09
Attending: STUDENT IN AN ORGANIZED HEALTH CARE EDUCATION/TRAINING PROGRAM | Admitting: INTERNAL MEDICINE
Payer: MEDICARE

## 2024-11-04 VITALS
HEIGHT: 71 IN | WEIGHT: 305.8 LBS | BODY MASS INDEX: 42.81 KG/M2 | TEMPERATURE: 98.2 F | HEART RATE: 84 BPM | RESPIRATION RATE: 24 BRPM | SYSTOLIC BLOOD PRESSURE: 132 MMHG | DIASTOLIC BLOOD PRESSURE: 76 MMHG

## 2024-11-04 DIAGNOSIS — K56.609 SBO (SMALL BOWEL OBSTRUCTION) (HCC): Primary | ICD-10-CM

## 2024-11-04 DIAGNOSIS — K52.9 ILEITIS: ICD-10-CM

## 2024-11-04 DIAGNOSIS — L97.522 ULCER OF LEFT FOOT WITH FAT LAYER EXPOSED (HCC): Primary | ICD-10-CM

## 2024-11-04 PROBLEM — E66.9 OBESITY: Status: ACTIVE | Noted: 2024-11-04

## 2024-11-04 PROBLEM — K50.012 TERMINAL ILEITIS, WITH INTESTINAL OBSTRUCTION (HCC): Status: ACTIVE | Noted: 2024-11-04

## 2024-11-04 PROBLEM — N52.9 ERECTILE DYSFUNCTION: Status: ACTIVE | Noted: 2024-11-04

## 2024-11-04 PROBLEM — F32.A DEPRESSION: Status: ACTIVE | Noted: 2024-11-04

## 2024-11-04 PROBLEM — H93.19 TINNITUS: Status: ACTIVE | Noted: 2024-11-04

## 2024-11-04 PROBLEM — H91.93 BILATERAL HEARING LOSS: Status: ACTIVE | Noted: 2024-11-04

## 2024-11-04 PROBLEM — A41.9 SEPSIS (HCC): Status: RESOLVED | Noted: 2023-03-05 | Resolved: 2024-11-04

## 2024-11-04 PROBLEM — E86.0 DEHYDRATION: Status: ACTIVE | Noted: 2024-11-04

## 2024-11-04 LAB
ALBUMIN SERPL-MCNC: 4.4 G/DL (ref 3.5–5.2)
ALBUMIN/GLOB SERPL: 1.2 {RATIO} (ref 1–2.5)
ALP SERPL-CCNC: 94 U/L (ref 40–129)
ALT SERPL-CCNC: 35 U/L (ref 5–41)
ANION GAP SERPL CALCULATED.3IONS-SCNC: 18 MMOL/L (ref 9–17)
AST SERPL-CCNC: 44 U/L
BACTERIA URNS QL MICRO: ABNORMAL
BILIRUB SERPL-MCNC: 0.4 MG/DL (ref 0.3–1.2)
BILIRUB UR QL STRIP: NEGATIVE
BUN SERPL-MCNC: 27 MG/DL (ref 8–23)
BUN/CREAT SERPL: 21 (ref 9–20)
CALCIUM SERPL-MCNC: 9.8 MG/DL (ref 8.6–10.4)
CHARACTER UR: ABNORMAL
CHLORIDE SERPL-SCNC: 99 MMOL/L (ref 98–107)
CLARITY UR: CLEAR
CO2 SERPL-SCNC: 19 MMOL/L (ref 20–31)
COLOR UR: YELLOW
CREAT SERPL-MCNC: 1.3 MG/DL (ref 0.7–1.2)
EPI CELLS #/AREA URNS HPF: ABNORMAL /HPF (ref 0–5)
ERYTHROCYTE [DISTWIDTH] IN BLOOD BY AUTOMATED COUNT: 13.8 % (ref 11.8–14.4)
GFR, ESTIMATED: 59 ML/MIN/1.73M2
GLUCOSE BLD-MCNC: 155 MG/DL (ref 75–110)
GLUCOSE SERPL-MCNC: 167 MG/DL (ref 70–99)
GLUCOSE UR STRIP-MCNC: NEGATIVE MG/DL
HCT VFR BLD AUTO: 39.3 % (ref 40.7–50.3)
HGB BLD-MCNC: 13.1 G/DL (ref 13–17)
HGB UR QL STRIP.AUTO: NEGATIVE
KETONES UR STRIP-MCNC: ABNORMAL MG/DL
LEUKOCYTE ESTERASE UR QL STRIP: NEGATIVE
LIPASE SERPL-CCNC: 26 U/L (ref 13–60)
MCH RBC QN AUTO: 28.7 PG (ref 25.2–33.5)
MCHC RBC AUTO-ENTMCNC: 33.3 G/DL (ref 25.2–33.5)
MCV RBC AUTO: 86.2 FL (ref 82.6–102.9)
MUCOUS THREADS URNS QL MICRO: ABNORMAL
NITRITE UR QL STRIP: NEGATIVE
NRBC BLD-RTO: 0 PER 100 WBC
PH UR STRIP: 6 [PH] (ref 5–6)
PLATELET # BLD AUTO: 371 K/UL (ref 138–453)
PMV BLD AUTO: 9.8 FL (ref 8.1–13.5)
POTASSIUM SERPL-SCNC: 5 MMOL/L (ref 3.7–5.3)
PROT SERPL-MCNC: 8.1 G/DL (ref 6.4–8.3)
PROT UR STRIP-MCNC: ABNORMAL MG/DL
RBC # BLD AUTO: 4.56 M/UL (ref 4.21–5.77)
RBC #/AREA URNS HPF: ABNORMAL /HPF (ref 0–4)
SODIUM SERPL-SCNC: 136 MMOL/L (ref 135–144)
SP GR UR STRIP: 1.02 (ref 1.01–1.02)
TROPONIN I SERPL HS-MCNC: 41 NG/L (ref 0–22)
TROPONIN I SERPL HS-MCNC: 42 NG/L (ref 0–22)
UROBILINOGEN UR STRIP-ACNC: NORMAL EU/DL (ref 0–1)
WBC #/AREA URNS HPF: ABNORMAL /HPF (ref 0–4)
WBC OTHER # BLD: 17.3 K/UL (ref 3.5–11.3)

## 2024-11-04 PROCEDURE — 82947 ASSAY GLUCOSE BLOOD QUANT: CPT

## 2024-11-04 PROCEDURE — 6360000002 HC RX W HCPCS: Performed by: STUDENT IN AN ORGANIZED HEALTH CARE EDUCATION/TRAINING PROGRAM

## 2024-11-04 PROCEDURE — 6360000004 HC RX CONTRAST MEDICATION: Performed by: STUDENT IN AN ORGANIZED HEALTH CARE EDUCATION/TRAINING PROGRAM

## 2024-11-04 PROCEDURE — 2060000000 HC ICU INTERMEDIATE R&B

## 2024-11-04 PROCEDURE — 2709999900 CT ABDOMEN PELVIS W IV CONTRAST

## 2024-11-04 PROCEDURE — 96375 TX/PRO/DX INJ NEW DRUG ADDON: CPT

## 2024-11-04 PROCEDURE — 83605 ASSAY OF LACTIC ACID: CPT

## 2024-11-04 PROCEDURE — 81001 URINALYSIS AUTO W/SCOPE: CPT

## 2024-11-04 PROCEDURE — 84484 ASSAY OF TROPONIN QUANT: CPT

## 2024-11-04 PROCEDURE — 83690 ASSAY OF LIPASE: CPT

## 2024-11-04 PROCEDURE — 80053 COMPREHEN METABOLIC PANEL: CPT

## 2024-11-04 PROCEDURE — 85027 COMPLETE CBC AUTOMATED: CPT

## 2024-11-04 PROCEDURE — 99285 EMERGENCY DEPT VISIT HI MDM: CPT

## 2024-11-04 PROCEDURE — 97597 DBRDMT OPN WND 1ST 20 CM/<: CPT | Performed by: PODIATRIST

## 2024-11-04 PROCEDURE — 96374 THER/PROPH/DIAG INJ IV PUSH: CPT

## 2024-11-04 PROCEDURE — 2580000003 HC RX 258: Performed by: NURSE PRACTITIONER

## 2024-11-04 PROCEDURE — 2580000003 HC RX 258: Performed by: STUDENT IN AN ORGANIZED HEALTH CARE EDUCATION/TRAINING PROGRAM

## 2024-11-04 PROCEDURE — 93005 ELECTROCARDIOGRAM TRACING: CPT | Performed by: STUDENT IN AN ORGANIZED HEALTH CARE EDUCATION/TRAINING PROGRAM

## 2024-11-04 PROCEDURE — 6360000002 HC RX W HCPCS: Performed by: NURSE PRACTITIONER

## 2024-11-04 PROCEDURE — 36415 COLL VENOUS BLD VENIPUNCTURE: CPT

## 2024-11-04 PROCEDURE — 97597 DBRDMT OPN WND 1ST 20 CM/<: CPT

## 2024-11-04 PROCEDURE — 71045 X-RAY EXAM CHEST 1 VIEW: CPT

## 2024-11-04 RX ORDER — NEOMYCIN/BACITRACIN/POLYMYXINB 3.5-400-5K
OINTMENT (GRAM) TOPICAL ONCE
OUTPATIENT
Start: 2024-11-04 | End: 2024-11-04

## 2024-11-04 RX ORDER — CLOBETASOL PROPIONATE 0.5 MG/G
OINTMENT TOPICAL ONCE
OUTPATIENT
Start: 2024-11-04 | End: 2024-11-04

## 2024-11-04 RX ORDER — SODIUM CHLORIDE 9 MG/ML
INJECTION, SOLUTION INTRAVENOUS CONTINUOUS
Status: DISCONTINUED | OUTPATIENT
Start: 2024-11-04 | End: 2024-11-06

## 2024-11-04 RX ORDER — LIDOCAINE 50 MG/G
OINTMENT TOPICAL ONCE
OUTPATIENT
Start: 2024-11-04 | End: 2024-11-04

## 2024-11-04 RX ORDER — SODIUM CHLORIDE 9 MG/ML
INJECTION, SOLUTION INTRAVENOUS PRN
Status: DISCONTINUED | OUTPATIENT
Start: 2024-11-04 | End: 2024-11-09 | Stop reason: HOSPADM

## 2024-11-04 RX ORDER — BETAMETHASONE DIPROPIONATE 0.5 MG/G
CREAM TOPICAL ONCE
OUTPATIENT
Start: 2024-11-04 | End: 2024-11-04

## 2024-11-04 RX ORDER — METRONIDAZOLE 500 MG/100ML
500 INJECTION, SOLUTION INTRAVENOUS ONCE
Status: COMPLETED | OUTPATIENT
Start: 2024-11-04 | End: 2024-11-05

## 2024-11-04 RX ORDER — SODIUM CHLOR/HYPOCHLOROUS ACID 0.033 %
SOLUTION, IRRIGATION IRRIGATION ONCE
OUTPATIENT
Start: 2024-11-04 | End: 2024-11-04

## 2024-11-04 RX ORDER — ONDANSETRON 4 MG/1
4 TABLET, ORALLY DISINTEGRATING ORAL EVERY 8 HOURS PRN
Status: DISCONTINUED | OUTPATIENT
Start: 2024-11-04 | End: 2024-11-09 | Stop reason: HOSPADM

## 2024-11-04 RX ORDER — INSULIN ASPART 100 [IU]/ML
40 INJECTION, SOLUTION INTRAVENOUS; SUBCUTANEOUS 3 TIMES DAILY
COMMUNITY

## 2024-11-04 RX ORDER — IOPAMIDOL 755 MG/ML
100 INJECTION, SOLUTION INTRAVASCULAR
Status: COMPLETED | OUTPATIENT
Start: 2024-11-04 | End: 2024-11-04

## 2024-11-04 RX ORDER — 0.9 % SODIUM CHLORIDE 0.9 %
1000 INTRAVENOUS SOLUTION INTRAVENOUS ONCE
Status: COMPLETED | OUTPATIENT
Start: 2024-11-04 | End: 2024-11-04

## 2024-11-04 RX ORDER — SODIUM CHLORIDE 0.9 % (FLUSH) 0.9 %
5-40 SYRINGE (ML) INJECTION PRN
Status: DISCONTINUED | OUTPATIENT
Start: 2024-11-04 | End: 2024-11-09 | Stop reason: HOSPADM

## 2024-11-04 RX ORDER — CIPROFLOXACIN 2 MG/ML
400 INJECTION, SOLUTION INTRAVENOUS ONCE
Status: COMPLETED | OUTPATIENT
Start: 2024-11-04 | End: 2024-11-04

## 2024-11-04 RX ORDER — SILVER SULFADIAZINE 10 MG/G
CREAM TOPICAL ONCE
OUTPATIENT
Start: 2024-11-04 | End: 2024-11-04

## 2024-11-04 RX ORDER — LIDOCAINE 40 MG/G
CREAM TOPICAL ONCE
OUTPATIENT
Start: 2024-11-04 | End: 2024-11-04

## 2024-11-04 RX ORDER — LIDOCAINE HYDROCHLORIDE 20 MG/ML
JELLY TOPICAL ONCE
OUTPATIENT
Start: 2024-11-04 | End: 2024-11-04

## 2024-11-04 RX ORDER — BACITRACIN ZINC AND POLYMYXIN B SULFATE 500; 1000 [USP'U]/G; [USP'U]/G
OINTMENT TOPICAL ONCE
OUTPATIENT
Start: 2024-11-04 | End: 2024-11-04

## 2024-11-04 RX ORDER — ONDANSETRON 2 MG/ML
4 INJECTION INTRAMUSCULAR; INTRAVENOUS EVERY 6 HOURS PRN
Status: DISCONTINUED | OUTPATIENT
Start: 2024-11-04 | End: 2024-11-09 | Stop reason: HOSPADM

## 2024-11-04 RX ORDER — MAGNESIUM SULFATE IN WATER 40 MG/ML
2000 INJECTION, SOLUTION INTRAVENOUS PRN
Status: DISCONTINUED | OUTPATIENT
Start: 2024-11-04 | End: 2024-11-09 | Stop reason: HOSPADM

## 2024-11-04 RX ORDER — INSULIN LISPRO 100 [IU]/ML
0-8 INJECTION, SOLUTION INTRAVENOUS; SUBCUTANEOUS
Status: DISCONTINUED | OUTPATIENT
Start: 2024-11-04 | End: 2024-11-05

## 2024-11-04 RX ORDER — ENOXAPARIN SODIUM 100 MG/ML
30 INJECTION SUBCUTANEOUS 2 TIMES DAILY
Status: DISCONTINUED | OUTPATIENT
Start: 2024-11-04 | End: 2024-11-09 | Stop reason: HOSPADM

## 2024-11-04 RX ORDER — ACETAMINOPHEN 325 MG/1
650 TABLET ORAL EVERY 6 HOURS PRN
Status: DISCONTINUED | OUTPATIENT
Start: 2024-11-04 | End: 2024-11-09 | Stop reason: HOSPADM

## 2024-11-04 RX ORDER — ONDANSETRON 2 MG/ML
4 INJECTION INTRAMUSCULAR; INTRAVENOUS ONCE
Status: COMPLETED | OUTPATIENT
Start: 2024-11-04 | End: 2024-11-04

## 2024-11-04 RX ORDER — TRIAMCINOLONE ACETONIDE 1 MG/G
OINTMENT TOPICAL ONCE
OUTPATIENT
Start: 2024-11-04 | End: 2024-11-04

## 2024-11-04 RX ORDER — METRONIDAZOLE 500 MG/100ML
500 INJECTION, SOLUTION INTRAVENOUS EVERY 8 HOURS
Status: DISCONTINUED | OUTPATIENT
Start: 2024-11-05 | End: 2024-11-05

## 2024-11-04 RX ORDER — CIPROFLOXACIN 2 MG/ML
400 INJECTION, SOLUTION INTRAVENOUS EVERY 12 HOURS
Status: DISCONTINUED | OUTPATIENT
Start: 2024-11-05 | End: 2024-11-05

## 2024-11-04 RX ORDER — GINSENG 100 MG
CAPSULE ORAL ONCE
OUTPATIENT
Start: 2024-11-04 | End: 2024-11-04

## 2024-11-04 RX ORDER — LIDOCAINE HYDROCHLORIDE 40 MG/ML
SOLUTION TOPICAL ONCE
OUTPATIENT
Start: 2024-11-04 | End: 2024-11-04

## 2024-11-04 RX ORDER — DEXTROSE MONOHYDRATE 100 MG/ML
INJECTION, SOLUTION INTRAVENOUS CONTINUOUS PRN
Status: DISCONTINUED | OUTPATIENT
Start: 2024-11-04 | End: 2024-11-09 | Stop reason: HOSPADM

## 2024-11-04 RX ORDER — GENTAMICIN SULFATE 1 MG/G
OINTMENT TOPICAL ONCE
OUTPATIENT
Start: 2024-11-04 | End: 2024-11-04

## 2024-11-04 RX ORDER — GLUCAGON 1 MG/ML
1 KIT INJECTION PRN
Status: DISCONTINUED | OUTPATIENT
Start: 2024-11-04 | End: 2024-11-09 | Stop reason: HOSPADM

## 2024-11-04 RX ORDER — SODIUM CHLORIDE 0.9 % (FLUSH) 0.9 %
5-40 SYRINGE (ML) INJECTION EVERY 12 HOURS SCHEDULED
Status: DISCONTINUED | OUTPATIENT
Start: 2024-11-04 | End: 2024-11-09 | Stop reason: HOSPADM

## 2024-11-04 RX ORDER — MUPIROCIN 20 MG/G
OINTMENT TOPICAL ONCE
OUTPATIENT
Start: 2024-11-04 | End: 2024-11-04

## 2024-11-04 RX ORDER — MORPHINE SULFATE 4 MG/ML
4 INJECTION, SOLUTION INTRAMUSCULAR; INTRAVENOUS ONCE
Status: COMPLETED | OUTPATIENT
Start: 2024-11-04 | End: 2024-11-04

## 2024-11-04 RX ORDER — POTASSIUM CHLORIDE 7.45 MG/ML
10 INJECTION INTRAVENOUS PRN
Status: DISCONTINUED | OUTPATIENT
Start: 2024-11-04 | End: 2024-11-09 | Stop reason: HOSPADM

## 2024-11-04 RX ORDER — ACETAMINOPHEN 650 MG/1
650 SUPPOSITORY RECTAL EVERY 6 HOURS PRN
Status: DISCONTINUED | OUTPATIENT
Start: 2024-11-04 | End: 2024-11-09 | Stop reason: HOSPADM

## 2024-11-04 RX ADMIN — IOPAMIDOL 100 ML: 755 INJECTION, SOLUTION INTRAVENOUS at 20:39

## 2024-11-04 RX ADMIN — SODIUM CHLORIDE: 9 INJECTION, SOLUTION INTRAVENOUS at 23:33

## 2024-11-04 RX ADMIN — CIPROFLOXACIN 400 MG: 400 INJECTION, SOLUTION INTRAVENOUS at 22:09

## 2024-11-04 RX ADMIN — SODIUM CHLORIDE 1000 ML: 9 INJECTION, SOLUTION INTRAVENOUS at 21:27

## 2024-11-04 RX ADMIN — ONDANSETRON 4 MG: 2 INJECTION INTRAMUSCULAR; INTRAVENOUS at 19:48

## 2024-11-04 RX ADMIN — MORPHINE SULFATE 4 MG: 4 INJECTION, SOLUTION INTRAMUSCULAR; INTRAVENOUS at 19:50

## 2024-11-04 RX ADMIN — METRONIDAZOLE 500 MG: 500 INJECTION, SOLUTION INTRAVENOUS at 23:34

## 2024-11-04 ASSESSMENT — PAIN SCALES - GENERAL
PAINLEVEL_OUTOF10: 9
PAINLEVEL_OUTOF10: 9

## 2024-11-04 ASSESSMENT — ENCOUNTER SYMPTOMS
BACK PAIN: 0
SHORTNESS OF BREATH: 0
NAUSEA: 1
BLOOD IN STOOL: 0
VOMITING: 1
ANAL BLEEDING: 0
ABDOMINAL PAIN: 1
ABDOMINAL DISTENTION: 1

## 2024-11-04 ASSESSMENT — PAIN - FUNCTIONAL ASSESSMENT: PAIN_FUNCTIONAL_ASSESSMENT: 0-10

## 2024-11-04 ASSESSMENT — PAIN DESCRIPTION - ORIENTATION: ORIENTATION: RIGHT;LEFT

## 2024-11-04 ASSESSMENT — PAIN DESCRIPTION - LOCATION
LOCATION: BACK;ABDOMEN
LOCATION: ABDOMEN

## 2024-11-04 NOTE — PATIENT INSTRUCTIONS
Apply hydrocolloid over wound daily and cover. Wear off loading boot.  SIGNS OF INFECTION  - Redness, swelling, skin hot  - Wound bed turns black or stringy yellow  - Foul odor  - Increased drainage or pus  - Increased pain  - Fever greater than 100F    CALL YOUR DOCTOR OR SEEK MEDICAL ATTENTION IF SIGNS OF INFECTION.  DO NOT WAIT UNTIL YOUR NEXT APPOINTMENT    Call the Wound Care Nurse with any other questions or concerns- [phone]

## 2024-11-04 NOTE — PROGRESS NOTES
Subjective:  Kedar Medellin is a 69 y.o. male who presents to the office today for left foot ulcer.  Dressing change as advised.  Patient has been doing well with offloading.  No signs of infection seen at home.  Currently denies F/C/N/V.     Allergies   Allergen Reactions    Bee Venom Anaphylaxis    Naproxen      Other reaction(s): Unknown Reaction    Semaglutide      Other reaction(s): Constipation    Empagliflozin Rash       Past Medical History:   Diagnosis Date    Allergic rhinitis     Colon polyps     history of    Elevated WBC count     history of    Hyperlipidemia     Hypertension     Obesity     Pain, joint, hand, left 7/28/2021    Partial small bowel obstruction (HCC) 3/5/2023    SBO (small bowel obstruction) (HCC) 3/12/2023    Sepsis with acute renal failure (HCC) 3/5/2023    Type II or unspecified type diabetes mellitus without mention of complication, not stated as uncontrolled     Unspecified sleep apnea     Weakness of left hand 7/28/2021       Prior to Admission medications    Medication Sig Start Date End Date Taking? Authorizing Provider   silver sulfADIAZINE (SILVADENE) 1 % cream Apply topically daily. 3/6/24  Yes Emory Reyna DPM   Omega-3 Fatty Acids (FISH OIL) 1000 MG capsule Take by mouth daily   Yes Soni Johnson MD   tamsulosin (FLOMAX) 0.4 MG capsule Take 1 capsule by mouth daily 8/18/23  Yes Barron Langley MD   Insulin Glargine-yfgn 100 UNIT/ML SOPN INJECT 40 UNITS UNDER SKIN AT BEDTIME FOR TYPE 2 DIABETES MELLITUS 6/14/23  Yes Soni Johnson MD   atorvastatin (LIPITOR) 80 MG tablet 0.5 tablets 5/18/22  Yes Soni Johnson MD   vitamin D (CHOLECALCIFEROL) 25 MCG (1000 UT) TABS tablet TAKE TWO TABLETS BY MOUTH EVERY DAY 5/29/14  Yes Soni Johnson MD   sildenafil (VIAGRA) 50 MG tablet 0.5 tablets 5/18/22  Yes Soni Johnson MD   gabapentin (NEURONTIN) 300 MG capsule Take 1 capsule by mouth 2 times daily.   Yes Soni Johnson MD

## 2024-11-04 NOTE — PLAN OF CARE
Problem: Cognitive:  Goal: Knowledge of wound care  Description: Knowledge of wound care  Outcome: Progressing  Goal: Understands risk factors for wounds  Description: Understands risk factors for wounds  Outcome: Progressing     Problem: Wound:  Goal: Will show signs of wound healing; wound closure and no evidence of infection  Description: Will show signs of wound healing; wound closure and no evidence of infection  Outcome: Progressing     Problem: Pressure Ulcer:  Goal: Signs of wound healing will improve  Description: Signs of wound healing will improve  Outcome: Progressing  Goal: Absence of new pressure ulcer  Description: Absence of new pressure ulcer  Outcome: Progressing  Goal: Will show no infection signs and symptoms  Description: Will show no infection signs and symptoms  Outcome: Progressing     Problem: Falls - Risk of:  Goal: Will remain free from falls  Description: Will remain free from falls  Outcome: Progressing     Problem: Blood Glucose:  Goal: Ability to maintain appropriate glucose levels will improve  Description: Ability to maintain appropriate glucose levels will improve  Outcome: Progressing

## 2024-11-05 PROBLEM — R79.89 ELEVATED LACTIC ACID LEVEL: Status: ACTIVE | Noted: 2024-11-05

## 2024-11-05 PROBLEM — E66.01 MORBID OBESITY WITH BMI OF 40.0-44.9, ADULT: Status: ACTIVE | Noted: 2024-11-05

## 2024-11-05 LAB
ALBUMIN SERPL-MCNC: 4 G/DL (ref 3.5–5.2)
ALBUMIN/GLOB SERPL: 1.2 {RATIO} (ref 1–2.5)
ALP SERPL-CCNC: 85 U/L (ref 40–129)
ALT SERPL-CCNC: 30 U/L (ref 5–41)
ANION GAP SERPL CALCULATED.3IONS-SCNC: 12 MMOL/L (ref 9–17)
AST SERPL-CCNC: 31 U/L
BASOPHILS # BLD: 0.04 K/UL (ref 0–0.2)
BASOPHILS NFR BLD: 0 % (ref 0–2)
BILIRUB DIRECT SERPL-MCNC: 0.1 MG/DL
BILIRUB INDIRECT SERPL-MCNC: 0.4 MG/DL (ref 0–1)
BILIRUB SERPL-MCNC: 0.5 MG/DL (ref 0.3–1.2)
BUN SERPL-MCNC: 25 MG/DL (ref 8–23)
BUN/CREAT SERPL: 19 (ref 9–20)
CALCIUM SERPL-MCNC: 8.9 MG/DL (ref 8.6–10.4)
CHLORIDE SERPL-SCNC: 99 MMOL/L (ref 98–107)
CO2 SERPL-SCNC: 25 MMOL/L (ref 20–31)
CREAT SERPL-MCNC: 1.3 MG/DL (ref 0.7–1.2)
EKG ATRIAL RATE: 83 BPM
EKG P AXIS: 5 DEGREES
EKG P-R INTERVAL: 172 MS
EKG Q-T INTERVAL: 420 MS
EKG QRS DURATION: 150 MS
EKG QTC CALCULATION (BAZETT): 493 MS
EKG R AXIS: 69 DEGREES
EKG T AXIS: 39 DEGREES
EKG VENTRICULAR RATE: 83 BPM
EOSINOPHIL # BLD: <0.03 K/UL (ref 0–0.44)
EOSINOPHILS RELATIVE PERCENT: 0 % (ref 1–4)
ERYTHROCYTE [DISTWIDTH] IN BLOOD BY AUTOMATED COUNT: 14 % (ref 11.8–14.4)
EST. AVERAGE GLUCOSE BLD GHB EST-MCNC: 189 MG/DL
GFR, ESTIMATED: 59 ML/MIN/1.73M2
GLOBULIN SER CALC-MCNC: 3.4 G/DL (ref 1.5–3.8)
GLUCOSE BLD-MCNC: 109 MG/DL (ref 75–110)
GLUCOSE BLD-MCNC: 125 MG/DL (ref 75–110)
GLUCOSE BLD-MCNC: 156 MG/DL (ref 75–110)
GLUCOSE SERPL-MCNC: 136 MG/DL (ref 70–99)
HBA1C MFR BLD: 8.2 % (ref 4–6)
HCT VFR BLD AUTO: 39.5 % (ref 40.7–50.3)
HGB BLD-MCNC: 12.8 G/DL (ref 13–17)
IMM GRANULOCYTES # BLD AUTO: 0.07 K/UL (ref 0–0.3)
IMM GRANULOCYTES NFR BLD: 1 %
INR PPP: 1
LACTATE BLDV-SCNC: 2.3 MMOL/L (ref 0.5–2.2)
LACTATE BLDV-SCNC: 2.4 MMOL/L (ref 0.5–2.2)
LACTATE BLDV-SCNC: 2.7 MMOL/L (ref 0.5–2.2)
LACTATE BLDV-SCNC: 3.1 MMOL/L (ref 0.5–2.2)
LACTATE BLDV-SCNC: 4 MMOL/L (ref 0.5–2.2)
LYMPHOCYTES NFR BLD: 0.99 K/UL (ref 1.1–3.7)
LYMPHOCYTES RELATIVE PERCENT: 7 % (ref 24–43)
MAGNESIUM SERPL-MCNC: 1.7 MG/DL (ref 1.6–2.6)
MCH RBC QN AUTO: 28.6 PG (ref 25.2–33.5)
MCHC RBC AUTO-ENTMCNC: 32.4 G/DL (ref 25.2–33.5)
MCV RBC AUTO: 88.4 FL (ref 82.6–102.9)
MONOCYTES NFR BLD: 0.97 K/UL (ref 0.1–1.2)
MONOCYTES NFR BLD: 7 % (ref 3–12)
NEUTROPHILS NFR BLD: 85 % (ref 36–65)
NEUTS SEG NFR BLD: 11.84 K/UL (ref 1.5–8.1)
NRBC BLD-RTO: 0 PER 100 WBC
PLATELET # BLD AUTO: 329 K/UL (ref 138–453)
PMV BLD AUTO: 9.8 FL (ref 8.1–13.5)
POTASSIUM SERPL-SCNC: 4.8 MMOL/L (ref 3.7–5.3)
PROT SERPL-MCNC: 7.4 G/DL (ref 6.4–8.3)
PROTHROMBIN TIME: 13.2 SEC (ref 11.5–14.2)
RBC # BLD AUTO: 4.47 M/UL (ref 4.21–5.77)
SODIUM SERPL-SCNC: 136 MMOL/L (ref 135–144)
WBC OTHER # BLD: 13.9 K/UL (ref 3.5–11.3)

## 2024-11-05 PROCEDURE — 85610 PROTHROMBIN TIME: CPT

## 2024-11-05 PROCEDURE — 83036 HEMOGLOBIN GLYCOSYLATED A1C: CPT

## 2024-11-05 PROCEDURE — 87040 BLOOD CULTURE FOR BACTERIA: CPT

## 2024-11-05 PROCEDURE — 85025 COMPLETE CBC W/AUTO DIFF WBC: CPT

## 2024-11-05 PROCEDURE — 80076 HEPATIC FUNCTION PANEL: CPT

## 2024-11-05 PROCEDURE — 80048 BASIC METABOLIC PNL TOTAL CA: CPT

## 2024-11-05 PROCEDURE — 83605 ASSAY OF LACTIC ACID: CPT

## 2024-11-05 PROCEDURE — 2580000003 HC RX 258: Performed by: NURSE PRACTITIONER

## 2024-11-05 PROCEDURE — 2580000003 HC RX 258: Performed by: INTERNAL MEDICINE

## 2024-11-05 PROCEDURE — 99222 1ST HOSP IP/OBS MODERATE 55: CPT | Performed by: PHYSICIAN ASSISTANT

## 2024-11-05 PROCEDURE — 83735 ASSAY OF MAGNESIUM: CPT

## 2024-11-05 PROCEDURE — 82947 ASSAY GLUCOSE BLOOD QUANT: CPT

## 2024-11-05 PROCEDURE — 36415 COLL VENOUS BLD VENIPUNCTURE: CPT

## 2024-11-05 PROCEDURE — 6360000002 HC RX W HCPCS: Performed by: NURSE PRACTITIONER

## 2024-11-05 PROCEDURE — 99222 1ST HOSP IP/OBS MODERATE 55: CPT | Performed by: NURSE PRACTITIONER

## 2024-11-05 PROCEDURE — 6370000000 HC RX 637 (ALT 250 FOR IP): Performed by: NURSE PRACTITIONER

## 2024-11-05 PROCEDURE — 2060000000 HC ICU INTERMEDIATE R&B

## 2024-11-05 RX ORDER — MORPHINE SULFATE 2 MG/ML
2 INJECTION, SOLUTION INTRAMUSCULAR; INTRAVENOUS
Status: DISCONTINUED | OUTPATIENT
Start: 2024-11-05 | End: 2024-11-09 | Stop reason: HOSPADM

## 2024-11-05 RX ORDER — SODIUM CHLORIDE, SODIUM LACTATE, POTASSIUM CHLORIDE, AND CALCIUM CHLORIDE .6; .31; .03; .02 G/100ML; G/100ML; G/100ML; G/100ML
1000 INJECTION, SOLUTION INTRAVENOUS ONCE
Status: COMPLETED | OUTPATIENT
Start: 2024-11-05 | End: 2024-11-05

## 2024-11-05 RX ORDER — INSULIN LISPRO 100 [IU]/ML
0-8 INJECTION, SOLUTION INTRAVENOUS; SUBCUTANEOUS EVERY 6 HOURS SCHEDULED
Status: DISCONTINUED | OUTPATIENT
Start: 2024-11-05 | End: 2024-11-08

## 2024-11-05 RX ORDER — 0.9 % SODIUM CHLORIDE 0.9 %
1000 INTRAVENOUS SOLUTION INTRAVENOUS ONCE
Status: COMPLETED | OUTPATIENT
Start: 2024-11-05 | End: 2024-11-05

## 2024-11-05 RX ORDER — MUPIROCIN 20 MG/G
OINTMENT TOPICAL 2 TIMES DAILY
Status: DISCONTINUED | OUTPATIENT
Start: 2024-11-05 | End: 2024-11-09 | Stop reason: HOSPADM

## 2024-11-05 RX ORDER — INSULIN GLARGINE 100 [IU]/ML
50 INJECTION, SOLUTION SUBCUTANEOUS 2 TIMES DAILY
Status: DISCONTINUED | OUTPATIENT
Start: 2024-11-05 | End: 2024-11-07

## 2024-11-05 RX ADMIN — MUPIROCIN: 20 OINTMENT TOPICAL at 21:37

## 2024-11-05 RX ADMIN — MORPHINE SULFATE 2 MG: 2 INJECTION, SOLUTION INTRAMUSCULAR; INTRAVENOUS at 04:59

## 2024-11-05 RX ADMIN — SODIUM CHLORIDE, POTASSIUM CHLORIDE, SODIUM LACTATE AND CALCIUM CHLORIDE 1000 ML: 600; 310; 30; 20 INJECTION, SOLUTION INTRAVENOUS at 05:06

## 2024-11-05 RX ADMIN — SODIUM CHLORIDE, POTASSIUM CHLORIDE, SODIUM LACTATE AND CALCIUM CHLORIDE 1000 ML: 600; 310; 30; 20 INJECTION, SOLUTION INTRAVENOUS at 00:40

## 2024-11-05 RX ADMIN — PIPERACILLIN AND TAZOBACTAM 3375 MG: 3; .375 INJECTION, POWDER, LYOPHILIZED, FOR SOLUTION INTRAVENOUS at 05:53

## 2024-11-05 RX ADMIN — PIPERACILLIN AND TAZOBACTAM 3375 MG: 3; .375 INJECTION, POWDER, LYOPHILIZED, FOR SOLUTION INTRAVENOUS at 21:35

## 2024-11-05 RX ADMIN — PIPERACILLIN AND TAZOBACTAM 3375 MG: 3; .375 INJECTION, POWDER, LYOPHILIZED, FOR SOLUTION INTRAVENOUS at 12:46

## 2024-11-05 RX ADMIN — INSULIN GLARGINE 50 UNITS: 100 INJECTION, SOLUTION SUBCUTANEOUS at 10:00

## 2024-11-05 RX ADMIN — SODIUM CHLORIDE 1000 ML: 9 INJECTION, SOLUTION INTRAVENOUS at 10:00

## 2024-11-05 RX ADMIN — ENOXAPARIN SODIUM 30 MG: 100 INJECTION SUBCUTANEOUS at 21:35

## 2024-11-05 RX ADMIN — MUPIROCIN: 20 OINTMENT TOPICAL at 10:01

## 2024-11-05 RX ADMIN — SODIUM CHLORIDE 1000 ML: 9 INJECTION, SOLUTION INTRAVENOUS at 13:58

## 2024-11-05 RX ADMIN — SODIUM CHLORIDE 1000 ML: 9 INJECTION, SOLUTION INTRAVENOUS at 11:14

## 2024-11-05 RX ADMIN — INSULIN GLARGINE 50 UNITS: 100 INJECTION, SOLUTION SUBCUTANEOUS at 21:35

## 2024-11-05 RX ADMIN — ENOXAPARIN SODIUM 30 MG: 100 INJECTION SUBCUTANEOUS at 00:00

## 2024-11-05 RX ADMIN — ENOXAPARIN SODIUM 30 MG: 100 INJECTION SUBCUTANEOUS at 10:00

## 2024-11-05 RX ADMIN — MORPHINE SULFATE 2 MG: 2 INJECTION, SOLUTION INTRAMUSCULAR; INTRAVENOUS at 01:09

## 2024-11-05 RX ADMIN — SODIUM CHLORIDE: 9 INJECTION, SOLUTION INTRAVENOUS at 22:00

## 2024-11-05 ASSESSMENT — PAIN - FUNCTIONAL ASSESSMENT
PAIN_FUNCTIONAL_ASSESSMENT: ACTIVITIES ARE NOT PREVENTED
PAIN_FUNCTIONAL_ASSESSMENT: PREVENTS OR INTERFERES SOME ACTIVE ACTIVITIES AND ADLS

## 2024-11-05 ASSESSMENT — PAIN SCALES - GENERAL
PAINLEVEL_OUTOF10: 2
PAINLEVEL_OUTOF10: 7
PAINLEVEL_OUTOF10: 7
PAINLEVEL_OUTOF10: 5

## 2024-11-05 ASSESSMENT — PAIN DESCRIPTION - DESCRIPTORS
DESCRIPTORS: CRAMPING
DESCRIPTORS: DULL

## 2024-11-05 ASSESSMENT — PAIN DESCRIPTION - ORIENTATION
ORIENTATION: MID
ORIENTATION: MID

## 2024-11-05 ASSESSMENT — PAIN DESCRIPTION - LOCATION
LOCATION: ABDOMEN
LOCATION: ABDOMEN

## 2024-11-05 NOTE — PROGRESS NOTES
Telemetry monitor at nurse's station alarms and shows heartrate 160s suddenly. Writer to bedside, heartrate 90s/sinus. Patient asympotmatic. Telemetry reviewed with Sun Magaña CNP, who identifies as SVT with aberrancy. No new orders.

## 2024-11-05 NOTE — PROGRESS NOTES
Per lab, lab results have not been populating. They faxed results to us of 4pm lactic and Dr Langley notified it was 2.3. Per Dr Langley verbal order, d/c repeat lactic lab draws and no new orders at this time.

## 2024-11-05 NOTE — PLAN OF CARE
Problem: Chronic Conditions and Co-morbidities  Goal: Patient's chronic conditions and co-morbidity symptoms are monitored and maintained or improved  Outcome: Progressing  Flowsheets (Taken 11/4/2024 2319 by Kenna Giang, RN)  Care Plan - Patient's Chronic Conditions and Co-Morbidity Symptoms are Monitored and Maintained or Improved:   Monitor and assess patient's chronic conditions and comorbid symptoms for stability, deterioration, or improvement   Collaborate with multidisciplinary team to address chronic and comorbid conditions and prevent exacerbation or deterioration   Update acute care plan with appropriate goals if chronic or comorbid symptoms are exacerbated and prevent overall improvement and discharge     Problem: Discharge Planning  Goal: Discharge to home or other facility with appropriate resources  Outcome: Progressing  Flowsheets (Taken 11/4/2024 2319 by Kenna Giang, RN)  Discharge to home or other facility with appropriate resources:   Identify barriers to discharge with patient and caregiver   Identify discharge learning needs (meds, wound care, etc)   Arrange for needed discharge resources and transportation as appropriate   Refer to discharge planning if patient needs post-hospital services based on physician order or complex needs related to functional status, cognitive ability or social support system     Problem: Pain  Goal: Verbalizes/displays adequate comfort level or baseline comfort level  Outcome: Progressing

## 2024-11-05 NOTE — ED PROVIDER NOTES
- 31 mmol/L    Anion Gap 18 (H) 9 - 17 mmol/L    Glucose 167 (H) 70 - 99 mg/dL    BUN 27 (H) 8 - 23 mg/dL    Creatinine 1.3 (H) 0.7 - 1.2 mg/dL    Est, Glom Filt Rate 59 (L) >60 mL/min/1.73m2    BUN/Creatinine Ratio 21 (H) 9 - 20    Calcium 9.8 8.6 - 10.4 mg/dL    Total Protein 8.1 6.4 - 8.3 g/dL    Albumin 4.4 3.5 - 5.2 g/dL    Albumin/Globulin Ratio 1.2 1.0 - 2.5    Total Bilirubin 0.4 0.3 - 1.2 mg/dL    Alkaline Phosphatase 94 40 - 129 U/L    ALT 35 5 - 41 U/L    AST 44 (H) <40 U/L   CBC   Result Value Ref Range    WBC 17.3 (H) 3.5 - 11.3 k/uL    RBC 4.56 4.21 - 5.77 m/uL    Hemoglobin 13.1 13.0 - 17.0 g/dL    Hematocrit 39.3 (L) 40.7 - 50.3 %    MCV 86.2 82.6 - 102.9 fL    MCH 28.7 25.2 - 33.5 pg    MCHC 33.3 25.2 - 33.5 g/dL    RDW 13.8 11.8 - 14.4 %    Platelets 371 138 - 453 k/uL    MPV 9.8 8.1 - 13.5 fL    NRBC Automated 0.0 0.0 per 100 WBC   Troponin   Result Value Ref Range    Troponin, High Sensitivity 41 (H) 0 - 22 ng/L   Troponin   Result Value Ref Range    Troponin, High Sensitivity 42 (H) 0 - 22 ng/L   Microscopic Urinalysis   Result Value Ref Range    WBC, UA 2 TO 5 0 - 4 /HPF    RBC, UA 0 TO 2 0 - 4 /HPF    Epithelial Cells, UA 2 TO 5 0 - 5 /HPF    Bacteria, UA FEW (A) None    Mucus, UA 1+ (A) None    Other Observations UA (A) NOT REQ.     Utilizing a urinalysis as the only screening method to exclude a potential uropathogen can be unreliable in many patient populations.  Rapid screening tests are less sensitive than culture and if UTI is a clinical possibility, culture should be considered despite a negative urinalysis.     EKG 12 Lead   Result Value Ref Range    Ventricular Rate 83 BPM    Atrial Rate 83 BPM    P-R Interval 172 ms    QRS Duration 150 ms    Q-T Interval 420 ms    QTc Calculation (Bazett) 493 ms    P Axis 5 degrees    R Axis 69 degrees    T Axis 39 degrees       Not indicated unless otherwise documented above    RADIOLOGY:   I reviewed the radiologist interpretations:    CT ABDOMEN  PELVIS W IV CONTRAST Additional Contrast? None   Final Result   1. Small-bowel obstruction secondary to terminal ileitis.  No evidence of   pneumatosis or free air.   2. Hepatic steatosis.         XR CHEST PORTABLE   Final Result   Mild to moderate cardiomegaly.             Not indicated unless otherwise documented above    EMERGENCY DEPARTMENT COURSE:     The patient was given the following medications:  Orders Placed This Encounter   Medications    morphine (PF) injection 4 mg    ondansetron (ZOFRAN) injection 4 mg    iopamidol (ISOVUE-370) 76 % injection 100 mL    sodium chloride 0.9 % bolus 1,000 mL    ciprofloxacin (CIPRO) IVPB 400 mg     Order Specific Question:   Antimicrobial Indications     Answer:   Intra-Abdominal Infection    metroNIDAZOLE (FLAGYL) 500 mg in 0.9% NaCl 100 mL IVPB premix     Order Specific Question:   Antimicrobial Indications     Answer:   Intra-Abdominal Infection    0.9 % sodium chloride infusion    sodium chloride flush 0.9 % injection 5-40 mL    sodium chloride flush 0.9 % injection 5-40 mL    0.9 % sodium chloride infusion    enoxaparin Sodium (LOVENOX) injection 30 mg     Order Specific Question:   Indication of Use     Answer:   Prophylaxis-DVT/PE    OR Linked Order Group     ondansetron (ZOFRAN-ODT) disintegrating tablet 4 mg     ondansetron (ZOFRAN) injection 4 mg    OR Linked Order Group     acetaminophen (TYLENOL) tablet 650 mg     acetaminophen (TYLENOL) suppository 650 mg    potassium chloride 10 mEq/100 mL IVPB (Peripheral Line)    magnesium sulfate 2000 mg in 50 mL IVPB premix    insulin lispro (HUMALOG,ADMELOG) injection vial 0-8 Units    glucose chewable tablet 16 g    OR Linked Order Group     dextrose bolus 10% 125 mL     dextrose bolus 10% 250 mL    glucagon injection 1 mg    dextrose 10 % infusion    ciprofloxacin (CIPRO) IVPB 400 mg     Order Specific Question:   Antimicrobial Indications     Answer:   Intra-Abdominal Infection    metroNIDAZOLE (FLAGYL) 500 mg in

## 2024-11-05 NOTE — H&P
07:40 PM    CO2 19 11/04/2024 07:40 PM    BUN 27 11/04/2024 07:40 PM    CREATININE 1.3 11/04/2024 07:40 PM    LABGLOM 59 11/04/2024 07:40 PM    LABGLOM >60 08/21/2023 11:16 AM    GLUCOSE 167 11/04/2024 07:40 PM    CALCIUM 9.8 11/04/2024 07:40 PM    BILITOT 0.4 11/04/2024 07:40 PM    ALKPHOS 94 11/04/2024 07:40 PM    AST 44 11/04/2024 07:40 PM    ALT 35 11/04/2024 07:40 PM       ASSESSMENT:      Patient Active Problem List   Diagnosis    Type 2 diabetes mellitus with diabetic polyneuropathy, with long-term current use of insulin ([facility])    Ulcer of left foot with fat layer exposed ([facility])    PVD (peripheral vascular disease) ([facility])    Ulcer of left foot with necrosis of muscle (HCC)    Cellulitis of foot    Diabetic ulcer of left foot due to type 2 diabetes mellitus ([facility])    Partial thickness rotator cuff tear    Hyperlipidemia    Benign neoplasm of sigmoid colon    Low back pain    Essential (primary) hypertension    Leukocytosis    Hyponatremia    Type 2 diabetes mellitus with hyperglycemia, with long-term current use of insulin (HCC)    Type 2 diabetes mellitus with stage 3a chronic kidney disease, with long-term current use of insulin (HCC)    Type 2 diabetes mellitus with foot ulcer, with long-term current use of insulin (HCC)    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 (HCC)    Osteomyelitis of left foot    Partial nontraumatic amputation of foot, left ([facility])    Hammer toe of left foot    Blood blister    Ulcer of left foot, limited to breakdown of skin (HCC)    Bilateral hearing loss    Depression    Erectile dysfunction    Obesity    Tinnitus    SBO (small bowel obstruction) (HCC)    Terminal ileitis, with intestinal obstruction (HCC)    Dehydration       PLAN:    Medications:  Scheduled Meds:   insulin lispro  0-8 Units SubCUTAneous 4 times per day    insulin glargine  50 Units SubCUTAneous BID    [START ON 11/7/2024] influenza virus vaccine  0.5 mL IntraMUSCular Prior to discharge    sodium chloride flush  5-40  60%  Hyperlipidemia  Diabetes Mellitus Type 2  ANGELA   Obesity  Tobacco abuse---quit--1992     PMH:  allergic rhinitis, colon polyps, SBO----3.12.2023---with watery drainage from the wound site, SBO--             3.6.2023---persistent mild-low-grade, anasarca---3.12.2023, blisters BLE---feet---3.12.2023,              BLE--edema, Elevated lactic acid---hyponatremia--2023  PSH:   right CTS---2010, colonoscopy--2009---polyps, RIH--1977, left great toenail plate removal--              2003, diagnostic laparoscopy---3.5.2023---SBO    Allergies:  bee venom, naproxen, semaglutide, empagliflozin        Attending Supervising Physician's Attestation Statement  I performed a history and physical examination on the patient and discussed the management with the nurse practitioner. I reviewed and agree with the findings and plan as documented in her note .    Electronically signed by Barron Langley MD on 11/5/24 at 6:52 PM EST   PLAN:    1. Small bowel obstruction -- terminal ileitis -- IV Cipro and Flagyl, NPO, IV hydration, pain control prn, antiemetics prn, monitor I&O, general surgery consulted, NG to ILWS  2. Dehydration -- IV hydration, monitor I&O, renal function, and fluid balance  3. DMII, uncontrolled -- insulins, monitor and titrate prn, recheck HgbA1C   4. RT protocols, IS  5. Home medications reviewed -- home insulin doses clarified per HPI  6. DVT prophylaxis -- lovenox  7. See orders     Add: Elevated lactic acid -- IV fluid bolus, IV cipro and flagyl switched to Zosyn, serial lactic acid until </=2,  keep MAP >/= 65, monitor urine output and I&Os, mental status and vital signs, labs pending, con't antipyretics prn -- despite having an initial lactic acid of 4, a standard fluid resuscitation of 30 mL/kg may harm the patient because the patient has chronic diastolic congestive heart failure -- he is not febrile, has no bandemia, is not hypotensive, and does not appear to be toxic. Therefore, ordering additional 1L IV

## 2024-11-05 NOTE — ED NOTES
ED Report    Presents to ED from: Home    Chief Complaint   Patient presents with    Abdominal Pain     Pt arrives c/o back pain that is constant that wraps around into abd, states pain starts about 4 inches above each hip, reports BM yesterday, quit passing gas this morning, abd tight     No diagnosis found.  Present Condition: stable   Pertinent Event(s): na    LOC:  A+O x 4  Code Status: FULL    Vital Signs   Vitals:    11/04/24 1908 11/04/24 1945   BP: (!) 157/74 123/73   Pulse: 80 75   Resp: 18    Temp: 96.8 °F (36 °C)    TempSrc: Tympanic    SpO2: 97% 95%   Weight: (!) 138.3 kg (305 lb)    Height: 1.803 m (5' 11\")      Oxygen Baseline: Room Air       Current Oxygen Needs: Room Air  Mobility: Independent       Mobility Aide: Cane    LDAs:   Peripheral IV Posterior;Right Hand (Active)   Site Assessment Clean, dry & intact 11/04/24 1942   Phlebitis Assessment No symptoms 11/04/24 1942   Infiltration Assessment 0 11/04/24 1942       Wound 03/12/23 Foot Anterior;Right (Active)       Wound 03/20/24 # left foot (Active)     Abnormal ED Labs:    Labs Reviewed   URINALYSIS WITH REFLEX TO CULTURE - Abnormal; Notable for the following components:       Result Value    Ketones, Urine TRACE (*)     Protein, UA TRACE (*)     All other components within normal limits   COMPREHENSIVE METABOLIC PANEL - Abnormal; Notable for the following components:    CO2 19 (*)     Anion Gap 18 (*)     Glucose 167 (*)     BUN 27 (*)     Creatinine 1.3 (*)     Est, Glom Filt Rate 59 (*)     BUN/Creatinine Ratio 21 (*)     AST 44 (*)     All other components within normal limits   CBC - Abnormal; Notable for the following components:    WBC 17.3 (*)     Hematocrit 39.3 (*)     All other components within normal limits   TROPONIN - Abnormal; Notable for the following components:    Troponin, High Sensitivity 41 (*)     All other components within normal limits   MICROSCOPIC URINALYSIS - Abnormal; Notable for the following components:     Urology   Completed     [] yes     [] no         Electronically signed by HENRIETTA GEORGE RN on 11/4/2024 at 9:31 PM

## 2024-11-05 NOTE — CONSULTS
General Surgery Consult Note  Khoa Herrera PA-C  Pt Name: Kedar Medellin  MRN: [id number]  YOB: 1955  Date of evaluation: 11/5/2024  Primary Care Physician: Cici Rodriguez APRN - NP  Patient evaluated at the request of  Dr. Langley  Reason for consultation: SBO with ileitis  IMPRESSIONS/plan:   69-year-old male with a history of recurrent small bowel obstructions, did have diagnostic laparoscopy on 3/5/2023 which did not find any specific etiology for his obstructions, has had a couple of obstruction since then and on this admission he does have some ileitis on CT imaging, unsure of exact cause at this point but with a family history of Crohn's disease that would need to be considered moving forward, at this point he is stable and symptoms are somewhat improved since the NG tube was placed, will treat conservatively at this point and monitor closely, continue the NG tube and bowel rest, antibiotics, will need endoscopies done roughly 6 weeks from this acute issue for further diagnosis, would also recommend GI follow-up.    Small bowel obstruction  Ileitis  Patient Active Problem List   Diagnosis    Type 2 diabetes mellitus with diabetic polyneuropathy, with long-term current use of insulin (HCC)    Ulcer of left foot with fat layer exposed (HCC)    PVD (peripheral vascular disease) (HCC)    Ulcer of left foot with necrosis of muscle (HCC)    Cellulitis of foot    Diabetic ulcer of left foot due to type 2 diabetes mellitus (HCC)    Partial thickness rotator cuff tear    Hyperlipidemia    Benign neoplasm of sigmoid colon    Low back pain    Essential (primary) hypertension    Leukocytosis    Hyponatremia    Type 2 diabetes mellitus with hyperglycemia, with long-term current use of insulin (HCC)    Type 2 diabetes mellitus with stage 3a chronic kidney disease, with long-term current use of insulin (HCC)    Type 2 diabetes mellitus with foot ulcer, with long-term current use of insulin (HCC)    Ulcer of  SBO  Assessment/Recommendations: See consultation note

## 2024-11-06 ENCOUNTER — CLINICAL DOCUMENTATION (OUTPATIENT)
Dept: FAMILY MEDICINE CLINIC | Age: 69
End: 2024-11-06

## 2024-11-06 ENCOUNTER — APPOINTMENT (OUTPATIENT)
Dept: GENERAL RADIOLOGY | Age: 69
DRG: 372 | End: 2024-11-06
Payer: MEDICARE

## 2024-11-06 PROBLEM — M86.9 OSTEOMYELITIS OF LEFT FOOT: Status: RESOLVED | Noted: 2023-08-28 | Resolved: 2024-11-06

## 2024-11-06 PROBLEM — T14.8XXA BLOOD BLISTER: Status: RESOLVED | Noted: 2023-12-18 | Resolved: 2024-11-06

## 2024-11-06 PROBLEM — Z89.432 PARTIAL NONTRAUMATIC AMPUTATION OF FOOT, LEFT (HCC): Status: RESOLVED | Noted: 2023-09-05 | Resolved: 2024-11-06

## 2024-11-06 PROBLEM — L97.524 ULCER OF LEFT FOOT WITH NECROSIS OF BONE (HCC): Status: RESOLVED | Noted: 2023-08-21 | Resolved: 2024-11-06

## 2024-11-06 PROBLEM — L97.523 ULCER OF LEFT FOOT WITH NECROSIS OF MUSCLE (HCC): Status: RESOLVED | Noted: 2023-08-14 | Resolved: 2024-11-06

## 2024-11-06 PROBLEM — E11.22 TYPE 2 DIABETES MELLITUS WITH STAGE 3A CHRONIC KIDNEY DISEASE, WITH LONG-TERM CURRENT USE OF INSULIN (HCC): Status: RESOLVED | Noted: 2023-08-14 | Resolved: 2024-11-06

## 2024-11-06 PROBLEM — E87.1 HYPONATREMIA: Status: RESOLVED | Noted: 2023-08-14 | Resolved: 2024-11-06

## 2024-11-06 PROBLEM — E66.9 OBESITY: Status: RESOLVED | Noted: 2024-11-04 | Resolved: 2024-11-06

## 2024-11-06 PROBLEM — N18.31 TYPE 2 DIABETES MELLITUS WITH STAGE 3A CHRONIC KIDNEY DISEASE, WITH LONG-TERM CURRENT USE OF INSULIN (HCC): Status: RESOLVED | Noted: 2023-08-14 | Resolved: 2024-11-06

## 2024-11-06 PROBLEM — L97.521 ULCER OF LEFT FOOT, LIMITED TO BREAKDOWN OF SKIN (HCC): Status: RESOLVED | Noted: 2024-04-08 | Resolved: 2024-11-06

## 2024-11-06 PROBLEM — D12.5 BENIGN NEOPLASM OF SIGMOID COLON: Status: RESOLVED | Noted: 2023-08-14 | Resolved: 2024-11-06

## 2024-11-06 PROBLEM — L03.119 CELLULITIS OF FOOT: Status: RESOLVED | Noted: 2023-08-14 | Resolved: 2024-11-06

## 2024-11-06 PROBLEM — Z89.412: Status: ACTIVE | Noted: 2023-09-05

## 2024-11-06 PROBLEM — Z79.4 TYPE 2 DIABETES MELLITUS WITH STAGE 3A CHRONIC KIDNEY DISEASE, WITH LONG-TERM CURRENT USE OF INSULIN (HCC): Status: RESOLVED | Noted: 2023-08-14 | Resolved: 2024-11-06

## 2024-11-06 LAB
ALBUMIN SERPL-MCNC: 3.7 G/DL (ref 3.5–5.2)
ANION GAP SERPL CALCULATED.3IONS-SCNC: 12 MMOL/L (ref 9–17)
BASOPHILS # BLD: 0.03 K/UL (ref 0–0.2)
BASOPHILS NFR BLD: 0 % (ref 0–2)
BUN SERPL-MCNC: 17 MG/DL (ref 8–23)
BUN/CREAT SERPL: 15 (ref 9–20)
CALCIUM SERPL-MCNC: 7.8 MG/DL (ref 8.6–10.4)
CHLORIDE SERPL-SCNC: 102 MMOL/L (ref 98–107)
CO2 SERPL-SCNC: 25 MMOL/L (ref 20–31)
CREAT SERPL-MCNC: 1.1 MG/DL (ref 0.7–1.2)
EOSINOPHIL # BLD: 0.28 K/UL (ref 0–0.44)
EOSINOPHILS RELATIVE PERCENT: 3 % (ref 1–4)
ERYTHROCYTE [DISTWIDTH] IN BLOOD BY AUTOMATED COUNT: 14.1 % (ref 11.8–14.4)
GFR, ESTIMATED: 72 ML/MIN/1.73M2
GLUCOSE BLD-MCNC: 114 MG/DL (ref 75–110)
GLUCOSE BLD-MCNC: 66 MG/DL (ref 75–110)
GLUCOSE BLD-MCNC: 73 MG/DL (ref 75–110)
GLUCOSE BLD-MCNC: 77 MG/DL (ref 75–110)
GLUCOSE BLD-MCNC: 97 MG/DL (ref 75–110)
GLUCOSE SERPL-MCNC: 89 MG/DL (ref 70–99)
HCT VFR BLD AUTO: 37.9 % (ref 40.7–50.3)
HGB BLD-MCNC: 12.4 G/DL (ref 13–17)
IMM GRANULOCYTES # BLD AUTO: 0.04 K/UL (ref 0–0.3)
IMM GRANULOCYTES NFR BLD: 0 %
LYMPHOCYTES NFR BLD: 1.26 K/UL (ref 1.1–3.7)
LYMPHOCYTES RELATIVE PERCENT: 14 % (ref 24–43)
MAGNESIUM SERPL-MCNC: 1.8 MG/DL (ref 1.6–2.6)
MCH RBC QN AUTO: 29.2 PG (ref 25.2–33.5)
MCHC RBC AUTO-ENTMCNC: 32.7 G/DL (ref 25.2–33.5)
MCV RBC AUTO: 89.4 FL (ref 82.6–102.9)
MONOCYTES NFR BLD: 0.81 K/UL (ref 0.1–1.2)
MONOCYTES NFR BLD: 9 % (ref 3–12)
NEUTROPHILS NFR BLD: 74 % (ref 36–65)
NEUTS SEG NFR BLD: 6.7 K/UL (ref 1.5–8.1)
NRBC BLD-RTO: 0 PER 100 WBC
PLATELET # BLD AUTO: 292 K/UL (ref 138–453)
PMV BLD AUTO: 9.8 FL (ref 8.1–13.5)
POTASSIUM SERPL-SCNC: 3.7 MMOL/L (ref 3.7–5.3)
RBC # BLD AUTO: 4.24 M/UL (ref 4.21–5.77)
SODIUM SERPL-SCNC: 139 MMOL/L (ref 135–144)
WBC OTHER # BLD: 9.1 K/UL (ref 3.5–11.3)

## 2024-11-06 PROCEDURE — 83735 ASSAY OF MAGNESIUM: CPT

## 2024-11-06 PROCEDURE — 80048 BASIC METABOLIC PNL TOTAL CA: CPT

## 2024-11-06 PROCEDURE — 99231 SBSQ HOSP IP/OBS SF/LOW 25: CPT | Performed by: INTERNAL MEDICINE

## 2024-11-06 PROCEDURE — 99232 SBSQ HOSP IP/OBS MODERATE 35: CPT | Performed by: PHYSICIAN ASSISTANT

## 2024-11-06 PROCEDURE — 6370000000 HC RX 637 (ALT 250 FOR IP): Performed by: NURSE PRACTITIONER

## 2024-11-06 PROCEDURE — 85025 COMPLETE CBC W/AUTO DIFF WBC: CPT

## 2024-11-06 PROCEDURE — 74019 RADEX ABDOMEN 2 VIEWS: CPT

## 2024-11-06 PROCEDURE — 82947 ASSAY GLUCOSE BLOOD QUANT: CPT

## 2024-11-06 PROCEDURE — 36415 COLL VENOUS BLD VENIPUNCTURE: CPT

## 2024-11-06 PROCEDURE — 2060000000 HC ICU INTERMEDIATE R&B

## 2024-11-06 PROCEDURE — 2580000003 HC RX 258: Performed by: NURSE PRACTITIONER

## 2024-11-06 PROCEDURE — 6360000002 HC RX W HCPCS: Performed by: INTERNAL MEDICINE

## 2024-11-06 PROCEDURE — 6360000002 HC RX W HCPCS: Performed by: NURSE PRACTITIONER

## 2024-11-06 PROCEDURE — 82040 ASSAY OF SERUM ALBUMIN: CPT

## 2024-11-06 RX ORDER — CALCIUM GLUCONATE 20 MG/ML
1000 INJECTION, SOLUTION INTRAVENOUS
Status: COMPLETED | OUTPATIENT
Start: 2024-11-06 | End: 2024-11-06

## 2024-11-06 RX ORDER — DEXTROSE MONOHYDRATE, SODIUM CHLORIDE, AND POTASSIUM CHLORIDE 50; 1.49; 4.5 G/1000ML; G/1000ML; G/1000ML
INJECTION, SOLUTION INTRAVENOUS CONTINUOUS
Status: DISCONTINUED | OUTPATIENT
Start: 2024-11-06 | End: 2024-11-09

## 2024-11-06 RX ADMIN — MUPIROCIN: 20 OINTMENT TOPICAL at 09:30

## 2024-11-06 RX ADMIN — PIPERACILLIN AND TAZOBACTAM 3375 MG: 3; .375 INJECTION, POWDER, LYOPHILIZED, FOR SOLUTION INTRAVENOUS at 05:32

## 2024-11-06 RX ADMIN — CALCIUM GLUCONATE 1000 MG: 20 INJECTION, SOLUTION INTRAVENOUS at 13:48

## 2024-11-06 RX ADMIN — ENOXAPARIN SODIUM 30 MG: 100 INJECTION SUBCUTANEOUS at 21:29

## 2024-11-06 RX ADMIN — PIPERACILLIN AND TAZOBACTAM 3375 MG: 3; .375 INJECTION, POWDER, LYOPHILIZED, FOR SOLUTION INTRAVENOUS at 21:49

## 2024-11-06 RX ADMIN — CALCIUM GLUCONATE 1000 MG: 20 INJECTION, SOLUTION INTRAVENOUS at 11:58

## 2024-11-06 RX ADMIN — PIPERACILLIN AND TAZOBACTAM 3375 MG: 3; .375 INJECTION, POWDER, LYOPHILIZED, FOR SOLUTION INTRAVENOUS at 13:44

## 2024-11-06 RX ADMIN — MORPHINE SULFATE 2 MG: 2 INJECTION, SOLUTION INTRAMUSCULAR; INTRAVENOUS at 09:10

## 2024-11-06 RX ADMIN — INSULIN GLARGINE 50 UNITS: 100 INJECTION, SOLUTION SUBCUTANEOUS at 21:29

## 2024-11-06 RX ADMIN — MUPIROCIN: 20 OINTMENT TOPICAL at 21:32

## 2024-11-06 RX ADMIN — SODIUM CHLORIDE, PRESERVATIVE FREE 40 MG: 5 INJECTION INTRAVENOUS at 09:10

## 2024-11-06 RX ADMIN — ENOXAPARIN SODIUM 30 MG: 100 INJECTION SUBCUTANEOUS at 09:08

## 2024-11-06 RX ADMIN — CALCIUM GLUCONATE 1000 MG: 20 INJECTION, SOLUTION INTRAVENOUS at 12:55

## 2024-11-06 RX ADMIN — DEXTROSE MONOHYDRATE 125 ML: 100 INJECTION, SOLUTION INTRAVENOUS at 17:51

## 2024-11-06 RX ADMIN — SODIUM CHLORIDE: 9 INJECTION, SOLUTION INTRAVENOUS at 14:33

## 2024-11-06 RX ADMIN — INSULIN GLARGINE 50 UNITS: 100 INJECTION, SOLUTION SUBCUTANEOUS at 09:11

## 2024-11-06 RX ADMIN — SODIUM CHLORIDE: 9 INJECTION, SOLUTION INTRAVENOUS at 06:02

## 2024-11-06 RX ADMIN — CALCIUM GLUCONATE 1000 MG: 20 INJECTION, SOLUTION INTRAVENOUS at 14:30

## 2024-11-06 ASSESSMENT — PAIN DESCRIPTION - DESCRIPTORS
DESCRIPTORS: CRAMPING

## 2024-11-06 ASSESSMENT — PAIN SCALES - GENERAL
PAINLEVEL_OUTOF10: 6
PAINLEVEL_OUTOF10: 5
PAINLEVEL_OUTOF10: 6
PAINLEVEL_OUTOF10: 4

## 2024-11-06 ASSESSMENT — PAIN - FUNCTIONAL ASSESSMENT: PAIN_FUNCTIONAL_ASSESSMENT: ACTIVITIES ARE NOT PREVENTED

## 2024-11-06 ASSESSMENT — PAIN DESCRIPTION - LOCATION
LOCATION: ABDOMEN

## 2024-11-06 ASSESSMENT — PAIN DESCRIPTION - FREQUENCY: FREQUENCY: CONTINUOUS

## 2024-11-06 ASSESSMENT — PAIN DESCRIPTION - ORIENTATION: ORIENTATION: MID

## 2024-11-06 ASSESSMENT — PAIN SCALES - WONG BAKER: WONGBAKER_NUMERICALRESPONSE: NO HURT

## 2024-11-06 ASSESSMENT — PAIN DESCRIPTION - ONSET: ONSET: ON-GOING

## 2024-11-06 NOTE — PLAN OF CARE
Problem: Chronic Conditions and Co-morbidities  Goal: Patient's chronic conditions and co-morbidity symptoms are monitored and maintained or improved  Outcome: Progressing  Flowsheets (Taken 11/5/2024 2000)  Care Plan - Patient's Chronic Conditions and Co-Morbidity Symptoms are Monitored and Maintained or Improved:   Monitor and assess patient's chronic conditions and comorbid symptoms for stability, deterioration, or improvement   Update acute care plan with appropriate goals if chronic or comorbid symptoms are exacerbated and prevent overall improvement and discharge     Problem: Discharge Planning  Goal: Discharge to home or other facility with appropriate resources  Outcome: Progressing  Flowsheets (Taken 11/5/2024 2000)  Discharge to home or other facility with appropriate resources:   Identify barriers to discharge with patient and caregiver   Refer to discharge planning if patient needs post-hospital services based on physician order or complex needs related to functional status, cognitive ability or social support system     Problem: Pain  Goal: Verbalizes/displays adequate comfort level or baseline comfort level  Outcome: Progressing     Problem: Safety - Adult  Goal: Free from fall injury  Outcome: Progressing     Problem: Cardiovascular - Adult  Goal: Maintains optimal cardiac output and hemodynamic stability  Outcome: Progressing  Flowsheets (Taken 11/5/2024 2000)  Maintains optimal cardiac output and hemodynamic stability:   Monitor blood pressure and heart rate   Monitor urine output and notify Licensed Independent Practitioner for values outside of normal range   Assess for signs of decreased cardiac output  Goal: Absence of cardiac dysrhythmias or at baseline  Outcome: Progressing  Flowsheets (Taken 11/5/2024 2000)  Absence of cardiac dysrhythmias or at baseline:   Monitor cardiac rate and rhythm   Assess for signs of decreased cardiac output   Administer antiarrhythmia medication and electrolyte

## 2024-11-06 NOTE — PROGRESS NOTES
Hospitalist Progress Note    Patient:  Kedar Medellin     YOB: 1955    MRN: [id number]   Admit date: 11/4/2024     Acct: [id number]     PCP: Cici Rodriguez APRN - NP    CC--Interval History: SBO---cont'd obstruction--NGT--seen by General Surgery--cont'd conservative management    See note below    All other ROS negative except noted in HPI    Diet:  Diet NPO    Medications:  Scheduled Meds:   pantoprazole (PROTONIX) 40 mg in sodium chloride (PF) 0.9 % 10 mL injection  40 mg IntraVENous Daily    calcium gluconate  1,000 mg IntraVENous Q1H    insulin lispro  0-8 Units SubCUTAneous 4 times per day    insulin glargine  50 Units SubCUTAneous BID    [START ON 11/7/2024] influenza virus vaccine  0.5 mL IntraMUSCular Prior to discharge    piperacillin-tazobactam  3,375 mg IntraVENous Q8H    mupirocin   Topical BID    sodium chloride flush  5-40 mL IntraVENous 2 times per day    enoxaparin  30 mg SubCUTAneous BID     Continuous Infusions:   sodium chloride Stopped (11/06/24 1433)    sodium chloride 100 mL/hr at 11/06/24 1504    dextrose       PRN Meds:morphine, sodium chloride flush, sodium chloride, ondansetron **OR** ondansetron, acetaminophen **OR** acetaminophen, potassium chloride, magnesium sulfate, glucose, dextrose bolus **OR** dextrose bolus, glucagon (rDNA), dextrose    Objective:  Labs:  CBC with Differential:    Lab Results   Component Value Date/Time    WBC 9.1 11/06/2024 05:25 AM    RBC 4.24 11/06/2024 05:25 AM    HGB 12.4 11/06/2024 05:25 AM    HCT 37.9 11/06/2024 05:25 AM     11/06/2024 05:25 AM    MCV 89.4 11/06/2024 05:25 AM    MCH 29.2 11/06/2024 05:25 AM    MCHC 32.7 11/06/2024 05:25 AM    RDW 14.1 11/06/2024 05:25 AM    LYMPHOPCT 14 11/06/2024 05:25 AM    MONOPCT 9 11/06/2024 05:25 AM    EOSPCT 3 11/06/2024 05:25 AM    BASOPCT 0 11/06/2024 05:25 AM    MONOSABS 0.81 11/06/2024 05:25 AM    LYMPHSABS 1.26 11/06/2024 05:25 AM    EOSABS 0.28 11/06/2024 05:25 AM    BASOSABS 0.03 11/06/2024  05:25 AM     BMP:    Lab Results   Component Value Date/Time     11/06/2024 05:25 AM    K 3.7 11/06/2024 05:25 AM     11/06/2024 05:25 AM    CO2 25 11/06/2024 05:25 AM    BUN 17 11/06/2024 05:25 AM    CREATININE 1.1 11/06/2024 05:25 AM    CALCIUM 7.8 11/06/2024 05:25 AM    LABGLOM 72 11/06/2024 05:25 AM    LABGLOM >60 08/21/2023 11:16 AM    GLUCOSE 89 11/06/2024 05:25 AM           Physical Exam:  Vitals: /85   Pulse 74   Temp 97.7 °F (36.5 °C) (Temporal)   Resp 18   Ht 1.803 m (5' 11\")   Wt (!) 140 kg (308 lb 9.6 oz)   SpO2 96%   BMI 43.04 kg/m²   24 hour intake/output:  Intake/Output Summary (Last 24 hours) at 11/6/2024 1529  Last data filed at 11/6/2024 1504  Gross per 24 hour   Intake 5738.78 ml   Output 4125 ml   Net 1613.78 ml     Last 3 weights:  Wt Readings from Last 3 Encounters:   11/06/24 (!) 140 kg (308 lb 9.6 oz)   11/04/24 (!) 138.7 kg (305 lb 12.8 oz)   10/24/24 (!) 136.5 kg (301 lb)     HEENT:  NGT, Normocephalic, and Atraumatic  Neck: Supple, No Masses, Tenderness, Nodularity, and No Lymphadenopathy  Chest/Lungs: Distant Breath Sounds  Cardiac: Regular Rate and Rhythm  GI/Abdomen:  distended abdomen--- and Bowel Sounds Absent  : Not examined  EXT/Skin: Edema Present  Neuro:  alert--generalized weakness      Assessment:    Principal Problem:    SBO (small bowel obstruction) (HCC)  Active Problems:    Leukocytosis    Terminal ileitis, with intestinal obstruction (HCC)    Dehydration    Elevated lactic acid level  Resolved Problems:    * No resolved hospital problems. *    SHARON WASHINGTON       name alert  69  WM  [vanna Rodriguez NP  DC Cardiology---[facility]; RODOLFO Joe; graham Reyna DPM--wound care]  FULL CODE    LOVENOX 30 bid       NGT--11.4.2024    Anti-infectives:  Zosyn IV    [Cipro IV, Flagyl IV]----dc'd    SBO---small bowel obstruction--11.4.2024---terminal ileitis---leukocytosis  Abdominal pain--distention--nausea---vomiting----11.4.2024

## 2024-11-06 NOTE — CARE COORDINATION
Case Management Assessment  Initial Evaluation    Date/Time of Evaluation: 11/6/2024 2:37 PM  Assessment Completed by: Rhina Chu RN    If patient is discharged prior to next notation, then this note serves as note for discharge by case management.    Patient Name: Kedar Medellin                   YOB: 1955  Diagnosis: SBO (small bowel obstruction) ([facility]) [K56.609]                   Date / Time: 11/4/2024  6:57 PM    Patient Admission Status: Inpatient   Readmission Risk (Low < 19, Mod (19-27), High > 27): Readmission Risk Score: 12.4    Current PCP: Cici Rodriguez APRN - NP  PCP verified by CM? Yes    Chart Reviewed: Yes      History Provided by: Patient  Patient Orientation: Alert and Oriented    Patient Cognition: Alert    Hospitalization in the last 30 days (Readmission):  No    If yes, Readmission Assessment in CM Navigator will be completed.    Advance Directives:      Code Status: Full Code   Patient's Primary Decision Maker is: Legal Next of Kin      Discharge Planning:    Patient lives with: Children Type of Home: House  Primary Care Giver: Self  Patient Support Systems include: Family Members   Current Financial resources: Medicare  Current community resources: None  Current services prior to admission: Durable Medical Equipment            Current DME: Cane            Type of Home Care services:  None    ADLS  Prior functional level: Independent in ADLs/IADLs  Current functional level: Independent in ADLs/IADLs    PT AM-PAC:   /24  OT AM-PAC:   /24    Family can provide assistance at DC: Yes  Would you like Case Management to discuss the discharge plan with any other family members/significant others, and if so, who? No  Plans to Return to Present Housing: Yes  Other Identified Issues/Barriers to RETURNING to current housing: yes  Potential Assistance needed at discharge: N/A            Potential DME:    Patient expects to discharge to: House  Plan for transportation at discharge:

## 2024-11-06 NOTE — PLAN OF CARE
Problem: Chronic Conditions and Co-morbidities  Goal: Patient's chronic conditions and co-morbidity symptoms are monitored and maintained or improved  11/6/2024 0746 by Kath Thakur RN  Outcome: Progressing  11/6/2024 0040 by Marcia Hernandez RN  Outcome: Progressing  Flowsheets (Taken 11/5/2024 2000)  Care Plan - Patient's Chronic Conditions and Co-Morbidity Symptoms are Monitored and Maintained or Improved:   Monitor and assess patient's chronic conditions and comorbid symptoms for stability, deterioration, or improvement   Update acute care plan with appropriate goals if chronic or comorbid symptoms are exacerbated and prevent overall improvement and discharge     Problem: Discharge Planning  Goal: Discharge to home or other facility with appropriate resources  11/6/2024 0746 by Kath Thakur RN  Outcome: Progressing  11/6/2024 0040 by Marcia Hernandez RN  Outcome: Progressing  Flowsheets (Taken 11/5/2024 2000)  Discharge to home or other facility with appropriate resources:   Identify barriers to discharge with patient and caregiver   Refer to discharge planning if patient needs post-hospital services based on physician order or complex needs related to functional status, cognitive ability or social support system     Problem: Pain  Goal: Verbalizes/displays adequate comfort level or baseline comfort level  11/6/2024 0746 by Kath Thakur RN  Outcome: Progressing  11/6/2024 0040 by Marcia Hernandez RN  Outcome: Progressing     Problem: Safety - Adult  Goal: Free from fall injury  11/6/2024 0746 by Kath Thakur RN  Outcome: Progressing  11/6/2024 0040 by Mracia Hernandez RN  Outcome: Progressing

## 2024-11-06 NOTE — PROGRESS NOTES
11/06/24 1118   Encounter Summary   Encounter Overview/Reason Spiritual/Emotional Needs   Service Provided For Patient and family together   Referral/Consult From [facility]   Support System Children   Last Encounter  11/06/24   Complexity of Encounter Low   Begin Time 1110   End Time  1123   Total Time Calculated 13 min   Spiritual/Emotional needs   Type Spiritual Support   Assessment/Intervention/Outcome   Assessment Calm   Intervention Active listening;Prayer (assurance of)/[place]   Outcome Engaged in conversation;Expressed Gratitude     Spiritual Health History and Assessment/Progress Note  [facility]    Spiritual/Emotional Needs,  ,  ,      Name: Kedar Medellin MRN: [id number]    Age: 69 y.o.     Sex: male   Language: English   Anabaptist: Other   SBO (small bowel obstruction) ([facility])     Date: 11/6/2024            Total Time Calculated: (P) 13 min              Spiritual Assessment continued in [facility]  PROGRESSIVE CARE        Referral/Consult From: (P) Rounding   Encounter Overview/Reason: Spiritual/Emotional Needs  Service Provided For: Patient and family together    Patient is sitting in chair and waiting for test results to help determine next steps. He continues to maintain his teasing good humor.    Mabel, Belief, Meaning:   Patient identifies as spiritual and is connected with a mabel tradition or spiritual practice  Family/Friends identify as spiritual and are connected with a mabel tradition or spiritual practice      Importance and Influence:  Patient has spiritual/personal beliefs that influence decisions regarding their health  Family/Friends have spiritual/personal beliefs that influence decisions regarding the patient's health    Community:  Patient is connected with a spiritual community  Family/Friends are connected with a spiritual community:    Assessment and Plan of Care:     Patient Interventions include: Facilitated expression of thoughts and feelings  Family/Friends

## 2024-11-06 NOTE — PROGRESS NOTES
General Surgery - Khoa Herrera PA-C  Daily Progress Note  Pt Name: Kedar Medellin  Medical Record Number: [id number]  Date of Birth 1955   Today's Date: 11/6/2024      SUBJECTIVE  Increased output of NG overnight, is dark in color and red  Nausea is not significant per patient  Continues to have abdominal pain and distention  Did pass flatus 4 times he states, no Bms  No f/c/cp/sob  Hgb stable  WBC normalized      OBJECTIVE  CURRENT VITALS BP (!) 141/60   Pulse 76   Temp 97.8 °F (36.6 °C) (Temporal)   Resp 15   Ht 1.803 m (5' 11\")   Wt (!) 140 kg (308 lb 9.6 oz)   SpO2 95%   BMI 43.04 kg/m²   GENERAL: [alert, cooperative, no distress]  LUNGS: [Speaking in complete sentences, no increased work of breathing, comfortable]  HEART: [normal rate, regular rhythm, and intact distal pulses]  ABDOMEN: distended but not firm, tenderness is generalized without localization, no rebound or guarding, not rigid, BS are audible throughout  EXTERMITY: [no edema]  24 HR INTAKE/OUTPUT :   Intake/Output Summary (Last 24 hours) at 11/6/2024 1259  Last data filed at 11/6/2024 1019  Gross per 24 hour   Intake 4411.9 ml   Output 4400 ml   Net 11.9 ml     DRAIN/TUBE OUTPUT : NG/OG/NJ/NE Tube Nasogastric 16 fr Right nostril-Output (mL): 700 ml,    Current Facility-Administered Medications   Medication Dose Route Frequency Provider Last Rate Last Admin    pantoprazole (PROTONIX) 40 mg in sodium chloride (PF) 0.9 % 10 mL injection  40 mg IntraVENous Daily Sun Magaña APRN - CNP   40 mg at 11/06/24 0910    calcium gluconate 1,000 mg in sodium chloride 50 mL  1,000 mg IntraVENous Q1H Barron Langley MD 50 mL/hr at 11/06/24 1255 1,000 mg at 11/06/24 1255    insulin lispro (HUMALOG,ADMELOG) injection vial 0-8 Units  0-8 Units SubCUTAneous 4 times per day Sun Magaña APRN - CNP        morphine (PF) injection 2 mg  2 mg IntraVENous Q3H PRN Sun Magaña APRN - CNP   2 mg at 11/06/24 0910    insulin glargine (LANTUS)  mL IntraVENous PRN Sun Magaña APRN - CNP        glucagon injection 1 mg  1 mg SubCUTAneous PRN Sun Magaña APRN - CNP        dextrose 10 % infusion   IntraVENous Continuous PRN Sun Magaña APRN - CNP            LABS  CBC :   Lab Results   Component Value Date/Time    WBC 9.1 11/06/2024 05:25 AM    HGB 12.4 11/06/2024 05:25 AM    HCT 37.9 11/06/2024 05:25 AM     11/06/2024 05:25 AM     BMP:   Lab Results   Component Value Date/Time     11/06/2024 05:25 AM    K 3.7 11/06/2024 05:25 AM     11/06/2024 05:25 AM    CO2 25 11/06/2024 05:25 AM    BUN 17 11/06/2024 05:25 AM    CREATININE 1.1 11/06/2024 05:25 AM    MG 1.8 11/06/2024 05:25 AM    PHOS 3.0 03/17/2023 05:45 AM       ASSESSMENT    ICD-10-CM    1. SBO (small bowel obstruction) ([facility])  K56.609       2. Ileitis  K52.9          69yoM with SbO which looks to be 2/2 ileitis, has had one prior abd surgery and could be adhesions as well, he is having a lot of output from the NG which does have some bloody look to it, hgb is stable, wbc normalized, abdomen is  but not rigid or peritoneal by any means, will continue conservative management for today and monitor closely, I did d/w patient that we need to start having some results in the next 24-48hrs or may need to proceed with another route and possibly OR. We may consider SBFT as well tomorrow.    PLAN  1. NPO  2. IVF  3. Morphine for pain control  4. NGT to LIWS  5. Monitor bowel function, I/Os  6. Hospitalist for medical management.    Khoa Herrera PA-C,   Electronically signed 11/6/2024 at 12:59 PM

## 2024-11-07 ENCOUNTER — APPOINTMENT (OUTPATIENT)
Dept: GENERAL RADIOLOGY | Age: 69
DRG: 372 | End: 2024-11-07
Payer: MEDICARE

## 2024-11-07 LAB
ANION GAP SERPL CALCULATED.3IONS-SCNC: 10 MMOL/L (ref 9–17)
BASOPHILS # BLD: 0.05 K/UL (ref 0–0.2)
BASOPHILS NFR BLD: 1 % (ref 0–2)
BUN SERPL-MCNC: 13 MG/DL (ref 8–23)
BUN/CREAT SERPL: 12 (ref 9–20)
CALCIUM SERPL-MCNC: 8.2 MG/DL (ref 8.6–10.4)
CHLORIDE SERPL-SCNC: 102 MMOL/L (ref 98–107)
CO2 SERPL-SCNC: 26 MMOL/L (ref 20–31)
CREAT SERPL-MCNC: 1.1 MG/DL (ref 0.7–1.2)
EOSINOPHIL # BLD: 0.3 K/UL (ref 0–0.44)
EOSINOPHILS RELATIVE PERCENT: 3 % (ref 1–4)
ERYTHROCYTE [DISTWIDTH] IN BLOOD BY AUTOMATED COUNT: 13.8 % (ref 11.8–14.4)
GFR, ESTIMATED: 72 ML/MIN/1.73M2
GLUCOSE BLD-MCNC: 110 MG/DL (ref 75–110)
GLUCOSE BLD-MCNC: 72 MG/DL (ref 75–110)
GLUCOSE BLD-MCNC: 86 MG/DL (ref 75–110)
GLUCOSE BLD-MCNC: 98 MG/DL (ref 75–110)
GLUCOSE SERPL-MCNC: 97 MG/DL (ref 70–99)
HCT VFR BLD AUTO: 34.5 % (ref 40.7–50.3)
HGB BLD-MCNC: 11.1 G/DL (ref 13–17)
IMM GRANULOCYTES # BLD AUTO: 0.04 K/UL (ref 0–0.3)
IMM GRANULOCYTES NFR BLD: 0 %
LYMPHOCYTES NFR BLD: 1.5 K/UL (ref 1.1–3.7)
LYMPHOCYTES RELATIVE PERCENT: 17 % (ref 24–43)
MAGNESIUM SERPL-MCNC: 1.8 MG/DL (ref 1.6–2.6)
MCH RBC QN AUTO: 28.7 PG (ref 25.2–33.5)
MCHC RBC AUTO-ENTMCNC: 32.2 G/DL (ref 25.2–33.5)
MCV RBC AUTO: 89.1 FL (ref 82.6–102.9)
MONOCYTES NFR BLD: 0.55 K/UL (ref 0.1–1.2)
MONOCYTES NFR BLD: 6 % (ref 3–12)
NEUTROPHILS NFR BLD: 73 % (ref 36–65)
NEUTS SEG NFR BLD: 6.45 K/UL (ref 1.5–8.1)
NRBC BLD-RTO: 0 PER 100 WBC
PLATELET # BLD AUTO: 284 K/UL (ref 138–453)
PMV BLD AUTO: 9.6 FL (ref 8.1–13.5)
POTASSIUM SERPL-SCNC: 3.6 MMOL/L (ref 3.7–5.3)
RBC # BLD AUTO: 3.87 M/UL (ref 4.21–5.77)
SODIUM SERPL-SCNC: 138 MMOL/L (ref 135–144)
WBC OTHER # BLD: 8.9 K/UL (ref 3.5–11.3)

## 2024-11-07 PROCEDURE — 36415 COLL VENOUS BLD VENIPUNCTURE: CPT

## 2024-11-07 PROCEDURE — 2500000003 HC RX 250 WO HCPCS: Performed by: NURSE PRACTITIONER

## 2024-11-07 PROCEDURE — 85025 COMPLETE CBC W/AUTO DIFF WBC: CPT

## 2024-11-07 PROCEDURE — 74250 X-RAY XM SM INT 1CNTRST STD: CPT

## 2024-11-07 PROCEDURE — 2500000003 HC RX 250 WO HCPCS: Performed by: INTERNAL MEDICINE

## 2024-11-07 PROCEDURE — 99231 SBSQ HOSP IP/OBS SF/LOW 25: CPT | Performed by: INTERNAL MEDICINE

## 2024-11-07 PROCEDURE — 83735 ASSAY OF MAGNESIUM: CPT

## 2024-11-07 PROCEDURE — 2580000003 HC RX 258: Performed by: NURSE PRACTITIONER

## 2024-11-07 PROCEDURE — 94760 N-INVAS EAR/PLS OXIMETRY 1: CPT

## 2024-11-07 PROCEDURE — 6370000000 HC RX 637 (ALT 250 FOR IP): Performed by: INTERNAL MEDICINE

## 2024-11-07 PROCEDURE — 80048 BASIC METABOLIC PNL TOTAL CA: CPT

## 2024-11-07 PROCEDURE — 82947 ASSAY GLUCOSE BLOOD QUANT: CPT

## 2024-11-07 PROCEDURE — 6360000002 HC RX W HCPCS: Performed by: NURSE PRACTITIONER

## 2024-11-07 PROCEDURE — 6360000002 HC RX W HCPCS: Performed by: INTERNAL MEDICINE

## 2024-11-07 PROCEDURE — 2060000000 HC ICU INTERMEDIATE R&B

## 2024-11-07 PROCEDURE — 74019 RADEX ABDOMEN 2 VIEWS: CPT

## 2024-11-07 PROCEDURE — 99232 SBSQ HOSP IP/OBS MODERATE 35: CPT | Performed by: PHYSICIAN ASSISTANT

## 2024-11-07 RX ORDER — INSULIN GLARGINE 100 [IU]/ML
30 INJECTION, SOLUTION SUBCUTANEOUS 2 TIMES DAILY
Status: DISCONTINUED | OUTPATIENT
Start: 2024-11-07 | End: 2024-11-09 | Stop reason: HOSPADM

## 2024-11-07 RX ORDER — INSULIN GLARGINE 100 [IU]/ML
40 INJECTION, SOLUTION SUBCUTANEOUS 2 TIMES DAILY
Status: DISCONTINUED | OUTPATIENT
Start: 2024-11-07 | End: 2024-11-07

## 2024-11-07 RX ORDER — DIATRIZOATE MEGLUMINE AND DIATRIZOATE SODIUM 660; 100 MG/ML; MG/ML
360 SOLUTION ORAL; RECTAL
Status: DISCONTINUED | OUTPATIENT
Start: 2024-11-07 | End: 2024-11-09 | Stop reason: HOSPADM

## 2024-11-07 RX ORDER — METOPROLOL TARTRATE 1 MG/ML
5 INJECTION, SOLUTION INTRAVENOUS EVERY 6 HOURS PRN
Status: DISCONTINUED | OUTPATIENT
Start: 2024-11-07 | End: 2024-11-09 | Stop reason: HOSPADM

## 2024-11-07 RX ORDER — CALCIUM GLUCONATE 20 MG/ML
1000 INJECTION, SOLUTION INTRAVENOUS
Status: DISPENSED | OUTPATIENT
Start: 2024-11-07 | End: 2024-11-07

## 2024-11-07 RX ORDER — CALCIUM GLUCONATE 20 MG/ML
1000 INJECTION, SOLUTION INTRAVENOUS ONCE
Status: COMPLETED | OUTPATIENT
Start: 2024-11-07 | End: 2024-11-07

## 2024-11-07 RX ADMIN — POTASSIUM CHLORIDE, DEXTROSE MONOHYDRATE AND SODIUM CHLORIDE: 150; 5; 450 INJECTION, SOLUTION INTRAVENOUS at 09:25

## 2024-11-07 RX ADMIN — POTASSIUM CHLORIDE, DEXTROSE MONOHYDRATE AND SODIUM CHLORIDE: 150; 5; 450 INJECTION, SOLUTION INTRAVENOUS at 01:21

## 2024-11-07 RX ADMIN — MUPIROCIN: 20 OINTMENT TOPICAL at 22:20

## 2024-11-07 RX ADMIN — ENOXAPARIN SODIUM 30 MG: 100 INJECTION SUBCUTANEOUS at 08:44

## 2024-11-07 RX ADMIN — INSULIN GLARGINE 40 UNITS: 100 INJECTION, SOLUTION SUBCUTANEOUS at 09:24

## 2024-11-07 RX ADMIN — CALCIUM GLUCONATE 1000 MG: 20 INJECTION, SOLUTION INTRAVENOUS at 10:10

## 2024-11-07 RX ADMIN — DIATRIZOATE MEGLUMINE AND DIATRIZOATE SODIUM 360 ML: 600; 100 SOLUTION ORAL; RECTAL at 09:25

## 2024-11-07 RX ADMIN — ENOXAPARIN SODIUM 30 MG: 100 INJECTION SUBCUTANEOUS at 22:12

## 2024-11-07 RX ADMIN — PIPERACILLIN AND TAZOBACTAM 3375 MG: 3; .375 INJECTION, POWDER, LYOPHILIZED, FOR SOLUTION INTRAVENOUS at 05:31

## 2024-11-07 RX ADMIN — MUPIROCIN: 20 OINTMENT TOPICAL at 08:43

## 2024-11-07 RX ADMIN — PIPERACILLIN AND TAZOBACTAM 3375 MG: 3; .375 INJECTION, POWDER, LYOPHILIZED, FOR SOLUTION INTRAVENOUS at 13:56

## 2024-11-07 RX ADMIN — CALCIUM GLUCONATE 1000 MG: 20 INJECTION, SOLUTION INTRAVENOUS at 13:10

## 2024-11-07 RX ADMIN — SODIUM CHLORIDE, PRESERVATIVE FREE 40 MG: 5 INJECTION INTRAVENOUS at 08:44

## 2024-11-07 NOTE — PROGRESS NOTES
Hospitalist Progress Note    Patient:  Kedar Medellin     YOB: 1955    MRN: [id number]   Admit date: 11/4/2024     Acct: [id number]     PCP: Cici Rodriguez APRN - NP    CC--Interval History: SBO---imaging consistent with cont'd SBO----SBFT 11.7.2024---results pending    NGT pending SBFT    DM2--lower blood glucose levels ---> decrease Lantus to 40 BID    CA corrected = 8.4 ----> calcium replacement    See note below     All other ROS negative except noted in HPI    Diet:  Diet NPO    Medications:  Scheduled Meds:   insulin glargine  40 Units SubCUTAneous BID    pantoprazole (PROTONIX) 40 mg in sodium chloride (PF) 0.9 % 10 mL injection  40 mg IntraVENous Daily    insulin lispro  0-8 Units SubCUTAneous 4 times per day    influenza virus vaccine  0.5 mL IntraMUSCular Prior to discharge    piperacillin-tazobactam  3,375 mg IntraVENous Q8H    mupirocin   Topical BID    sodium chloride flush  5-40 mL IntraVENous 2 times per day    enoxaparin  30 mg SubCUTAneous BID     Continuous Infusions:   dextrose 5% and 0.45% NaCl with KCl 20 mEq 125 mL/hr at 11/07/24 0925    sodium chloride 125 mL/hr at 11/07/24 0117    dextrose       PRN Meds:diatrizoate meglumine-sodium, morphine, sodium chloride flush, sodium chloride, ondansetron **OR** ondansetron, acetaminophen **OR** acetaminophen, potassium chloride, magnesium sulfate, glucose, dextrose bolus **OR** dextrose bolus, glucagon (rDNA), dextrose    Objective:  Labs:  CBC with Differential:    Lab Results   Component Value Date/Time    WBC 8.9 11/07/2024 05:01 AM    RBC 3.87 11/07/2024 05:01 AM    HGB 11.1 11/07/2024 05:01 AM    HCT 34.5 11/07/2024 05:01 AM     11/07/2024 05:01 AM    MCV 89.1 11/07/2024 05:01 AM    MCH 28.7 11/07/2024 05:01 AM    MCHC 32.2 11/07/2024 05:01 AM    RDW 13.8 11/07/2024 05:01 AM    LYMPHOPCT 17 11/07/2024 05:01 AM    MONOPCT 6 11/07/2024 05:01 AM    EOSPCT 3 11/07/2024 05:01 AM    BASOPCT 1 11/07/2024 05:01 AM    MONOSABS 0.55  IV]----dc'd    SBO---small bowel obstruction--11.4.2024---terminal ileitis---leukocytosis  Abdominal pain--distention--nausea---vomiting----11.4.2024            SBFT---11.7.2024----pending        AAS---11.7.2024----NGT--persistent small bowel dilatation--obstructing process        AAS---11.6.2024---multiple dilated loops of bowel consistent with persistent SBO        CT AP--11.4.2024--SBO--terminal ileitis--no pneumatosis--no free air--hepatic steatosis    Elevated lactic acid level---4.0---3.1---2.4---2.7--2.3---sepsis ruled out  CKD--Stage 3a  DM2---uncontrolled  HTN  Covid--19 infection---10.24.2024  Chronic left foot plantar diabetic ulcer---POA---11.4.2024--topical care   RLE---rub areas--boot   SVT---asymptomatic--nonsustained 13 beats   Hypocalcemia     Prior:  Left diabetic foot infection---due to Diabetes Mellitus Type 2  POD ____ left foot--8.14.2023--debridement--removal flexor tendon--1st MT---[facility]    RBBB         EKG---3.5.2023---NSR--86--RBBB        2D ECHO--3.6.2023--LA mildly dilated--mild LVH--NLVSF--RA dilatation--NRVSF--                               MAC--AV calcification with adequate opening---RVSP ~ 34 mm Hg---                               AR mildly dilated 3.8 cm---IVC not visualized--Grade II moderate DD---                               LVEF ~ 60%  Hyperlipidemia  Diabetes Mellitus Type 2  ANGELA   Obesity  Tobacco abuse---quit--1992     PMH:  allergic rhinitis, colon polyps, SBO----3.12.2023---with watery drainage from the wound site, SBO--             3.6.2023---persistent mild-low-grade, anasarca---3.12.2023, blisters BLE---feet---3.12.2023,              BLE--edema, Elevated lactic acid---hyponatremia--2023  PSH:   right CTS---2010, colonoscopy--2009---polyps, RIH--1977, left great toenail plate removal--              2003, diagnostic laparoscopy---3.5.2023---SBO    Allergies:  bee venom, naproxen, semaglutide, empagliflozin    Plan:    SBO---SBFT---11.7.2024    DM2----decrease Lantus

## 2024-11-07 NOTE — PROGRESS NOTES
Chronic low back pain - Primary     Suspect this is more related to SI joint dysfunction.  -Patient information and rehab exercises given  -Did explain to him that if he has this happen again acutely he might benefit from a chiropractic adjustment or seeing an osteopath.  -I am going to refer him for physical therapy for strengthening exercises, stretching and rehab program  -May use ibuprofen and continue muscle relaxer as needed.  -If he is getting worse or not getting better let me know  -We will go ahead and get an x-ray to make sure there is nothing more significant going on since this has been a chronic problem               RN notified dr. Maier regarding results of small bowel through study. Dr Maier stated to keep NG clamped for tonight to see how he does.

## 2024-11-07 NOTE — PLAN OF CARE
Problem: Chronic Conditions and Co-morbidities  Goal: Patient's chronic conditions and co-morbidity symptoms are monitored and maintained or improved  11/7/2024 0743 by Shock, Rylee, RN  Outcome: Progressing  11/7/2024 0332 by Kenna Giang RN  Outcome: Progressing  Flowsheets (Taken 11/6/2024 2025)  Care Plan - Patient's Chronic Conditions and Co-Morbidity Symptoms are Monitored and Maintained or Improved:   Monitor and assess patient's chronic conditions and comorbid symptoms for stability, deterioration, or improvement   Collaborate with multidisciplinary team to address chronic and comorbid conditions and prevent exacerbation or deterioration   Update acute care plan with appropriate goals if chronic or comorbid symptoms are exacerbated and prevent overall improvement and discharge     Problem: Discharge Planning  Goal: Discharge to home or other facility with appropriate resources  11/7/2024 0743 by Shock, Rylee, RN  Outcome: Progressing  11/7/2024 0332 by Kenna Giang RN  Outcome: Progressing  Flowsheets (Taken 11/6/2024 2025)  Discharge to home or other facility with appropriate resources:   Identify barriers to discharge with patient and caregiver   Arrange for needed discharge resources and transportation as appropriate   Identify discharge learning needs (meds, wound care, etc)   Refer to discharge planning if patient needs post-hospital services based on physician order or complex needs related to functional status, cognitive ability or social support system     Problem: Pain  Goal: Verbalizes/displays adequate comfort level or baseline comfort level  11/7/2024 0743 by Shock, Rylee, RN  Outcome: Progressing  11/7/2024 0332 by Kenna Giang RN  Outcome: Progressing     Problem: Safety - Adult  Goal: Free from fall injury  11/7/2024 0743 by Shock, Rylee, RN  Outcome: Progressing  11/7/2024 0332 by Kenna Giang RN  Outcome: Progressing     Problem: Cardiovascular - Adult  Goal: Maintains

## 2024-11-07 NOTE — PLAN OF CARE
Problem: Gastrointestinal - Adult  Goal: Maintains or returns to baseline bowel function  Outcome: Not Progressing  Flowsheets (Taken 11/6/2024 2025)  Maintains or returns to baseline bowel function:   Assess bowel function   Administer IV fluids as ordered to ensure adequate hydration   Administer ordered medications as needed   Encourage mobilization and activity     Problem: Chronic Conditions and Co-morbidities  Goal: Patient's chronic conditions and co-morbidity symptoms are monitored and maintained or improved  Outcome: Progressing  Flowsheets (Taken 11/6/2024 2025)  Care Plan - Patient's Chronic Conditions and Co-Morbidity Symptoms are Monitored and Maintained or Improved:   Monitor and assess patient's chronic conditions and comorbid symptoms for stability, deterioration, or improvement   Collaborate with multidisciplinary team to address chronic and comorbid conditions and prevent exacerbation or deterioration   Update acute care plan with appropriate goals if chronic or comorbid symptoms are exacerbated and prevent overall improvement and discharge     Problem: Discharge Planning  Goal: Discharge to home or other facility with appropriate resources  Outcome: Progressing  Flowsheets (Taken 11/6/2024 2025)  Discharge to home or other facility with appropriate resources:   Identify barriers to discharge with patient and caregiver   Arrange for needed discharge resources and transportation as appropriate   Identify discharge learning needs (meds, wound care, etc)   Refer to discharge planning if patient needs post-hospital services based on physician order or complex needs related to functional status, cognitive ability or social support system     Problem: Pain  Goal: Verbalizes/displays adequate comfort level or baseline comfort level  Outcome: Progressing     Problem: Safety - Adult  Goal: Free from fall injury  Outcome: Progressing     Problem: Cardiovascular - Adult  Goal: Maintains optimal cardiac

## 2024-11-07 NOTE — PROGRESS NOTES
General Surgery - Khoa Herrera PA-C  Daily Progress Note  Pt Name: Kedar Medellin  Medical Record Number: [id number]  Date of Birth 1955   Today's Date: 11/7/2024      SUBJECTIVE  NG output down some and now lighter in color and less dark red  No nausea  Pain he states is slightly better than yesterday but still painful  Not passing any gas or having any Bms  Hgb stable  WBC normal      OBJECTIVE  CURRENT VITALS BP (!) 158/80   Pulse 77   Temp 98.4 °F (36.9 °C) (Temporal)   Resp 18   Ht 1.803 m (5' 11\")   Wt (!) 141 kg (310 lb 12.8 oz)   SpO2 95%   BMI 43.35 kg/m²   GENERAL: [alert, cooperative, no distress]  LUNGS: [Speaking in complete sentences, no increased work of breathing, comfortable]  HEART: [normal rate, regular rhythm, and intact distal pulses]  ABDOMEN: soft but distended, tenderness is generalized worse in the central abdomen, BS are audible but hypoactive, no rebound or guarding, not peritoneal  EXTERMITY: [no edema]  24 HR INTAKE/OUTPUT :   Intake/Output Summary (Last 24 hours) at 11/7/2024 0827  Last data filed at 11/7/2024 0532  Gross per 24 hour   Intake 3047.77 ml   Output 2150 ml   Net 897.77 ml     DRAIN/TUBE OUTPUT : NG/OG/NJ/NE Tube Nasogastric 16 fr Right nostril-Output (mL): 600 ml,    Current Facility-Administered Medications   Medication Dose Route Frequency Provider Last Rate Last Admin    pantoprazole (PROTONIX) 40 mg in sodium chloride (PF) 0.9 % 10 mL injection  40 mg IntraVENous Daily Sun Magaña APRN - CNP   40 mg at 11/06/24 0910    dextrose 5 % and 0.45 % NaCl with KCl 20 mEq infusion   IntraVENous Continuous Sun Magaña APRN -  mL/hr at 11/07/24 0531 Rate Verify at 11/07/24 0531    insulin lispro (HUMALOG,ADMELOG) injection vial 0-8 Units  0-8 Units SubCUTAneous 4 times per day Sun Magaña APRN - CNP        morphine (PF) injection 2 mg  2 mg IntraVENous Q3H PRN Sun Magaña APRN - CNP   2 mg at 11/06/24 0910    insulin glargine (LANTUS)

## 2024-11-08 LAB
ANION GAP SERPL CALCULATED.3IONS-SCNC: 11 MMOL/L (ref 9–17)
BASOPHILS # BLD: 0.06 K/UL (ref 0–0.2)
BASOPHILS NFR BLD: 1 % (ref 0–2)
BUN SERPL-MCNC: 13 MG/DL (ref 8–23)
BUN/CREAT SERPL: 13 (ref 9–20)
CALCIUM SERPL-MCNC: 8.9 MG/DL (ref 8.6–10.4)
CHLORIDE SERPL-SCNC: 104 MMOL/L (ref 98–107)
CO2 SERPL-SCNC: 24 MMOL/L (ref 20–31)
CREAT SERPL-MCNC: 1 MG/DL (ref 0.7–1.2)
EOSINOPHIL # BLD: 0.28 K/UL (ref 0–0.44)
EOSINOPHILS RELATIVE PERCENT: 3 % (ref 1–4)
ERYTHROCYTE [DISTWIDTH] IN BLOOD BY AUTOMATED COUNT: 13.7 % (ref 11.8–14.4)
GFR, ESTIMATED: 80 ML/MIN/1.73M2
GLUCOSE BLD-MCNC: 151 MG/DL (ref 75–110)
GLUCOSE BLD-MCNC: 174 MG/DL (ref 75–110)
GLUCOSE BLD-MCNC: 75 MG/DL (ref 75–110)
GLUCOSE SERPL-MCNC: 77 MG/DL (ref 70–99)
HCT VFR BLD AUTO: 35.6 % (ref 40.7–50.3)
HGB BLD-MCNC: 11.8 G/DL (ref 13–17)
IMM GRANULOCYTES # BLD AUTO: 0.06 K/UL (ref 0–0.3)
IMM GRANULOCYTES NFR BLD: 1 %
LYMPHOCYTES NFR BLD: 1.26 K/UL (ref 1.1–3.7)
LYMPHOCYTES RELATIVE PERCENT: 14 % (ref 24–43)
MAGNESIUM SERPL-MCNC: 1.8 MG/DL (ref 1.6–2.6)
MCH RBC QN AUTO: 29.1 PG (ref 25.2–33.5)
MCHC RBC AUTO-ENTMCNC: 33.1 G/DL (ref 25.2–33.5)
MCV RBC AUTO: 87.9 FL (ref 82.6–102.9)
MONOCYTES NFR BLD: 0.59 K/UL (ref 0.1–1.2)
MONOCYTES NFR BLD: 6 % (ref 3–12)
NEUTROPHILS NFR BLD: 75 % (ref 36–65)
NEUTS SEG NFR BLD: 6.93 K/UL (ref 1.5–8.1)
NRBC BLD-RTO: 0 PER 100 WBC
PLATELET # BLD AUTO: 344 K/UL (ref 138–453)
PMV BLD AUTO: 9.7 FL (ref 8.1–13.5)
POTASSIUM SERPL-SCNC: 3.4 MMOL/L (ref 3.7–5.3)
RBC # BLD AUTO: 4.05 M/UL (ref 4.21–5.77)
SODIUM SERPL-SCNC: 139 MMOL/L (ref 135–144)
WBC OTHER # BLD: 9.2 K/UL (ref 3.5–11.3)

## 2024-11-08 PROCEDURE — 36415 COLL VENOUS BLD VENIPUNCTURE: CPT

## 2024-11-08 PROCEDURE — 2060000000 HC ICU INTERMEDIATE R&B

## 2024-11-08 PROCEDURE — 2580000003 HC RX 258: Performed by: NURSE PRACTITIONER

## 2024-11-08 PROCEDURE — 2500000003 HC RX 250 WO HCPCS: Performed by: NURSE PRACTITIONER

## 2024-11-08 PROCEDURE — 2500000003 HC RX 250 WO HCPCS: Performed by: INTERNAL MEDICINE

## 2024-11-08 PROCEDURE — 99231 SBSQ HOSP IP/OBS SF/LOW 25: CPT | Performed by: INTERNAL MEDICINE

## 2024-11-08 PROCEDURE — 99232 SBSQ HOSP IP/OBS MODERATE 35: CPT | Performed by: SURGERY

## 2024-11-08 PROCEDURE — 80048 BASIC METABOLIC PNL TOTAL CA: CPT

## 2024-11-08 PROCEDURE — 6360000004 HC RX CONTRAST MEDICATION: Performed by: PHYSICIAN ASSISTANT

## 2024-11-08 PROCEDURE — 85025 COMPLETE CBC W/AUTO DIFF WBC: CPT

## 2024-11-08 PROCEDURE — 83735 ASSAY OF MAGNESIUM: CPT

## 2024-11-08 PROCEDURE — 6370000000 HC RX 637 (ALT 250 FOR IP): Performed by: NURSE PRACTITIONER

## 2024-11-08 PROCEDURE — 6360000002 HC RX W HCPCS: Performed by: NURSE PRACTITIONER

## 2024-11-08 PROCEDURE — 82947 ASSAY GLUCOSE BLOOD QUANT: CPT

## 2024-11-08 RX ORDER — INSULIN LISPRO 100 [IU]/ML
0-8 INJECTION, SOLUTION INTRAVENOUS; SUBCUTANEOUS
Status: DISCONTINUED | OUTPATIENT
Start: 2024-11-08 | End: 2024-11-09 | Stop reason: HOSPADM

## 2024-11-08 RX ADMIN — METOPROLOL TARTRATE 5 MG: 1 INJECTION, SOLUTION INTRAVENOUS at 10:08

## 2024-11-08 RX ADMIN — POTASSIUM CHLORIDE, DEXTROSE MONOHYDRATE AND SODIUM CHLORIDE: 150; 5; 450 INJECTION, SOLUTION INTRAVENOUS at 16:10

## 2024-11-08 RX ADMIN — MUPIROCIN: 20 OINTMENT TOPICAL at 08:35

## 2024-11-08 RX ADMIN — DIATRIZOATE MEGLUMINE AND DIATRIZOATE SODIUM 360 ML: 600; 100 SOLUTION ORAL; RECTAL at 15:38

## 2024-11-08 RX ADMIN — ENOXAPARIN SODIUM 30 MG: 100 INJECTION SUBCUTANEOUS at 21:01

## 2024-11-08 RX ADMIN — MUPIROCIN: 20 OINTMENT TOPICAL at 21:05

## 2024-11-08 RX ADMIN — PIPERACILLIN AND TAZOBACTAM 3375 MG: 3; .375 INJECTION, POWDER, LYOPHILIZED, FOR SOLUTION INTRAVENOUS at 21:04

## 2024-11-08 RX ADMIN — PIPERACILLIN AND TAZOBACTAM 3375 MG: 3; .375 INJECTION, POWDER, LYOPHILIZED, FOR SOLUTION INTRAVENOUS at 13:10

## 2024-11-08 RX ADMIN — INSULIN GLARGINE 30 UNITS: 100 INJECTION, SOLUTION SUBCUTANEOUS at 21:01

## 2024-11-08 RX ADMIN — POTASSIUM CHLORIDE, DEXTROSE MONOHYDRATE AND SODIUM CHLORIDE: 150; 5; 450 INJECTION, SOLUTION INTRAVENOUS at 08:31

## 2024-11-08 RX ADMIN — SODIUM CHLORIDE, PRESERVATIVE FREE 40 MG: 5 INJECTION INTRAVENOUS at 08:31

## 2024-11-08 RX ADMIN — ENOXAPARIN SODIUM 30 MG: 100 INJECTION SUBCUTANEOUS at 08:31

## 2024-11-08 NOTE — PLAN OF CARE
Problem: Chronic Conditions and Co-morbidities  Goal: Patient's chronic conditions and co-morbidity symptoms are monitored and maintained or improved  11/8/2024 0852 by Shock, Rylee, RN  Outcome: Progressing  11/7/2024 2200 by Viry Bautista RN  Outcome: Progressing     Problem: Discharge Planning  Goal: Discharge to home or other facility with appropriate resources  11/8/2024 0852 by Shock, Rylee, RN  Outcome: Progressing  11/7/2024 2200 by Viry Bautista RN  Outcome: Progressing     Problem: Pain  Goal: Verbalizes/displays adequate comfort level or baseline comfort level  11/8/2024 0852 by Shock, Rylee, RN  Outcome: Progressing  11/7/2024 2200 by Viry Bautista RN  Outcome: Progressing     Problem: Safety - Adult  Goal: Free from fall injury  11/8/2024 0852 by Shock, Rylee, RN  Outcome: Progressing  11/7/2024 2200 by Viry Bautista RN  Outcome: Progressing     Problem: Cardiovascular - Adult  Goal: Maintains optimal cardiac output and hemodynamic stability  11/8/2024 0852 by Shock, Rylee, RN  Outcome: Progressing  11/7/2024 2200 by Viry Bautista RN  Outcome: Progressing  Goal: Absence of cardiac dysrhythmias or at baseline  11/8/2024 0852 by Shock, Rylee, RN  Outcome: Progressing  11/7/2024 2200 by Viry Bautista RN  Outcome: Progressing     Problem: Skin/Tissue Integrity - Adult  Goal: Incisions, wounds, or drain sites healing without S/S of infection  11/8/2024 0852 by Shock, Rylee, RN  Outcome: Progressing  11/7/2024 2200 by Viry Bautista RN  Outcome: Progressing     Problem: Gastrointestinal - Adult  Goal: Minimal or absence of nausea and vomiting  11/8/2024 0852 by Shock, Rylee, RN  Outcome: Progressing  11/7/2024 2200 by Viry Bautista RN  Outcome: Progressing  Goal: Maintains or returns to baseline bowel function  11/8/2024 0852 by Shock, Rylee, RN  Outcome: Progressing  11/7/2024 2200 by Viry Bautista RN  Outcome: Progressing     Problem: Metabolic/Fluid and Electrolytes - Adult  Goal: Electrolytes

## 2024-11-08 NOTE — PROGRESS NOTES
Surgery Progress Note            PATIENT NAME: Kedar Medellin     TODAY'S DATE: 11/8/2024, 2:17 PM    CHIEF COMPLAINT:  SBO    SUBJECTIVE:    Pt seen and examined. No acute events.  Pt denies any abd pain and states he feels his abd is less distended today.  Has been started on CLD and tolerating thus far.  Passing flatus and having BMs.  No nausea or emesis since NG removed.     OBJECTIVE:   VITALS:  BP (!) 155/60   Pulse 66   Temp 97.5 °F (36.4 °C) (Temporal)   Resp 18   Ht 1.803 m (5' 11\")   Wt 135.8 kg (299 lb 4.8 oz)   SpO2 98%   BMI 41.74 kg/m²      INTAKE/OUTPUT:      Intake/Output Summary (Last 24 hours) at 11/8/2024 1417  Last data filed at 11/7/2024 1929  Gross per 24 hour   Intake 1487.43 ml   Output 350 ml   Net 1137.43 ml                 CONSTITUTIONAL:  awake and alert.  No acute distress  CARDIOVASCULAR:  regular rate and rhythm   LUNGS:  CTA Bilaterally  ABDOMEN:   soft, protuberant, non tender to palpation, bowel sounds present.  EXTREMITIES:  negative    Data:  CBC:   Lab Results   Component Value Date/Time    WBC 9.2 11/08/2024 05:01 AM    RBC 4.05 11/08/2024 05:01 AM    HGB 11.8 11/08/2024 05:01 AM    HCT 35.6 11/08/2024 05:01 AM    MCV 87.9 11/08/2024 05:01 AM    MCH 29.1 11/08/2024 05:01 AM    MCHC 33.1 11/08/2024 05:01 AM    RDW 13.7 11/08/2024 05:01 AM     11/08/2024 05:01 AM    MPV 9.7 11/08/2024 05:01 AM     CMP:    Lab Results   Component Value Date/Time     11/08/2024 05:01 AM    K 3.4 11/08/2024 05:01 AM     11/08/2024 05:01 AM    CO2 24 11/08/2024 05:01 AM    BUN 13 11/08/2024 05:01 AM    CREATININE 1.0 11/08/2024 05:01 AM    LABGLOM 80 11/08/2024 05:01 AM    LABGLOM >60 08/21/2023 11:16 AM    GLUCOSE 77 11/08/2024 05:01 AM    CALCIUM 8.9 11/08/2024 05:01 AM    BILITOT 0.5 11/05/2024 04:08 AM    ALKPHOS 85 11/05/2024 04:08 AM    AST 31 11/05/2024 04:08 AM    ALT 30 11/05/2024 04:08 AM     BMP:    Lab Results   Component Value Date/Time     11/08/2024   Please call with questions.    Electronically signed by Cody Chance DO  on 11/8/2024 at 2:17 PM

## 2024-11-08 NOTE — PLAN OF CARE
Problem: Chronic Conditions and Co-morbidities  Goal: Patient's chronic conditions and co-morbidity symptoms are monitored and maintained or improved  Outcome: Progressing     Problem: Discharge Planning  Goal: Discharge to home or other facility with appropriate resources  Outcome: Progressing     Problem: Pain  Goal: Verbalizes/displays adequate comfort level or baseline comfort level  Outcome: Progressing     Problem: Safety - Adult  Goal: Free from fall injury  Outcome: Progressing     Problem: Cardiovascular - Adult  Goal: Maintains optimal cardiac output and hemodynamic stability  Outcome: Progressing  Goal: Absence of cardiac dysrhythmias or at baseline  Outcome: Progressing     Problem: Skin/Tissue Integrity - Adult  Goal: Incisions, wounds, or drain sites healing without S/S of infection  Outcome: Progressing     Problem: Gastrointestinal - Adult  Goal: Minimal or absence of nausea and vomiting  Outcome: Progressing  Goal: Maintains or returns to baseline bowel function  Outcome: Progressing     Problem: Metabolic/Fluid and Electrolytes - Adult  Goal: Electrolytes maintained within normal limits  Outcome: Progressing  Goal: Hemodynamic stability and optimal renal function maintained  Outcome: Progressing  Goal: Glucose maintained within prescribed range  Outcome: Progressing     Problem: Skin/Tissue Integrity  Goal: Absence of new skin breakdown  Description: 1.  Monitor for areas of redness and/or skin breakdown  2.  Assess vascular access sites hourly  3.  Every 4-6 hours minimum:  Change oxygen saturation probe site  4.  Every 4-6 hours:  If on nasal continuous positive airway pressure, respiratory therapy assess nares and determine need for appliance change or resting period.  Outcome: Progressing     Problem: Respiratory - Adult  Goal: Achieves optimal ventilation and oxygenation  Outcome: Progressing     Problem: Nutrition Deficit:  Goal: Optimize nutritional status  Outcome: Progressing

## 2024-11-08 NOTE — PROGRESS NOTES
Hospitalist Progress Note    Patient:  Kedar Medellin     YOB: 1955    MRN: [id number]   Admit date: 11/4/2024     Acct: [id number]     PCP: Cici Rodriguez APRN - NP    CC--Interval History: SBO---resolved---SBFT--11.7.2024---no obstruction---gas and BM--[+]---NGT out---CLD.    K = 3.4 -----> potassium replacement    DM2-----BG = 77    See note below     All other ROS negative except noted in HPI    Diet:  ADULT DIET; Clear Liquid    Medications:  Scheduled Meds:   insulin glargine  30 Units SubCUTAneous BID    pantoprazole (PROTONIX) 40 mg in sodium chloride (PF) 0.9 % 10 mL injection  40 mg IntraVENous Daily    insulin lispro  0-8 Units SubCUTAneous 4 times per day    influenza virus vaccine  0.5 mL IntraMUSCular Prior to discharge    piperacillin-tazobactam  3,375 mg IntraVENous Q8H    mupirocin   Topical BID    sodium chloride flush  5-40 mL IntraVENous 2 times per day    enoxaparin  30 mg SubCUTAneous BID     Continuous Infusions:   dextrose 5% and 0.45% NaCl with KCl 20 mEq 125 mL/hr at 11/08/24 0831    sodium chloride 125 mL/hr at 11/07/24 0117    dextrose       PRN Meds:diatrizoate meglumine-sodium, metoprolol, morphine, sodium chloride flush, sodium chloride, ondansetron **OR** ondansetron, acetaminophen **OR** acetaminophen, potassium chloride, magnesium sulfate, glucose, dextrose bolus **OR** dextrose bolus, glucagon (rDNA), dextrose    Objective:  Labs:  CBC with Differential:    Lab Results   Component Value Date/Time    WBC 9.2 11/08/2024 05:01 AM    RBC 4.05 11/08/2024 05:01 AM    HGB 11.8 11/08/2024 05:01 AM    HCT 35.6 11/08/2024 05:01 AM     11/08/2024 05:01 AM    MCV 87.9 11/08/2024 05:01 AM    MCH 29.1 11/08/2024 05:01 AM    MCHC 33.1 11/08/2024 05:01 AM    RDW 13.7 11/08/2024 05:01 AM    LYMPHOPCT 14 11/08/2024 05:01 AM    MONOPCT 6 11/08/2024 05:01 AM    EOSPCT 3 11/08/2024 05:01 AM    BASOPCT 1 11/08/2024 05:01 AM    MONOSABS 0.59 11/08/2024 05:01 AM    LYMPHSABS 1.26

## 2024-11-08 NOTE — PROGRESS NOTES
Energy Requirements: Current  Energy (kcal/day): 9997-4404  Weight Used for Protein Requirements: Ideal  Protein (g/day): 85-95  Method Used for Fluid Requirements: Defer to provider  Fluid (ml/day):      Nutrition Diagnosis:   in context of acute illness or injury related to inadequate protein-energy intake as evidenced by NPO or clear liquid status due to medical condition  Moderate malnutrition related to acute injury/trauma as evidenced by localized or generalized fluid accumulation, intake 0-25%, NPO or clear liquid status due to medical condition    Nutrition Interventions:   Food and/or Nutrient Delivery: Continue Current Diet, IV Fluid Delivery (advance diet as tolerated)  Nutrition Education/Counseling: Education/Counseling needed  Coordination of Nutrition Care: Continue to monitor while inpatient       Goals:  Goals: Other  Type of Goal: New goal       Nutrition Monitoring and Evaluation:   Behavioral-Environmental Outcomes: None Identified  Food/Nutrient Intake Outcomes: Diet Advancement/Tolerance, Food and Nutrient Intake, IVF Intake  Physical Signs/Symptoms Outcomes: Biochemical Data, GI Status, Nausea or Vomiting, Fluid Status or Edema, Weight    Discharge Planning:    Recommend pursue outpatient nutrition counseling (for obesity)     [address]  Contact: [phone]

## 2024-11-08 NOTE — PROGRESS NOTES
Physician Progress Note      PATIENT:               SHARON WASHINGTON  CSN #:                  [id number]  :                       1955  ADMIT DATE:       2024 6:57 PM  DISCH DATE:  RESPONDING  PROVIDER #:        Barron Langley MD          QUERY TEXT:    Pt admitted with small bowel obstruction and terminal ileitis.  Per  24 General Surgery consult: small bowel obstruction secondary to   terminal ileitis  Pt treated with IV antibiotics    Please document the type/cause  of ileitis in the medical record if able to be   determined    The medical record reflects the following:  Risk Factors: per Gen Surg consult: he states that his sister does have   Crohn's disease, he was never diagnosed with Crohn's or ulcerative colitis,  Clinical Indicators: per H/P presented with back and abdominal pain, abdominal   distention, low appetite, nausea, vomiting the past 1 day;  admission day WBC   17.3, lactic acid  4.0;   per  24 General Surgery consult: small bowel   obstruction secondary to terminal ileitis;  24 CT abd/pelvis: Dilated   small bowel loops with transition point seen related to the terminal ileum   which is mildly narrowed with proximal small bowel demonstrating small bowel   feces sign in the right lower quadrant suggesting obstruction.  There is a fat   containing umbilical hernia.  No evidence   Treatment: IV Cipro, IV Flagyl ->  IV Zosyn    Virginie Acosta, MSN, RN, CCDS, CRCR  Clinical   .  Options provided:  -- Bacterial Ileitis  -- Ileitis due to, please specify  -- Other - I will add my own diagnosis  -- Disagree - Not applicable / Not valid  -- Disagree - Clinically unable to determine / Unknown  -- Refer to Clinical Documentation Reviewer    PROVIDER RESPONSE TEXT:    This patient has bacterial ileitis    Query created by: Virginie Acosta on 2024 10:45 AM      Electronically signed by:  Barron Langley MD 2024 12:54 PM

## 2024-11-09 VITALS
RESPIRATION RATE: 18 BRPM | OXYGEN SATURATION: 98 % | WEIGHT: 303.6 LBS | HEIGHT: 71 IN | BODY MASS INDEX: 42.5 KG/M2 | TEMPERATURE: 97.8 F | HEART RATE: 68 BPM | SYSTOLIC BLOOD PRESSURE: 159 MMHG | DIASTOLIC BLOOD PRESSURE: 77 MMHG

## 2024-11-09 LAB
ANION GAP SERPL CALCULATED.3IONS-SCNC: 11 MMOL/L (ref 9–17)
BASOPHILS # BLD: 0.05 K/UL (ref 0–0.2)
BASOPHILS NFR BLD: 1 % (ref 0–2)
BUN SERPL-MCNC: 10 MG/DL (ref 8–23)
BUN/CREAT SERPL: 9 (ref 9–20)
CALCIUM SERPL-MCNC: 8.5 MG/DL (ref 8.6–10.4)
CHLORIDE SERPL-SCNC: 106 MMOL/L (ref 98–107)
CO2 SERPL-SCNC: 22 MMOL/L (ref 20–31)
CREAT SERPL-MCNC: 1.1 MG/DL (ref 0.7–1.2)
EOSINOPHIL # BLD: 0.38 K/UL (ref 0–0.44)
EOSINOPHILS RELATIVE PERCENT: 5 % (ref 1–4)
ERYTHROCYTE [DISTWIDTH] IN BLOOD BY AUTOMATED COUNT: 13.7 % (ref 11.8–14.4)
GFR, ESTIMATED: 72 ML/MIN/1.73M2
GLUCOSE BLD-MCNC: 101 MG/DL (ref 75–110)
GLUCOSE BLD-MCNC: 154 MG/DL (ref 75–110)
GLUCOSE SERPL-MCNC: 121 MG/DL (ref 70–99)
HCT VFR BLD AUTO: 33.2 % (ref 40.7–50.3)
HGB BLD-MCNC: 11.2 G/DL (ref 13–17)
IMM GRANULOCYTES # BLD AUTO: 0.04 K/UL (ref 0–0.3)
IMM GRANULOCYTES NFR BLD: 1 %
LYMPHOCYTES NFR BLD: 1.29 K/UL (ref 1.1–3.7)
LYMPHOCYTES RELATIVE PERCENT: 18 % (ref 24–43)
MAGNESIUM SERPL-MCNC: 1.7 MG/DL (ref 1.6–2.6)
MCH RBC QN AUTO: 29.5 PG (ref 25.2–33.5)
MCHC RBC AUTO-ENTMCNC: 33.7 G/DL (ref 25.2–33.5)
MCV RBC AUTO: 87.4 FL (ref 82.6–102.9)
MONOCYTES NFR BLD: 0.49 K/UL (ref 0.1–1.2)
MONOCYTES NFR BLD: 7 % (ref 3–12)
NEUTROPHILS NFR BLD: 68 % (ref 36–65)
NEUTS SEG NFR BLD: 4.91 K/UL (ref 1.5–8.1)
NRBC BLD-RTO: 0 PER 100 WBC
PLATELET # BLD AUTO: 334 K/UL (ref 138–453)
PMV BLD AUTO: 9.7 FL (ref 8.1–13.5)
POTASSIUM SERPL-SCNC: 4 MMOL/L (ref 3.7–5.3)
RBC # BLD AUTO: 3.8 M/UL (ref 4.21–5.77)
SODIUM SERPL-SCNC: 139 MMOL/L (ref 135–144)
WBC OTHER # BLD: 7.2 K/UL (ref 3.5–11.3)

## 2024-11-09 PROCEDURE — 6360000002 HC RX W HCPCS: Performed by: NURSE PRACTITIONER

## 2024-11-09 PROCEDURE — 80048 BASIC METABOLIC PNL TOTAL CA: CPT

## 2024-11-09 PROCEDURE — 85025 COMPLETE CBC W/AUTO DIFF WBC: CPT

## 2024-11-09 PROCEDURE — 82947 ASSAY GLUCOSE BLOOD QUANT: CPT

## 2024-11-09 PROCEDURE — 99238 HOSP IP/OBS DSCHRG MGMT 30/<: CPT | Performed by: FAMILY MEDICINE

## 2024-11-09 PROCEDURE — 6370000000 HC RX 637 (ALT 250 FOR IP): Performed by: NURSE PRACTITIONER

## 2024-11-09 PROCEDURE — 83735 ASSAY OF MAGNESIUM: CPT

## 2024-11-09 PROCEDURE — 36415 COLL VENOUS BLD VENIPUNCTURE: CPT

## 2024-11-09 PROCEDURE — 2580000003 HC RX 258: Performed by: NURSE PRACTITIONER

## 2024-11-09 RX ADMIN — PIPERACILLIN AND TAZOBACTAM 3375 MG: 3; .375 INJECTION, POWDER, LYOPHILIZED, FOR SOLUTION INTRAVENOUS at 13:48

## 2024-11-09 RX ADMIN — INSULIN GLARGINE 30 UNITS: 100 INJECTION, SOLUTION SUBCUTANEOUS at 09:27

## 2024-11-09 RX ADMIN — SODIUM CHLORIDE, PRESERVATIVE FREE 40 MG: 5 INJECTION INTRAVENOUS at 09:27

## 2024-11-09 RX ADMIN — MUPIROCIN: 20 OINTMENT TOPICAL at 09:27

## 2024-11-09 RX ADMIN — PIPERACILLIN AND TAZOBACTAM 3375 MG: 3; .375 INJECTION, POWDER, LYOPHILIZED, FOR SOLUTION INTRAVENOUS at 05:21

## 2024-11-09 NOTE — DISCHARGE SUMMARY
Discharge Summary    Kedar Medellin Patient Status:  Inpatient    1955 MRN [id number]   Location [facility]  PROGRESSIVE CARE Attending Barron Langley MD   Hosp Day # 5 PCP Cici Rodriguez APRN - NP     Date of Admission: 2024    Date of Discharge: 2024    Admitting Diagnosis: SBO (small bowel obstruction) ([facility]) [K56.609]    Discharge Diagnosis:   Small bowel obstruction.    Reason for Admission/HPI: Presented with abdominal pain and nausea vomiting CT abdomen with SBO secondary to possible terminal ileitis    Hospital Course: Patient had NG placed was NPO and IV fluids for SBO was on IV zosyn for possible ileitis on CT,surgery consulted recommended continuation of medical management ,SBFT on 2024 with resolution of SBO, NG discontinued was started on CLD and advanced without problems.h/o Diabetes was on insulin coverage while here.Discontinue antibiotics on discharge,WBC count has been stable the last  4 days..    Consultations: General surgery    Procedures: None    Complications: None    Disposition: Home    Discharge Condition: Good    Discharge Medications:      Medication List        CONTINUE taking these medications      aspirin 81 MG tablet     atorvastatin 80 MG tablet  Commonly known as: LIPITOR     cyanocobalamin 100 MCG tablet     fish oil 1000 MG capsule     gabapentin 300 MG capsule  Commonly known as: NEURONTIN     insulin aspart 100 UNIT/ML injection vial  Commonly known as: NOVOLOG     Insulin Glargine-yfgn 100 UNIT/ML Sopn     lisinopril-hydroCHLOROthiazide 20-12.5 MG per tablet  Commonly known as: PRINZIDE;ZESTORETIC  Take 1 tablet by mouth daily.     loratadine 10 MG tablet  Commonly known as: CLARITIN     metFORMIN 1000 MG tablet  Commonly known as: GLUCOPHAGE     MULTIVITAMIN PO     sildenafil 50 MG tablet  Commonly known as: VIAGRA     vitamin D 25 MCG (1000 UT) Tabs tablet  Commonly known as: CHOLECALCIFEROL            STOP taking these medications      silver sulfADIAZINE 1 %  cream  Commonly known as: SILVADENE     tamsulosin 0.4 MG capsule  Commonly known as: FLOMAX              Follow up Visits: Follow-up with PCP in 1 week       Total Time spent with patient and coordinating care:  30 minutes    Yomaira Adame MD  11/9/2024  10:00 AM

## 2024-11-09 NOTE — PLAN OF CARE
Problem: Chronic Conditions and Co-morbidities  Goal: Patient's chronic conditions and co-morbidity symptoms are monitored and maintained or improved  Outcome: Progressing     Problem: Discharge Planning  Goal: Discharge to home or other facility with appropriate resources  Outcome: Progressing  Flowsheets (Taken 11/9/2024 9656)  Discharge to home or other facility with appropriate resources: Identify barriers to discharge with patient and caregiver     Problem: Pain  Goal: Verbalizes/displays adequate comfort level or baseline comfort level  Outcome: Progressing     Problem: Safety - Adult  Goal: Free from fall injury  Outcome: Progressing     Problem: Cardiovascular - Adult  Goal: Maintains optimal cardiac output and hemodynamic stability  Outcome: Progressing     Problem: Skin/Tissue Integrity - Adult  Goal: Incisions, wounds, or drain sites healing without S/S of infection  Outcome: Progressing     Problem: Gastrointestinal - Adult  Goal: Minimal or absence of nausea and vomiting  Outcome: Progressing     Problem: Metabolic/Fluid and Electrolytes - Adult  Goal: Electrolytes maintained within normal limits  Outcome: Progressing     Problem: Respiratory - Adult  Goal: Achieves optimal ventilation and oxygenation  Outcome: Progressing     Problem: Nutrition Deficit:  Goal: Optimize nutritional status  Outcome: Progressing

## 2024-11-09 NOTE — PLAN OF CARE
Problem: Chronic Conditions and Co-morbidities  Goal: Patient's chronic conditions and co-morbidity symptoms are monitored and maintained or improved  11/8/2024 2143 by Viry Bautista RN  Outcome: Progressing  11/8/2024 0852 by Shock, Rylee, RN  Outcome: Progressing     Problem: Discharge Planning  Goal: Discharge to home or other facility with appropriate resources  11/8/2024 2143 by Viry Bautista RN  Outcome: Progressing  11/8/2024 0852 by Shock, Rylee, RN  Outcome: Progressing     Problem: Pain  Goal: Verbalizes/displays adequate comfort level or baseline comfort level  11/8/2024 2143 by Viry Bautista RN  Outcome: Progressing  11/8/2024 0852 by Shock, Rylee, RN  Outcome: Progressing     Problem: Safety - Adult  Goal: Free from fall injury  11/8/2024 2143 by Viry Bautista RN  Outcome: Progressing  11/8/2024 0852 by Shock, Rylee, RN  Outcome: Progressing     Problem: Cardiovascular - Adult  Goal: Maintains optimal cardiac output and hemodynamic stability  11/8/2024 2143 by Viry Bautista RN  Outcome: Progressing  11/8/2024 0852 by Shock, Rylee, RN  Outcome: Progressing  Goal: Absence of cardiac dysrhythmias or at baseline  11/8/2024 2143 by Viry Bautista RN  Outcome: Progressing  11/8/2024 0852 by Shock, Rylee, RN  Outcome: Progressing     Problem: Skin/Tissue Integrity - Adult  Goal: Incisions, wounds, or drain sites healing without S/S of infection  11/8/2024 2143 by Viry Bautista RN  Outcome: Progressing  11/8/2024 0852 by Shock, Rylee, RN  Outcome: Progressing     Problem: Gastrointestinal - Adult  Goal: Minimal or absence of nausea and vomiting  11/8/2024 2143 by Viry Bautista RN  Outcome: Progressing  11/8/2024 0852 by Shock, Rylee, RN  Outcome: Progressing  Goal: Maintains or returns to baseline bowel function  11/8/2024 2143 by Viry Bautista RN  Outcome: Progressing  11/8/2024 0852 by Shock, Rylee, RN  Outcome: Progressing     Problem: Metabolic/Fluid and Electrolytes - Adult  Goal: Electrolytes

## 2024-11-09 NOTE — PLAN OF CARE
Problem: Chronic Conditions and Co-morbidities  Goal: Patient's chronic conditions and co-morbidity symptoms are monitored and maintained or improved  11/9/2024 1524 by Damaris Hurtado RN  Outcome: Completed  11/9/2024 1408 by Damaris Hurtado RN  Outcome: Progressing     Problem: Discharge Planning  Goal: Discharge to home or other facility with appropriate resources  11/9/2024 1524 by Damaris Hurtado RN  Outcome: Completed  11/9/2024 1408 by Damaris Hurtado RN  Outcome: Progressing  Flowsheets (Taken 11/9/2024 0939)  Discharge to home or other facility with appropriate resources: Identify barriers to discharge with patient and caregiver     Problem: Pain  Goal: Verbalizes/displays adequate comfort level or baseline comfort level  11/9/2024 1524 by Damaris Hurtado RN  Outcome: Completed  11/9/2024 1408 by Damaris Hurtado RN  Outcome: Progressing     Problem: Safety - Adult  Goal: Free from fall injury  11/9/2024 1524 by Damaris Hurtado RN  Outcome: Completed  11/9/2024 1408 by Damaris Hurtado RN  Outcome: Progressing     Problem: Cardiovascular - Adult  Goal: Maintains optimal cardiac output and hemodynamic stability  11/9/2024 1524 by Damaris Hurtado RN  Outcome: Completed  11/9/2024 1408 by Damaris Hurtado RN  Outcome: Progressing  Goal: Absence of cardiac dysrhythmias or at baseline  Outcome: Completed     Problem: Skin/Tissue Integrity - Adult  Goal: Incisions, wounds, or drain sites healing without S/S of infection  11/9/2024 1524 by Damaris Hurtado RN  Outcome: Completed  11/9/2024 1408 by Damaris Hurtado RN  Outcome: Progressing     Problem: Gastrointestinal - Adult  Goal: Minimal or absence of nausea and vomiting  11/9/2024 1524 by Damaris Hurtado RN  Outcome: Completed  11/9/2024 1408 by Damaris Hurtado RN  Outcome: Progressing  Goal: Maintains or returns to baseline bowel function  Outcome: Completed     Problem: Metabolic/Fluid and Electrolytes - Adult  Goal:  Electrolytes maintained within normal limits  11/9/2024 1524 by Damaris Hurtado RN  Outcome: Completed  11/9/2024 1408 by Damaris Hurtado RN  Outcome: Progressing  Goal: Hemodynamic stability and optimal renal function maintained  Outcome: Completed  Goal: Glucose maintained within prescribed range  Outcome: Completed     Problem: Skin/Tissue Integrity  Goal: Absence of new skin breakdown  Description: 1.  Monitor for areas of redness and/or skin breakdown  2.  Assess vascular access sites hourly  3.  Every 4-6 hours minimum:  Change oxygen saturation probe site  4.  Every 4-6 hours:  If on nasal continuous positive airway pressure, respiratory therapy assess nares and determine need for appliance change or resting period.  Outcome: Completed     Problem: Respiratory - Adult  Goal: Achieves optimal ventilation and oxygenation  11/9/2024 1524 by Damaris Hurtado RN  Outcome: Completed  11/9/2024 1408 by Damaris Hurtado RN  Outcome: Progressing     Problem: Nutrition Deficit:  Goal: Optimize nutritional status  11/9/2024 1524 by Damaris Hurtado RN  Outcome: Completed  11/9/2024 1408 by Damaris Hurtado RN  Outcome: Progressing

## 2024-11-10 LAB
GLUCOSE BLD-MCNC: 98 MG/DL (ref 75–110)
MICROORGANISM SPEC CULT: NORMAL
MICROORGANISM SPEC CULT: NORMAL
SERVICE CMNT-IMP: NORMAL
SERVICE CMNT-IMP: NORMAL
SPECIMEN DESCRIPTION: NORMAL
SPECIMEN DESCRIPTION: NORMAL

## 2024-11-11 ENCOUNTER — HOSPITAL ENCOUNTER (OUTPATIENT)
Dept: WOUND CARE | Age: 69
Discharge: HOME | End: 2024-11-12
Attending: PODIATRIST
Payer: OTHER GOVERNMENT

## 2024-11-11 ENCOUNTER — TELEPHONE (OUTPATIENT)
Dept: FAMILY MEDICINE CLINIC | Age: 69
End: 2024-11-11

## 2024-11-11 VITALS
TEMPERATURE: 98.2 F | HEIGHT: 71 IN | WEIGHT: 306.2 LBS | RESPIRATION RATE: 20 BRPM | SYSTOLIC BLOOD PRESSURE: 138 MMHG | HEART RATE: 88 BPM | BODY MASS INDEX: 42.87 KG/M2 | DIASTOLIC BLOOD PRESSURE: 74 MMHG

## 2024-11-11 DIAGNOSIS — L97.522 ULCER OF LEFT FOOT WITH FAT LAYER EXPOSED (HCC): Primary | ICD-10-CM

## 2024-11-11 PROCEDURE — 97597 DBRDMT OPN WND 1ST 20 CM/<: CPT | Performed by: PODIATRIST

## 2024-11-11 PROCEDURE — 97597 DBRDMT OPN WND 1ST 20 CM/<: CPT

## 2024-11-11 RX ORDER — TRIAMCINOLONE ACETONIDE 1 MG/G
OINTMENT TOPICAL ONCE
OUTPATIENT
Start: 2024-11-11 | End: 2024-11-11

## 2024-11-11 RX ORDER — LIDOCAINE HYDROCHLORIDE 20 MG/ML
JELLY TOPICAL ONCE
OUTPATIENT
Start: 2024-11-11 | End: 2024-11-11

## 2024-11-11 RX ORDER — SILVER SULFADIAZINE 10 MG/G
CREAM TOPICAL ONCE
OUTPATIENT
Start: 2024-11-11 | End: 2024-11-11

## 2024-11-11 RX ORDER — GENTAMICIN SULFATE 1 MG/G
OINTMENT TOPICAL ONCE
OUTPATIENT
Start: 2024-11-11 | End: 2024-11-11

## 2024-11-11 RX ORDER — LIDOCAINE 50 MG/G
OINTMENT TOPICAL ONCE
OUTPATIENT
Start: 2024-11-11 | End: 2024-11-11

## 2024-11-11 RX ORDER — GINSENG 100 MG
CAPSULE ORAL ONCE
OUTPATIENT
Start: 2024-11-11 | End: 2024-11-11

## 2024-11-11 RX ORDER — LIDOCAINE 40 MG/G
CREAM TOPICAL ONCE
OUTPATIENT
Start: 2024-11-11 | End: 2024-11-11

## 2024-11-11 RX ORDER — LIDOCAINE HYDROCHLORIDE 40 MG/ML
SOLUTION TOPICAL ONCE
OUTPATIENT
Start: 2024-11-11 | End: 2024-11-11

## 2024-11-11 RX ORDER — MUPIROCIN 20 MG/G
OINTMENT TOPICAL ONCE
OUTPATIENT
Start: 2024-11-11 | End: 2024-11-11

## 2024-11-11 RX ORDER — BACITRACIN ZINC AND POLYMYXIN B SULFATE 500; 1000 [USP'U]/G; [USP'U]/G
OINTMENT TOPICAL ONCE
OUTPATIENT
Start: 2024-11-11 | End: 2024-11-11

## 2024-11-11 RX ORDER — BETAMETHASONE DIPROPIONATE 0.5 MG/G
CREAM TOPICAL ONCE
OUTPATIENT
Start: 2024-11-11 | End: 2024-11-11

## 2024-11-11 RX ORDER — CLOBETASOL PROPIONATE 0.5 MG/G
OINTMENT TOPICAL ONCE
OUTPATIENT
Start: 2024-11-11 | End: 2024-11-11

## 2024-11-11 RX ORDER — SODIUM CHLOR/HYPOCHLOROUS ACID 0.033 %
SOLUTION, IRRIGATION IRRIGATION ONCE
OUTPATIENT
Start: 2024-11-11 | End: 2024-11-11

## 2024-11-11 RX ORDER — NEOMYCIN/BACITRACIN/POLYMYXINB 3.5-400-5K
OINTMENT (GRAM) TOPICAL ONCE
OUTPATIENT
Start: 2024-11-11 | End: 2024-11-11

## 2024-11-11 NOTE — TELEPHONE ENCOUNTER
PCU follow up/dod 11-9/[facility], pt has no PCP, needs 1 time hospital follow up, please schedule when doing call.

## 2024-11-11 NOTE — PROGRESS NOTES
Status Clean;Dry;Intact 11/11/24 1309   Wound Cleansed Cleansed with saline 11/11/24 1309   Dressing/Treatment Hydrocolloid 11/11/24 1309   Offloading for Diabetic Foot Ulcers Offloading boot 11/11/24 1309   Dressing Change Due 11/12/24 11/11/24 1309   Wound Length (cm) 0 cm 11/11/24 1309   Wound Width (cm) 0 cm 11/11/24 1309   Wound Depth (cm) 0 cm 11/11/24 1309   Wound Surface Area (cm^2) 0 cm^2 11/11/24 1309   Change in Wound Size % (l*w) 100 11/11/24 1309   Wound Volume (cm^3) 0 cm^3 11/11/24 1309   Wound Healing % 100 11/11/24 1309   Post-Procedure Length (cm) 0.3 cm 11/11/24 1321   Post-Procedure Width (cm) 0.1 cm 11/11/24 1321   Post-Procedure Depth (cm) 0.1 cm 11/11/24 1321   Post-Procedure Surface Area (cm^2) 0.03 cm^2 11/11/24 1321   Post-Procedure Volume (cm^3) 0.003 cm^3 11/11/24 1321   Wound Assessment Pink/red 11/11/24 1309   Drainage Amount None (dry) 11/11/24 1309   Drainage Description Clear 11/07/24 0832   Odor None 11/11/24 1309   Valery-wound Assessment Hyperkeratosis (callous) 11/11/24 1309   Margins Undefined edges 11/11/24 1309   Wound Thickness Description not for Pressure Injury Full thickness 11/11/24 1309   Number of days: 236         Asessment: Patient is a 69 y.o. male with:   Hospital Problems             Last Modified POA    Type 2 diabetes mellitus with diabetic polyneuropathy, with long-term current use of insulin ([facility]) 11/11/2024 Yes    Ulcer of left foot with fat layer exposed ([facility]) 11/11/2024 Yes    PVD (peripheral vascular disease) ([facility]) 11/11/2024 Yes       Plan: Patient examined and evaluated.  Current condition and treatment options discussed in detail.    DM foot ed and exam  Dressing: duoderm qd  Active wound management took place 100% non excisional with the use of  curette and tissue nippers.  All non viable tissue (including epidermis and/or dermis) and bio burden was removed to promote healing.  Bleeding was present.  Please see chart for exact measurements, if not

## 2024-11-11 NOTE — PATIENT INSTRUCTIONS
Apply piece of hydrocolloid to foot wound daily and cover with bandage. Wear off loading boot.  SIGNS OF INFECTION  - Redness, swelling, skin hot  - Wound bed turns black or stringy yellow  - Foul odor  - Increased drainage or pus  - Increased pain  - Fever greater than 100F    CALL YOUR DOCTOR OR SEEK MEDICAL ATTENTION IF SIGNS OF INFECTION.  DO NOT WAIT UNTIL YOUR NEXT APPOINTMENT    Call the Wound Care Nurse with any other questions or concerns- [phone]

## 2024-11-11 NOTE — TELEPHONE ENCOUNTER
Care Transitions Initial Follow Up Call    Outreach made within 2 business days of discharge: Yes    Patient: Kedar Medellin Patient : 1955   MRN: [id number]  Reason for Admission: Small bowel obstruction   Discharge Date: 24       Spoke with: Patient    Discharge department/facility: [facility] Interactive Patient Contact:  Was patient able to fill all prescriptions: Yes  Was patient instructed to bring all medications to the follow-up visit: Yes  Is patient taking all medications as directed in the discharge summary? Yes  Does patient understand their discharge instructions: Yes  Does patient have questions or concerns that need addressed prior to 7-14 day follow up office visit: no    Additional needs identified to be addressed with provider  No needs identified             Scheduled appointment with PCP within 7-14 days    Patient is unsure if he wants to schedule anything with our office. [facility] and will talk with them about this. States he will call office back if he changes his mind.       Follow Up  Future Appointments   Date Time Provider Department Center   2024  1:00 PM [facility] Reyna DPM [facility]   1/15/2025  2:00 PM Emory Reyna DPM [facility]       Sabine Lancaster LPN

## 2024-11-18 ENCOUNTER — HOSPITAL ENCOUNTER (OUTPATIENT)
Dept: WOUND CARE | Age: 69
Discharge: HOME | End: 2024-11-19
Attending: PODIATRIST
Payer: OTHER GOVERNMENT

## 2024-11-18 VITALS
HEIGHT: 71 IN | RESPIRATION RATE: 22 BRPM | TEMPERATURE: 98.1 F | WEIGHT: 304 LBS | BODY MASS INDEX: 42.56 KG/M2 | HEART RATE: 82 BPM | DIASTOLIC BLOOD PRESSURE: 74 MMHG | SYSTOLIC BLOOD PRESSURE: 128 MMHG

## 2024-11-18 DIAGNOSIS — L97.522 ULCER OF LEFT FOOT WITH FAT LAYER EXPOSED (HCC): Primary | ICD-10-CM

## 2024-11-18 PROCEDURE — 99213 OFFICE O/P EST LOW 20 MIN: CPT | Performed by: PODIATRIST

## 2024-11-18 PROCEDURE — 99213 OFFICE O/P EST LOW 20 MIN: CPT

## 2024-11-18 RX ORDER — LIDOCAINE HYDROCHLORIDE 40 MG/ML
SOLUTION TOPICAL ONCE
Status: CANCELLED | OUTPATIENT
Start: 2024-11-18 | End: 2024-11-18

## 2024-11-18 RX ORDER — LIDOCAINE 40 MG/G
CREAM TOPICAL ONCE
Status: CANCELLED | OUTPATIENT
Start: 2024-11-18 | End: 2024-11-18

## 2024-11-18 RX ORDER — TRIAMCINOLONE ACETONIDE 1 MG/G
OINTMENT TOPICAL ONCE
Status: CANCELLED | OUTPATIENT
Start: 2024-11-18 | End: 2024-11-18

## 2024-11-18 RX ORDER — LIDOCAINE HYDROCHLORIDE 20 MG/ML
JELLY TOPICAL ONCE
Status: CANCELLED | OUTPATIENT
Start: 2024-11-18 | End: 2024-11-18

## 2024-11-18 RX ORDER — MUPIROCIN 20 MG/G
OINTMENT TOPICAL ONCE
Status: CANCELLED | OUTPATIENT
Start: 2024-11-18 | End: 2024-11-18

## 2024-11-18 RX ORDER — NEOMYCIN/BACITRACIN/POLYMYXINB 3.5-400-5K
OINTMENT (GRAM) TOPICAL ONCE
Status: CANCELLED | OUTPATIENT
Start: 2024-11-18 | End: 2024-11-18

## 2024-11-18 RX ORDER — BETAMETHASONE DIPROPIONATE 0.5 MG/G
CREAM TOPICAL ONCE
Status: CANCELLED | OUTPATIENT
Start: 2024-11-18 | End: 2024-11-18

## 2024-11-18 RX ORDER — GINSENG 100 MG
CAPSULE ORAL ONCE
Status: CANCELLED | OUTPATIENT
Start: 2024-11-18 | End: 2024-11-18

## 2024-11-18 RX ORDER — BACITRACIN ZINC AND POLYMYXIN B SULFATE 500; 1000 [USP'U]/G; [USP'U]/G
OINTMENT TOPICAL ONCE
Status: CANCELLED | OUTPATIENT
Start: 2024-11-18 | End: 2024-11-18

## 2024-11-18 RX ORDER — LIDOCAINE 50 MG/G
OINTMENT TOPICAL ONCE
Status: CANCELLED | OUTPATIENT
Start: 2024-11-18 | End: 2024-11-18

## 2024-11-18 RX ORDER — SODIUM CHLOR/HYPOCHLOROUS ACID 0.033 %
SOLUTION, IRRIGATION IRRIGATION ONCE
Status: CANCELLED | OUTPATIENT
Start: 2024-11-18 | End: 2024-11-18

## 2024-11-18 RX ORDER — SILVER SULFADIAZINE 10 MG/G
CREAM TOPICAL ONCE
Status: CANCELLED | OUTPATIENT
Start: 2024-11-18 | End: 2024-11-18

## 2024-11-18 RX ORDER — CLOBETASOL PROPIONATE 0.5 MG/G
OINTMENT TOPICAL ONCE
Status: CANCELLED | OUTPATIENT
Start: 2024-11-18 | End: 2024-11-18

## 2024-11-18 RX ORDER — GENTAMICIN SULFATE 1 MG/G
OINTMENT TOPICAL ONCE
Status: CANCELLED | OUTPATIENT
Start: 2024-11-18 | End: 2024-11-18

## 2024-11-18 NOTE — PROGRESS NOTES
MD Soni   atorvastatin (LIPITOR) 80 MG tablet 0.5 tablets 22  Yes Soni Johnson MD   vitamin D (CHOLECALCIFEROL) 25 MCG (1000 UT) TABS tablet TAKE TWO TABLETS BY MOUTH EVERY DAY 14  Yes Soni Johnson MD   sildenafil (VIAGRA) 50 MG tablet 0.5 tablets 22  Yes Soni Johnson MD   gabapentin (NEURONTIN) 300 MG capsule Take 1 capsule by mouth 2 times daily.   Yes Soni Johnson MD   lisinopril-hydrochlorothiazide (PRINZIDE;ZESTORETIC) 20-12.5 MG per tablet Take 1 tablet by mouth daily. 14  Yes Chong Sevilla PA   metFORMIN (GLUCOPHAGE) 1000 MG tablet Take 0.5 tablets by mouth 2 times daily   Yes Soni Johnson MD   loratadine (CLARITIN) 10 MG tablet Take 1 tablet by mouth daily   Yes Soni Johnson MD   aspirin 81 MG tablet Take 1 tablet by mouth daily   Yes Soni Johnson MD   Multiple Vitamins-Minerals (MULTIVITAMIN PO) Take  by mouth daily.   Yes Soni Johnson MD       Past Surgical History:   Procedure Laterality Date    CARPAL TUNNEL RELEASE Right 12/3/2010    COLONOSCOPY  2009    polyps    INGUINAL HERNIA REPAIR Right 1977    LAPAROSCOPY N/A 3/5/2023    DIAGNOSTIC LAPAROSCOPY performed by Cody Zavala MD at [facility] OR    TOE AMPUTATION Left 2023    Left 1st ray amputation performed by Emory Reyna DPM at [facility] OR    TOENAIL EXCISION Left 2003    removal nail plate 1st digit       Family History   Problem Relation Age of Onset    Heart Disease Mother     High Blood Pressure Mother     Diabetes Mother     Heart Disease Father     High Blood Pressure Other        Social History     Tobacco Use    Smoking status: Former     Current packs/day: 0.00     Average packs/day: 1 pack/day for 20.0 years (20.0 ttl pk-yrs)     Types: Cigarettes     Start date:      Quit date:      Years since quittin.9    Smokeless tobacco: Never   Substance Use Topics    Alcohol use: Not Currently       ROS: All 14 ROS

## 2024-12-03 ENCOUNTER — TELEPHONE (OUTPATIENT)
Dept: SURGERY | Age: 69
End: 2024-12-03

## 2024-12-03 ENCOUNTER — OFFICE VISIT (OUTPATIENT)
Dept: PODIATRY | Age: 69
End: 2024-12-03
Payer: OTHER GOVERNMENT

## 2024-12-03 VITALS
WEIGHT: 300 LBS | HEART RATE: 76 BPM | HEIGHT: 71 IN | BODY MASS INDEX: 42 KG/M2 | DIASTOLIC BLOOD PRESSURE: 68 MMHG | SYSTOLIC BLOOD PRESSURE: 138 MMHG

## 2024-12-03 DIAGNOSIS — L97.521 SKIN ULCER OF LEFT GREAT TOE, LIMITED TO BREAKDOWN OF SKIN (HCC): Primary | ICD-10-CM

## 2024-12-03 DIAGNOSIS — E11.42 DM TYPE 2 WITH DIABETIC PERIPHERAL NEUROPATHY (HCC): ICD-10-CM

## 2024-12-03 DIAGNOSIS — L97.521 ULCER OF LEFT FOOT, LIMITED TO BREAKDOWN OF SKIN (HCC): ICD-10-CM

## 2024-12-03 PROCEDURE — 99214 OFFICE O/P EST MOD 30 MIN: CPT | Performed by: PODIATRIST

## 2024-12-03 PROCEDURE — 1123F ACP DISCUSS/DSCN MKR DOCD: CPT | Performed by: PODIATRIST

## 2024-12-03 PROCEDURE — 3052F HG A1C>EQUAL 8.0%<EQUAL 9.0%: CPT | Performed by: PODIATRIST

## 2024-12-03 PROCEDURE — 3078F DIAST BP <80 MM HG: CPT | Performed by: PODIATRIST

## 2024-12-03 PROCEDURE — 3075F SYST BP GE 130 - 139MM HG: CPT | Performed by: PODIATRIST

## 2024-12-03 PROCEDURE — 97597 DBRDMT OPN WND 1ST 20 CM/<: CPT | Performed by: PODIATRIST

## 2024-12-03 PROCEDURE — 99213 OFFICE O/P EST LOW 20 MIN: CPT | Performed by: PODIATRIST

## 2024-12-03 RX ORDER — SILVER SULFADIAZINE 10 MG/G
CREAM TOPICAL
Qty: 30 G | Refills: 1 | Status: SHIPPED | OUTPATIENT
Start: 2024-12-03

## 2024-12-03 NOTE — TELEPHONE ENCOUNTER
[facility] Referral--  [id number]      Patient given colonoscopy paperwork.    [facility] for [facility]    Dr Chance' states patient does not need to be seen in the office post hosp, SBO and colonoscopy paperwork can be mailed to him.    Patient needs to complete the paperwork and mail back to Dr Chance' office.    When completed paperwork is received from the patient, then the patient will be notified by telephone and a colonoscopy will be scheduled over the phone.

## 2024-12-03 NOTE — PROGRESS NOTES
Subjective:    Kedar Medellin is a 69 y.o. male who presents with the ulceration of the L foot patient wore the offloading shoe for 1 week then 1 week ago started that diabetic shoe and insole.  Immediately saw new irritation this past week.  Patient relates pain is Absent .  Pain is rated 0 out of 10 and is described as none.  Currently denies F/C/N/V. Overall diabetic control is not changed.    Allergies   Allergen Reactions    Bee Venom Anaphylaxis    Naproxen      Other reaction(s): Unknown Reaction    Semaglutide      Other reaction(s): Constipation    Empagliflozin Rash       Past Medical History:   Diagnosis Date    Allergic rhinitis     Benign neoplasm of sigmoid colon 08/14/2023    Colon polyps     history of    COVID-19 10/24/2024    Elevated WBC count     history of    Hyperlipidemia     Hypertension     Obesity     Osteomyelitis of left foot 08/28/2023    Pain, joint, hand, left 07/28/2021    Partial small bowel obstruction (HCC) 03/05/2023    SBO (small bowel obstruction) ([facility]) 03/12/2023    Sepsis ([facility]) 03/05/2023    Sepsis with acute renal failure ([facility]) 03/05/2023    Type 2 diabetes mellitus with stage 3a chronic kidney disease, with long-term current use of insulin ([facility]) 08/14/2023    Type II or unspecified type diabetes mellitus without mention of complication, not stated as uncontrolled     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 ([facility]) 08/21/2023    Ulcer of left foot with necrosis of muscle ([facility]) 08/14/2023    Ulcer of left foot, limited to breakdown of skin ([facility]) 04/08/2024    Unspecified sleep apnea     Weakness of left hand 07/28/2021       Prior to Admission medications    Medication Sig Start Date End Date Taking? Authorizing Provider   silver sulfADIAZINE (SILVADENE) 1 % cream Apply topically daily. 12/3/24  Yes Emory Reyna DPM   insulin aspart (NOVOLOG) 100 UNIT/ML injection vial Inject 40 Units into the skin 3 times daily   Yes Provider, MD Soni   cyanocobalamin 100 MCG

## 2024-12-04 PROBLEM — E86.0 DEHYDRATION: Status: RESOLVED | Noted: 2024-11-04 | Resolved: 2024-12-04

## 2024-12-04 RX ORDER — PSEUDOEPHEDRINE HCL 30 MG
100 TABLET ORAL
COMMUNITY
Start: 2024-11-20

## 2024-12-15 ENCOUNTER — HOSPITAL ENCOUNTER (EMERGENCY)
Age: 69
Discharge: HOME | End: 2024-12-16
Attending: SPECIALIST
Payer: OTHER GOVERNMENT

## 2024-12-15 ENCOUNTER — APPOINTMENT (OUTPATIENT)
Dept: CT IMAGING | Age: 69
End: 2024-12-15
Payer: OTHER GOVERNMENT

## 2024-12-15 DIAGNOSIS — N32.0 BLADDER OUTLET OBSTRUCTION: ICD-10-CM

## 2024-12-15 DIAGNOSIS — K59.00 CONSTIPATION, UNSPECIFIED CONSTIPATION TYPE: Primary | ICD-10-CM

## 2024-12-15 DIAGNOSIS — N40.0 PROSTATE ENLARGEMENT: ICD-10-CM

## 2024-12-15 LAB
ALBUMIN SERPL-MCNC: 4.1 G/DL (ref 3.5–5.2)
ALBUMIN/GLOB SERPL: 1.2 {RATIO} (ref 1–2.5)
ALP SERPL-CCNC: 94 U/L (ref 40–129)
ALT SERPL-CCNC: 23 U/L (ref 5–41)
ANION GAP SERPL CALCULATED.3IONS-SCNC: 15 MMOL/L (ref 9–17)
AST SERPL-CCNC: 22 U/L
BASOPHILS # BLD: 0.06 K/UL (ref 0–0.2)
BASOPHILS NFR BLD: 1 % (ref 0–2)
BILIRUB SERPL-MCNC: 0.3 MG/DL (ref 0.3–1.2)
BILIRUB UR QL STRIP: NEGATIVE
BUN SERPL-MCNC: 23 MG/DL (ref 8–23)
BUN/CREAT SERPL: 21 (ref 9–20)
CALCIUM SERPL-MCNC: 8.9 MG/DL (ref 8.6–10.4)
CHLORIDE SERPL-SCNC: 96 MMOL/L (ref 98–107)
CLARITY UR: CLEAR
CO2 SERPL-SCNC: 22 MMOL/L (ref 20–31)
COLOR UR: YELLOW
COMMENT: ABNORMAL
CREAT SERPL-MCNC: 1.1 MG/DL (ref 0.7–1.2)
EOSINOPHIL # BLD: 0.2 K/UL (ref 0–0.44)
EOSINOPHILS RELATIVE PERCENT: 2 % (ref 1–4)
ERYTHROCYTE [DISTWIDTH] IN BLOOD BY AUTOMATED COUNT: 13.2 % (ref 11.8–14.4)
GFR, ESTIMATED: 72 ML/MIN/1.73M2
GLUCOSE SERPL-MCNC: 322 MG/DL (ref 70–99)
GLUCOSE UR STRIP-MCNC: ABNORMAL MG/DL
HCT VFR BLD AUTO: 36.6 % (ref 40.7–50.3)
HGB BLD-MCNC: 12.4 G/DL (ref 13–17)
HGB UR QL STRIP.AUTO: NEGATIVE
IMM GRANULOCYTES # BLD AUTO: 0.04 K/UL (ref 0–0.3)
IMM GRANULOCYTES NFR BLD: 1 %
KETONES UR STRIP-MCNC: NEGATIVE MG/DL
LACTATE BLDV-SCNC: 2.7 MMOL/L (ref 0.5–2.2)
LACTATE BLDV-SCNC: 3 MMOL/L (ref 0.5–2.2)
LEUKOCYTE ESTERASE UR QL STRIP: NEGATIVE
LIPASE SERPL-CCNC: 35 U/L (ref 13–60)
LYMPHOCYTES NFR BLD: 1.51 K/UL (ref 1.1–3.7)
LYMPHOCYTES RELATIVE PERCENT: 17 % (ref 24–43)
MAGNESIUM SERPL-MCNC: 1.7 MG/DL (ref 1.6–2.6)
MCH RBC QN AUTO: 28.9 PG (ref 25.2–33.5)
MCHC RBC AUTO-ENTMCNC: 33.9 G/DL (ref 25.2–33.5)
MCV RBC AUTO: 85.3 FL (ref 82.6–102.9)
MONOCYTES NFR BLD: 0.63 K/UL (ref 0.1–1.2)
MONOCYTES NFR BLD: 7 % (ref 3–12)
NEUTROPHILS NFR BLD: 72 % (ref 36–65)
NEUTS SEG NFR BLD: 6.28 K/UL (ref 1.5–8.1)
NITRITE UR QL STRIP: NEGATIVE
NRBC BLD-RTO: 0 PER 100 WBC
PH UR STRIP: 6 [PH] (ref 5–6)
PLATELET # BLD AUTO: 317 K/UL (ref 138–453)
PMV BLD AUTO: 9.8 FL (ref 8.1–13.5)
POTASSIUM SERPL-SCNC: 4.2 MMOL/L (ref 3.7–5.3)
PROT SERPL-MCNC: 7.6 G/DL (ref 6.4–8.3)
PROT UR STRIP-MCNC: NEGATIVE MG/DL
RBC # BLD AUTO: 4.29 M/UL (ref 4.21–5.77)
SODIUM SERPL-SCNC: 133 MMOL/L (ref 135–144)
SP GR UR STRIP: 1.01 (ref 1.01–1.02)
TROPONIN I SERPL HS-MCNC: 42 NG/L (ref 0–22)
UROBILINOGEN UR STRIP-ACNC: NORMAL EU/DL (ref 0–1)
WBC OTHER # BLD: 8.7 K/UL (ref 3.5–11.3)

## 2024-12-15 PROCEDURE — 36415 COLL VENOUS BLD VENIPUNCTURE: CPT

## 2024-12-15 PROCEDURE — 80053 COMPREHEN METABOLIC PANEL: CPT

## 2024-12-15 PROCEDURE — 2580000003 HC RX 258: Performed by: SPECIALIST

## 2024-12-15 PROCEDURE — 99285 EMERGENCY DEPT VISIT HI MDM: CPT

## 2024-12-15 PROCEDURE — 81003 URINALYSIS AUTO W/O SCOPE: CPT

## 2024-12-15 PROCEDURE — 6360000004 HC RX CONTRAST MEDICATION: Performed by: SPECIALIST

## 2024-12-15 PROCEDURE — 83605 ASSAY OF LACTIC ACID: CPT

## 2024-12-15 PROCEDURE — 84484 ASSAY OF TROPONIN QUANT: CPT

## 2024-12-15 PROCEDURE — 83735 ASSAY OF MAGNESIUM: CPT

## 2024-12-15 PROCEDURE — 83690 ASSAY OF LIPASE: CPT

## 2024-12-15 PROCEDURE — 85025 COMPLETE CBC W/AUTO DIFF WBC: CPT

## 2024-12-15 PROCEDURE — 2709999900 CT ABDOMEN PELVIS W IV CONTRAST

## 2024-12-15 RX ORDER — SODIUM CHLORIDE 0.9 % (FLUSH) 0.9 %
3 SYRINGE (ML) INJECTION EVERY 8 HOURS
Status: DISCONTINUED | OUTPATIENT
Start: 2024-12-15 | End: 2024-12-15

## 2024-12-15 RX ORDER — 0.9 % SODIUM CHLORIDE 0.9 %
1000 INTRAVENOUS SOLUTION INTRAVENOUS ONCE
Status: DISCONTINUED | OUTPATIENT
Start: 2024-12-15 | End: 2024-12-15

## 2024-12-15 RX ORDER — 0.9 % SODIUM CHLORIDE 0.9 %
1000 INTRAVENOUS SOLUTION INTRAVENOUS ONCE
Status: COMPLETED | OUTPATIENT
Start: 2024-12-15 | End: 2024-12-15

## 2024-12-15 RX ORDER — 0.9 % SODIUM CHLORIDE 0.9 %
1000 INTRAVENOUS SOLUTION INTRAVENOUS ONCE
Status: COMPLETED | OUTPATIENT
Start: 2024-12-16 | End: 2024-12-16

## 2024-12-15 RX ORDER — IOPAMIDOL 755 MG/ML
100 INJECTION, SOLUTION INTRAVASCULAR
Status: COMPLETED | OUTPATIENT
Start: 2024-12-15 | End: 2024-12-15

## 2024-12-15 RX ADMIN — SODIUM CHLORIDE 1000 ML: 9 INJECTION, SOLUTION INTRAVENOUS at 21:23

## 2024-12-15 RX ADMIN — IOPAMIDOL 100 ML: 755 INJECTION, SOLUTION INTRAVENOUS at 21:33

## 2024-12-15 RX ADMIN — SODIUM CHLORIDE 1000 ML: 9 INJECTION, SOLUTION INTRAVENOUS at 23:54

## 2024-12-15 ASSESSMENT — LIFESTYLE VARIABLES
HOW MANY STANDARD DRINKS CONTAINING ALCOHOL DO YOU HAVE ON A TYPICAL DAY: PATIENT DOES NOT DRINK
HOW OFTEN DO YOU HAVE A DRINK CONTAINING ALCOHOL: NEVER

## 2024-12-15 ASSESSMENT — PAIN - FUNCTIONAL ASSESSMENT: PAIN_FUNCTIONAL_ASSESSMENT: 0-10

## 2024-12-15 ASSESSMENT — PAIN SCALES - GENERAL: PAINLEVEL_OUTOF10: 6

## 2024-12-15 ASSESSMENT — PAIN DESCRIPTION - LOCATION: LOCATION: ABDOMEN

## 2024-12-16 ENCOUNTER — HOSPITAL ENCOUNTER (OUTPATIENT)
Dept: WOUND CARE | Age: 69
Discharge: HOME | End: 2024-12-17
Attending: PODIATRIST
Payer: OTHER GOVERNMENT

## 2024-12-16 VITALS
HEIGHT: 71 IN | OXYGEN SATURATION: 95 % | WEIGHT: 305 LBS | TEMPERATURE: 98.4 F | BODY MASS INDEX: 42.7 KG/M2 | RESPIRATION RATE: 18 BRPM | DIASTOLIC BLOOD PRESSURE: 76 MMHG | SYSTOLIC BLOOD PRESSURE: 146 MMHG | HEART RATE: 76 BPM

## 2024-12-16 VITALS
SYSTOLIC BLOOD PRESSURE: 116 MMHG | TEMPERATURE: 99.7 F | HEART RATE: 88 BPM | HEIGHT: 71 IN | DIASTOLIC BLOOD PRESSURE: 60 MMHG | BODY MASS INDEX: 41.86 KG/M2 | RESPIRATION RATE: 20 BRPM | WEIGHT: 299 LBS

## 2024-12-16 PROCEDURE — 99213 OFFICE O/P EST LOW 20 MIN: CPT

## 2024-12-16 RX ORDER — TAMSULOSIN HYDROCHLORIDE 0.4 MG/1
0.4 CAPSULE ORAL DAILY
COMMUNITY

## 2024-12-16 RX ORDER — POLYETHYLENE GLYCOL 3350 17 G/17G
17 POWDER, FOR SOLUTION ORAL DAILY PRN
COMMUNITY

## 2024-12-16 ASSESSMENT — PAIN SCALES - GENERAL: PAINLEVEL_OUTOF10: 0

## 2024-12-16 NOTE — PROGRESS NOTES
Wound Care Nurse Visit Note        Kedar Medellin  Age: 69 y.o.  YOB: 1955    Today's Date:  12/16/2024    Patient presents today per physician order for a scheduled nurse visit.    Orders to follow same dressings as previous visit and to report any changes in conditions if noted.    /60   Pulse 88   Temp 99.7 °F (37.6 °C) (Tympanic)   Resp 20   Ht 1.803 m (5' 10.98\")   Wt 135.6 kg (299 lb)   BMI 41.72 kg/m²       Wound Assessment:      Wound 03/12/23 Foot Anterior;Right (Active)   Number of days: 645       Wound 12/16/24 Foot Left;Lateral (Active)   Wound Image   12/16/24 1314   Wound Etiology Diabetic 12/16/24 1314   Dressing Status New dressing applied 12/16/24 1314   Wound Cleansed Cleansed with saline 12/16/24 1314   Dressing/Treatment Other (comment) 12/16/24 1314   Offloading for Diabetic Foot Ulcers Offloading boot 12/16/24 1314   Dressing Change Due 12/17/24 12/16/24 1314   Wound Length (cm) 0.7 cm 12/16/24 1314   Wound Width (cm) 0.1 cm 12/16/24 1314   Wound Depth (cm) 0.1 cm 12/16/24 1314   Wound Surface Area (cm^2) 0.07 cm^2 12/16/24 1314   Wound Volume (cm^3) 0.007 cm^3 12/16/24 1314   Wound Assessment Pink/red 12/16/24 1314   Drainage Amount None (dry) 12/16/24 1314   Odor None 12/16/24 1314   Valery-wound Assessment Dry/flaky 12/16/24 1314   Margins Undefined edges 12/16/24 1314   Wound Thickness Description not for Pressure Injury Partial thickness 12/16/24 1314   Number of days: 0        Wound was redressed per order and patient discharged after instructions given.  Left foot wound dressed with silvadene and bandaid.    Sameera Marc RN

## 2024-12-16 NOTE — DISCHARGE INSTRUCTIONS
Please understand that at this time there is no evidence for a more serious underlying process, but that early in the process of an illness or injury, an emergency department workup can be falsely reassuring.  You should contact your family doctor within the next 48 hours for a follow up appointment    THANK YOU!!!    From [facility] and [facility]    On behalf of the Emergency Department staff at [facility], I would like to thank you for giving us the opportunity to address your health care needs and concerns.    We hope that during your visit, our service was delivered in a professional and caring manner. Please keep [facility] in mind as we walk with you down the path to your own personal wellness.     Please expect an automated text message or email from us so we can ask a few questions about your health and progress. Based on your answers, a clinician may call you back to offer help and instructions.    Please understand that early in the process of an illness or injury, an emergency department workup can be falsely reassuring.  If you notice any worsening, changing or persistent symptoms please call your family doctor or return to the ER immediately.     Tell us how we did during your visit at [email or web address]   and let us know about your experience

## 2024-12-16 NOTE — ED PROVIDER NOTES
[facility]  EMERGENCY DEPARTMENT ENCOUNTER      Pt Name: Kedar Medellin  MRN: [id number]  Birthdate 1955  Date of evaluation: 12/15/2024      CHIEF COMPLAINT       Chief Complaint   Patient presents with    Constipation     No BM in a week          HISTORY OF PRESENT ILLNESS    Kedar Medellin is a 69 y.o. male who presents to the emergency department accompanied by his daughter for evaluation of constipation with last bowel movement about 1 week ago.  Patient has had small occasional bowel movement since then.  He has been taking MiraLAX and he has taken 4 times today without much relief.  He denies any abdominal pain or distention.  He has been passing gas.  He denies any fever, chills, nausea, vomiting, melena or hematochezia.  Appetite has been fair.  He denies any chest pain, shortness of breath, palpitations or diaphoresis.  He denies any lightheadedness or dizziness.  He has had urinary frequency last night but denies any dysuria or hematuria.  His previous abdominal surgeries include laparoscopic surgery for bowel obstruction in March 2023.  He also has had hernia repair in the right inguinal region in the past.  He has history of recurrent small bowel obstructions and thus comes to the emergency department to rule out bowel obstruction as well as for constipation with inability to have a bowel movement.      REVIEW OF SYSTEMS       Review of Systems   Constitutional:  Negative for chills and fever.   HENT:  Negative for congestion and sore throat.    Respiratory:  Negative for cough and shortness of breath.    Cardiovascular:  Negative for chest pain and palpitations.   Gastrointestinal:  Positive for constipation. Negative for abdominal distention, abdominal pain, nausea and vomiting.   Genitourinary:  Positive for frequency. Negative for dysuria, flank pain and hematuria.   Musculoskeletal:  Positive for back pain. Negative for neck pain.   Skin:  Negative for rash.  in acute distress.     Appearance: He is well-developed.   HENT:      Head: Normocephalic and atraumatic.      Nose: Nose normal.   Eyes:      Extraocular Movements: Extraocular movements intact.      Pupils: Pupils are equal, round, and reactive to light.   Cardiovascular:      Rate and Rhythm: Normal rate and regular rhythm.      Heart sounds: Normal heart sounds. No murmur heard.  Pulmonary:      Effort: Pulmonary effort is normal. No respiratory distress.      Breath sounds: Normal breath sounds.   Abdominal:      General: Bowel sounds are normal. There is no distension or abdominal bruit.      Palpations: Abdomen is soft. There is no pulsatile mass.      Tenderness: There is no abdominal tenderness.   Genitourinary:     Rectum: No mass or tenderness.      Comments: Patient has large amount of stools in the rectum which were disimpacted.  Musculoskeletal:      Cervical back: Normal range of motion and neck supple.   Skin:     General: Skin is warm and dry.   Neurological:      General: No focal deficit present.      Mental Status: He is alert and oriented to person, place, and time.   Psychiatric:         Behavior: Behavior normal.         Thought Content: Thought content normal.          DIFFERENTIAL DIAGNOSIS/ MDM:     Differential diagnosis considered: Cholecystitis, appendicitis, pancreatitis,  urinary tract infection, renal stone, small bowel obstruction,diverticulitis, gastritis, enteritis, colitis.     Chronic Conditions affecting care (DM,HTN,CA, etc): See past medical history above.    Social Determinants of Health affecting care (unable to care for self, lives alone, unemployed, homeless,etc): Patient lives at home and is able to take care of himself    History source(s) (patient,spouse,parent,family,friend,EMS,etc): Patient and his daughter    Review of external sources (ECF,Hospital records,EMS report, radiology reports, etc): Hospital records    Tests considered but not ordered: See

## 2024-12-16 NOTE — PATIENT INSTRUCTIONS
Apply Silvadene cream and bandaid to left foot wound daily. If any concerns go to Urgent Care, ER, or PCP if needed.  SIGNS OF INFECTION  - Redness, swelling, skin hot  - Wound bed turns black or stringy yellow  - Foul odor  - Increased drainage or pus  - Increased pain  - Fever greater than 100F    CALL YOUR DOCTOR OR SEEK MEDICAL ATTENTION IF SIGNS OF INFECTION.  DO NOT WAIT UNTIL YOUR NEXT APPOINTMENT    Call the Wound Care Nurse with any other questions or concerns- [phone]

## 2024-12-20 ENCOUNTER — TELEPHONE (OUTPATIENT)
Dept: SURGERY | Age: 69
End: 2024-12-20

## 2024-12-20 NOTE — TELEPHONE ENCOUNTER
[facility]    Patient:Kedar Medellin  :1955  Surgical/Procedure Planned: Colonoscopy    Date & Location: D       Outpatient     Planned Length of OR: 30 minutes    Sedation: general    Medication Instructions   Insulin:    insulin aspart (NOVOLOG) 100 UNIT/ML-         Insulin Glargine-100 UNIT/ML SOPN-       ASA 81 mg  Hold ___ Days        Provider:Cici MCKEON      Signature of Provider Giving Orders for Medication holds:    _____________________________________________

## 2025-01-06 ENCOUNTER — PREP FOR PROCEDURE (OUTPATIENT)
Dept: SURGERY | Age: 70
End: 2025-01-06

## 2025-01-06 ENCOUNTER — HOSPITAL ENCOUNTER (OUTPATIENT)
Dept: WOUND CARE | Age: 70
Discharge: HOME | End: 2025-01-07
Payer: MEDICARE

## 2025-01-06 VITALS
TEMPERATURE: 97.5 F | BODY MASS INDEX: 43.23 KG/M2 | HEIGHT: 70 IN | RESPIRATION RATE: 18 BRPM | HEART RATE: 82 BPM | DIASTOLIC BLOOD PRESSURE: 70 MMHG | WEIGHT: 302 LBS | SYSTOLIC BLOOD PRESSURE: 124 MMHG

## 2025-01-06 DIAGNOSIS — L97.522 ULCER OF LEFT FOOT WITH FAT LAYER EXPOSED (HCC): Primary | ICD-10-CM

## 2025-01-06 DIAGNOSIS — I73.9 PVD (PERIPHERAL VASCULAR DISEASE) (HCC): ICD-10-CM

## 2025-01-06 DIAGNOSIS — Z87.19 HISTORY OF SMALL BOWEL OBSTRUCTION: ICD-10-CM

## 2025-01-06 PROBLEM — K59.00 CONSTIPATION: Status: ACTIVE | Noted: 2025-01-06

## 2025-01-06 PROCEDURE — 97597 DBRDMT OPN WND 1ST 20 CM/<: CPT | Performed by: PHYSICIAN ASSISTANT

## 2025-01-06 PROCEDURE — 11042 DBRDMT SUBQ TIS 1ST 20SQCM/<: CPT

## 2025-01-06 NOTE — PLAN OF CARE
Problem: Cognitive:  Goal: Knowledge of wound care  Description: Knowledge of wound care  Outcome: Progressing  Goal: Understands risk factors for wounds  Description: Understands risk factors for wounds  Outcome: Progressing     Problem: Wound:  Goal: Will show signs of wound healing; wound closure and no evidence of infection  Description: Will show signs of wound healing; wound closure and no evidence of infection  Outcome: Progressing     Problem: Weight control:  Goal: Ability to maintain an optimal weight for height and age will be supported  Description: Ability to maintain an optimal weight for height and age will be supported  Outcome: Progressing     Problem: Falls - Risk of:  Goal: Will remain free from falls  Description: Will remain free from falls  Outcome: Progressing     Problem: Blood Glucose:  Goal: Ability to maintain appropriate glucose levels will improve  Description: Ability to maintain appropriate glucose levels will improve  Outcome: Progressing

## 2025-01-06 NOTE — PROGRESS NOTES
Omega-3 Fatty Acids (FISH OIL) 1000 MG capsule Take by mouth daily      Insulin Glargine-yfgn 100 UNIT/ML SOPN INJECT 40 UNITS UNDER SKIN AT BEDTIME FOR TYPE 2 DIABETES MELLITUS      atorvastatin (LIPITOR) 80 MG tablet 0.5 tablets      vitamin D (CHOLECALCIFEROL) 25 MCG (1000 UT) TABS tablet TAKE TWO TABLETS BY MOUTH EVERY DAY      sildenafil (VIAGRA) 50 MG tablet 0.5 tablets      gabapentin (NEURONTIN) 300 MG capsule Take 1 capsule by mouth 2 times daily.      lisinopril-hydrochlorothiazide (PRINZIDE;ZESTORETIC) 20-12.5 MG per tablet Take 1 tablet by mouth daily. 30 tablet 1    metFORMIN (GLUCOPHAGE) 1000 MG tablet Take 0.5 tablets by mouth 2 times daily      loratadine (CLARITIN) 10 MG tablet Take 1 tablet by mouth daily      aspirin 81 MG tablet Take 1 tablet by mouth daily      Multiple Vitamins-Minerals (MULTIVITAMIN PO) Take  by mouth daily.       No current facility-administered medications on file prior to encounter.         Objective:      /70   Pulse 82   Temp 97.5 °F (36.4 °C) (Tympanic)   Resp 18   Ht 1.778 m (5' 10\")   Wt (!) 137 kg (302 lb)   BMI 43.33 kg/m²     Wt Readings from Last 3 Encounters:   01/06/25 (!) 137 kg (302 lb)   12/16/24 135.6 kg (299 lb)   12/15/24 (!) 138.3 kg (305 lb)       ROS:     Per hpi      Objective   Vitals:    01/06/25 1332   BP: 124/70   Pulse: 82   Resp: 18   Temp: 97.5 °F (36.4 °C)     General: Comfortable no acute distress  Lungs: No increased labor, holding conversation without difficulty  Heart/Vascular: Pulses: 2+ dorsalis pulse left, Cap refill less than 2 seconds left toes, scant hair present on the left foot  Extremity: Prior great toe amputation on the left foot  Neuro: Decreased sensation in the foot  Skin: Shown on the plantar surface of the foot that is callused over, callus removed and there is no underlying ulcer left, is now healed, has large callus formation on the lateral foot and after removal did demonstrate open wound still present, no

## 2025-01-20 ENCOUNTER — HOSPITAL ENCOUNTER (OUTPATIENT)
Dept: WOUND CARE | Age: 70
Discharge: HOME | End: 2025-01-21
Attending: PHYSICIAN ASSISTANT
Payer: OTHER GOVERNMENT

## 2025-01-20 VITALS
HEART RATE: 78 BPM | WEIGHT: 302 LBS | TEMPERATURE: 98 F | RESPIRATION RATE: 18 BRPM | SYSTOLIC BLOOD PRESSURE: 138 MMHG | DIASTOLIC BLOOD PRESSURE: 70 MMHG | HEIGHT: 70 IN | BODY MASS INDEX: 43.23 KG/M2

## 2025-01-20 DIAGNOSIS — I73.9 PVD (PERIPHERAL VASCULAR DISEASE) (HCC): ICD-10-CM

## 2025-01-20 DIAGNOSIS — L97.522 ULCER OF LEFT FOOT WITH FAT LAYER EXPOSED (HCC): Primary | ICD-10-CM

## 2025-01-20 PROCEDURE — 97597 DBRDMT OPN WND 1ST 20 CM/<: CPT | Performed by: PHYSICIAN ASSISTANT

## 2025-01-20 PROCEDURE — 97597 DBRDMT OPN WND 1ST 20 CM/<: CPT

## 2025-01-20 NOTE — DISCHARGE INSTRUCTIONS
Start dry dressing to left foot wound,   Ok to return to diabetic shoe.  SIGNS OF INFECTION  - Redness, swelling, skin hot  - Wound bed turns black or stringy yellow  - Foul odor  - Increased drainage or pus  - Increased pain  - Fever greater than 100F    CALL YOUR DOCTOR OR SEEK MEDICAL ATTENTION IF SIGNS OF INFECTION.  DO NOT WAIT UNTIL YOUR NEXT APPOINTMENT    Call the Wound Care Nurse with any other questions or concerns- [phone]  Follow up as scheduled

## 2025-01-20 NOTE — PLAN OF CARE
Problem: Pain  Goal: Verbalizes/displays adequate comfort level or baseline comfort level  Outcome: Progressing     Problem: Cognitive:  Goal: Knowledge of wound care  Description: Knowledge of wound care  Outcome: Progressing  Goal: Understands risk factors for wounds  Description: Understands risk factors for wounds  Outcome: Progressing     Problem: Wound:  Goal: Will show signs of wound healing; wound closure and no evidence of infection  Description: Will show signs of wound healing; wound closure and no evidence of infection  Outcome: Progressing     Problem: Venous:  Goal: Signs of wound healing will improve  Description: Signs of wound healing will improve  Outcome: Progressing     Problem: Weight control:  Goal: Ability to maintain an optimal weight for height and age will be supported  Description: Ability to maintain an optimal weight for height and age will be supported  Outcome: Progressing     Problem: Falls - Risk of:  Goal: Will remain free from falls  Description: Will remain free from falls  Outcome: Progressing     Problem: Blood Glucose:  Goal: Ability to maintain appropriate glucose levels will improve  Description: Ability to maintain appropriate glucose levels will improve  Outcome: Progressing

## 2025-01-20 NOTE — PROGRESS NOTES
[facility]   Progress Note and Procedure Note      Kedar Medellin  MEDICAL RECORD NUMBER:  [id number]  AGE: 69 y.o.   GENDER: male  : 1955  EPISODE DATE:  2025    Subjective:     Chief Complaint   Patient presents with    Wound Check     Left foot          HISTORY of PRESENT ILLNESS HPI     Kedar Medellin is a 69 y.o. male who presents today for wound/ulcer evaluation.   History of Wound Context: Wound on the plantar surface of the left foot better there for over a year and now a few months with 1 on the lateral foot after trying on new orthotics that did not fit appropriately resulting in pressure injuries.  Original date wound started: over one year ago  Origin of the Wound: Diabetic foot Pressure Injury  Diabetic: yes  Last A1C:   Hemoglobin A1C   Date Value Ref Range Status   2024 8.2 (H) 4.0 - 6.0 % Final          Current Dressing:  None    Today's issues/concerns: Patient has no new concerns, feels that they are healed, no new pain swelling or discharge, no offputting odors, he would like to move back to his protective diabetic shoe rather than the offloading boot if it is healed.    PAST MEDICAL HISTORY        Diagnosis Date    Allergic rhinitis     Benign neoplasm of sigmoid colon 2023    Colon polyps     history of    COVID-19 10/24/2024    Elevated WBC count     history of    Hyperlipidemia     Hypertension     Obesity     Osteomyelitis of left foot 2023    Pain, joint, hand, left 2021    Partial small bowel obstruction (HCC) 2023    SBO (small bowel obstruction) (HCC) 2023    Sepsis (HCC) 2023    Sepsis with acute renal failure (HCC) 2023    Type 2 diabetes mellitus with stage 3a chronic kidney disease, with long-term current use of insulin (HCC) 2023    Type II or unspecified type diabetes mellitus without mention of complication, not stated as uncontrolled     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

## 2025-01-29 NOTE — H&P
Subjective   Kedar Medellin is a 69 y.o. male who presents today for colonoscopy after recent admission for SBO that showed ileitis on imaging.  Pt denies any recent changes in bowel movements, blood per rectum, melena or unintended weight loss.  Reports family hx of colon cancer in second degree relative.    Past Medical History:   Diagnosis Date    Allergic rhinitis     Benign neoplasm of sigmoid colon 08/14/2023    Colon polyps     history of    COVID-19 10/24/2024    Elevated WBC count     history of    Hyperlipidemia     Hypertension     Obesity     Osteomyelitis of left foot 08/28/2023    Pain, joint, hand, left 07/28/2021    Partial small bowel obstruction (HCC) 03/05/2023    SBO (small bowel obstruction) (HCC) 03/12/2023    Sepsis (HCC) 03/05/2023    Sepsis with acute renal failure (HCC) 03/05/2023    Type 2 diabetes mellitus with stage 3a chronic kidney disease, with long-term current use of insulin (HCC) 08/14/2023    Type II or unspecified type diabetes mellitus without mention of complication, not stated as uncontrolled     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 (HCC) 08/21/2023    Ulcer of left foot with necrosis of muscle (HCC) 08/14/2023    Ulcer of left foot, limited to breakdown of skin (HCC) 04/08/2024    Unspecified sleep apnea     Weakness of left hand 07/28/2021       Past Surgical History:   Procedure Laterality Date    CARPAL TUNNEL RELEASE Right 12/3/2010    COLONOSCOPY  1/20/2009    polyps    INGUINAL HERNIA REPAIR Right 8/19/1977    LAPAROSCOPY N/A 3/5/2023    DIAGNOSTIC LAPAROSCOPY performed by Cody Zavala MD at [facility] OR    TOE AMPUTATION Left 8/31/2023    Left 1st ray amputation performed by Emory Reyna DPM at [facility] OR    TOENAIL EXCISION Left 1/30/2003    removal nail plate 1st digit       No current facility-administered medications for this encounter.     Current Outpatient Medications   Medication Sig Dispense Refill    polyethylene glycol (GLYCOLAX) 17 g packet Take 1

## 2025-01-30 ENCOUNTER — ANESTHESIA EVENT (OUTPATIENT)
Dept: OPERATING ROOM | Age: 70
End: 2025-01-30
Payer: OTHER GOVERNMENT

## 2025-01-30 ENCOUNTER — HOSPITAL ENCOUNTER (OUTPATIENT)
Age: 70
Setting detail: OUTPATIENT SURGERY
Discharge: HOME | End: 2025-01-30
Attending: SURGERY | Admitting: SURGERY
Payer: OTHER GOVERNMENT

## 2025-01-30 ENCOUNTER — ANESTHESIA (OUTPATIENT)
Dept: OPERATING ROOM | Age: 70
End: 2025-01-30
Payer: OTHER GOVERNMENT

## 2025-01-30 VITALS
TEMPERATURE: 97.1 F | DIASTOLIC BLOOD PRESSURE: 56 MMHG | BODY MASS INDEX: 42.23 KG/M2 | HEIGHT: 70 IN | WEIGHT: 295 LBS | RESPIRATION RATE: 16 BRPM | OXYGEN SATURATION: 97 % | HEART RATE: 85 BPM | SYSTOLIC BLOOD PRESSURE: 114 MMHG

## 2025-01-30 DIAGNOSIS — Z87.19 HISTORY OF SMALL BOWEL OBSTRUCTION: ICD-10-CM

## 2025-01-30 DIAGNOSIS — K59.00 CONSTIPATION: ICD-10-CM

## 2025-01-30 PROBLEM — R93.5 ABNORMAL CT OF THE ABDOMEN: Status: ACTIVE | Noted: 2025-01-30

## 2025-01-30 PROCEDURE — 3700000000 HC ANESTHESIA ATTENDED CARE: Performed by: SURGERY

## 2025-01-30 PROCEDURE — 3700000001 HC ADD 15 MINUTES (ANESTHESIA): Performed by: SURGERY

## 2025-01-30 PROCEDURE — 7100000011 HC PHASE II RECOVERY - ADDTL 15 MIN: Performed by: SURGERY

## 2025-01-30 PROCEDURE — 6360000002 HC RX W HCPCS: Performed by: NURSE ANESTHETIST, CERTIFIED REGISTERED

## 2025-01-30 PROCEDURE — 2709999900 HC NON-CHARGEABLE SUPPLY: Performed by: SURGERY

## 2025-01-30 PROCEDURE — 7100000010 HC PHASE II RECOVERY - FIRST 15 MIN: Performed by: SURGERY

## 2025-01-30 PROCEDURE — 2580000003 HC RX 258: Performed by: SURGERY

## 2025-01-30 PROCEDURE — 88305 TISSUE EXAM BY PATHOLOGIST: CPT

## 2025-01-30 PROCEDURE — 2500000003 HC RX 250 WO HCPCS: Performed by: NURSE ANESTHETIST, CERTIFIED REGISTERED

## 2025-01-30 PROCEDURE — 3609027000 HC COLONOSCOPY: Performed by: SURGERY

## 2025-01-30 RX ORDER — SODIUM CHLORIDE, SODIUM LACTATE, POTASSIUM CHLORIDE, CALCIUM CHLORIDE 600; 310; 30; 20 MG/100ML; MG/100ML; MG/100ML; MG/100ML
INJECTION, SOLUTION INTRAVENOUS CONTINUOUS
Status: DISCONTINUED | OUTPATIENT
Start: 2025-01-30 | End: 2025-01-30 | Stop reason: HOSPADM

## 2025-01-30 RX ORDER — DEXMEDETOMIDINE HYDROCHLORIDE 100 UG/ML
INJECTION, SOLUTION INTRAVENOUS
Status: DISCONTINUED | OUTPATIENT
Start: 2025-01-30 | End: 2025-01-30 | Stop reason: SDUPTHER

## 2025-01-30 RX ORDER — SODIUM CHLORIDE 0.9 % (FLUSH) 0.9 %
5-40 SYRINGE (ML) INJECTION PRN
Status: DISCONTINUED | OUTPATIENT
Start: 2025-01-30 | End: 2025-01-30 | Stop reason: HOSPADM

## 2025-01-30 RX ORDER — SODIUM CHLORIDE 9 MG/ML
INJECTION, SOLUTION INTRAVENOUS PRN
Status: DISCONTINUED | OUTPATIENT
Start: 2025-01-30 | End: 2025-01-30 | Stop reason: HOSPADM

## 2025-01-30 RX ORDER — PROPOFOL 10 MG/ML
INJECTION, EMULSION INTRAVENOUS
Status: DISCONTINUED | OUTPATIENT
Start: 2025-01-30 | End: 2025-01-30 | Stop reason: SDUPTHER

## 2025-01-30 RX ORDER — SODIUM CHLORIDE 0.9 % (FLUSH) 0.9 %
5-40 SYRINGE (ML) INJECTION EVERY 12 HOURS SCHEDULED
Status: DISCONTINUED | OUTPATIENT
Start: 2025-01-30 | End: 2025-01-30 | Stop reason: HOSPADM

## 2025-01-30 RX ADMIN — PHENYLEPHRINE HYDROCHLORIDE 100 MCG: 10 INJECTION INTRAVENOUS at 09:02

## 2025-01-30 RX ADMIN — PHENYLEPHRINE HYDROCHLORIDE 100 MCG: 10 INJECTION INTRAVENOUS at 09:09

## 2025-01-30 RX ADMIN — DEXMEDETOMIDINE HYDROCHLORIDE 20 MCG: 100 INJECTION, SOLUTION INTRAVENOUS at 08:40

## 2025-01-30 RX ADMIN — PROPOFOL 120 MG: 10 INJECTION, EMULSION INTRAVENOUS at 08:40

## 2025-01-30 RX ADMIN — PROPOFOL 180 MCG/KG/MIN: 10 INJECTION, EMULSION INTRAVENOUS at 08:41

## 2025-01-30 RX ADMIN — SODIUM CHLORIDE, POTASSIUM CHLORIDE, SODIUM LACTATE AND CALCIUM CHLORIDE: 600; 310; 30; 20 INJECTION, SOLUTION INTRAVENOUS at 08:21

## 2025-01-30 ASSESSMENT — PAIN SCALES - GENERAL
PAINLEVEL_OUTOF10: 0

## 2025-01-30 ASSESSMENT — PAIN - FUNCTIONAL ASSESSMENT
PAIN_FUNCTIONAL_ASSESSMENT: 0-10
PAIN_FUNCTIONAL_ASSESSMENT: ADULT NONVERBAL PAIN SCALE (NPVS)

## 2025-01-30 NOTE — ANESTHESIA PRE PROCEDURE
Department of Anesthesiology  Preprocedure Note       Name:  Kedar Medellin   Age:  69 y.o.  :  1955                                          MRN:  [id number]         Date:  2025      Surgeon: Surgeon(s):  Cody Chance DO    Procedure: Procedure(s):  Colonoscopy    Medications prior to admission:   Prior to Admission medications    Medication Sig Start Date End Date Taking? Authorizing Provider   polyethylene glycol (GLYCOLAX) 17 g packet Take 1 packet by mouth daily as needed for Constipation    Soni Johnson MD   tamsulosin (FLOMAX) 0.4 MG capsule Take 1 capsule by mouth daily    Soni Johnson MD   docusate (COLACE, DULCOLAX) 100 MG CAPS Take 100 mg by mouth 24   Soni Johnson MD   silver sulfADIAZINE (SILVADENE) 1 % cream Apply topically daily. 12/3/24   Emory Reyna DPM   insulin aspart (NOVOLOG) 100 UNIT/ML injection vial Inject 40 Units into the skin 3 times daily    Soni Johnson MD   cyanocobalamin 100 MCG tablet Take 2 tablets by mouth daily 24   Soni Johnson MD   Omega-3 Fatty Acids (FISH OIL) 1000 MG capsule Take by mouth daily    Soni Johnson MD   Insulin Glargine-yfgn 100 UNIT/ML SOPN INJECT 40 UNITS UNDER SKIN AT BEDTIME FOR TYPE 2 DIABETES MELLITUS 23   Soni Johnson MD   atorvastatin (LIPITOR) 80 MG tablet 0.5 tablets 22   Soni Johnson MD   vitamin D (CHOLECALCIFEROL) 25 MCG (1000 UT) TABS tablet TAKE TWO TABLETS BY MOUTH EVERY DAY 14   Soni Johnson MD   sildenafil (VIAGRA) 50 MG tablet 0.5 tablets 22   Soni Johnson MD   gabapentin (NEURONTIN) 300 MG capsule Take 1 capsule by mouth 2 times daily.    Soni Johnson MD   lisinopril-hydrochlorothiazide (PRINZIDE;ZESTORETIC) 20-12.5 MG per tablet Take 1 tablet by mouth daily. 14   Chong Sevilla PA   metFORMIN (GLUCOPHAGE) 1000 MG tablet Take 0.5 tablets by mouth 2 times daily    Soni Johnson

## 2025-01-30 NOTE — OP NOTE
[facility]                [address] [place] [address]                            OPERATIVE REPORT      PATIENT NAME: SHARON WASHINGTON             : 1955  MED REC NO: [id number]                         ROOM: [facility]  ACCOUNT NO: [id number]                       ADMIT DATE: 2025  PROVIDER: Cody Chance DO      DATE OF PROCEDURE:  2025    SURGEON:  Cody Chance DO    ASSISTANT:  None.    PREOPERATIVE DIAGNOSIS:  Abnormal CT.    POSTOPERATIVE DIAGNOSES:    1. Abnormal CT.  2. Colon polyps.  3. Diverticulosis.  4. Marginal bowel prep.    PROCEDURE:  Colonoscopy with biopsies and hot snare polypectomies.    ANESTHESIA:  MAC.    ESTIMATED BLOOD LOSS:  Minimal.    FLUIDS:  Per Anesthesia record.    COMPLICATIONS:  None.    SPECIMENS:    1. Biopsy in terminal ileum.  2. Polyp at hepatic flexure, removed with hot snare.  3. Polyp at 50 cm, removed with hot snare.    INDICATIONS FOR PROCEDURE:  The patient is a 69-year-old gentleman who was referred to my office for colonoscopy after recently being admitted for small-bowel obstruction.  His CT at that time showed some inflammatory changes in the terminal ileum, and we decided to go ahead and proceed with colonoscopy to investigate that further.  Prior to the time of the procedure, risks, benefits, and alternatives were explained to the patient and consent was obtained.    DESCRIPTION OF PROCEDURE:  The patient was brought to the endoscopy suite, kept on preop gurney, placed in left lateral decubitus position.  Monitoring devices were placed.  MAC anesthesia was induced.  After induction of anesthesia, a time-out was performed, and correct patient and procedure were verified.  Digital rectal exam was performed, which showed no abnormalities.  The Olympus video endoscope was lubricated, inserted in the patient's rectum, which was gently insufflated with air.  The scope was then slowly advanced

## 2025-01-30 NOTE — ANESTHESIA POSTPROCEDURE EVALUATION
Department of Anesthesiology  Postprocedure Note    Patient: Kedar Medellin  MRN: [id number]  YOB: 1955  Date of evaluation: 1/30/2025    Procedure Summary       Date: 01/30/25 Room / Location: [facility]    Anesthesia Start: 0838 Anesthesia Stop: 0920    Procedure: Colonoscopy Diagnosis:       Constipation      History of small bowel obstruction      (Constipation [K59.00])      (History of small bowel obstruction [Z87.19])    Surgeons: Cody Chance DO Responsible Provider: Loc Chang APRN - CRNA    Anesthesia Type: General, TIVA ASA Status: 3            Anesthesia Type: General, TIVA    Yelena Phase I: Yelena Score: 10    Yelena Phase II:      Anesthesia Post Evaluation    Patient location during evaluation: bedside  Level of consciousness: sleepy but conscious  Airway patency: patent  Nausea & Vomiting: no nausea and no vomiting  Cardiovascular status: hemodynamically stable  Respiratory status: spontaneous ventilation  Hydration status: stable  Pain management: satisfactory to patient    No notable events documented.

## 2025-02-01 LAB — SURGICAL PATHOLOGY REPORT: NORMAL

## 2025-02-03 ENCOUNTER — HOSPITAL ENCOUNTER (OUTPATIENT)
Dept: WOUND CARE | Age: 70
Discharge: HOME | End: 2025-02-04
Attending: PHYSICIAN ASSISTANT
Payer: OTHER GOVERNMENT

## 2025-02-03 VITALS
BODY MASS INDEX: 43.09 KG/M2 | TEMPERATURE: 97.4 F | SYSTOLIC BLOOD PRESSURE: 132 MMHG | HEART RATE: 82 BPM | DIASTOLIC BLOOD PRESSURE: 80 MMHG | HEIGHT: 70 IN | RESPIRATION RATE: 16 BRPM | WEIGHT: 301 LBS

## 2025-02-03 PROBLEM — L84 CALLUS OF FOOT: Status: ACTIVE | Noted: 2025-02-03

## 2025-02-03 PROCEDURE — 97597 DBRDMT OPN WND 1ST 20 CM/<: CPT | Performed by: PHYSICIAN ASSISTANT

## 2025-02-03 PROCEDURE — 99213 OFFICE O/P EST LOW 20 MIN: CPT

## 2025-02-03 NOTE — PROGRESS NOTES
[facility]   Progress Note and Procedure Note      Kedar Medellin  MEDICAL RECORD NUMBER:  [id number]  AGE: 69 y.o.   GENDER: male  : 1955  EPISODE DATE:  2/3/2025    Subjective:     Chief Complaint   Patient presents with    Wound Check     Left foot          HISTORY of PRESENT ILLNESS HPI     Kedar Medellin is a 69 y.o. male who presents today for wound/ulcer evaluation.   History of Wound Context: Wound on the plantar surface of the left foot better there for over a year and now a few months with 1 on the lateral foot after trying on new orthotics that did not fit appropriately resulting in pressure injuries.  Original date wound started: over one year ago  Origin of the Wound: Diabetic foot Pressure Injury  Diabetic: yes  Last A1C:   Hemoglobin A1C   Date Value Ref Range Status   2024 8.2 (H) 4.0 - 6.0 % Final      Current Dressing:  None    Today's issues/concerns: Patient has no new concerns, no new pain swelling or discharge, no offputting odors,     PAST MEDICAL HISTORY        Diagnosis Date    Allergic rhinitis     Benign neoplasm of sigmoid colon 2023    Colon polyps     history of    COVID-19 10/24/2024    Elevated WBC count     history of    Hyperlipidemia     Hypertension     Obesity     Osteomyelitis of left foot 2023    Pain, joint, hand, left 2021    Partial small bowel obstruction (HCC) 2023    SBO (small bowel obstruction) ([facility]) 2023    Sepsis ([facility]) 2023    Sepsis with acute renal failure ([facility]) 2023    Type 2 diabetes mellitus with stage 3a chronic kidney disease, with long-term current use of insulin (HCC) 2023    Type II or unspecified type diabetes mellitus without mention of complication, not stated as uncontrolled     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

## 2025-02-18 ENCOUNTER — OFFICE VISIT (OUTPATIENT)
Dept: PODIATRY | Age: 70
End: 2025-02-18
Payer: MEDICARE

## 2025-02-18 VITALS
SYSTOLIC BLOOD PRESSURE: 136 MMHG | RESPIRATION RATE: 20 BRPM | HEART RATE: 84 BPM | WEIGHT: 301.2 LBS | DIASTOLIC BLOOD PRESSURE: 80 MMHG | BODY MASS INDEX: 43.22 KG/M2

## 2025-02-18 DIAGNOSIS — E11.42 DM TYPE 2 WITH DIABETIC PERIPHERAL NEUROPATHY (HCC): Primary | ICD-10-CM

## 2025-02-18 DIAGNOSIS — B35.1 DERMATOPHYTOSIS OF NAIL: ICD-10-CM

## 2025-02-18 DIAGNOSIS — S98.132A AMPUTATED TOE OF LEFT FOOT: ICD-10-CM

## 2025-02-18 PROCEDURE — 99999 PR OFFICE/OUTPT VISIT,PROCEDURE ONLY: CPT | Performed by: PODIATRIST

## 2025-02-18 PROCEDURE — 11720 DEBRIDE NAIL 1-5: CPT | Performed by: PODIATRIST

## 2025-02-18 NOTE — PROGRESS NOTES
1st MPJ ROM within normal limits, bilateral.  Dorsally contracted digits present.  toe amp L hallux.  Integument:   Interdigital maceration absent to web spaces bilateral.   Nails left 1, 5 and right 5 thickened, dystrophic and crumbly, discolored with subungual debris.   Fissures absent, bilateral. Hyperkeratotic tissue is absent.      Asessment: Patient is a 69 y.o. male with:    Diagnosis Orders   1. DM type 2 with diabetic peripheral neuropathy (HCC)        2. Amputated toe of left foot (HCC)        3. Dermatophytosis of nail                Plan: Patient examined and evaluated.  Current condition and treatment options discussed in detail.    DM foot ed and exam  All nails as mentioned above debrided in length and thickness.  Patient advised OTC methods for treatment of the mycotic nails.  Patient will follow up in 4 months for nail care

## 2025-05-20 ENCOUNTER — OFFICE VISIT (OUTPATIENT)
Dept: PODIATRY | Age: 70
End: 2025-05-20
Payer: OTHER GOVERNMENT

## 2025-05-20 VITALS
RESPIRATION RATE: 20 BRPM | DIASTOLIC BLOOD PRESSURE: 62 MMHG | WEIGHT: 294.6 LBS | HEART RATE: 84 BPM | SYSTOLIC BLOOD PRESSURE: 134 MMHG | BODY MASS INDEX: 42.27 KG/M2

## 2025-05-20 DIAGNOSIS — I87.2 CHRONIC VENOUS INSUFFICIENCY: ICD-10-CM

## 2025-05-20 DIAGNOSIS — I87.2 VENOUS STASIS DERMATITIS: Primary | ICD-10-CM

## 2025-05-20 DIAGNOSIS — S98.132A AMPUTATED TOE OF LEFT FOOT: ICD-10-CM

## 2025-05-20 DIAGNOSIS — E11.42 DM TYPE 2 WITH DIABETIC PERIPHERAL NEUROPATHY (HCC): ICD-10-CM

## 2025-05-20 DIAGNOSIS — L84 CORNS AND CALLOSITIES: ICD-10-CM

## 2025-05-20 DIAGNOSIS — B35.1 DERMATOPHYTOSIS OF NAIL: ICD-10-CM

## 2025-05-20 PROCEDURE — 11055 PARING/CUTG B9 HYPRKER LES 1: CPT | Performed by: PODIATRIST

## 2025-05-20 PROCEDURE — 3075F SYST BP GE 130 - 139MM HG: CPT | Performed by: PODIATRIST

## 2025-05-20 PROCEDURE — 99213 OFFICE O/P EST LOW 20 MIN: CPT | Performed by: PODIATRIST

## 2025-05-20 PROCEDURE — 1123F ACP DISCUSS/DSCN MKR DOCD: CPT | Performed by: PODIATRIST

## 2025-05-20 PROCEDURE — 3078F DIAST BP <80 MM HG: CPT | Performed by: PODIATRIST

## 2025-05-20 PROCEDURE — 11720 DEBRIDE NAIL 1-5: CPT | Performed by: PODIATRIST

## 2025-05-20 NOTE — PROGRESS NOTES
Foot Care Worksheet  PCP: Cici Rodriguez APRN - NP  Last visit: 12 / 18 / 2024    Nail description: Thick , Yellow , Crumbly , Marked limitation of ambulation     Pain resulting from thickened and dystrophy of nail plate No    Nails involved  Right   5  (T5-T9)  Left     1, 5  (TA-T4)    Q7 1 Class A Finding - Non traumatic amputation of foot yes      Subjective:  Kedar Medellin is a 69 y.o. male who presents to the office today with increased swelling in both legs.  Patient states right legs been right longer.  Left leg just recently showed little more redness.  He signed off a lot of dry skin.  Has used lotion here there.  Patient adds he has not been using compression stockings at all but does have them at home.  Patient also request DM foot care.   Currently denies F/C/N/V.     Allergies   Allergen Reactions    Bee Venom Anaphylaxis    Naproxen      Other reaction(s): Unknown Reaction    Semaglutide      Other reaction(s): Constipation    Empagliflozin Rash       Past Medical History:   Diagnosis Date    Allergic rhinitis     Benign neoplasm of sigmoid colon 08/14/2023    Colon polyps     history of    COVID-19 10/24/2024    Elevated WBC count     history of    Hyperlipidemia     Hypertension     Obesity     Osteomyelitis of left foot ([facility]) 08/28/2023    Pain, joint, hand, left 07/28/2021    Partial small bowel obstruction ([facility]) 03/05/2023    SBO (small bowel obstruction) ([facility]) 03/12/2023    Sepsis ([facility]) 03/05/2023    Sepsis with acute renal failure ([facility]) 03/05/2023    Type 2 diabetes mellitus with stage 3a chronic kidney disease, with long-term current use of insulin ([facility]) 08/14/2023    Type II or unspecified type diabetes mellitus without mention of complication, not stated as uncontrolled     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

## 2025-07-22 ENCOUNTER — OFFICE VISIT (OUTPATIENT)
Dept: NEUROLOGY | Age: 70
End: 2025-07-22
Payer: OTHER GOVERNMENT

## 2025-07-22 VITALS
BODY MASS INDEX: 43.38 KG/M2 | SYSTOLIC BLOOD PRESSURE: 142 MMHG | DIASTOLIC BLOOD PRESSURE: 68 MMHG | HEART RATE: 83 BPM | WEIGHT: 303 LBS | OXYGEN SATURATION: 96 % | HEIGHT: 70 IN

## 2025-07-22 DIAGNOSIS — G25.0 ESSENTIAL TREMOR: Primary | ICD-10-CM

## 2025-07-22 PROCEDURE — 99205 OFFICE O/P NEW HI 60 MIN: CPT | Performed by: PSYCHIATRY & NEUROLOGY

## 2025-07-22 PROCEDURE — 1123F ACP DISCUSS/DSCN MKR DOCD: CPT | Performed by: PSYCHIATRY & NEUROLOGY

## 2025-07-22 PROCEDURE — 3077F SYST BP >= 140 MM HG: CPT | Performed by: PSYCHIATRY & NEUROLOGY

## 2025-07-22 PROCEDURE — 3078F DIAST BP <80 MM HG: CPT | Performed by: PSYCHIATRY & NEUROLOGY

## 2025-07-22 RX ORDER — PRIMIDONE 50 MG/1
TABLET ORAL
Qty: 30 TABLET | Refills: 3 | Status: SHIPPED | OUTPATIENT
Start: 2025-07-22

## 2025-07-22 NOTE — PATIENT INSTRUCTIONS
Start Primdione 50 mg nightly for 1 week then change to daytime there after  TSH with reflex T4  Vitamin B12, folate  Vitamin B6 level  Manganese level  Please call us with an update as to how you are doing in 1-2 weeks  Call with any new symptoms or concerns.   Follow up in 2-3 months.

## 2025-07-24 ENCOUNTER — HOSPITAL ENCOUNTER (OUTPATIENT)
Age: 70
Discharge: HOME | End: 2025-07-24
Payer: MEDICARE

## 2025-07-24 DIAGNOSIS — G25.0 ESSENTIAL TREMOR: ICD-10-CM

## 2025-07-24 LAB
FOLATE SERPL-MCNC: 30.2 NG/ML (ref 4.8–24.2)
TSH SERPL DL<=0.05 MIU/L-ACNC: 1.39 UIU/ML (ref 0.27–4.2)
VIT B12 SERPL-MCNC: 334 PG/ML (ref 232–1245)

## 2025-07-24 PROCEDURE — 82746 ASSAY OF FOLIC ACID SERUM: CPT

## 2025-07-24 PROCEDURE — 84443 ASSAY THYROID STIM HORMONE: CPT

## 2025-07-24 PROCEDURE — 82607 VITAMIN B-12: CPT

## 2025-07-24 PROCEDURE — 84207 ASSAY OF VITAMIN B-6: CPT

## 2025-07-25 ENCOUNTER — RESULTS FOLLOW-UP (OUTPATIENT)
Dept: NEUROLOGY | Age: 70
End: 2025-07-25

## 2025-07-25 LAB
MISCELLANEOUS LAB TEST RESULT: NORMAL
TEST NAME: NORMAL

## 2025-07-25 NOTE — RESULT ENCOUNTER NOTE
Please ask patient to start B12 sublingual supplements over the counter daily not multivitamins (preferably more than 3000mcg/day). Level is low 334. Repeat in two to three months.  Folate level is good.   Vadim Lobo MD

## 2025-07-28 LAB
MISCELLANEOUS LAB TEST RESULT: NORMAL
PYRIDOXAL PHOS SERPL-SCNC: 58.2 NMOL/L (ref 20–125)
TEST NAME: NORMAL

## 2025-07-29 NOTE — PROGRESS NOTES
Chief Complaint   Patient presents with    Consultation/Tremor     Kedar Medellin is a 69 y.o. male who presents today for evaluation of bilateral tremor for the 4 months following an illness. His symptoms are worse when using utensils to eat, drink from a glass, or when reaching for items. He can have good days and bad days with his symptoms. He is on gabapentin 300 mg two times a day for neuropathy. He is diabetic for the past 30 years, poorly controlled, his reports his last HA1C was over 9. His mother has history of tremor. No history of head injury. No head imaging done. His sleep is poor and interrupted. He wakes up feeling tired. He is supposed to be on BiPAP, however he does not wear it. He denies chest pain. No shortness of breath, no neck pain. No vision changes. No dysphagia. No fever. No rash. No weight loss. History provided by patient accompanied by his daughter.        Past Medical History:   Diagnosis Date    Allergic rhinitis     Benign neoplasm of sigmoid colon 08/14/2023    Colon polyps     history of    COVID-19 10/24/2024    Elevated WBC count     history of    Hyperlipidemia     Hypertension     Obesity     Osteomyelitis of left foot ([facility]) 08/28/2023    Pain, joint, hand, left 07/28/2021    Partial small bowel obstruction ([facility]) 03/05/2023    SBO (small bowel obstruction) ([facility]) 03/12/2023    Sepsis ([facility]) 03/05/2023    Sepsis with acute renal failure ([facility]) 03/05/2023    Tremors of nervous system     Type 2 diabetes mellitus with stage 3a chronic kidney disease, with long-term current use of insulin ([facility]) 08/14/2023    Type II or unspecified type diabetes mellitus without mention of complication, not stated as uncontrolled     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

## 2025-07-30 ENCOUNTER — OFFICE VISIT (OUTPATIENT)
Dept: PODIATRY | Age: 70
End: 2025-07-30
Payer: OTHER GOVERNMENT

## 2025-07-30 VITALS
RESPIRATION RATE: 20 BRPM | SYSTOLIC BLOOD PRESSURE: 136 MMHG | WEIGHT: 298.2 LBS | HEART RATE: 88 BPM | BODY MASS INDEX: 42.79 KG/M2 | DIASTOLIC BLOOD PRESSURE: 62 MMHG

## 2025-07-30 DIAGNOSIS — E11.42 DM TYPE 2 WITH DIABETIC PERIPHERAL NEUROPATHY (HCC): ICD-10-CM

## 2025-07-30 DIAGNOSIS — L97.511 ULCER OF RIGHT FOOT, LIMITED TO BREAKDOWN OF SKIN (HCC): Primary | ICD-10-CM

## 2025-07-30 DIAGNOSIS — L97.921 ULCER OF LEFT LOWER LEG, LIMITED TO BREAKDOWN OF SKIN (HCC): ICD-10-CM

## 2025-07-30 DIAGNOSIS — S98.132A AMPUTATED TOE OF LEFT FOOT: ICD-10-CM

## 2025-07-30 DIAGNOSIS — I87.2 CHRONIC VENOUS INSUFFICIENCY: ICD-10-CM

## 2025-07-30 DIAGNOSIS — L97.911 ULCER OF RIGHT LEG, LIMITED TO BREAKDOWN OF SKIN (HCC): ICD-10-CM

## 2025-07-30 DIAGNOSIS — B35.1 DERMATOPHYTOSIS OF NAIL: ICD-10-CM

## 2025-07-30 PROCEDURE — 29580 STRAPPING UNNA BOOT: CPT | Performed by: PODIATRIST

## 2025-07-30 PROCEDURE — 11720 DEBRIDE NAIL 1-5: CPT | Performed by: PODIATRIST

## 2025-07-30 PROCEDURE — 99214 OFFICE O/P EST MOD 30 MIN: CPT | Performed by: PODIATRIST

## 2025-07-30 PROCEDURE — 97597 DBRDMT OPN WND 1ST 20 CM/<: CPT | Performed by: PODIATRIST

## 2025-07-30 NOTE — PROGRESS NOTES
Foot Care Worksheet  PCP: Cici Rodriguez APRN - NP  Last visit: 06 / 03 / 2025    Nail description: Thick , Yellow , Crumbly , Marked limitation of ambulation     Pain resulting from thickened and dystrophy of nail plate No    Nails involved  Right   5  (T5-T9)  Left     1, 5  (TA-T4)    Q7 1 Class A Finding - Non traumatic amputation of foot yes      Subjective:  Kedar Medellin is a 69 y.o. male who presents to the office today for diabetic footcare.  Patient does not know there is a wound on the right foot.  Patient denies any treatment there.  No change in shoe gear.  Does not think issues with old.  Patient adds his legs are stayed swollen.  Has not changed even with using compression.  No treatment for the legs.  Currently denies F/C/N/V.     Allergies   Allergen Reactions    Bee Venom Anaphylaxis    Naproxen      Other reaction(s): Unknown Reaction    Semaglutide      Other reaction(s): Constipation    Empagliflozin Rash       Past Medical History:   Diagnosis Date    Allergic rhinitis     Benign neoplasm of sigmoid colon 08/14/2023    Colon polyps     history of    COVID-19 10/24/2024    Elevated WBC count     history of    Hyperlipidemia     Hypertension     Obesity     Osteomyelitis of left foot ([facility]) 08/28/2023    Pain, joint, hand, left 07/28/2021    Partial small bowel obstruction ([facility]) 03/05/2023    SBO (small bowel obstruction) ([facility]) 03/12/2023    Sepsis ([facility]) 03/05/2023    Sepsis with acute renal failure ([facility]) 03/05/2023    Tremors of nervous system     Type 2 diabetes mellitus with stage 3a chronic kidney disease, with long-term current use of insulin ([facility]) 08/14/2023    Type II or unspecified type diabetes mellitus without mention of complication, not stated as uncontrolled     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

## 2025-07-30 NOTE — PATIENT INSTRUCTIONS
Silver foam and unna boot to both lower legs    Today a compression dressing has been applied to your lower leg. Its purpose is to reduce swelling and help treat your wound. It will stay on until your next appointment.  1. It must stay dry. You can shower with a bag covering it or purchase a shower boot at  a medical supply store.  2. If the dressing falls more than 2 inches or gets wet, call us ([phone]) to come in  to have it changed.  3. If the top or bottom rolls, you can snip through it to relieve the constriction.  4. To help reduce swelling, when sitting elevate your leg, preferably to heart level.   Avoid standing in one place for long periods of time.  5.  A rare complication is a decrease of arterial blood flow to your toes. If your   leg becomes more painful with numbness or tingling or a purple color to your toes,  carefully remove the dressing by cutting it off or unwrapping it. If normal sensation does not return to the limb, seek medical help.       Cleanse wound with normal saline or in the shower if allowed. Pat dry. Cut silver alginate to fit wound, pack lightly. (Silver Alginate will swell as it absorbs drainage.) Cover with dry gauze. Hold with tape, roll gauze or coban. Change at least every three days and whenever drainage comes through the outer dressing.     Wear off loading surgical shoe on the right foot at all times weight bearing.    SIGNS OF INFECTION  - Redness, swelling, skin hot  - Wound bed turns black or stringy yellow  - Foul odor  - Increased drainage or pus  - Increased pain  - Fever greater than 100F    CALL YOUR DOCTOR OR SEEK MEDICAL ATTENTION IF SIGNS OF INFECTION.  DO NOT WAIT UNTIL YOUR NEXT APPOINTMENT    Call  with any other questions or concerns- [phone]    Return to see  as scheduled for wound care     Return to see  as scheduled for routine foot care

## 2025-08-04 ENCOUNTER — HOSPITAL ENCOUNTER (OUTPATIENT)
Dept: WOUND CARE | Age: 70
Discharge: HOME | End: 2025-08-05
Payer: MEDICARE

## 2025-08-04 VITALS
HEART RATE: 86 BPM | SYSTOLIC BLOOD PRESSURE: 126 MMHG | DIASTOLIC BLOOD PRESSURE: 70 MMHG | WEIGHT: 303 LBS | HEIGHT: 70 IN | BODY MASS INDEX: 43.38 KG/M2 | RESPIRATION RATE: 22 BRPM

## 2025-08-04 DIAGNOSIS — L97.911 ULCER OF RIGHT LEG, LIMITED TO BREAKDOWN OF SKIN (HCC): ICD-10-CM

## 2025-08-04 DIAGNOSIS — L97.511 ULCER OF RIGHT FOOT, LIMITED TO BREAKDOWN OF SKIN (HCC): Primary | ICD-10-CM

## 2025-08-04 DIAGNOSIS — I87.2 CHRONIC VENOUS INSUFFICIENCY: ICD-10-CM

## 2025-08-04 DIAGNOSIS — L97.921 ULCER OF LEFT LOWER LEG, LIMITED TO BREAKDOWN OF SKIN (HCC): ICD-10-CM

## 2025-08-04 PROCEDURE — 97597 DBRDMT OPN WND 1ST 20 CM/<: CPT

## 2025-08-04 PROCEDURE — 97597 DBRDMT OPN WND 1ST 20 CM/<: CPT | Performed by: PODIATRIST

## 2025-08-04 PROCEDURE — 29580 STRAPPING UNNA BOOT: CPT

## 2025-08-04 PROCEDURE — 29580 STRAPPING UNNA BOOT: CPT | Performed by: PODIATRIST

## 2025-08-04 RX ORDER — LIDOCAINE 40 MG/G
CREAM TOPICAL PRN
OUTPATIENT
Start: 2025-08-04

## 2025-08-04 RX ORDER — CLOBETASOL PROPIONATE 0.5 MG/G
OINTMENT TOPICAL PRN
OUTPATIENT
Start: 2025-08-04

## 2025-08-04 RX ORDER — SILVER SULFADIAZINE 10 MG/G
CREAM TOPICAL PRN
OUTPATIENT
Start: 2025-08-04

## 2025-08-04 RX ORDER — GENTAMICIN SULFATE 1 MG/G
OINTMENT TOPICAL PRN
OUTPATIENT
Start: 2025-08-04

## 2025-08-04 RX ORDER — SODIUM CHLOR/HYPOCHLOROUS ACID 0.033 %
SOLUTION, IRRIGATION IRRIGATION PRN
OUTPATIENT
Start: 2025-08-04

## 2025-08-04 RX ORDER — LIDOCAINE HYDROCHLORIDE 20 MG/ML
JELLY TOPICAL PRN
OUTPATIENT
Start: 2025-08-04

## 2025-08-04 RX ORDER — LIDOCAINE HYDROCHLORIDE 40 MG/ML
SOLUTION TOPICAL PRN
OUTPATIENT
Start: 2025-08-04

## 2025-08-04 RX ORDER — BACITRACIN ZINC AND POLYMYXIN B SULFATE 500; 1000 [USP'U]/G; [USP'U]/G
OINTMENT TOPICAL PRN
OUTPATIENT
Start: 2025-08-04

## 2025-08-04 RX ORDER — LIDOCAINE 50 MG/G
OINTMENT TOPICAL PRN
OUTPATIENT
Start: 2025-08-04

## 2025-08-04 RX ORDER — NEOMYCIN/BACITRACIN/POLYMYXINB 3.5-400-5K
OINTMENT (GRAM) TOPICAL PRN
OUTPATIENT
Start: 2025-08-04

## 2025-08-04 RX ORDER — GINSENG 100 MG
CAPSULE ORAL PRN
OUTPATIENT
Start: 2025-08-04

## 2025-08-04 RX ORDER — MUPIROCIN 2 %
OINTMENT (GRAM) TOPICAL PRN
OUTPATIENT
Start: 2025-08-04

## 2025-08-04 RX ORDER — BETAMETHASONE DIPROPIONATE 0.5 MG/G
CREAM TOPICAL PRN
OUTPATIENT
Start: 2025-08-04

## 2025-08-04 RX ORDER — TRIAMCINOLONE ACETONIDE 1 MG/G
OINTMENT TOPICAL PRN
OUTPATIENT
Start: 2025-08-04

## 2025-08-04 ASSESSMENT — PAIN SCALES - GENERAL: PAINLEVEL_OUTOF10: 0

## 2025-08-11 ENCOUNTER — HOSPITAL ENCOUNTER (OUTPATIENT)
Dept: WOUND CARE | Age: 70
Discharge: HOME | End: 2025-08-12
Attending: PODIATRIST
Payer: MEDICARE

## 2025-08-11 VITALS
RESPIRATION RATE: 20 BRPM | TEMPERATURE: 97.6 F | DIASTOLIC BLOOD PRESSURE: 70 MMHG | HEART RATE: 80 BPM | SYSTOLIC BLOOD PRESSURE: 130 MMHG | BODY MASS INDEX: 43.48 KG/M2 | HEIGHT: 70 IN

## 2025-08-11 DIAGNOSIS — L97.511 ULCER OF RIGHT FOOT, LIMITED TO BREAKDOWN OF SKIN (HCC): Primary | ICD-10-CM

## 2025-08-11 PROCEDURE — 97597 DBRDMT OPN WND 1ST 20 CM/<: CPT

## 2025-08-11 RX ORDER — GINSENG 100 MG
CAPSULE ORAL PRN
OUTPATIENT
Start: 2025-08-11

## 2025-08-11 RX ORDER — SILVER SULFADIAZINE 10 MG/G
CREAM TOPICAL PRN
OUTPATIENT
Start: 2025-08-11

## 2025-08-11 RX ORDER — LIDOCAINE HYDROCHLORIDE 20 MG/ML
JELLY TOPICAL PRN
OUTPATIENT
Start: 2025-08-11

## 2025-08-11 RX ORDER — LIDOCAINE 50 MG/G
OINTMENT TOPICAL PRN
OUTPATIENT
Start: 2025-08-11

## 2025-08-11 RX ORDER — TRIAMCINOLONE ACETONIDE 1 MG/G
OINTMENT TOPICAL PRN
OUTPATIENT
Start: 2025-08-11

## 2025-08-11 RX ORDER — BETAMETHASONE DIPROPIONATE 0.5 MG/G
CREAM TOPICAL PRN
OUTPATIENT
Start: 2025-08-11

## 2025-08-11 RX ORDER — BACITRACIN ZINC AND POLYMYXIN B SULFATE 500; 1000 [USP'U]/G; [USP'U]/G
OINTMENT TOPICAL PRN
OUTPATIENT
Start: 2025-08-11

## 2025-08-11 RX ORDER — NEOMYCIN/BACITRACIN/POLYMYXINB 3.5-400-5K
OINTMENT (GRAM) TOPICAL PRN
OUTPATIENT
Start: 2025-08-11

## 2025-08-11 RX ORDER — MUPIROCIN 2 %
OINTMENT (GRAM) TOPICAL PRN
OUTPATIENT
Start: 2025-08-11

## 2025-08-11 RX ORDER — GENTAMICIN SULFATE 1 MG/G
OINTMENT TOPICAL PRN
OUTPATIENT
Start: 2025-08-11

## 2025-08-11 RX ORDER — SODIUM CHLOR/HYPOCHLOROUS ACID 0.033 %
SOLUTION, IRRIGATION IRRIGATION PRN
OUTPATIENT
Start: 2025-08-11

## 2025-08-11 RX ORDER — LIDOCAINE 40 MG/G
CREAM TOPICAL PRN
OUTPATIENT
Start: 2025-08-11

## 2025-08-11 RX ORDER — CLOBETASOL PROPIONATE 0.5 MG/G
OINTMENT TOPICAL PRN
OUTPATIENT
Start: 2025-08-11

## 2025-08-11 RX ORDER — LIDOCAINE HYDROCHLORIDE 40 MG/ML
SOLUTION TOPICAL PRN
OUTPATIENT
Start: 2025-08-11

## 2025-08-18 ENCOUNTER — HOSPITAL ENCOUNTER (OUTPATIENT)
Dept: WOUND CARE | Age: 70
Discharge: HOME | End: 2025-08-19
Attending: PODIATRIST
Payer: MEDICARE

## 2025-08-18 VITALS
HEIGHT: 70 IN | SYSTOLIC BLOOD PRESSURE: 128 MMHG | BODY MASS INDEX: 42.52 KG/M2 | WEIGHT: 297 LBS | HEART RATE: 84 BPM | DIASTOLIC BLOOD PRESSURE: 60 MMHG | TEMPERATURE: 97 F | RESPIRATION RATE: 18 BRPM

## 2025-08-18 DIAGNOSIS — L97.511 ULCER OF RIGHT FOOT, LIMITED TO BREAKDOWN OF SKIN (HCC): Primary | ICD-10-CM

## 2025-08-18 PROCEDURE — 97597 DBRDMT OPN WND 1ST 20 CM/<: CPT | Performed by: PODIATRIST

## 2025-08-18 PROCEDURE — 97597 DBRDMT OPN WND 1ST 20 CM/<: CPT

## 2025-08-18 RX ORDER — SILVER SULFADIAZINE 10 MG/G
CREAM TOPICAL PRN
OUTPATIENT
Start: 2025-08-18

## 2025-08-18 RX ORDER — BETAMETHASONE DIPROPIONATE 0.5 MG/G
CREAM TOPICAL PRN
OUTPATIENT
Start: 2025-08-18

## 2025-08-18 RX ORDER — SODIUM CHLOR/HYPOCHLOROUS ACID 0.033 %
SOLUTION, IRRIGATION IRRIGATION PRN
OUTPATIENT
Start: 2025-08-18

## 2025-08-18 RX ORDER — CLOBETASOL PROPIONATE 0.5 MG/G
OINTMENT TOPICAL PRN
OUTPATIENT
Start: 2025-08-18

## 2025-08-18 RX ORDER — LIDOCAINE HYDROCHLORIDE 20 MG/ML
JELLY TOPICAL PRN
OUTPATIENT
Start: 2025-08-18

## 2025-08-18 RX ORDER — MUPIROCIN 2 %
OINTMENT (GRAM) TOPICAL PRN
OUTPATIENT
Start: 2025-08-18

## 2025-08-18 RX ORDER — LIDOCAINE 50 MG/G
OINTMENT TOPICAL PRN
OUTPATIENT
Start: 2025-08-18

## 2025-08-18 RX ORDER — BACITRACIN ZINC AND POLYMYXIN B SULFATE 500; 1000 [USP'U]/G; [USP'U]/G
OINTMENT TOPICAL PRN
OUTPATIENT
Start: 2025-08-18

## 2025-08-18 RX ORDER — GINSENG 100 MG
CAPSULE ORAL PRN
OUTPATIENT
Start: 2025-08-18

## 2025-08-18 RX ORDER — NEOMYCIN/BACITRACIN/POLYMYXINB 3.5-400-5K
OINTMENT (GRAM) TOPICAL PRN
OUTPATIENT
Start: 2025-08-18

## 2025-08-18 RX ORDER — GENTAMICIN SULFATE 1 MG/G
OINTMENT TOPICAL PRN
OUTPATIENT
Start: 2025-08-18

## 2025-08-18 RX ORDER — LIDOCAINE HYDROCHLORIDE 40 MG/ML
SOLUTION TOPICAL PRN
OUTPATIENT
Start: 2025-08-18

## 2025-08-18 RX ORDER — TRIAMCINOLONE ACETONIDE 1 MG/G
OINTMENT TOPICAL PRN
OUTPATIENT
Start: 2025-08-18

## 2025-08-18 RX ORDER — LIDOCAINE 40 MG/G
CREAM TOPICAL PRN
OUTPATIENT
Start: 2025-08-18

## 2025-08-25 ENCOUNTER — HOSPITAL ENCOUNTER (OUTPATIENT)
Dept: WOUND CARE | Age: 70
Discharge: HOME | End: 2025-08-26
Attending: PODIATRIST
Payer: MEDICARE

## 2025-08-25 VITALS
WEIGHT: 296.8 LBS | TEMPERATURE: 98 F | SYSTOLIC BLOOD PRESSURE: 126 MMHG | HEART RATE: 76 BPM | HEIGHT: 70 IN | BODY MASS INDEX: 42.49 KG/M2 | DIASTOLIC BLOOD PRESSURE: 76 MMHG | RESPIRATION RATE: 20 BRPM

## 2025-08-25 DIAGNOSIS — L97.511 ULCER OF RIGHT FOOT, LIMITED TO BREAKDOWN OF SKIN (HCC): Primary | ICD-10-CM

## 2025-08-25 PROCEDURE — 97597 DBRDMT OPN WND 1ST 20 CM/<: CPT | Performed by: PODIATRIST

## 2025-08-25 PROCEDURE — 97597 DBRDMT OPN WND 1ST 20 CM/<: CPT

## 2025-08-25 RX ORDER — SILVER SULFADIAZINE 10 MG/G
CREAM TOPICAL PRN
OUTPATIENT
Start: 2025-08-25

## 2025-08-25 RX ORDER — GENTAMICIN SULFATE 1 MG/G
OINTMENT TOPICAL PRN
OUTPATIENT
Start: 2025-08-25

## 2025-08-25 RX ORDER — LIDOCAINE 50 MG/G
OINTMENT TOPICAL PRN
OUTPATIENT
Start: 2025-08-25

## 2025-08-25 RX ORDER — MUPIROCIN 2 %
OINTMENT (GRAM) TOPICAL PRN
OUTPATIENT
Start: 2025-08-25

## 2025-08-25 RX ORDER — LIDOCAINE HYDROCHLORIDE 20 MG/ML
JELLY TOPICAL PRN
OUTPATIENT
Start: 2025-08-25

## 2025-08-25 RX ORDER — BACITRACIN ZINC AND POLYMYXIN B SULFATE 500; 1000 [USP'U]/G; [USP'U]/G
OINTMENT TOPICAL PRN
OUTPATIENT
Start: 2025-08-25

## 2025-08-25 RX ORDER — TRIAMCINOLONE ACETONIDE 1 MG/G
OINTMENT TOPICAL PRN
OUTPATIENT
Start: 2025-08-25

## 2025-08-25 RX ORDER — NEOMYCIN/BACITRACIN/POLYMYXINB 3.5-400-5K
OINTMENT (GRAM) TOPICAL PRN
OUTPATIENT
Start: 2025-08-25

## 2025-08-25 RX ORDER — LIDOCAINE HYDROCHLORIDE 40 MG/ML
SOLUTION TOPICAL PRN
OUTPATIENT
Start: 2025-08-25

## 2025-08-25 RX ORDER — CLOBETASOL PROPIONATE 0.5 MG/G
OINTMENT TOPICAL PRN
OUTPATIENT
Start: 2025-08-25

## 2025-08-25 RX ORDER — SODIUM CHLOR/HYPOCHLOROUS ACID 0.033 %
SOLUTION, IRRIGATION IRRIGATION PRN
OUTPATIENT
Start: 2025-08-25

## 2025-08-25 RX ORDER — BETAMETHASONE DIPROPIONATE 0.5 MG/G
CREAM TOPICAL PRN
OUTPATIENT
Start: 2025-08-25

## 2025-08-25 RX ORDER — LIDOCAINE 40 MG/G
CREAM TOPICAL PRN
OUTPATIENT
Start: 2025-08-25

## 2025-08-25 RX ORDER — GINSENG 100 MG
CAPSULE ORAL PRN
OUTPATIENT
Start: 2025-08-25

## 2025-09-02 ENCOUNTER — HOSPITAL ENCOUNTER (OUTPATIENT)
Dept: WOUND CARE | Age: 70
Discharge: HOME | End: 2025-09-03
Attending: PODIATRIST
Payer: MEDICARE

## 2025-09-02 VITALS
BODY MASS INDEX: 42.23 KG/M2 | HEART RATE: 84 BPM | RESPIRATION RATE: 22 BRPM | DIASTOLIC BLOOD PRESSURE: 60 MMHG | SYSTOLIC BLOOD PRESSURE: 136 MMHG | WEIGHT: 295 LBS | HEIGHT: 70 IN | TEMPERATURE: 98.9 F

## 2025-09-02 DIAGNOSIS — E11.42 TYPE 2 DIABETES MELLITUS WITH DIABETIC POLYNEUROPATHY, WITH LONG-TERM CURRENT USE OF INSULIN (HCC): ICD-10-CM

## 2025-09-02 DIAGNOSIS — Z79.4 TYPE 2 DIABETES MELLITUS WITH DIABETIC POLYNEUROPATHY, WITH LONG-TERM CURRENT USE OF INSULIN (HCC): ICD-10-CM

## 2025-09-02 DIAGNOSIS — L97.511 ULCER OF RIGHT FOOT, LIMITED TO BREAKDOWN OF SKIN (HCC): Primary | ICD-10-CM

## 2025-09-02 DIAGNOSIS — I73.9 PVD (PERIPHERAL VASCULAR DISEASE): ICD-10-CM

## 2025-09-02 PROCEDURE — 97597 DBRDMT OPN WND 1ST 20 CM/<: CPT | Performed by: PODIATRIST

## 2025-09-02 PROCEDURE — 99213 OFFICE O/P EST LOW 20 MIN: CPT | Performed by: PODIATRIST

## 2025-09-02 PROCEDURE — 99213 OFFICE O/P EST LOW 20 MIN: CPT

## 2025-09-02 PROCEDURE — 97597 DBRDMT OPN WND 1ST 20 CM/<: CPT

## 2025-09-02 RX ORDER — BETAMETHASONE DIPROPIONATE 0.5 MG/G
CREAM TOPICAL PRN
OUTPATIENT
Start: 2025-09-02

## 2025-09-02 RX ORDER — SODIUM CHLOR/HYPOCHLOROUS ACID 0.033 %
SOLUTION, IRRIGATION IRRIGATION PRN
OUTPATIENT
Start: 2025-09-02

## 2025-09-02 RX ORDER — TRIAMCINOLONE ACETONIDE 1 MG/G
OINTMENT TOPICAL PRN
OUTPATIENT
Start: 2025-09-02

## 2025-09-02 RX ORDER — LIDOCAINE HYDROCHLORIDE 20 MG/ML
JELLY TOPICAL PRN
OUTPATIENT
Start: 2025-09-02

## 2025-09-02 RX ORDER — LIDOCAINE 40 MG/G
CREAM TOPICAL PRN
OUTPATIENT
Start: 2025-09-02

## 2025-09-02 RX ORDER — NEOMYCIN/BACITRACIN/POLYMYXINB 3.5-400-5K
OINTMENT (GRAM) TOPICAL PRN
OUTPATIENT
Start: 2025-09-02

## 2025-09-02 RX ORDER — LIDOCAINE HYDROCHLORIDE 40 MG/ML
SOLUTION TOPICAL PRN
OUTPATIENT
Start: 2025-09-02

## 2025-09-02 RX ORDER — MUPIROCIN 2 %
OINTMENT (GRAM) TOPICAL PRN
OUTPATIENT
Start: 2025-09-02

## 2025-09-02 RX ORDER — CLOBETASOL PROPIONATE 0.5 MG/G
OINTMENT TOPICAL PRN
OUTPATIENT
Start: 2025-09-02

## 2025-09-02 RX ORDER — SILVER SULFADIAZINE 10 MG/G
CREAM TOPICAL PRN
OUTPATIENT
Start: 2025-09-02

## 2025-09-02 RX ORDER — GINSENG 100 MG
CAPSULE ORAL PRN
OUTPATIENT
Start: 2025-09-02

## 2025-09-02 RX ORDER — GENTAMICIN SULFATE 1 MG/G
OINTMENT TOPICAL PRN
OUTPATIENT
Start: 2025-09-02

## 2025-09-02 RX ORDER — CEPHALEXIN 500 MG/1
500 CAPSULE ORAL 3 TIMES DAILY
Qty: 30 CAPSULE | Refills: 0 | Status: SHIPPED | OUTPATIENT
Start: 2025-09-02 | End: 2025-09-12

## 2025-09-02 RX ORDER — LIDOCAINE 50 MG/G
OINTMENT TOPICAL PRN
OUTPATIENT
Start: 2025-09-02

## 2025-09-02 RX ORDER — BACITRACIN ZINC AND POLYMYXIN B SULFATE 500; 1000 [USP'U]/G; [USP'U]/G
OINTMENT TOPICAL PRN
OUTPATIENT
Start: 2025-09-02

## 2025-09-02 ASSESSMENT — PAIN SCALES - GENERAL: PAINLEVEL_OUTOF10: 0

## (undated) DEVICE — TRAP SPEC 40CC MUCUS OPN SUCT

## (undated) DEVICE — TROCARS: Brand: KII® BALLOON BLUNT TIP SYSTEM

## (undated) DEVICE — GARMENT,MEDLINE,DVT,INT,CALF,MED, GEN2: Brand: MEDLINE

## (undated) DEVICE — 4-PORT MANIFOLD: Brand: NEPTUNE 2

## (undated) DEVICE — BANDAGE COBAN 4 IN COMPR W4INXL5YD FOAM COHESIVE QUIK STK SELF ADH SFT

## (undated) DEVICE — Z DISCONTINUED BY MEDLINE USE 2711682 TRAY SKIN PREP DRY W/ PREM GLV

## (undated) DEVICE — COVER LT HNDL BLU PLAS

## (undated) DEVICE — SNARE ENDOSCP L240CM SHTH DIA2.4MM LOOP W20MM MIN WRK CHN

## (undated) DEVICE — SUTURE NONABSORBABLE MONOFILAMENT 2-0 FS 18 IN ETHILON 664H

## (undated) DEVICE — GOWN,AURORA,NONRNF,XL,30/CS: Brand: MEDLINE

## (undated) DEVICE — MERCY DEFIANCE ENDO KIT: Brand: MEDLINE INDUSTRIES, INC.

## (undated) DEVICE — TRAP SURG QUAD PARABOLA SLOT DSGN SFTY SCRN TRAPEASE

## (undated) DEVICE — TOWEL,OR,DSP,ST,BLUE,STD,4/PK,20PK/CS: Brand: MEDLINE

## (undated) DEVICE — BNDG,ELSTC,MATRIX,STRL,4"X5YD,LF,HOOK&LP: Brand: MEDLINE

## (undated) DEVICE — 3M™ WARMING BLANKET, FULL BODY, 10 PER CASE, 40068: Brand: BAIR HUGGER™

## (undated) DEVICE — 3M™ WARMING BLANKET, UPPER BODY, 10 PER CASE, 42268: Brand: BAIR HUGGER™

## (undated) DEVICE — PAD, GROUNDING, UNIVERSAL, SPLIT, 9': Brand: MEDLINE

## (undated) DEVICE — STRIP SKIN CLSR W0.25XL4IN WHT SPUNBOUND FBR NYL HI ADH

## (undated) DEVICE — GAUZE,SPONGE,2"X2",8PLY,STERILE,LF,2'S: Brand: MEDLINE

## (undated) DEVICE — LAP CHOLE PK

## (undated) DEVICE — GLOVE SURG SZ 8 L12IN THK91MIL BRN LTX FREE POLYCHLOROPRENE

## (undated) DEVICE — FORCEPS BX L240CM JAW DIA2.2MM RAD JAW 4 HOT DISP

## (undated) DEVICE — THIN OFFSET (5.5 X 0.38 X 25.0MM)

## (undated) DEVICE — PRECISION OFFSET (9.0 X 0.254 X 18.5MM)

## (undated) DEVICE — GOWN,AURORA,NON-REINFORCED,2XL: Brand: MEDLINE

## (undated) DEVICE — CO2 CANNULA,SSOFT,ADLT,7O2,4CO2,FEMALE: Brand: MEDLINE

## (undated) DEVICE — TUBING, SUCTION, 3/16" X 20', STRAIGHT: Brand: MEDLINE

## (undated) DEVICE — 9165 UNIVERSAL PATIENT PLATE: Brand: 3M™

## (undated) DEVICE — SUTURE VCRL SZ 2-0 L27IN ABSRB VLT L26MM SH 1/2 CIR J317H

## (undated) DEVICE — BLADE CLIPPER GEN PURP NS

## (undated) DEVICE — SUTURE VCRL SZ 4-0 L18IN ABSRB UD L19MM PS-2 3/8 CIR PRIM J496H

## (undated) DEVICE — PACK PROCEDURE SURG EXTREMITY MIN

## (undated) DEVICE — INTENDED FOR TISSUE SEPARATION, AND OTHER PROCEDURES THAT REQUIRE A SHARP SURGICAL BLADE TO PUNCTURE OR CUT.: Brand: BARD-PARKER ® CARBON RIB-BACK BLADES

## (undated) DEVICE — CONTAINER,SPECIMEN,OR STERILE,4OZ: Brand: MEDLINE

## (undated) DEVICE — PACK SURG PROC REMINDER N WVN DISPOSABLE BEAC TIME OUT

## (undated) DEVICE — PAD,ABDOMINAL,5"X9",ST,LF,25/BX: Brand: MEDLINE INDUSTRIES, INC.

## (undated) DEVICE — CHLORAPREP 26ML ORANGE

## (undated) DEVICE — SUTURE COAT VCRL SZ 4-0 L18IN ABSRB UD L16MM PC-3 3/8 CIR J845G

## (undated) DEVICE — SCISSOR SURG CRV ENDOCUT TIP FOR LAP DISP

## (undated) DEVICE — SOLUTION PREP PAINT POV IOD FOR SKIN MUCOUS MEM

## (undated) DEVICE — PENCIL ES L3M BTTN SWCH HOLSTER W/ BLDE ELECTRD EDGE

## (undated) DEVICE — SOLUTION IV IRRIG POUR BRL 0.9% SODIUM CHL 2F7124

## (undated) DEVICE — SUTURE VCRL SZ 3-0 L27IN ABSRB VLT L26MM SH 1/2 CIR J316H

## (undated) DEVICE — Z INACTIVE USE 2660664 SOLUTION IRRIG 3000ML 0.9% SOD CHL USP UROMATIC PLAS CONT

## (undated) DEVICE — GAUZE,SPONGE,3"X3",12PLY,STERILE,LF,2'S: Brand: MEDLINE

## (undated) DEVICE — SOLUTION ANTIFOG VIS SYS CLEARIFY LAPSCP

## (undated) DEVICE — GLOVE SURG SZ 7 L12IN THK7.5MIL DK GRN LTX FREE MSG6570] MEDLINE INDUSTRIES INC]

## (undated) DEVICE — SUTURE VCRL SZ 3-0 L27IN ABSRB UD L26MM CT-2 1/2 CIR J232H

## (undated) DEVICE — STRIP,CLOSURE,WOUND,MEDI-STRIP,1/2X4: Brand: MEDLINE

## (undated) DEVICE — SUTURE VCRL SZ 0 L27IN ABSRB VLT L26MM CT-2 1/2 CIR J334H

## (undated) DEVICE — GAUZE,SPONGE,FLUFF,6"X6.75",STRL,5/TRAY: Brand: MEDLINE